# Patient Record
Sex: FEMALE | Race: WHITE | Employment: OTHER | URBAN - METROPOLITAN AREA
[De-identification: names, ages, dates, MRNs, and addresses within clinical notes are randomized per-mention and may not be internally consistent; named-entity substitution may affect disease eponyms.]

---

## 2017-01-09 ENCOUNTER — OFFICE VISIT (OUTPATIENT)
Dept: SLEEP MEDICINE | Age: 82
End: 2017-01-09

## 2017-01-09 VITALS
DIASTOLIC BLOOD PRESSURE: 86 MMHG | HEART RATE: 103 BPM | BODY MASS INDEX: 28.49 KG/M2 | SYSTOLIC BLOOD PRESSURE: 132 MMHG | WEIGHT: 171 LBS | OXYGEN SATURATION: 98 % | HEIGHT: 65 IN

## 2017-01-09 DIAGNOSIS — I25.10 CORONARY ARTERY DISEASE INVOLVING NATIVE CORONARY ARTERY OF NATIVE HEART WITHOUT ANGINA PECTORIS: Chronic | ICD-10-CM

## 2017-01-09 DIAGNOSIS — I10 ESSENTIAL HYPERTENSION: Chronic | ICD-10-CM

## 2017-01-09 DIAGNOSIS — G47.33 OBSTRUCTIVE SLEEP APNEA (ADULT) (PEDIATRIC): Primary | Chronic | ICD-10-CM

## 2017-01-09 DIAGNOSIS — E03.9 ACQUIRED HYPOTHYROIDISM: Chronic | ICD-10-CM

## 2017-01-09 PROCEDURE — 99214 OFFICE O/P EST MOD 30 MIN: CPT | Performed by: INTERNAL MEDICINE

## 2017-01-09 ASSESSMENT — SLEEP AND FATIGUE QUESTIONNAIRES
HOW LIKELY ARE YOU TO NOD OFF OR FALL ASLEEP WHILE SITTING AND READING: 2
ESS TOTAL SCORE: 4
HOW LIKELY ARE YOU TO NOD OFF OR FALL ASLEEP WHILE LYING DOWN TO REST IN THE AFTERNOON WHEN CIRCUMSTANCES PERMIT: 2
HOW LIKELY ARE YOU TO NOD OFF OR FALL ASLEEP WHEN YOU ARE A PASSENGER IN A CAR FOR AN HOUR WITHOUT A BREAK: 0
HOW LIKELY ARE YOU TO NOD OFF OR FALL ASLEEP WHILE SITTING QUIETLY AFTER LUNCH WITHOUT ALCOHOL: 0
HOW LIKELY ARE YOU TO NOD OFF OR FALL ASLEEP WHILE SITTING INACTIVE IN A PUBLIC PLACE: 0
HOW LIKELY ARE YOU TO NOD OFF OR FALL ASLEEP IN A CAR, WHILE STOPPED FOR A FEW MINUTES IN TRAFFIC: 0
HOW LIKELY ARE YOU TO NOD OFF OR FALL ASLEEP WHILE SITTING AND TALKING TO SOMEONE: 0
HOW LIKELY ARE YOU TO NOD OFF OR FALL ASLEEP WHILE WATCHING TV: 0

## 2017-01-09 ASSESSMENT — ENCOUNTER SYMPTOMS
APNEA: 1
SHORTNESS OF BREATH: 0
RHINORRHEA: 0
COUGH: 0
CHOKING: 0

## 2017-03-21 ENCOUNTER — OFFICE VISIT (OUTPATIENT)
Dept: SLEEP MEDICINE | Age: 82
End: 2017-03-21

## 2017-03-21 VITALS
WEIGHT: 171 LBS | DIASTOLIC BLOOD PRESSURE: 70 MMHG | SYSTOLIC BLOOD PRESSURE: 116 MMHG | HEART RATE: 77 BPM | OXYGEN SATURATION: 93 % | HEIGHT: 64 IN | BODY MASS INDEX: 29.19 KG/M2

## 2017-03-21 DIAGNOSIS — E03.9 ACQUIRED HYPOTHYROIDISM: Chronic | ICD-10-CM

## 2017-03-21 DIAGNOSIS — I10 ESSENTIAL HYPERTENSION: Chronic | ICD-10-CM

## 2017-03-21 DIAGNOSIS — I25.10 CORONARY ARTERY DISEASE INVOLVING NATIVE CORONARY ARTERY OF NATIVE HEART WITHOUT ANGINA PECTORIS: Chronic | ICD-10-CM

## 2017-03-21 DIAGNOSIS — G47.33 OBSTRUCTIVE SLEEP APNEA (ADULT) (PEDIATRIC): Primary | Chronic | ICD-10-CM

## 2017-03-21 PROCEDURE — 99214 OFFICE O/P EST MOD 30 MIN: CPT | Performed by: NURSE PRACTITIONER

## 2017-03-21 ASSESSMENT — ENCOUNTER SYMPTOMS
ABDOMINAL DISTENTION: 0
COUGH: 0
SINUS PRESSURE: 0
APNEA: 0
SHORTNESS OF BREATH: 0
RHINORRHEA: 0
ABDOMINAL PAIN: 0

## 2017-03-21 ASSESSMENT — SLEEP AND FATIGUE QUESTIONNAIRES
HOW LIKELY ARE YOU TO NOD OFF OR FALL ASLEEP WHILE SITTING AND TALKING TO SOMEONE: 0
HOW LIKELY ARE YOU TO NOD OFF OR FALL ASLEEP WHILE SITTING QUIETLY AFTER LUNCH WITHOUT ALCOHOL: 1
HOW LIKELY ARE YOU TO NOD OFF OR FALL ASLEEP WHILE SITTING INACTIVE IN A PUBLIC PLACE: 0
HOW LIKELY ARE YOU TO NOD OFF OR FALL ASLEEP WHEN YOU ARE A PASSENGER IN A CAR FOR AN HOUR WITHOUT A BREAK: 0
HOW LIKELY ARE YOU TO NOD OFF OR FALL ASLEEP WHILE SITTING AND READING: 0
ESS TOTAL SCORE: 3
HOW LIKELY ARE YOU TO NOD OFF OR FALL ASLEEP WHILE LYING DOWN TO REST IN THE AFTERNOON WHEN CIRCUMSTANCES PERMIT: 1
HOW LIKELY ARE YOU TO NOD OFF OR FALL ASLEEP IN A CAR, WHILE STOPPED FOR A FEW MINUTES IN TRAFFIC: 0
HOW LIKELY ARE YOU TO NOD OFF OR FALL ASLEEP WHILE WATCHING TV: 1

## 2018-01-24 ENCOUNTER — HOSPITAL ENCOUNTER (OUTPATIENT)
Dept: OTHER | Age: 83
Discharge: HOME | End: 2018-01-24
Attending: NEUROLOGICAL SURGERY | Admitting: NEUROLOGICAL SURGERY

## 2018-01-24 DIAGNOSIS — R52 PAIN: ICD-10-CM

## 2018-01-30 ENCOUNTER — HOSPITAL ENCOUNTER (OUTPATIENT)
Dept: MRI IMAGING | Age: 83
Discharge: HOME | End: 2018-01-30
Attending: NEUROLOGICAL SURGERY | Admitting: NEUROLOGICAL SURGERY

## 2018-01-30 DIAGNOSIS — M47.27 LUMBOSACRAL RADICULOPATHY DUE TO DEGENERATIVE JOINT DISEASE OF SPINE: ICD-10-CM

## 2018-01-30 DIAGNOSIS — M47.12 OTHER SPONDYLOSIS WITH MYELOPATHY, CERVICAL REGION: ICD-10-CM

## 2018-10-19 ENCOUNTER — ANESTHESIA (OUTPATIENT)
Dept: ENDOSCOPY | Age: 83
End: 2018-10-19
Payer: MEDICARE

## 2018-10-19 ENCOUNTER — HOSPITAL ENCOUNTER (OUTPATIENT)
Age: 83
Setting detail: OBSERVATION
Discharge: HOME | End: 2018-10-19
Attending: EMERGENCY MEDICINE
Payer: MEDICARE

## 2018-10-19 ENCOUNTER — APPOINTMENT (OUTPATIENT)
Dept: CT IMAGING | Age: 83
End: 2018-10-19
Payer: MEDICARE

## 2018-10-19 ENCOUNTER — ANESTHESIA EVENT (OUTPATIENT)
Dept: ENDOSCOPY | Age: 83
End: 2018-10-19
Payer: MEDICARE

## 2018-10-19 VITALS — SYSTOLIC BLOOD PRESSURE: 103 MMHG | DIASTOLIC BLOOD PRESSURE: 59 MMHG | OXYGEN SATURATION: 97 %

## 2018-10-19 VITALS
BODY MASS INDEX: 24.99 KG/M2 | HEIGHT: 65 IN | SYSTOLIC BLOOD PRESSURE: 147 MMHG | WEIGHT: 150 LBS | OXYGEN SATURATION: 98 % | DIASTOLIC BLOOD PRESSURE: 75 MMHG | RESPIRATION RATE: 16 BRPM | TEMPERATURE: 98.3 F | HEART RATE: 75 BPM

## 2018-10-19 DIAGNOSIS — K62.89 PROCTITIS: ICD-10-CM

## 2018-10-19 DIAGNOSIS — K62.5 RECTAL BLEEDING: Primary | ICD-10-CM

## 2018-10-19 PROBLEM — K92.2 GI BLEEDING: Status: ACTIVE | Noted: 2018-10-19

## 2018-10-19 LAB
A/G RATIO: 1.7 (ref 1.1–2.2)
ABO/RH: NORMAL
ALBUMIN SERPL-MCNC: 4.8 G/DL (ref 3.4–5)
ALP BLD-CCNC: 48 U/L (ref 40–129)
ALT SERPL-CCNC: 12 U/L (ref 10–40)
ANION GAP SERPL CALCULATED.3IONS-SCNC: 12 MMOL/L (ref 3–16)
ANTIBODY SCREEN: NORMAL
AST SERPL-CCNC: 17 U/L (ref 15–37)
BASOPHILS ABSOLUTE: 0 K/UL (ref 0–0.2)
BASOPHILS RELATIVE PERCENT: 0.7 %
BILIRUB SERPL-MCNC: 0.9 MG/DL (ref 0–1)
BILIRUBIN URINE: NEGATIVE
BLOOD, URINE: NEGATIVE
BUN BLDV-MCNC: 13 MG/DL (ref 7–20)
CALCIUM SERPL-MCNC: 9.4 MG/DL (ref 8.3–10.6)
CHLORIDE BLD-SCNC: 102 MMOL/L (ref 99–110)
CLARITY: CLEAR
CO2: 26 MMOL/L (ref 21–32)
COLOR: YELLOW
CREAT SERPL-MCNC: 0.8 MG/DL (ref 0.6–1.2)
EOSINOPHILS ABSOLUTE: 0 K/UL (ref 0–0.6)
EOSINOPHILS RELATIVE PERCENT: 0.4 %
EPITHELIAL CELLS, UA: 0 /HPF (ref 0–5)
GFR AFRICAN AMERICAN: >60
GFR NON-AFRICAN AMERICAN: >60
GLOBULIN: 2.8 G/DL
GLUCOSE BLD-MCNC: 110 MG/DL (ref 70–99)
GLUCOSE URINE: NEGATIVE MG/DL
HCT VFR BLD CALC: 41.5 % (ref 36–48)
HEMOGLOBIN: 13.9 G/DL (ref 12–16)
HYALINE CASTS: 1 /LPF (ref 0–8)
INR BLD: 1.15 (ref 0.86–1.14)
KETONES, URINE: 15 MG/DL
LEUKOCYTE ESTERASE, URINE: ABNORMAL
LIPASE: 25 U/L (ref 13–60)
LYMPHOCYTES ABSOLUTE: 0.8 K/UL (ref 1–5.1)
LYMPHOCYTES RELATIVE PERCENT: 13.7 %
MCH RBC QN AUTO: 28.7 PG (ref 26–34)
MCHC RBC AUTO-ENTMCNC: 33.5 G/DL (ref 31–36)
MCV RBC AUTO: 85.8 FL (ref 80–100)
MICROSCOPIC EXAMINATION: YES
MONOCYTES ABSOLUTE: 0.4 K/UL (ref 0–1.3)
MONOCYTES RELATIVE PERCENT: 6.2 %
NEUTROPHILS ABSOLUTE: 4.7 K/UL (ref 1.7–7.7)
NEUTROPHILS RELATIVE PERCENT: 79 %
NITRITE, URINE: NEGATIVE
OCCULT BLOOD DIAGNOSTIC: NORMAL
PDW BLD-RTO: 12.9 % (ref 12.4–15.4)
PH UA: 7.5
PLATELET # BLD: 182 K/UL (ref 135–450)
PMV BLD AUTO: 7.8 FL (ref 5–10.5)
POTASSIUM SERPL-SCNC: 3.8 MMOL/L (ref 3.5–5.1)
PROTEIN UA: NEGATIVE MG/DL
PROTHROMBIN TIME: 13.1 SEC (ref 9.8–13)
RBC # BLD: 4.83 M/UL (ref 4–5.2)
RBC UA: 0 /HPF (ref 0–4)
SODIUM BLD-SCNC: 140 MMOL/L (ref 136–145)
SPECIFIC GRAVITY UA: 1.01
TOTAL PROTEIN: 7.6 G/DL (ref 6.4–8.2)
URINE REFLEX TO CULTURE: YES
URINE TYPE: ABNORMAL
UROBILINOGEN, URINE: 0.2 E.U./DL
WBC # BLD: 6 K/UL (ref 4–11)
WBC UA: 2 /HPF (ref 0–5)

## 2018-10-19 PROCEDURE — 96361 HYDRATE IV INFUSION ADD-ON: CPT

## 2018-10-19 PROCEDURE — 80053 COMPREHEN METABOLIC PANEL: CPT

## 2018-10-19 PROCEDURE — 2500000003 HC RX 250 WO HCPCS: Performed by: NURSE ANESTHETIST, CERTIFIED REGISTERED

## 2018-10-19 PROCEDURE — G0378 HOSPITAL OBSERVATION PER HR: HCPCS

## 2018-10-19 PROCEDURE — 86900 BLOOD TYPING SEROLOGIC ABO: CPT

## 2018-10-19 PROCEDURE — 3609008400 HC SIGMOIDOSCOPY DIAGNOSTIC: Performed by: INTERNAL MEDICINE

## 2018-10-19 PROCEDURE — 87086 URINE CULTURE/COLONY COUNT: CPT

## 2018-10-19 PROCEDURE — 7100000010 HC PHASE II RECOVERY - FIRST 15 MIN: Performed by: INTERNAL MEDICINE

## 2018-10-19 PROCEDURE — 86901 BLOOD TYPING SEROLOGIC RH(D): CPT

## 2018-10-19 PROCEDURE — 85025 COMPLETE CBC W/AUTO DIFF WBC: CPT

## 2018-10-19 PROCEDURE — 83690 ASSAY OF LIPASE: CPT

## 2018-10-19 PROCEDURE — 86850 RBC ANTIBODY SCREEN: CPT

## 2018-10-19 PROCEDURE — 6360000002 HC RX W HCPCS: Performed by: INTERNAL MEDICINE

## 2018-10-19 PROCEDURE — 81001 URINALYSIS AUTO W/SCOPE: CPT

## 2018-10-19 PROCEDURE — 2580000003 HC RX 258: Performed by: NURSE ANESTHETIST, CERTIFIED REGISTERED

## 2018-10-19 PROCEDURE — 85610 PROTHROMBIN TIME: CPT

## 2018-10-19 PROCEDURE — 74177 CT ABD & PELVIS W/CONTRAST: CPT

## 2018-10-19 PROCEDURE — 99285 EMERGENCY DEPT VISIT HI MDM: CPT

## 2018-10-19 PROCEDURE — 7100000011 HC PHASE II RECOVERY - ADDTL 15 MIN: Performed by: INTERNAL MEDICINE

## 2018-10-19 PROCEDURE — 2500000003 HC RX 250 WO HCPCS: Performed by: INTERNAL MEDICINE

## 2018-10-19 PROCEDURE — G0328 FECAL BLOOD SCRN IMMUNOASSAY: HCPCS

## 2018-10-19 PROCEDURE — 96375 TX/PRO/DX INJ NEW DRUG ADDON: CPT

## 2018-10-19 PROCEDURE — 6360000004 HC RX CONTRAST MEDICATION: Performed by: EMERGENCY MEDICINE

## 2018-10-19 PROCEDURE — 96374 THER/PROPH/DIAG INJ IV PUSH: CPT

## 2018-10-19 PROCEDURE — S0028 INJECTION, FAMOTIDINE, 20 MG: HCPCS | Performed by: INTERNAL MEDICINE

## 2018-10-19 PROCEDURE — 3700000000 HC ANESTHESIA ATTENDED CARE: Performed by: INTERNAL MEDICINE

## 2018-10-19 PROCEDURE — 3700000001 HC ADD 15 MINUTES (ANESTHESIA): Performed by: INTERNAL MEDICINE

## 2018-10-19 PROCEDURE — 7100000000 HC PACU RECOVERY - FIRST 15 MIN: Performed by: INTERNAL MEDICINE

## 2018-10-19 PROCEDURE — 2709999900 HC NON-CHARGEABLE SUPPLY: Performed by: INTERNAL MEDICINE

## 2018-10-19 PROCEDURE — 2580000003 HC RX 258: Performed by: NURSE PRACTITIONER

## 2018-10-19 PROCEDURE — 7100000001 HC PACU RECOVERY - ADDTL 15 MIN: Performed by: INTERNAL MEDICINE

## 2018-10-19 RX ORDER — PROPOFOL 10 MG/ML
INJECTION, EMULSION INTRAVENOUS CONTINUOUS PRN
Status: DISCONTINUED | OUTPATIENT
Start: 2018-10-19 | End: 2018-10-19 | Stop reason: SDUPTHER

## 2018-10-19 RX ORDER — BUSPIRONE HYDROCHLORIDE 5 MG/1
15 TABLET ORAL 3 TIMES DAILY
Status: CANCELLED | OUTPATIENT
Start: 2018-10-19

## 2018-10-19 RX ORDER — LIDOCAINE HYDROCHLORIDE 20 MG/ML
INJECTION, SOLUTION INFILTRATION; PERINEURAL PRN
Status: DISCONTINUED | OUTPATIENT
Start: 2018-10-19 | End: 2018-10-19 | Stop reason: SDUPTHER

## 2018-10-19 RX ORDER — ISOSORBIDE MONONITRATE 30 MG/1
30 TABLET, EXTENDED RELEASE ORAL DAILY
Status: CANCELLED | OUTPATIENT
Start: 2018-10-19

## 2018-10-19 RX ORDER — ATORVASTATIN CALCIUM 80 MG/1
80 TABLET, FILM COATED ORAL NIGHTLY
Status: CANCELLED | OUTPATIENT
Start: 2018-10-19

## 2018-10-19 RX ORDER — LEVOTHYROXINE SODIUM 0.07 MG/1
1 TABLET ORAL DAILY
Status: CANCELLED | OUTPATIENT
Start: 2018-10-19

## 2018-10-19 RX ORDER — SERTRALINE HYDROCHLORIDE 100 MG/1
200 TABLET, FILM COATED ORAL DAILY
Status: ON HOLD | COMMUNITY
End: 2021-10-18 | Stop reason: ALTCHOICE

## 2018-10-19 RX ORDER — SODIUM CHLORIDE 0.9 % (FLUSH) 0.9 %
10 SYRINGE (ML) INJECTION PRN
Status: CANCELLED | OUTPATIENT
Start: 2018-10-19

## 2018-10-19 RX ORDER — AMLODIPINE BESYLATE 2.5 MG/1
1 TABLET ORAL DAILY
Status: CANCELLED | OUTPATIENT
Start: 2018-10-19

## 2018-10-19 RX ORDER — SODIUM CHLORIDE 9 MG/ML
INJECTION, SOLUTION INTRAVENOUS CONTINUOUS PRN
Status: DISCONTINUED | OUTPATIENT
Start: 2018-10-19 | End: 2018-10-19 | Stop reason: SDUPTHER

## 2018-10-19 RX ORDER — ATORVASTATIN CALCIUM 80 MG/1
80 TABLET, FILM COATED ORAL NIGHTLY
Status: ON HOLD | COMMUNITY
End: 2021-10-22 | Stop reason: SDUPTHER

## 2018-10-19 RX ORDER — ONDANSETRON 2 MG/ML
4 INJECTION INTRAMUSCULAR; INTRAVENOUS EVERY 6 HOURS PRN
Status: CANCELLED | OUTPATIENT
Start: 2018-10-19

## 2018-10-19 RX ORDER — SODIUM CHLORIDE 0.9 % (FLUSH) 0.9 %
10 SYRINGE (ML) INJECTION EVERY 12 HOURS SCHEDULED
Status: CANCELLED | OUTPATIENT
Start: 2018-10-19

## 2018-10-19 RX ORDER — 0.9 % SODIUM CHLORIDE 0.9 %
1000 INTRAVENOUS SOLUTION INTRAVENOUS ONCE
Status: COMPLETED | OUTPATIENT
Start: 2018-10-19 | End: 2018-10-19

## 2018-10-19 RX ORDER — METOPROLOL SUCCINATE 100 MG/1
1 TABLET, EXTENDED RELEASE ORAL DAILY
Status: CANCELLED | OUTPATIENT
Start: 2018-10-19

## 2018-10-19 RX ORDER — ONDANSETRON 2 MG/ML
4 INJECTION INTRAMUSCULAR; INTRAVENOUS EVERY 6 HOURS PRN
Status: DISCONTINUED | OUTPATIENT
Start: 2018-10-19 | End: 2018-11-13 | Stop reason: HOSPADM

## 2018-10-19 RX ADMIN — SODIUM CHLORIDE 1000 ML: 9 INJECTION, SOLUTION INTRAVENOUS at 07:57

## 2018-10-19 RX ADMIN — SODIUM CHLORIDE: 9 INJECTION, SOLUTION INTRAVENOUS at 14:00

## 2018-10-19 RX ADMIN — FAMOTIDINE 20 MG: 10 INJECTION, SOLUTION INTRAVENOUS at 11:55

## 2018-10-19 RX ADMIN — IOPAMIDOL 75 ML: 755 INJECTION, SOLUTION INTRAVENOUS at 08:45

## 2018-10-19 RX ADMIN — ONDANSETRON 4 MG: 2 INJECTION INTRAMUSCULAR; INTRAVENOUS at 11:55

## 2018-10-19 RX ADMIN — PROPOFOL 50 MCG/KG/MIN: 10 INJECTION, EMULSION INTRAVENOUS at 14:06

## 2018-10-19 RX ADMIN — LIDOCAINE HYDROCHLORIDE 50 MG: 20 INJECTION, SOLUTION INFILTRATION; PERINEURAL at 14:05

## 2018-10-19 ASSESSMENT — PULMONARY FUNCTION TESTS
PIF_VALUE: 1

## 2018-10-19 ASSESSMENT — ENCOUNTER SYMPTOMS
BLOOD IN STOOL: 1
SHORTNESS OF BREATH: 0
DIARRHEA: 0
VOMITING: 0
ABDOMINAL PAIN: 0
CHEST TIGHTNESS: 0
NAUSEA: 1

## 2018-10-19 ASSESSMENT — PAIN - FUNCTIONAL ASSESSMENT: PAIN_FUNCTIONAL_ASSESSMENT: 0-10

## 2018-10-19 NOTE — ED NOTES
Pt resting quietly, awaiting transfer to the floor.        Denise Gill, RN  10/19/18 Rajitmackenzie 130, RN  10/19/18 130

## 2018-10-19 NOTE — ED NOTES
Pt c/o of rectal bleeding that began a few weeks. Pt came to ED today because of coffee ground bleeding found on sheets through the night. Pt reports straining while using the restroom. Pt states pain is a 3/10. VSS. No distress noted. Iv placed and fluids are running. PT C/O of headache, physician made aware. Pt and spouse are updated on plan of care. Call light within reach.       Brittani Aquino RN  10/19/18 0655

## 2018-10-19 NOTE — CONSULTS
Gastroenterology Consult Note      Patient: Esequiel Valentine  : 1935  Acct#:      Date:  10/19/2018    Subjective:       History of Present Illness  Patient is a 82 y.o.  female admitted with GI bleeding [K92.2]  GI bleeding [K92.2] who is seen in consult for rectal bleeding. Pt reports a 2 year h/o intermittent BRPBR but much worse over the last 2 days precipitating presentation. Mild lower crampy discomfort. No anorectal pain. No constipation or straining at stool. Has about 2 BM a day that tend to be loose with associated urgency. Reports a 9# unintentional wt loss over the past year or so. Thinks last colonoscopy was 8-10 years ago - unclear if ever had polyps. Past Medical History:   Diagnosis Date    CAD (coronary artery disease)     Depression     Hyperlipidemia     Hypertension     Hypothyroid     Obstructive sleep apnea (adult) (pediatric)       Past Surgical History:   Procedure Laterality Date    ANGIOPLASTY      CORONARY ARTERY BYPASS GRAFT      EYE SURGERY      TUBAL LIGATION          Admission Meds  No current facility-administered medications on file prior to encounter. Current Outpatient Prescriptions on File Prior to Encounter   Medication Sig Dispense Refill    clopidogrel (PLAVIX) 75 MG tablet Take 75 mg by mouth daily.  busPIRone (BUSPAR) 15 MG tablet Take 15 mg by mouth 3 times daily       isosorbide mononitrate (IMDUR) 30 MG CR tablet Take 30 mg by mouth daily.  metoprolol (TOPROL-XL) 100 MG XL tablet TAKE 1 TABLET BY MOUTH EVERY DAY 30 tablet 0    levothyroxine (SYNTHROID) 75 MCG tablet TAKE ONE TABLET BY MOUTH DAILY 90 tablet 1    amlodipine (NORVASC) 2.5 MG tablet TAKE 1 TABLET BY MOUTH DAILY 90 tablet 3    zoster vaccine live, PF, (ZOSTAVAX) 28128 UNT/0.65ML injection Inject 0.65 mLs into the skin once. Patient denies ASA, NSAID use.     Allergies  Allergies   Allergen Reactions    Percocet

## 2018-10-19 NOTE — PROGRESS NOTES
Adm to pacu from endo per cart awakening. Respirations easy & symmetrical. Abdomen soft. Denies pain.

## 2018-10-19 NOTE — PROGRESS NOTES
preopTeaching / education initiated regarding perioperative experience, expectations, and pain management during stay. Patient verbalized understanding.

## 2018-10-19 NOTE — ED PROVIDER NOTES
in stool and nausea. Negative for abdominal pain, diarrhea and vomiting. Genitourinary: Negative for dysuria. Psychiatric/Behavioral: The patient is nervous/anxious. All other systems reviewed and are negative. Positives and Pertinent negatives as per HPI. Except as noted abovein the ROS, all other systems were reviewed and negative. PAST MEDICAL HISTORY     Past Medical History:   Diagnosis Date    CAD (coronary artery disease)     Depression     Hyperlipidemia     Hypertension     Hypothyroid     Obstructive sleep apnea (adult) (pediatric)          SURGICAL HISTORY     Past Surgical History:   Procedure Laterality Date    ANGIOPLASTY      CORONARY ARTERY BYPASS GRAFT  1997    EYE SURGERY      TUBAL LIGATION           CURRENTMEDICATIONS       Previous Medications    AMLODIPINE (NORVASC) 2.5 MG TABLET    TAKE 1 TABLET BY MOUTH DAILY    BUSPIRONE (BUSPAR) 15 MG TABLET    Take 15 mg by mouth 2 times daily. 1/2 tab in AM & 1 tab in PM     CLOPIDOGREL (PLAVIX) 75 MG TABLET    Take 75 mg by mouth daily. ISOSORBIDE MONONITRATE (IMDUR) 30 MG CR TABLET    Take 30 mg by mouth daily. LEVOTHYROXINE (SYNTHROID) 75 MCG TABLET    TAKE ONE TABLET BY MOUTH DAILY    METOPROLOL (TOPROL-XL) 100 MG XL TABLET    TAKE 1 TABLET BY MOUTH EVERY DAY    NIACIN-SIMVASTATIN (SIMCOR) 500-20 MG TB24 TABLET    Take 2 tablets by mouth daily. SERTRALINE (ZOLOFT) 100 MG TABLET    TAKE ONE TABLET BY MOUTH EVERY DAY    ZOSTER VACCINE LIVE, PF, (ZOSTAVAX) 21880 UNT/0.65ML INJECTION    Inject 0.65 mLs into the skin once.            ALLERGIES     Percocet [oxycodone-acetaminophen]    FAMILYHISTORY       Family History   Problem Relation Age of Onset    Heart Disease Mother     Heart Disease Father     Asthma Neg Hx     Cancer Neg Hx     Diabetes Neg Hx     Emphysema Neg Hx     Hypertension Neg Hx     Heart Failure Neg Hx           SOCIAL HISTORY       Social History     Social History    Marital status: examination by myself. This was Hemoccult negative and a yellow stool. She reports she is awakening in the morning with a large splat of blood staining the bed sheets, does admit that she is incontinent of stool and urine. This is not new. CBC is unremarkable with a normal hemoglobin of 13.9. CMP unremarkable. Lipase 25.0. Urinalysis reveals slight ketonuria, the patient is given a liter of IV fluid. INR 1.15, she does take Plavix and aspirin daily. CT abdomen pelvis with IV contrast:  Impression:    Questionable focal wall thickening of the rectum.  This could be further  evaluated with endoscopy if clinically warranted.  There is no evidence of an  obstruction. Small volume pelvic free fluid. Lower lobe nodules measure less than 6 mm. Attending physician did speak with gastroenterology, Dr. Niraj Paulino, he does recommend admission via observation status to hospitalist services and his service will see the patient to sort out rectal bleeding. Patient has not had any bleeding from the rectum during her ER stay. The patient tolerated their visit well. They were seen and evaluated by the attending physician who agreed with the assessment and plan. The patient and / or thefamily were informed of the results of any tests, a time was given to answer questions, a plan was proposed and they agreed with plan. FINAL IMPRESSION      1. Rectal bleeding          DISPOSITION/PLAN   DISPOSITION Decision To Admit 10/19/2018 10:43:29 AM      PATIENT REFERREDTO:  No follow-up provider specified. DISCHARGE MEDICATIONS:  New Prescriptions    No medications on file       DISCONTINUED MEDICATIONS:  Discontinued Medications    ASPIRIN 81 MG EC TABLET    Take 81 mg by mouth daily. HYDROCODONE-ACETAMINOPHEN (VICODIN) 5-500 MG PER TABLET    Take 1 tablet by mouth every 6 hours as needed. LORAZEPAM (ATIVAN) 0.5 MG TABLET    Take 0.5 mg by mouth every 6 hours as needed.       POTASSIUM CHLORIDE

## 2018-10-19 NOTE — PROGRESS NOTES
ALL DISCHARGE INFORMATION EXPLAINED TO PT AND RESPONSIBLE PARTY TO INCLUDE PAIN MANAGEMENT, DIET, ACTIVITY, WOUND CARE ET UNDERSTANDING VOICED. PT HAS CLEAR UNDERSTANDING OF THEIR HOME MEDS. EDUCATIONAL PT HAS BEEN ASSESSED TO BE AT POTENTIAL RISK FOR FALLS RELATED TO RECEIVING ANESTHESIA/MEDICATIONS IN SURGERY. PT AND FAMILY GIVEN INFORMATION ON ASSISTED AMBULATION POST OP.  PAPERS SIGNED AND COPIES GIVEN

## 2018-10-20 LAB — URINE CULTURE, ROUTINE: NORMAL

## 2018-10-20 PROCEDURE — G0378 HOSPITAL OBSERVATION PER HR: HCPCS

## 2018-10-21 PROCEDURE — G0378 HOSPITAL OBSERVATION PER HR: HCPCS

## 2018-10-22 PROCEDURE — G0378 HOSPITAL OBSERVATION PER HR: HCPCS

## 2018-10-23 PROCEDURE — G0378 HOSPITAL OBSERVATION PER HR: HCPCS

## 2018-10-24 PROCEDURE — G0378 HOSPITAL OBSERVATION PER HR: HCPCS

## 2018-10-25 PROCEDURE — G0378 HOSPITAL OBSERVATION PER HR: HCPCS

## 2018-10-26 PROCEDURE — G0378 HOSPITAL OBSERVATION PER HR: HCPCS

## 2018-10-27 PROCEDURE — G0378 HOSPITAL OBSERVATION PER HR: HCPCS

## 2018-10-28 PROCEDURE — G0378 HOSPITAL OBSERVATION PER HR: HCPCS

## 2018-10-29 PROCEDURE — G0378 HOSPITAL OBSERVATION PER HR: HCPCS

## 2018-10-30 PROCEDURE — G0378 HOSPITAL OBSERVATION PER HR: HCPCS

## 2018-10-30 RX ORDER — ARIPIPRAZOLE 2 MG/1
2 TABLET ORAL DAILY
Status: ON HOLD | COMMUNITY
End: 2021-10-18 | Stop reason: ALTCHOICE

## 2018-10-31 PROCEDURE — G0378 HOSPITAL OBSERVATION PER HR: HCPCS

## 2018-11-01 PROCEDURE — G0378 HOSPITAL OBSERVATION PER HR: HCPCS

## 2018-11-02 PROCEDURE — G0378 HOSPITAL OBSERVATION PER HR: HCPCS

## 2018-11-03 PROCEDURE — G0378 HOSPITAL OBSERVATION PER HR: HCPCS

## 2018-11-04 PROCEDURE — G0378 HOSPITAL OBSERVATION PER HR: HCPCS

## 2018-11-05 PROCEDURE — G0378 HOSPITAL OBSERVATION PER HR: HCPCS

## 2018-11-06 PROCEDURE — G0378 HOSPITAL OBSERVATION PER HR: HCPCS

## 2018-11-07 PROCEDURE — G0378 HOSPITAL OBSERVATION PER HR: HCPCS

## 2018-11-08 PROCEDURE — G0378 HOSPITAL OBSERVATION PER HR: HCPCS

## 2018-11-09 PROCEDURE — G0378 HOSPITAL OBSERVATION PER HR: HCPCS

## 2018-11-10 PROCEDURE — G0378 HOSPITAL OBSERVATION PER HR: HCPCS

## 2018-11-11 PROCEDURE — G0378 HOSPITAL OBSERVATION PER HR: HCPCS

## 2018-11-12 PROCEDURE — G0378 HOSPITAL OBSERVATION PER HR: HCPCS

## 2018-11-16 ENCOUNTER — HOSPITAL ENCOUNTER (OUTPATIENT)
Age: 83
Setting detail: OUTPATIENT SURGERY
Discharge: HOME | End: 2018-11-16
Attending: INTERNAL MEDICINE | Admitting: INTERNAL MEDICINE
Payer: MEDICARE

## 2018-11-16 ENCOUNTER — ANESTHESIA (OUTPATIENT)
Dept: ENDOSCOPY | Age: 83
End: 2018-11-16
Payer: MEDICARE

## 2018-11-16 ENCOUNTER — ANESTHESIA EVENT (OUTPATIENT)
Dept: ENDOSCOPY | Age: 83
End: 2018-11-16
Payer: MEDICARE

## 2018-11-16 VITALS
BODY MASS INDEX: 25.1 KG/M2 | SYSTOLIC BLOOD PRESSURE: 112 MMHG | HEIGHT: 64 IN | HEART RATE: 65 BPM | WEIGHT: 147 LBS | RESPIRATION RATE: 16 BRPM | OXYGEN SATURATION: 100 % | TEMPERATURE: 97.4 F | DIASTOLIC BLOOD PRESSURE: 87 MMHG

## 2018-11-16 VITALS — DIASTOLIC BLOOD PRESSURE: 48 MMHG | OXYGEN SATURATION: 98 % | SYSTOLIC BLOOD PRESSURE: 107 MMHG

## 2018-11-16 PROCEDURE — 2709999900 HC NON-CHARGEABLE SUPPLY: Performed by: INTERNAL MEDICINE

## 2018-11-16 PROCEDURE — 2580000003 HC RX 258: Performed by: ANESTHESIOLOGY

## 2018-11-16 PROCEDURE — C1773 RET DEV, INSERTABLE: HCPCS | Performed by: INTERNAL MEDICINE

## 2018-11-16 PROCEDURE — 6360000002 HC RX W HCPCS: Performed by: NURSE ANESTHETIST, CERTIFIED REGISTERED

## 2018-11-16 PROCEDURE — 2580000003 HC RX 258: Performed by: NURSE ANESTHETIST, CERTIFIED REGISTERED

## 2018-11-16 PROCEDURE — 7100000011 HC PHASE II RECOVERY - ADDTL 15 MIN: Performed by: INTERNAL MEDICINE

## 2018-11-16 PROCEDURE — 3609010600 HC COLONOSCOPY POLYPECTOMY SNARE/COLD BIOPSY: Performed by: INTERNAL MEDICINE

## 2018-11-16 PROCEDURE — 3700000001 HC ADD 15 MINUTES (ANESTHESIA): Performed by: INTERNAL MEDICINE

## 2018-11-16 PROCEDURE — 7100000010 HC PHASE II RECOVERY - FIRST 15 MIN: Performed by: INTERNAL MEDICINE

## 2018-11-16 PROCEDURE — 88305 TISSUE EXAM BY PATHOLOGIST: CPT

## 2018-11-16 PROCEDURE — 3700000000 HC ANESTHESIA ATTENDED CARE: Performed by: INTERNAL MEDICINE

## 2018-11-16 RX ORDER — BRIMONIDINE TARTRATE, TIMOLOL MALEATE 2; 5 MG/ML; MG/ML
1 SOLUTION/ DROPS OPHTHALMIC EVERY 12 HOURS
Status: ON HOLD | COMMUNITY
End: 2021-11-01

## 2018-11-16 RX ORDER — VENLAFAXINE 100 MG/1
100 TABLET ORAL NIGHTLY
Status: ON HOLD | COMMUNITY
End: 2021-10-22 | Stop reason: HOSPADM

## 2018-11-16 RX ORDER — QUETIAPINE FUMARATE 50 MG/1
50 TABLET, FILM COATED ORAL NIGHTLY
COMMUNITY

## 2018-11-16 RX ORDER — PROPOFOL 10 MG/ML
INJECTION, EMULSION INTRAVENOUS PRN
Status: DISCONTINUED | OUTPATIENT
Start: 2018-11-16 | End: 2018-11-16 | Stop reason: SDUPTHER

## 2018-11-16 RX ORDER — PREDNISOLONE SODIUM PHOSPHATE 10 MG/ML
1 SOLUTION/ DROPS OPHTHALMIC DAILY
COMMUNITY

## 2018-11-16 RX ORDER — SODIUM CHLORIDE 9 MG/ML
INJECTION, SOLUTION INTRAVENOUS CONTINUOUS PRN
Status: DISCONTINUED | OUTPATIENT
Start: 2018-11-16 | End: 2018-11-16 | Stop reason: SDUPTHER

## 2018-11-16 RX ORDER — SODIUM CHLORIDE 9 MG/ML
INJECTION, SOLUTION INTRAVENOUS CONTINUOUS
Status: DISCONTINUED | OUTPATIENT
Start: 2018-11-16 | End: 2018-11-19 | Stop reason: HOSPADM

## 2018-11-16 RX ADMIN — PROPOFOL 10 MG: 10 INJECTION, EMULSION INTRAVENOUS at 12:10

## 2018-11-16 RX ADMIN — PROPOFOL 10 MG: 10 INJECTION, EMULSION INTRAVENOUS at 12:08

## 2018-11-16 RX ADMIN — SODIUM CHLORIDE: 9 INJECTION, SOLUTION INTRAVENOUS at 11:30

## 2018-11-16 RX ADMIN — PROPOFOL 20 MG: 10 INJECTION, EMULSION INTRAVENOUS at 12:00

## 2018-11-16 RX ADMIN — PROPOFOL 10 MG: 10 INJECTION, EMULSION INTRAVENOUS at 12:14

## 2018-11-16 RX ADMIN — PROPOFOL 10 MG: 10 INJECTION, EMULSION INTRAVENOUS at 12:17

## 2018-11-16 RX ADMIN — PROPOFOL 10 MG: 10 INJECTION, EMULSION INTRAVENOUS at 12:06

## 2018-11-16 RX ADMIN — PROPOFOL 10 MG: 10 INJECTION, EMULSION INTRAVENOUS at 12:04

## 2018-11-16 RX ADMIN — PROPOFOL 80 MG: 10 INJECTION, EMULSION INTRAVENOUS at 11:57

## 2018-11-16 RX ADMIN — SODIUM CHLORIDE: 9 INJECTION, SOLUTION INTRAVENOUS at 11:50

## 2018-11-16 RX ADMIN — PROPOFOL 10 MG: 10 INJECTION, EMULSION INTRAVENOUS at 12:02

## 2018-11-16 ASSESSMENT — PAIN SCALES - GENERAL
PAINLEVEL_OUTOF10: 0

## 2018-11-16 ASSESSMENT — PAIN - FUNCTIONAL ASSESSMENT: PAIN_FUNCTIONAL_ASSESSMENT: 0-10

## 2018-11-16 NOTE — PROGRESS NOTES
To endo recovery from procedure room. VSS, awakens to voice, respirations easy and unlabored.
or piercings on day of surgery. All body piercing jewelry must be removed. 11. If you have ___dentures, they will be removed before going to the OR; we will provide you a container. If you wear ___contact lenses or ___glasses, they will be removed; please bring a case for them. 12. Please see your family doctor/pediatrician for a history & physical and/or concerning medications. Bring any test results/reports from your physician's office. PCP__________________Phone___________H&P Appt. Date________             13 If you  have a Living Will and Durable Power of  for Healthcare, please bring in a copy. X           14. Notify your Surgeon if you develop any illness between now and surgery  time, cough, cold, fever, sore throat, nausea, vomiting, etc.  Please notify your surgeon if you experience dizziness, shortness of breath or blurred vision between now & the time of your surgery             15. DO NOT shave your operative site 96 hours prior to surgery. For face & neck surgery, men may use an electric razor 48 hours prior to surgery. 16. Shower the night before surgery with ___Antibacterial soap ___Hibiclens             17. To provide excellent care visitors will be limited to one in the room at any given time. X           18. Please bring picture ID and insurance card. 19.  Visit our web site for additional information:  [email or web address]              20.During flu season no children under the age of 15 are permitted in the hospital for the safety of all patients. 21. If you take a long acting insulin in the evening only  take half of your usual  dose the night  before your procedure              22. If you use a c-pap please bring DOS if staying overnight,             23.For your convenience [facility] has a pharmacy on site to fill your prescriptions.              24. If you use oxygen and have a portable tank please

## 2018-11-16 NOTE — ANESTHESIA PRE PROCEDURE
disease)     Depression     Hyperlipidemia     Hypertension     Hypothyroid     Obstructive sleep apnea (adult) (pediatric)        Past Surgical History:        Procedure Laterality Date    ANGIOPLASTY      CORONARY ARTERY BYPASS GRAFT  1997    EYE SURGERY Right     CATARACT EXTRACTION W/ IOL    EYE SURGERY Left     CATARCT EXTRACTION W/ IOL    [place] SIGMOIDOSCOPY FLX DX W/COLLJ SPEC BR/WA IF PFRMD N/A 10/19/2018    SIGMOIDOSCOPY DIAGNOSTIC FLEXIBLE performed by Jocelin Palencia MD at [address]  10/19/2018    flexible    TONSILLECTOMY AND ADENOIDECTOMY      TUBAL LIGATION         Social History:    Social History   Substance Use Topics    Smoking status: Never Smoker    Smokeless tobacco: Never Used    Alcohol use Yes      Comment: occas. Counseling given: Not Answered      Vital Signs (Current): There were no vitals filed for this visit.                                            BP Readings from Last 3 Encounters:   10/19/18 (!) 147/75   10/19/18 (!) 103/59   03/21/17 116/70       NPO Status:                                                                                 BMI:   Wt Readings from Last 3 Encounters:   10/30/18 149 lb (67.6 kg)   10/19/18 150 lb (68 kg)   03/21/17 171 lb (77.6 kg)     There is no height or weight on file to calculate BMI.    CBC:   Lab Results   Component Value Date    WBC 6.0 10/19/2018    RBC 4.83 10/19/2018    HGB 13.9 10/19/2018    HCT 41.5 10/19/2018    MCV 85.8 10/19/2018    RDW 12.9 10/19/2018     10/19/2018       CMP:   Lab Results   Component Value Date     10/19/2018    K 3.8 10/19/2018     10/19/2018    CO2 26 10/19/2018    BUN 13 10/19/2018    CREATININE 0.8 10/19/2018    GFRAA >60 10/19/2018    GFRAA 57 06/03/2010    AGRATIO 1.7 10/19/2018    LABGLOM >60 10/19/2018    GLUCOSE 110 10/19/2018    PROT 7.6 10/19/2018    PROT 7.6 06/03/2010    CALCIUM 9.4 10/19/2018    BILITOT 0.9 10/19/2018 ALKPHOS 48 10/19/2018    AST 17 10/19/2018    ALT 12 10/19/2018       POC Tests: No results for input(s): POCGLU, POCNA, POCK, POCCL, POCBUN, POCHEMO, POCHCT in the last 72 hours. Coags:   Lab Results   Component Value Date    PROTIME 13.1 10/19/2018    INR 1.15 10/19/2018       HCG (If Applicable): No results found for: PREGTESTUR, PREGSERUM, HCG, HCGQUANT     ABGs: No results found for: PHART, PO2ART, JDV8YLC, XHN6MPG, BEART, G7TEHDFO     Type & Screen (If Applicable):  No results found for: LABABO, [address]    Anesthesia Evaluation  Patient summary reviewed and Nursing notes reviewed no history of anesthetic complications:   Airway: Mallampati: II  TM distance: >3 FB   Neck ROM: full  Mouth opening: > = 3 FB Dental:          Pulmonary:normal exam    (+) sleep apnea:                             Cardiovascular:  Exercise tolerance: poor (<4 METS),   (+) hypertension: moderate, CAD:, CABG/stent (s/p CABG 2009 stents later  good exercise tolerance): no interval change, [place]:, hyperlipidemia    (-)  angina, orthopnea and PND      Rhythm: regular  Rate: normal  Echocardiogram reviewed         Beta Blocker:  Dose within 24 Hrs      ROS comment: 2017:  Equivocal ECG for ischemia with graded exercise test.   2. Duke Treadmill Score is 4 which indicates intermediate risk. 3. Hypertensive BP response. Neuro/Psych:   (+) depression/anxiety    (-) psychiatric history            ROS comment: 16 eye surgery  Legally blind  Severe glaucoma GI/Hepatic/Renal:             Endo/Other:    (+) hypothyroidism, blood dyscrasia: anemia:., .                 Abdominal:           Vascular:                                        Anesthesia Plan      MAC     ASA 3       Induction: intravenous. MIPS: Prophylactic antiemetics administered. Anesthetic plan and risks discussed with patient. Plan discussed with CRNA.     Attending anesthesiologist reviewed and agrees with Renato Reece MD

## 2018-12-19 NOTE — DISCHARGE SUMMARY
The patient is an 29-year-old female who presented to  emergency room this morning with a report of bright red blood per  rectum. The bright red blood per rectum had been intermittent for two  years, but increased over the last couple of days and was associated  with increased stooling and some urgency. Pt was brought to the endo dept for a sigmoidoscopy. Normal sigmoidoscopy to the mid descending colon with the exception of mild left-sided diverticulosis and small  internal hemorrhoids. I suspect the bleeding is hemorrhoidal in  nature. There is no evidence of any blood throughout the colon. Plan is for a complete colon after plavix is held for 1 week.

## 2021-10-18 ENCOUNTER — APPOINTMENT (OUTPATIENT)
Dept: GENERAL RADIOLOGY | Age: 86
DRG: 377 | End: 2021-10-18
Payer: COMMERCIAL

## 2021-10-18 ENCOUNTER — HOSPITAL ENCOUNTER (INPATIENT)
Age: 86
LOS: 4 days | Discharge: HOME HEALTH | DRG: 377 | End: 2021-10-22
Attending: EMERGENCY MEDICINE | Admitting: INTERNAL MEDICINE
Payer: COMMERCIAL

## 2021-10-18 DIAGNOSIS — J81.0 ACUTE PULMONARY EDEMA (HCC): Primary | ICD-10-CM

## 2021-10-18 DIAGNOSIS — K92.2 GASTROINTESTINAL HEMORRHAGE, UNSPECIFIED GASTROINTESTINAL HEMORRHAGE TYPE: ICD-10-CM

## 2021-10-18 DIAGNOSIS — I48.91 ATRIAL FIBRILLATION, UNSPECIFIED TYPE (HCC): ICD-10-CM

## 2021-10-18 DIAGNOSIS — J96.01 ACUTE RESPIRATORY FAILURE WITH HYPOXEMIA (HCC): ICD-10-CM

## 2021-10-18 PROBLEM — D62 ACUTE BLOOD LOSS ANEMIA: Status: ACTIVE | Noted: 2021-10-18

## 2021-10-18 LAB
A/G RATIO: 0.7 (ref 1.1–2.2)
ABO/RH: NORMAL
ALBUMIN SERPL-MCNC: 3 G/DL (ref 3.4–5)
ALP BLD-CCNC: 75 U/L (ref 40–129)
ALT SERPL-CCNC: 21 U/L (ref 10–40)
ANION GAP SERPL CALCULATED.3IONS-SCNC: 16 MMOL/L (ref 3–16)
ANTIBODY SCREEN: NORMAL
AST SERPL-CCNC: 23 U/L (ref 15–37)
BASOPHILS ABSOLUTE: 0.1 K/UL (ref 0–0.2)
BASOPHILS RELATIVE PERCENT: 0.6 %
BILIRUB SERPL-MCNC: 0.4 MG/DL (ref 0–1)
BUN BLDV-MCNC: 27 MG/DL (ref 7–20)
CALCIUM SERPL-MCNC: 9.2 MG/DL (ref 8.3–10.6)
CHLORIDE BLD-SCNC: 101 MMOL/L (ref 99–110)
CO2: 19 MMOL/L (ref 21–32)
CREAT SERPL-MCNC: 1.4 MG/DL (ref 0.6–1.2)
EOSINOPHILS ABSOLUTE: 0 K/UL (ref 0–0.6)
EOSINOPHILS RELATIVE PERCENT: 0.1 %
GFR AFRICAN AMERICAN: 43
GFR NON-AFRICAN AMERICAN: 36
GLOBULIN: 4.4 G/DL
GLUCOSE BLD-MCNC: 146 MG/DL (ref 70–99)
HCT VFR BLD CALC: 23.5 % (ref 36–48)
HEMOGLOBIN: 7.4 G/DL (ref 12–16)
INR BLD: 1.68 (ref 0.88–1.12)
LYMPHOCYTES ABSOLUTE: 1.1 K/UL (ref 1–5.1)
LYMPHOCYTES RELATIVE PERCENT: 11.8 %
MCH RBC QN AUTO: 27.2 PG (ref 26–34)
MCHC RBC AUTO-ENTMCNC: 31.7 G/DL (ref 31–36)
MCV RBC AUTO: 85.8 FL (ref 80–100)
MONOCYTES ABSOLUTE: 0.5 K/UL (ref 0–1.3)
MONOCYTES RELATIVE PERCENT: 4.9 %
NEUTROPHILS ABSOLUTE: 7.6 K/UL (ref 1.7–7.7)
NEUTROPHILS RELATIVE PERCENT: 82.6 %
OCCULT BLOOD DIAGNOSTIC: ABNORMAL
PDW BLD-RTO: 16.5 % (ref 12.4–15.4)
PLATELET # BLD: 391 K/UL (ref 135–450)
PMV BLD AUTO: 8.1 FL (ref 5–10.5)
POTASSIUM SERPL-SCNC: 4.8 MMOL/L (ref 3.5–5.1)
PRO-BNP: ABNORMAL PG/ML (ref 0–449)
PROCALCITONIN: 0.17 NG/ML (ref 0–0.15)
PROTHROMBIN TIME: 19.4 SEC (ref 9.9–12.7)
RBC # BLD: 2.73 M/UL (ref 4–5.2)
SODIUM BLD-SCNC: 136 MMOL/L (ref 136–145)
TOTAL PROTEIN: 7.4 G/DL (ref 6.4–8.2)
TROPONIN: 0.05 NG/ML
WBC # BLD: 9.2 K/UL (ref 4–11)

## 2021-10-18 PROCEDURE — 99285 EMERGENCY DEPT VISIT HI MDM: CPT

## 2021-10-18 PROCEDURE — 1200000000 HC SEMI PRIVATE

## 2021-10-18 PROCEDURE — 96375 TX/PRO/DX INJ NEW DRUG ADDON: CPT

## 2021-10-18 PROCEDURE — 2580000003 HC RX 258: Performed by: NURSE PRACTITIONER

## 2021-10-18 PROCEDURE — 93005 ELECTROCARDIOGRAM TRACING: CPT | Performed by: EMERGENCY MEDICINE

## 2021-10-18 PROCEDURE — 84484 ASSAY OF TROPONIN QUANT: CPT

## 2021-10-18 PROCEDURE — 36415 COLL VENOUS BLD VENIPUNCTURE: CPT

## 2021-10-18 PROCEDURE — 86923 COMPATIBILITY TEST ELECTRIC: CPT

## 2021-10-18 PROCEDURE — 71045 X-RAY EXAM CHEST 1 VIEW: CPT

## 2021-10-18 PROCEDURE — 86900 BLOOD TYPING SEROLOGIC ABO: CPT

## 2021-10-18 PROCEDURE — 96365 THER/PROPH/DIAG IV INF INIT: CPT

## 2021-10-18 PROCEDURE — 6370000000 HC RX 637 (ALT 250 FOR IP): Performed by: NURSE PRACTITIONER

## 2021-10-18 PROCEDURE — 83880 ASSAY OF NATRIURETIC PEPTIDE: CPT

## 2021-10-18 PROCEDURE — 80053 COMPREHEN METABOLIC PANEL: CPT

## 2021-10-18 PROCEDURE — 85610 PROTHROMBIN TIME: CPT

## 2021-10-18 PROCEDURE — 86901 BLOOD TYPING SEROLOGIC RH(D): CPT

## 2021-10-18 PROCEDURE — 85025 COMPLETE CBC W/AUTO DIFF WBC: CPT

## 2021-10-18 PROCEDURE — 84145 PROCALCITONIN (PCT): CPT

## 2021-10-18 PROCEDURE — 82270 OCCULT BLOOD FECES: CPT

## 2021-10-18 PROCEDURE — P9016 RBC LEUKOCYTES REDUCED: HCPCS

## 2021-10-18 PROCEDURE — C9113 INJ PANTOPRAZOLE SODIUM, VIA: HCPCS | Performed by: NURSE PRACTITIONER

## 2021-10-18 PROCEDURE — 6360000002 HC RX W HCPCS: Performed by: NURSE PRACTITIONER

## 2021-10-18 PROCEDURE — 86850 RBC ANTIBODY SCREEN: CPT

## 2021-10-18 RX ORDER — SODIUM CHLORIDE 9 MG/ML
25 INJECTION, SOLUTION INTRAVENOUS PRN
Status: DISCONTINUED | OUTPATIENT
Start: 2021-10-18 | End: 2021-10-22 | Stop reason: HOSPADM

## 2021-10-18 RX ORDER — METOPROLOL SUCCINATE 50 MG/1
100 TABLET, EXTENDED RELEASE ORAL DAILY
Status: DISCONTINUED | OUTPATIENT
Start: 2021-10-19 | End: 2021-10-19

## 2021-10-18 RX ORDER — ISOSORBIDE MONONITRATE 30 MG/1
30 TABLET, EXTENDED RELEASE ORAL DAILY
Status: DISCONTINUED | OUTPATIENT
Start: 2021-10-19 | End: 2021-10-18

## 2021-10-18 RX ORDER — ARIPIPRAZOLE 2 MG/1
2 TABLET ORAL DAILY
Status: DISCONTINUED | OUTPATIENT
Start: 2021-10-19 | End: 2021-10-18

## 2021-10-18 RX ORDER — PREDNISOLONE ACETATE 10 MG/ML
1 SUSPENSION/ DROPS OPHTHALMIC DAILY
Status: DISCONTINUED | OUTPATIENT
Start: 2021-10-19 | End: 2021-10-22 | Stop reason: HOSPADM

## 2021-10-18 RX ORDER — LEVOTHYROXINE SODIUM 0.07 MG/1
1 TABLET ORAL DAILY
Status: DISCONTINUED | OUTPATIENT
Start: 2021-10-19 | End: 2021-10-18

## 2021-10-18 RX ORDER — VENLAFAXINE 25 MG/1
75 TABLET ORAL DAILY
Status: DISCONTINUED | OUTPATIENT
Start: 2021-10-19 | End: 2021-10-22 | Stop reason: HOSPADM

## 2021-10-18 RX ORDER — FUROSEMIDE 10 MG/ML
20 INJECTION INTRAMUSCULAR; INTRAVENOUS ONCE
Status: COMPLETED | OUTPATIENT
Start: 2021-10-18 | End: 2021-10-18

## 2021-10-18 RX ORDER — ONDANSETRON 4 MG/1
4 TABLET, ORALLY DISINTEGRATING ORAL EVERY 8 HOURS PRN
Status: DISCONTINUED | OUTPATIENT
Start: 2021-10-18 | End: 2021-10-22 | Stop reason: HOSPADM

## 2021-10-18 RX ORDER — MULTIVIT-MIN/IRON/FOLIC ACID/K 18-600-40
1 CAPSULE ORAL DAILY
COMMUNITY

## 2021-10-18 RX ORDER — ATORVASTATIN CALCIUM 80 MG/1
80 TABLET, FILM COATED ORAL NIGHTLY
Status: DISCONTINUED | OUTPATIENT
Start: 2021-10-18 | End: 2021-10-22 | Stop reason: HOSPADM

## 2021-10-18 RX ORDER — SODIUM CHLORIDE 0.9 % (FLUSH) 0.9 %
5-40 SYRINGE (ML) INJECTION EVERY 12 HOURS SCHEDULED
Status: DISCONTINUED | OUTPATIENT
Start: 2021-10-18 | End: 2021-10-22 | Stop reason: HOSPADM

## 2021-10-18 RX ORDER — PREDNISOLONE SODIUM PHOSPHATE 10 MG/ML
1 SOLUTION/ DROPS OPHTHALMIC 4 TIMES DAILY
Status: DISCONTINUED | OUTPATIENT
Start: 2021-10-18 | End: 2021-10-18 | Stop reason: SDUPTHER

## 2021-10-18 RX ORDER — PANTOPRAZOLE SODIUM 40 MG/10ML
40 INJECTION, POWDER, LYOPHILIZED, FOR SOLUTION INTRAVENOUS DAILY
Status: DISCONTINUED | OUTPATIENT
Start: 2021-10-19 | End: 2021-10-19

## 2021-10-18 RX ORDER — LEVOTHYROXINE SODIUM 112 UG/1
112 TABLET ORAL
Status: DISCONTINUED | OUTPATIENT
Start: 2021-10-19 | End: 2021-10-22 | Stop reason: HOSPADM

## 2021-10-18 RX ORDER — SODIUM CHLORIDE 0.9 % (FLUSH) 0.9 %
5-40 SYRINGE (ML) INJECTION PRN
Status: DISCONTINUED | OUTPATIENT
Start: 2021-10-18 | End: 2021-10-22 | Stop reason: HOSPADM

## 2021-10-18 RX ORDER — QUETIAPINE FUMARATE 25 MG/1
25 TABLET, FILM COATED ORAL 2 TIMES DAILY
Status: DISCONTINUED | OUTPATIENT
Start: 2021-10-18 | End: 2021-10-22 | Stop reason: HOSPADM

## 2021-10-18 RX ORDER — AMLODIPINE BESYLATE 2.5 MG/1
2.5 TABLET ORAL DAILY
Status: ON HOLD | COMMUNITY
End: 2021-10-22 | Stop reason: HOSPADM

## 2021-10-18 RX ORDER — BRIMONIDINE TARTRATE, TIMOLOL MALEATE 2; 5 MG/ML; MG/ML
1 SOLUTION/ DROPS OPHTHALMIC EVERY 12 HOURS
Status: DISCONTINUED | OUTPATIENT
Start: 2021-10-18 | End: 2021-10-18

## 2021-10-18 RX ORDER — LORAZEPAM 1 MG/1
1 TABLET ORAL ONCE
Status: COMPLETED | OUTPATIENT
Start: 2021-10-18 | End: 2021-10-18

## 2021-10-18 RX ORDER — ACETAMINOPHEN 650 MG/1
650 SUPPOSITORY RECTAL EVERY 6 HOURS PRN
Status: DISCONTINUED | OUTPATIENT
Start: 2021-10-18 | End: 2021-10-22 | Stop reason: HOSPADM

## 2021-10-18 RX ORDER — ACETAMINOPHEN 325 MG/1
650 TABLET ORAL EVERY 6 HOURS PRN
Status: DISCONTINUED | OUTPATIENT
Start: 2021-10-18 | End: 2021-10-22 | Stop reason: HOSPADM

## 2021-10-18 RX ORDER — ONDANSETRON 2 MG/ML
4 INJECTION INTRAMUSCULAR; INTRAVENOUS EVERY 6 HOURS PRN
Status: DISCONTINUED | OUTPATIENT
Start: 2021-10-18 | End: 2021-10-22 | Stop reason: HOSPADM

## 2021-10-18 RX ORDER — FUROSEMIDE 10 MG/ML
20 INJECTION INTRAMUSCULAR; INTRAVENOUS 2 TIMES DAILY
Status: DISCONTINUED | OUTPATIENT
Start: 2021-10-18 | End: 2021-10-21

## 2021-10-18 RX ORDER — AMLODIPINE BESYLATE 2.5 MG/1
1 TABLET ORAL DAILY
Status: DISCONTINUED | OUTPATIENT
Start: 2021-10-19 | End: 2021-10-18

## 2021-10-18 RX ORDER — M-VIT,TX,IRON,MINS/CALC/FOLIC 27MG-0.4MG
1 TABLET ORAL DAILY
COMMUNITY

## 2021-10-18 RX ADMIN — SODIUM CHLORIDE 80 MG: 9 INJECTION, SOLUTION INTRAVENOUS at 20:07

## 2021-10-18 RX ADMIN — FUROSEMIDE 20 MG: 10 INJECTION, SOLUTION INTRAMUSCULAR; INTRAVENOUS at 19:46

## 2021-10-18 RX ADMIN — SODIUM CHLORIDE 8 MG/HR: 9 INJECTION, SOLUTION INTRAVENOUS at 20:44

## 2021-10-18 RX ADMIN — LORAZEPAM 1 MG: 1 TABLET ORAL at 20:44

## 2021-10-18 ASSESSMENT — ENCOUNTER SYMPTOMS
VOMITING: 0
ROS SKIN COMMENTS: PALE
CHEST TIGHTNESS: 0
DIARRHEA: 0
ABDOMINAL PAIN: 0
NAUSEA: 0
SHORTNESS OF BREATH: 1

## 2021-10-18 ASSESSMENT — PAIN SCALES - GENERAL: PAINLEVEL_OUTOF10: 0

## 2021-10-18 NOTE — ED PROVIDER NOTES
Respiratory: Positive for shortness of breath. Negative for chest tightness. Cardiovascular: Negative for chest pain. Gastrointestinal: Negative for abdominal pain, diarrhea, nausea and vomiting. Genitourinary: Negative for dysuria. Skin:        pale   All other systems reviewed and are negative. Positives and Pertinent negatives as per HPI. Except as noted above in the ROS, all other systems were reviewed and negative. PAST MEDICAL HISTORY     Past Medical History:   Diagnosis Date    CAD (coronary artery disease)     Depression     Hyperlipidemia     Hypertension     Hypothyroid     Legal blindness     Obstructive sleep apnea (adult) (pediatric)          SURGICAL HISTORY     Past Surgical History:   Procedure Laterality Date    ANGIOPLASTY      COLONOSCOPY  11/16/2018    COLONOSCOPY POLYPECTOMY SNARE/COLD BIOPSY performed by Lyndsay Horn MD at [address] Right     CATARACT EXTRACTION W/ IOL    EYE SURGERY Left     CATARCT EXTRACTION W/ IOL    [place] SIGMOIDOSCOPY FLX DX W/COLLJ SPEC BR/WA IF PFRMD N/A 10/19/2018    SIGMOIDOSCOPY DIAGNOSTIC FLEXIBLE performed by Lyndsay Horn MD at [facility]  10/19/2018    flexible    TONSILLECTOMY AND ADENOIDECTOMY      TUBAL LIGATION           CURRENTMEDICATIONS       Previous Medications    AMLODIPINE (NORVASC) 2.5 MG TABLET    TAKE 1 TABLET BY MOUTH DAILY    ARIPIPRAZOLE (ABILIFY) 2 MG TABLET    Take 2 mg by mouth daily    ATORVASTATIN (LIPITOR) 80 MG TABLET    Take 80 mg by mouth nightly    BRIMONIDINE-TIMOLOL (COMBIGAN) 0.2-0.5 % OPHTHALMIC SOLUTION    Place 1 drop into both eyes every 12 hours    CLOPIDOGREL (PLAVIX) 75 MG TABLET    Take 75 mg by mouth daily. ISOSORBIDE MONONITRATE (IMDUR) 30 MG CR TABLET    Take 30 mg by mouth daily.       LEVOTHYROXINE (SYNTHROID) 75 MCG TABLET    TAKE ONE TABLET BY MOUTH DAILY    METOPROLOL (TOPROL-XL) 100 MG XL TABLET    TAKE 1 TABLET BY MOUTH EVERY DAY    PREDNISOLONE SODIUM PHOSPHATE (INFLAMASE FORTE) 1 % OPHTHALMIC SOLUTION    Place 1 drop into both eyes 4 times daily    QUETIAPINE (SEROQUEL) 50 MG TABLET    Take 75 mg by mouth 2 times daily    SERTRALINE (ZOLOFT) 100 MG TABLET    Take 200 mg by mouth daily    TRAZODONE & DIET MANAGE PROD (TRAZAMINE) 50 MG MISC    Take 100 mg by mouth    VENLAFAXINE (EFFEXOR) 100 MG TABLET    Take 100 mg by mouth 3 times daily    ZOSTER VACCINE LIVE, PF, (ZOSTAVAX) 98008 UNT/0.65ML INJECTION    Inject 0.65 mLs into the skin once. ALLERGIES     Percocet [oxycodone-acetaminophen]    FAMILYHISTORY       Family History   Problem Relation Age of Onset    Heart Disease Mother     Heart Disease Father     Asthma Neg Hx     Cancer Neg Hx     Diabetes Neg Hx     Emphysema Neg Hx     Hypertension Neg Hx     Heart Failure Neg Hx           SOCIAL HISTORY       Social History     Tobacco Use    Smoking status: Never Smoker    Smokeless tobacco: Never Used   Vaping Use    Vaping Use: Never used   Substance Use Topics    Alcohol use: Yes     Alcohol/week: 2.0 standard drinks     Types: 2 Glasses of wine per week     Comment: occas.  Drug use: No       SCREENINGS    [place] Coma Scale  Eye Opening: Spontaneous  Best Verbal Response: Oriented  Best Motor Response: Obeys commands  [place] Coma Scale Score: 15        PHYSICAL EXAM    (up to 7 for level 4, 8 or more for level 5)     ED Triage Vitals   BP Temp Temp Source Pulse Resp SpO2 Height Weight   10/18/21 1815 10/18/21 1816 10/18/21 1816 10/18/21 1815 10/18/21 1815 10/18/21 1816 10/18/21 1811 10/18/21 1811   (!) 154/101 97.7 °F (36.5 °C) Oral 128 30 100 % 5' 4\" (1.626 m) 144 lb (65.3 kg)       Physical Exam  Vitals and nursing note reviewed. Constitutional:       Appearance: She is well-developed. She is not diaphoretic. HENT:      Head: Normocephalic and atraumatic.       Right Ear: External ear normal.      Left Ear: External ear normal.   Eyes:      General:         Right eye: No discharge. Left eye: No discharge. Neck:      Vascular: No JVD. Cardiovascular:      Rate and Rhythm: Tachycardia present. Rhythm irregular. Pulses: Normal pulses. Pulmonary:      Effort: Pulmonary effort is normal. No respiratory distress. Abdominal:      Palpations: Abdomen is soft. Tenderness: There is no abdominal tenderness. Genitourinary:     Rectum: Guaiac result positive. Musculoskeletal:         General: Normal range of motion. Cervical back: Normal range of motion and neck supple. Skin:     General: Skin is warm and dry. Coloration: Skin is pale. Neurological:      Mental Status: She is alert and oriented to person, place, and time.    Psychiatric:         Behavior: Behavior normal.         DIAGNOSTIC RESULTS   LABS:    Labs Reviewed   CBC WITH AUTO DIFFERENTIAL - Abnormal; Notable for the following components:       Result Value    RBC 2.73 (*)     Hemoglobin 7.4 (*)     Hematocrit 23.5 (*)     RDW 16.5 (*)     All other components within normal limits    Narrative:     Performed at:  [facility] [address], [place], [address]   Phone [phone]   COMPREHENSIVE METABOLIC PANEL - Abnormal; Notable for the following components:    CO2 19 (*)     Glucose 146 (*)     BUN 27 (*)     CREATININE 1.4 (*)     GFR Non- 36 (*)     GFR  43 (*)     Albumin 3.0 (*)     Albumin/Globulin Ratio 0.7 (*)     All other components within normal limits    Narrative:     Performed at:  [facility] [address], [place], [address]   Phone [phone]   PROTIME-INR - Abnormal; Notable for the following components:    Protime 19.4 (*)     INR 1.68 (*)     All other components within normal limits    Narrative:     Performed at:  [facility] [address], [place], [facility] [address] Phone [phone]   BLOOD OCCULT STOOL DIAGNOSTIC - Abnormal; Notable for the following components:    Occult Blood Diagnostic   (*)     Value: Result: POSITIVE  Normal range: Negative      All other components within normal limits    Narrative:     ORDER#: [id number]                          ORDERED BY: Bill Isabel  SOURCE: Stool                              COLLECTED:  10/18/21 18:51  ANTIBIOTICS AT REGINA.:                      RECEIVED :  10/18/21 19:00  Performed at:  [facility]  [facility]   Phone [phone]   TROPONIN - Abnormal; Notable for the following components:    Troponin 0.05 (*)     All other components within normal limits    Narrative:     Performed at:  [facility]  [facility]   Phone [phone]   BRAIN NATRIURETIC PEPTIDE - Abnormal; Notable for the following components:    Pro-BNP 12,375 (*)     All other components within normal limits    Narrative:     Performed at:  [facility]  [facility]   Phone [phone]   PROCALCITONIN - Abnormal; Notable for the following components:    Procalcitonin 0.17 (*)     All other components within normal limits    Narrative:     Performed at:  [facility]  [facility]   Phone [phone]   TYPE AND SCREEN    Narrative:     Performed at:  [facility] [address]   Phone [phone]       When ordered only abnormal lab results are displayed. All other labs were within normal range or not returned as of this dictation. EKG: When ordered, EKG's are interpreted by the Emergency Department Physician in the absence of a cardiologist.  Please see their note for interpretation of EKG.     RADIOLOGY:   Non-plain film images such as CT, Ultrasound and MRI

## 2021-10-19 LAB
ANION GAP SERPL CALCULATED.3IONS-SCNC: 12 MMOL/L (ref 3–16)
BLOOD BANK DISPENSE STATUS: NORMAL
BLOOD BANK PRODUCT CODE: NORMAL
BPU ID: NORMAL
BUN BLDV-MCNC: 29 MG/DL (ref 7–20)
CALCIUM SERPL-MCNC: 9.1 MG/DL (ref 8.3–10.6)
CHLORIDE BLD-SCNC: 102 MMOL/L (ref 99–110)
CO2: 17 MMOL/L (ref 21–32)
CREAT SERPL-MCNC: 1.4 MG/DL (ref 0.6–1.2)
DESCRIPTION BLOOD BANK: NORMAL
EKG ATRIAL RATE: 119 BPM
EKG DIAGNOSIS: NORMAL
EKG Q-T INTERVAL: 334 MS
EKG QRS DURATION: 98 MS
EKG QTC CALCULATION (BAZETT): 469 MS
EKG R AXIS: 19 DEGREES
EKG T AXIS: 110 DEGREES
EKG VENTRICULAR RATE: 119 BPM
GFR AFRICAN AMERICAN: 43
GFR NON-AFRICAN AMERICAN: 36
GLUCOSE BLD-MCNC: 103 MG/DL (ref 70–99)
HCT VFR BLD CALC: 21.5 % (ref 36–48)
HCT VFR BLD CALC: 24.1 % (ref 36–48)
HEMOGLOBIN: 7 G/DL (ref 12–16)
HEMOGLOBIN: 7.7 G/DL (ref 12–16)
POTASSIUM REFLEX MAGNESIUM: 4.4 MMOL/L (ref 3.5–5.1)
SARS-COV-2, NAAT: NOT DETECTED
SODIUM BLD-SCNC: 131 MMOL/L (ref 136–145)

## 2021-10-19 PROCEDURE — 6370000000 HC RX 637 (ALT 250 FOR IP): Performed by: PHYSICIAN ASSISTANT

## 2021-10-19 PROCEDURE — 85018 HEMOGLOBIN: CPT

## 2021-10-19 PROCEDURE — 36415 COLL VENOUS BLD VENIPUNCTURE: CPT

## 2021-10-19 PROCEDURE — C9113 INJ PANTOPRAZOLE SODIUM, VIA: HCPCS | Performed by: INTERNAL MEDICINE

## 2021-10-19 PROCEDURE — C1751 CATH, INF, PER/CENT/MIDLINE: HCPCS

## 2021-10-19 PROCEDURE — 85014 HEMATOCRIT: CPT

## 2021-10-19 PROCEDURE — 6370000000 HC RX 637 (ALT 250 FOR IP): Performed by: INTERNAL MEDICINE

## 2021-10-19 PROCEDURE — 6360000002 HC RX W HCPCS: Performed by: NURSE PRACTITIONER

## 2021-10-19 PROCEDURE — 1200000000 HC SEMI PRIVATE

## 2021-10-19 PROCEDURE — C9113 INJ PANTOPRAZOLE SODIUM, VIA: HCPCS | Performed by: NURSE PRACTITIONER

## 2021-10-19 PROCEDURE — 6370000000 HC RX 637 (ALT 250 FOR IP): Performed by: NURSE PRACTITIONER

## 2021-10-19 PROCEDURE — 99222 1ST HOSP IP/OBS MODERATE 55: CPT | Performed by: INTERNAL MEDICINE

## 2021-10-19 PROCEDURE — 2580000003 HC RX 258: Performed by: INTERNAL MEDICINE

## 2021-10-19 PROCEDURE — 2580000003 HC RX 258: Performed by: NURSE PRACTITIONER

## 2021-10-19 PROCEDURE — 87635 SARS-COV-2 COVID-19 AMP PRB: CPT

## 2021-10-19 PROCEDURE — 6360000002 HC RX W HCPCS: Performed by: INTERNAL MEDICINE

## 2021-10-19 PROCEDURE — 36573 INSJ PICC RS&I 5 YR+: CPT

## 2021-10-19 PROCEDURE — 36430 TRANSFUSION BLD/BLD COMPNT: CPT

## 2021-10-19 PROCEDURE — 93010 ELECTROCARDIOGRAM REPORT: CPT | Performed by: INTERNAL MEDICINE

## 2021-10-19 PROCEDURE — 80048 BASIC METABOLIC PNL TOTAL CA: CPT

## 2021-10-19 PROCEDURE — 2580000003 HC RX 258

## 2021-10-19 RX ORDER — PEG-3350, SODIUM SULFATE, SODIUM CHLORIDE, POTASSIUM CHLORIDE, SODIUM ASCORBATE AND ASCORBIC ACID 7.5-2.691G
100 KIT ORAL ONCE
Status: COMPLETED | OUTPATIENT
Start: 2021-10-19 | End: 2021-10-19

## 2021-10-19 RX ORDER — AMIODARONE HYDROCHLORIDE 200 MG/1
400 TABLET ORAL 2 TIMES DAILY
Status: DISCONTINUED | OUTPATIENT
Start: 2021-10-19 | End: 2021-10-22 | Stop reason: HOSPADM

## 2021-10-19 RX ORDER — LIDOCAINE HYDROCHLORIDE 10 MG/ML
5 INJECTION, SOLUTION EPIDURAL; INFILTRATION; INTRACAUDAL; PERINEURAL ONCE
Status: DISCONTINUED | OUTPATIENT
Start: 2021-10-19 | End: 2021-10-22 | Stop reason: HOSPADM

## 2021-10-19 RX ORDER — SODIUM CHLORIDE 0.9 % (FLUSH) 0.9 %
5-40 SYRINGE (ML) INJECTION EVERY 12 HOURS SCHEDULED
Status: DISCONTINUED | OUTPATIENT
Start: 2021-10-19 | End: 2021-10-22 | Stop reason: HOSPADM

## 2021-10-19 RX ORDER — PEG-3350, SODIUM SULFATE, SODIUM CHLORIDE, POTASSIUM CHLORIDE, SODIUM ASCORBATE AND ASCORBIC ACID 7.5-2.691G
100 KIT ORAL ONCE
Status: DISCONTINUED | OUTPATIENT
Start: 2021-10-19 | End: 2021-10-19

## 2021-10-19 RX ORDER — SODIUM CHLORIDE 9 MG/ML
INJECTION, SOLUTION INTRAVENOUS PRN
Status: DISCONTINUED | OUTPATIENT
Start: 2021-10-19 | End: 2021-10-22 | Stop reason: HOSPADM

## 2021-10-19 RX ORDER — SODIUM CHLORIDE 9 MG/ML
INJECTION, SOLUTION INTRAVENOUS
Status: COMPLETED
Start: 2021-10-19 | End: 2021-10-19

## 2021-10-19 RX ORDER — PEG-3350, SODIUM SULFATE, SODIUM CHLORIDE, POTASSIUM CHLORIDE, SODIUM ASCORBATE AND ASCORBIC ACID 7.5-2.691G
100 KIT ORAL ONCE
Status: COMPLETED | OUTPATIENT
Start: 2021-10-20 | End: 2021-10-19

## 2021-10-19 RX ORDER — SODIUM CHLORIDE 0.9 % (FLUSH) 0.9 %
5-40 SYRINGE (ML) INJECTION PRN
Status: DISCONTINUED | OUTPATIENT
Start: 2021-10-19 | End: 2021-10-22 | Stop reason: HOSPADM

## 2021-10-19 RX ORDER — SODIUM CHLORIDE 9 MG/ML
25 INJECTION, SOLUTION INTRAVENOUS PRN
Status: DISCONTINUED | OUTPATIENT
Start: 2021-10-19 | End: 2021-10-22 | Stop reason: HOSPADM

## 2021-10-19 RX ORDER — METOPROLOL SUCCINATE 50 MG/1
100 TABLET, EXTENDED RELEASE ORAL DAILY
Status: DISCONTINUED | OUTPATIENT
Start: 2021-10-19 | End: 2021-10-20

## 2021-10-19 RX ORDER — HYDROXYZINE HYDROCHLORIDE 10 MG/1
10 TABLET, FILM COATED ORAL 3 TIMES DAILY PRN
Status: DISCONTINUED | OUTPATIENT
Start: 2021-10-19 | End: 2021-10-21

## 2021-10-19 RX ADMIN — HYDROXYZINE HYDROCHLORIDE 10 MG: 10 TABLET ORAL at 11:11

## 2021-10-19 RX ADMIN — ATORVASTATIN CALCIUM 80 MG: 80 TABLET, FILM COATED ORAL at 20:33

## 2021-10-19 RX ADMIN — SODIUM CHLORIDE 25 ML: 9 INJECTION, SOLUTION INTRAVENOUS at 14:18

## 2021-10-19 RX ADMIN — LEVOTHYROXINE SODIUM 112 MCG: 0.11 TABLET ORAL at 06:02

## 2021-10-19 RX ADMIN — Medication 10 ML: at 20:33

## 2021-10-19 RX ADMIN — METOPROLOL SUCCINATE 100 MG: 50 TABLET, EXTENDED RELEASE ORAL at 06:27

## 2021-10-19 RX ADMIN — PREDNISOLONE ACETATE 1 DROP: 10 SUSPENSION/ DROPS OPHTHALMIC at 09:30

## 2021-10-19 RX ADMIN — ATORVASTATIN CALCIUM 80 MG: 80 TABLET, FILM COATED ORAL at 00:40

## 2021-10-19 RX ADMIN — SODIUM CHLORIDE 8 MG/HR: 9 INJECTION, SOLUTION INTRAVENOUS at 06:29

## 2021-10-19 RX ADMIN — QUETIAPINE FUMARATE 25 MG: 25 TABLET ORAL at 09:24

## 2021-10-19 RX ADMIN — FUROSEMIDE 20 MG: 10 INJECTION, SOLUTION INTRAMUSCULAR; INTRAVENOUS at 09:24

## 2021-10-19 RX ADMIN — POLYETHYLENE GLYCOL 3350, SODIUM SULFATE, SODIUM CHLORIDE, POTASSIUM CHLORIDE, ASCORBIC ACID, SODIUM ASCORBATE 100 G: KIT at 19:33

## 2021-10-19 RX ADMIN — POLYETHYLENE GLYCOL 3350, SODIUM SULFATE, SODIUM CHLORIDE, POTASSIUM CHLORIDE, ASCORBIC ACID, SODIUM ASCORBATE 100 G: KIT at 23:13

## 2021-10-19 RX ADMIN — QUETIAPINE FUMARATE 25 MG: 25 TABLET ORAL at 00:40

## 2021-10-19 RX ADMIN — Medication 10 ML: at 00:40

## 2021-10-19 RX ADMIN — PANTOPRAZOLE SODIUM 40 MG: 40 INJECTION, POWDER, FOR SOLUTION INTRAVENOUS at 09:37

## 2021-10-19 RX ADMIN — Medication 10 ML: at 09:30

## 2021-10-19 RX ADMIN — FUROSEMIDE 20 MG: 10 INJECTION, SOLUTION INTRAMUSCULAR; INTRAVENOUS at 18:57

## 2021-10-19 RX ADMIN — AMIODARONE HYDROCHLORIDE 400 MG: 200 TABLET ORAL at 20:33

## 2021-10-19 RX ADMIN — VENLAFAXINE 75 MG: 25 TABLET ORAL at 09:24

## 2021-10-19 RX ADMIN — QUETIAPINE FUMARATE 25 MG: 25 TABLET ORAL at 20:33

## 2021-10-19 ASSESSMENT — PAIN DESCRIPTION - PAIN TYPE
TYPE: ACUTE PAIN
TYPE: ACUTE PAIN

## 2021-10-19 ASSESSMENT — PAIN DESCRIPTION - ONSET
ONSET: GRADUAL
ONSET: GRADUAL

## 2021-10-19 ASSESSMENT — PAIN SCALES - GENERAL
PAINLEVEL_OUTOF10: 0
PAINLEVEL_OUTOF10: 4
PAINLEVEL_OUTOF10: 0
PAINLEVEL_OUTOF10: 3

## 2021-10-19 ASSESSMENT — PAIN DESCRIPTION - DESCRIPTORS
DESCRIPTORS: HEADACHE
DESCRIPTORS: HEADACHE

## 2021-10-19 ASSESSMENT — PAIN DESCRIPTION - FREQUENCY
FREQUENCY: INTERMITTENT
FREQUENCY: INTERMITTENT

## 2021-10-19 ASSESSMENT — PAIN DESCRIPTION - LOCATION
LOCATION: HEAD
LOCATION: HEAD

## 2021-10-19 ASSESSMENT — PAIN DESCRIPTION - PROGRESSION
CLINICAL_PROGRESSION: NOT CHANGED
CLINICAL_PROGRESSION: NOT CHANGED

## 2021-10-19 NOTE — ED NOTES
Charlotte Sheppard in place     [facility], [address] [place] [address]  10/18/21 2010

## 2021-10-19 NOTE — PROGRESS NOTES
[facility] PROGRESS NOTE    10/19/2021 8:22 AM        Name: Alexandria Fitzgerald . Admitted: 10/18/2021  Primary Care Provider: Jennifer Mederos (Tel: [phone])      Chief Complaint   Patient presents with    Fatigue     Patient newly diagnosed with Afib in June, was recently seen at [facility] for weakness and a kidney infection. Patient is having increasing weakness since she was last seen at [facility], on 4 L % O2 sat      Brief History: Patient is an 81 yo female with hx PAF, CAD/CABG, HLD, HTN, hypothyroidism, ROBYN. She was hospitalized 9/15-9/25 at [facility] with atrial fibrillation and decompensated CHF and was started on Eliquis. She was discharged to rehab unit 9/25-10/7. Patient presented to ER with progressive fatigue, weakness, shortness of breath since return home. Labs showed anemia with Hgb 7.4. Stool for occult blood positive. Lives with ex-. She was active with [facility] prior to admit. Subjective:  Presently resting in bed. Noted to be tachycardic on exam (120s). Patient denies chest pain, shortness of breath, palpitations, abdominal pain, nausea, constipation, diarrhea.      Reviewed interval ancillary notes    Current Medications  metoprolol succinate (TOPROL XL) extended release tablet 100 mg, Daily  pantoprazole (PROTONIX) 80 mg in sodium chloride 0.9 % 100 mL infusion, Continuous  atorvastatin (LIPITOR) tablet 80 mg, Nightly  QUEtiapine (SEROQUEL) tablet 25 mg, BID  venlafaxine (EFFEXOR) tablet 75 mg, Daily  sodium chloride flush 0.9 % injection 5-40 mL, 2 times per day  sodium chloride flush 0.9 % injection 5-40 mL, PRN  0.9 % sodium chloride infusion, PRN  ondansetron (ZOFRAN-ODT) disintegrating tablet 4 mg, Q8H PRN   Or  ondansetron (ZOFRAN) injection 4 mg, Q6H PRN  acetaminophen (TYLENOL) tablet 650 mg, Q6H PRN   Or  acetaminophen (TYLENOL) suppository 650 mg, Q6H PRN  pantoprazole (PROTONIX) injection 40 mg, Daily  furosemide (LASIX) injection 20 mg, BID  influenza quadrivalent split vaccine (FLUZONE;FLUARIX;FLULAVAL;AFLURIA) injection 0.5 mL, Prior to discharge  levothyroxine (SYNTHROID) tablet 112 mcg, QAM AC  prednisoLONE acetate (PRED FORTE) 1 % ophthalmic suspension 1 drop, Daily        Objective:  /79   Pulse 125   Temp 97.8 °F (36.6 °C) (Tympanic)   Resp 16   Ht 5' 4\" (1.626 m)   Wt 154 lb 1.6 oz (69.9 kg)   SpO2 93%   BMI 26.45 kg/m²     Intake/Output Summary (Last 24 hours) at 10/19/2021 0822  Last data filed at 10/19/2021 0630  Gross per 24 hour   Intake --   Output 100 ml   Net -100 ml      Wt Readings from Last 3 Encounters:   10/19/21 154 lb 1.6 oz (69.9 kg)   11/16/18 147 lb (66.7 kg)   10/19/18 150 lb (68 kg)       General appearance:  Appears comfortable  Eyes: Sclera clear. Pupils equal.  ENT: Moist oral mucosa. Trachea midline, no adenopathy. Cardiovascular: Tachycardic, normal S1, S2. No murmur. No edema in lower extremities  Respiratory: Not using accessory muscles. Good inspiratory effort. Clear to auscultation bilaterally, no wheeze or crackles. GI: Abdomen soft, no tenderness, not distended, normal bowel sounds  Musculoskeletal: No cyanosis in digits, neck supple  Neurology: CN 2-12 grossly intact. No speech or motor deficits  Psych: Normal affect. Alert and oriented in time, place and person  Skin: Warm, dry, normal turgor, color pale    Labs and Tests:  CBC:   Recent Labs     10/18/21  1859 10/19/21  0509   WBC 9.2  --    HGB 7.4* 7.0*     --      BMP:    Recent Labs     10/18/21  1859 10/19/21  0509    131*   K 4.8 4.4    102   CO2 19* 17*   BUN 27* 29*   CREATININE 1.4* 1.4*   GLUCOSE 146* 103*     Hepatic:   Recent Labs     10/18/21  1859   AST 23   ALT 21   BILITOT 0.4   ALKPHOS 75       CXR 10/18/2021:   Interval development of bibasilar opacities greater on the right with some   blunting of the right costophrenic angle and focal density in the fissure   suggestive of fluid.  These findings are new compared to prior study.  The   findings could represent CHF however the possibility of a infectious   pneumonic process cannot be excluded       Problem List  Active Problems:    Acute blood loss anemia  Resolved Problems:    * No resolved hospital problems. *       Assessment & Plan:   1. Acute blood loss anemia. Hgb 7.4 on presentation, stool for occult blood positive. Started on IV Protonix, GI consult pending. Eliquis on hold. Hgb 7.0 this am, will give 1 unit PRBCs given hx CAD, acute CHF and elevated troponin. Check iron studies. 2. Acute on chronic diastolic CHF. CXR suggestive pulmonary edema, proBNP 85600. Started on IV Lasix. Consult cardiology. 3. PAF. Noted to be in atrial fib with RVR on presentation. Hospitalized last month at [facility] for same. Continue beta blocker. Significant anemia likely contributing factor to HR. Continue beta blocker. Eliquis on hold for GI bleed. 4. CAD/elevated troponin. Denies chest pain. Troponin 0.05. No acute changes on EKG. Likely supply demand mismatch. Denies chest pain. To receive 1 unit PRBCs. Cardiology consulted. 5. HTN. Controlled. Continue to follow. I have discussed with the patient the rationale for blood component transfusion; its benefits in treating or preventing fatigue, organ damage, or death; and its risk which includes mild transfusion reactions, rare risk of blood borne infection, or more serious but rare reactions. I have discussed the alternatives to transfusion, including the risk and consequences of not receiving transfusion. The patient had an opportunity to ask questions and had agreed to proceed with transfusion of blood components. Disposition. Patient lives with ex-. She was active with [facility] prior to admit.        Diet: Diet NPO  Code:Full Code  DVT PPX: scds      Jaelyn Barakat, APRN - CNP   10/19/2021 8:22 AM

## 2021-10-19 NOTE — ED NOTES
PT report given to WISAM Ponce at this time. She states no further questions or concerns. Telemetry and transport requested.      Siria Parks RN  10/18/21 1225

## 2021-10-19 NOTE — CONSULTS
[address] [place] [address]  [phone]      Chief Complaint   Patient presents with    Fatigue     Patient newly diagnosed with Afib in June, was recently seen at [facility] for weakness and a kidney infection. Patient is having increasing weakness since she was last seen at [facility], on 4 L % O2 sat             History of Present Illness:  Allan Turpin is a 82 y.o. patient who presented to the hospital with complaints of fatigue and weakness. She came to the ED and was found to have anemia and dark stools. Recently started on eliquis for Afib with RVR at OSH. Hgb was 7. GI wants to perform EGD. Troponin is slightly elevated. I have been asked to provide consultation regarding further management and testing. Past Medical History:   has a past medical history of Atrial fibrillation ([address] CAD (coronary artery disease), Depression, Hyperlipidemia, Hypertension, Hypothyroid, Legal blindness, and Obstructive sleep apnea (adult) (pediatric). Surgical History:   has a past surgical history that includes Coronary artery bypass graft (1997); Tubal ligation; angioplasty; sigmoidoscopy (10/19/2018); pr sigmoidoscopy flx dx w/collj spec br/wa if pfrmd (N/A, 10/19/2018); eye surgery (Right); eye surgery (Left); Tonsillectomy and adenoidectomy; and Colonoscopy (11/16/2018). Social History:   reports that she has never smoked. She has never used smokeless tobacco. She reports current alcohol use of about 2.0 standard drinks of alcohol per week. She reports that she does not use drugs. Family History:  family history includes Heart Disease in her father and mother. Home Medications:  Were reviewed and are listed in nursing record. and/or listed below  Prior to Admission medications    Medication Sig Start Date End Date Taking?  Authorizing Provider   Cholecalciferol (VITAMIN D) 50 MCG (2000 UT) CAPS capsule Take 1 capsule by mouth daily   Yes Historical Provider, MD   Multiple  PEG-KCl-NaCl-NaSulf-Na Asc-C (MOVIPREP) solution 100 g  998 g Oral Once General Vik PA-C        lidocaine PF 1 % injection 5 mL  5 mL IntraDERmal Once Harish Dash, APRN - CNP        sodium chloride flush 0.9 % injection 5-40 mL  5-40 mL IntraVENous 2 times per day Harish Dash, APRN - CNP        sodium chloride flush 0.9 % injection 5-40 mL  5-40 mL IntraVENous PRN Harish Dash, APRN - CNP        0.9 % sodium chloride infusion  25 mL IntraVENous PRN Harish Dash, APRN -  mL/hr at 10/19/21 1418 25 mL at 10/19/21 1418    pantoprazole (PROTONIX) 80 mg in sodium chloride 0.9 % 100 mL infusion  8 mg/hr IntraVENous Continuous Donovan Byrd, APRN - CNP 10 mL/hr at 10/19/21 1843 8 mg/hr at 10/19/21 1843    atorvastatin (LIPITOR) tablet 80 mg  80 mg Oral Nightly Chrissy Dumont MD   80 mg at 10/19/21 0040    QUEtiapine (SEROQUEL) tablet 25 mg  25 mg Oral BID Chrissy Dumont MD   25 mg at 10/19/21 0924    venlafaxine (EFFEXOR) tablet 75 mg  75 mg Oral Daily Chrissy Dumont MD   75 mg at 10/19/21 3672    sodium chloride flush 0.9 % injection 5-40 mL  5-40 mL IntraVENous 2 times per day Chrissy Dumont MD   10 mL at 10/19/21 0930    sodium chloride flush 0.9 % injection 5-40 mL  5-40 mL IntraVENous PRN Chrissy Duomnt MD        0.9 % sodium chloride infusion  25 mL IntraVENous PRN Chrissy Dumont MD        ondansetron (ZOFRAN-ODT) disintegrating tablet 4 mg  4 mg Oral Q8H PRN Chrissy Dumont MD        Or    ondansetron [facility]) injection 4 mg  4 mg IntraVENous Q6H PRN Chrissy Dumont MD        acetaminophen (TYLENOL) tablet 650 mg  650 mg Oral Q6H PRN Chrissy Dumont MD        Or    acetaminophen (TYLENOL) suppository 650 mg  650 mg Rectal Q6H PRN Chrissy Dumont MD        furosemide (LASIX) injection 20 mg  20 mg IntraVENous BID Chrissy Dumont MD   20 mg at 10/19/21 2009    influenza quadrivalent split vaccine (FLUZONE;FLUARIX;FLULAVAL;AFLURIA) injection 0.5 mL  0.5 mL IntraMUSCular Prior to discharge Mirza Morse MD        levothyroxine (SYNTHROID) tablet 112 mcg  112 mcg Oral QAM AC Mirza Morse MD   112 mcg at 10/19/21 0602    prednisoLONE acetate (PRED FORTE) 1 % ophthalmic suspension 1 drop  1 drop Right Eye Daily Mirza Morse MD   1 drop at 10/19/21 0930        Allergies:  Percocet [oxycodone-acetaminophen]     Review of Systems:     · Constitutional: there has been no unanticipated weight loss. There's been no change in energy level, sleep pattern, or activity level. · Eyes: No visual changes or diplopia. No scleral icterus. · ENT: No Headaches, hearing loss or vertigo. No mouth sores or sore throat. · Cardiovascular: Reviewed in HPI  · Respiratory: No cough or wheezing, no sputum production. No hematemesis. · Gastrointestinal: No abdominal pain, appetite loss, blood in stools. No change in bowel or bladder habits. · Genitourinary: No dysuria, trouble voiding, or hematuria. · Musculoskeletal:  No gait disturbance, weakness or joint complaints. · Integumentary: No rash or pruritis. · Neurological: No headache, diplopia, change in muscle strength, numbness or tingling. No change in gait, balance, coordination, mood, affect, memory, mentation, behavior. · Psychiatric: No anxiety, no depression. · Endocrine: No malaise, fatigue or temperature intolerance. No excessive thirst, fluid intake, or urination. No tremor. · Hematologic/Lymphatic: No abnormal bruising or bleeding, blood clots or swollen lymph nodes. · Allergic/Immunologic: No nasal congestion or hives.   ·     Physical Examination:    Vitals:    10/19/21 1820   BP: (!) 142/80   Pulse: 124   Resp: 16   Temp: 97.6 °F (36.4 °C)   SpO2: 100%    Weight: 154 lb 1.6 oz (69.9 kg)         General Appearance:  Alert, cooperative, no distress, appears stated age   Head:  Normocephalic, without obvious abnormality, atraumatic   Eyes: PERRL, conjunctiva/corneas clear       Nose: Nares normal, no drainage or sinus tenderness   Throat: Lips, mucosa, and tongue normal   Neck: Supple, symmetrical, trachea midline, no adenopathy, thyroid: not enlarged, symmetric, no tenderness/mass/nodules, no carotid bruit or JVD       Lungs:   Clear to auscultation bilaterally, respirations unlabored   Chest Wall:  No tenderness or deformity   Heart:  Irregular rate and rhythm, S1, S2 normal, no murmur, rub or gallop   Abdomen:   Soft, non-tender, bowel sounds active all four quadrants,  no masses, no organomegaly           Extremities: Extremities normal, atraumatic, no cyanosis or edema   Pulses: 2+ and symmetric   Skin: Skin color, texture, turgor normal, no rashes or lesions   Pysch: Normal mood and affect   Neurologic: Normal gross motor and sensory exam.         Labs  CBC:   Lab Results   Component Value Date    WBC 9.2 10/18/2021    RBC 2.73 10/18/2021    HGB 7.0 10/19/2021    HCT 21.5 10/19/2021    MCV 85.8 10/18/2021    RDW 16.5 10/18/2021     10/18/2021     CMP:    Lab Results   Component Value Date     10/19/2021    K 4.4 10/19/2021     10/19/2021    CO2 17 10/19/2021    BUN 29 10/19/2021    CREATININE 1.4 10/19/2021    GFRAA 43 10/19/2021    GFRAA 57 06/03/2010    AGRATIO 0.7 10/18/2021    LABGLOM 36 10/19/2021    GLUCOSE 103 10/19/2021    PROT 7.4 10/18/2021    PROT 7.6 06/03/2010    CALCIUM 9.1 10/19/2021    BILITOT 0.4 10/18/2021    ALKPHOS 75 10/18/2021    AST 23 10/18/2021    ALT 21 10/18/2021     PT/INR:  No results found for: PTINR  Lab Results   Component Value Date    TROPONINI 0.05 (H) 10/18/2021       EKG:  I have reviewed EKG with the following interpretation:  Impression:  Atrial fibrillation with rapid ventricular responsePossible Inferior infarct , age undeterminedConfirmed     Pt has CAD with following history:  CABG: (date/details),   Valve replacement (date/details)   GXT/Myoview/Lexiscan: (date)  (results) Stress/echo: (date) (result)   CATH/PCI:  (date)- (results)  - (# and type of stents) -   ECHO: (date) results EF    %. RICK (date) (results)  EP procedures/studies/ablation:  (specific data). Device information:  Event monitor: (date/results)    All testing and labs listed below were personally reviewed. Assessment  Patient Active Problem List   Diagnosis    CAD (coronary artery disease)    HTN (hypertension)    Obstructive sleep apnea    Hypothyroid    Hyperlipidemia    Depression    GI bleeding    Acute blood loss anemia         Plan:    I had the opportunity to review the clinical symptoms and presentation of Venessa Yu. Assessment/Plan:  Active Problems:    Acute blood loss anemia  Plan: iron def anemia probably from GI losses. Hold eliquis until ok per GI to resume. May benefit from [employer] in the future. Afib: af with RVR. Recently started on eliquis and amiodarone after a cardioversion. Now back in afib. Treat medically for now with toprol xl and amiodarone. CAD: CABG 2002. PCI 2014. Troponin elevated due to afib and anemia (demand ischemia). No ACS. No heparin or aspirin due to active bleed. I will address the patient's cardiac risk factors and adjusted pharmacologic treatment as needed. In addition, I have reinforced the need for patient directed risk factor modification. Tobacco use was discussed with the patient and educated on the negative effects. I have asked the patient to not utilize these agents. Thank you for allowing to us to participate in the care or Jory Linton. Further evaluation will be based upon the patient's clinical course and testing results. All questions and concerns were addressed to the patient/family. Alternatives to my treatment were discussed. The note was completed using EMR. Every effort was made to ensure accuracy; however, inadvertent computerized transcription errors may be present.     Juan Carlos Todd MD, MD 10/19/2021 7:03 PM

## 2021-10-19 NOTE — ED PROVIDER NOTES
This patient was seen by the Mid-Level Provider. I have seen and examined the patient, agree with the workup, evaluation, management and diagnosis. Care plan has been discussed. My assessment reveals an 44-year-old female who presents with weakness. This is an 44-year-old female who presents with increasing weakness and shortness of breath. Patient has a history of a recent kidney infection as well. The patient also has been recently diagnosed with atrial fibrillation and is on anticoagulation. Radiology results:    XR CHEST PORTABLE   Final Result   Interval development of bibasilar opacities greater on the right with some   blunting of the right costophrenic angle and focal density in the fissure   suggestive of fluid. These findings are new compared to prior study.   The   findings could represent CHF however the possibility of a infectious   pneumonic process cannot be excluded               LABS:    Labs Reviewed   CBC WITH AUTO DIFFERENTIAL - Abnormal; Notable for the following components:       Result Value    RBC 2.73 (*)     Hemoglobin 7.4 (*)     Hematocrit 23.5 (*)     RDW 16.5 (*)     All other components within normal limits    Narrative:     Performed at:  [facility]  [address] [facility], [address]   Phone [phone]   COMPREHENSIVE METABOLIC PANEL - Abnormal; Notable for the following components:    CO2 19 (*)     Glucose 146 (*)     BUN 27 (*)     CREATININE 1.4 (*)     GFR Non- 36 (*)     GFR  43 (*)     Albumin 3.0 (*)     Albumin/Globulin Ratio 0.7 (*)     All other components within normal limits    Narrative:     Performed at:  [facility]  [address] [facility], [address]   Phone [phone]   PROTIME-INR - Abnormal; Notable for the following components:    Protime 19.4 (*)     INR 1.68 (*)     All other components within normal limits    Narrative:     Performed at:  [facility] [address], [place]   Phone [phone]   BLOOD OCCULT STOOL DIAGNOSTIC - Abnormal; Notable for the following components:    Occult Blood Diagnostic   (*)     Value: Result: POSITIVE  Normal range: Negative      All other components within normal limits    Narrative:     ORDER#: [id number]                          ORDERED BY: Leela Barba  SOURCE: Stool                              COLLECTED:  10/18/21 18:51  ANTIBIOTICS AT REGINA.:                      RECEIVED :  10/18/21 19:00  Performed at:  [facility] [address], [address]   Phone [phone]   TROPONIN - Abnormal; Notable for the following components:    Troponin 0.05 (*)     All other components within normal limits    Narrative:     Performed at:  [facility] [address], [place]   Phone [phone]  - Abnormal; Notable for the following components:    Pro-BNP 12,375 (*)     All other components within normal limits    Narrative:     Performed at:  [facility] [address], [place]   Phone [phone]   PROCALCITONIN   TYPE AND SCREEN    Narrative:     Performed at:  [facility] [address], [place]   Phone [phone]           EKG:    Atrial fibrillation at a rate of 119 beats a minute with a rapid ventricular response. No acute ST elevations or depressions or pathologic Q waves. Exam:    Rectal exam was guaiac positive per the midlevel. She was neurologic intact with no focal motor or sensory deficits throughout. Medical decision makin-year-old female presents with fatigue and generalized weakness. The patient's work-up is consistent with an acute lower GI bleed secondary to anticoagulation.   In addition, the patient also has some pulmonary edema as well as an JULIO and anemia. She will be admitted for further care, nephrology consultation and blood. She is in stable condition. FINAL IMPRESSION:    1. Acute pulmonary edema (HCC)    2. Acute respiratory failure with hypoxemia (HCC)    3. Atrial fibrillation, unspecified type ([facility] [address]    4.  Gastrointestinal hemorrhage, unspecified gastrointestinal hemorrhage type           Melissa Bowles MD  10/18/21 2040

## 2021-10-19 NOTE — ED NOTES
This RN attempted to call for PT report at this time, told RN would call back.      Malinda Bernal RN  10/18/21 2397

## 2021-10-19 NOTE — PLAN OF CARE
Problem: Falls - Risk of:  Goal: Will remain free from falls  Description: Will remain free from falls  Outcome: Ongoing     Problem:  Bowel Function - Altered:  Goal: Bowel elimination is within specified parameters  Description: Bowel elimination is within specified parameters  Outcome: Ongoing     Problem: Fluid Volume - Imbalance:  Goal: Will show no signs and symptoms of excessive bleeding  Description: Will show no signs and symptoms of excessive bleeding  Outcome: Ongoing  Goal: Absence of imbalanced fluid volume signs and symptoms  Description: Absence of imbalanced fluid volume signs and symptoms  Outcome: Ongoing

## 2021-10-19 NOTE — ED NOTES
PT transported to floor in stable condition at this time via bed. PT transported with protonix infusing, telemetry monitor in place, by Jennifer Jacobs, ED tech.      Swathi Chopra RN  10/18/21 3965

## 2021-10-19 NOTE — CONSULTS
Gastroenterology Consult Note        Patient: Mainor Dumont  : 1935  Acct#:      Date:  10/19/2021    Subjective:       History of Present Illness  Patient is a 82 y.o.    female admitted with Acute blood loss anemia [D62]  Acute pulmonary edema (HCC) [J81.0]  Gastrointestinal hemorrhage, unspecified gastrointestinal hemorrhage type [K92.2]  Acute respiratory failure with hypoxemia (HCC) [J96.01]  Atrial fibrillation, unspecified type ([address] [I48.91] who is seen in consult for anemia. History of CAD, CABG. She was admitted to [address] 9/15/2021 for acute on chronic CHF and A. fib with RVR. She was started on Eliquis. Was noted to be anemic earlier this month. Has had fatigue and presented to the ER for this. Had elevated BNP and is on Lasix here. Also was in A. fib with RVR. Noted to have elevated troponin at 0.05. Cardiology to see. Noted to have black stool on TRINO in the ER. Patient states her family doctor started her on iron pills 2 days ago. She is legally blind and is unsure what color her stool was at home prior to starting iron pills. Denies abdominal pain. Has had some nausea for a week but no vomiting. Does take ibuprofen at least weekly. Constipation.     Past Medical History:   Diagnosis Date    Atrial fibrillation ([address]     CAD (coronary artery disease)     Depression     Hyperlipidemia     Hypertension     Hypothyroid     Legal blindness     Obstructive sleep apnea (adult) (pediatric)       Past Surgical History:   Procedure Laterality Date    ANGIOPLASTY      COLONOSCOPY  2018    COLONOSCOPY POLYPECTOMY SNARE/COLD BIOPSY performed by Naveed Singh MD at [facility] Right     CATARACT EXTRACTION W/ IOL    EYE SURGERY Left     CATARCT EXTRACTION W/ IOL    ND SIGMOIDOSCOPY FLX DX W/COLLJ SPEC BR/WA IF PFRMD N/A 10/19/2018    SIGMOIDOSCOPY DIAGNOSTIC FLEXIBLE performed by Naveed Singh MD at [address] [facility] ENDOSCOPY    SIGMOIDOSCOPY  10/19/2018    flexible    TONSILLECTOMY AND ADENOIDECTOMY      TUBAL LIGATION        Past Endoscopic History    Colonoscopy with Dr. Jw Dickson 11/20/2018 for intermittent bright red blood per rectum x2 years. 6 polyps were removed, mild diverticulosis scattered throughout the colon, one 6 mm AVMs seen in the cecum which was not treated, small internal hemorrhoids likely source of bleeding. Flex sig 10/20/21 with Dr Solis Mancini:  Sigmoidoscopy.     INDICATION:  The patient is an 59-year-old female who presented to  emergency room this morning with a report of bright red blood per  rectum. The bright red blood per rectum had been intermittent for two  years, but increased over the last couple of days and was associated  with increased stooling and some urgency. She has some lower crampy  abdominal discomfort, which has been present for some time duration  which is not clear. She believes her last colonoscopy was roughly 10  years ago. CT was performed in the emergency room which suggested  thickening of the rectal mucosa representing either a proctitis or  possible neoplasm. The patient's hemoglobin however is normal and her  stool is guaiac negative. Her bleeding sounds more hemorrhoidal in  nature, but due to the CT finding, it was decided to perform a  sigmoidoscopy after enema. Informed and written consent obtained from  the patient after discussing the procedure. ASSESSMENT AND PLAN:  Normal sigmoidoscopy to the mid descending colon  with the exception of mild left-sided diverticulosis and small  internal hemorrhoids. I suspect the bleeding is hemorrhoidal in  nature. There is no evidence of any blood throughout the colon. Given the patient's more longer standing symptoms however I think it  would be reasonable to perform a colonoscopy which she is in agreement  with.   She is on Plavix however and this one need to be held prior to  the exam.  She will be discharged to home and we will arrange for an  outpatient colonoscopy after we confirm it is okay to hold her Plavix. Admission Meds  No current facility-administered medications on file prior to encounter. Current Outpatient Medications on File Prior to Encounter   Medication Sig Dispense Refill    Cholecalciferol (VITAMIN D) 50 MCG (2000 UT) CAPS capsule Take 1 capsule by mouth daily      Multiple Vitamins-Minerals (THERAPEUTIC MULTIVITAMIN-MINERALS) tablet Take 1 tablet by mouth daily      amLODIPine (NORVASC) 2.5 MG tablet Take 2.5 mg by mouth daily      TraZODone & Diet Manage Prod (TRAZAMINE) 50 MG MISC Take 100 mg by mouth nightly       venlafaxine (EFFEXOR) 100 MG tablet Take 100 mg by mouth nightly       QUEtiapine (SEROQUEL) 50 MG tablet Take 50 mg by mouth nightly       prednisoLONE sodium phosphate (INFLAMASE FORTE) 1 % ophthalmic solution Place 1 drop into the right eye daily       atorvastatin (LIPITOR) 80 MG tablet Take 80 mg by mouth nightly      metoprolol (TOPROL-XL) 100 MG XL tablet TAKE 1 TABLET BY MOUTH EVERY DAY (Patient taking differently: Take 100 mg by mouth daily ) 30 tablet 0    levothyroxine (SYNTHROID) 75 MCG tablet TAKE ONE TABLET BY MOUTH DAILY (Patient taking differently: Take 125 mcg by mouth Daily ) 90 tablet 1    brimonidine-timolol (COMBIGAN) 0.2-0.5 % ophthalmic solution Place 1 drop into both eyes every 12 hours      zoster vaccine live, PF, (ZOSTAVAX) 16773 UNT/0.65ML injection Inject 0.65 mLs into the skin once. Allergies  Allergies   Allergen Reactions    Percocet [Oxycodone-Acetaminophen] Other (See Comments)     Pt states it makes her crazy. Social   Social History     Tobacco Use    Smoking status: Never Smoker    Smokeless tobacco: Never Used   Substance Use Topics    Alcohol use: Yes     Alcohol/week: 2.0 standard drinks     Types: 2 Glasses of wine per week     Comment: occas.         Family History   Problem Relation Age of Onset    Heart Disease Mother     Heart Disease Father     Asthma Neg Hx     Cancer Neg Hx     Diabetes Neg Hx     Emphysema Neg Hx     Hypertension Neg Hx     Heart Failure Neg Hx            Review of Systems  Constitutional: negative for fevers, chills, sweats  + fatigue  Ears, nose, mouth, throat, and face: negative for nasal congestion and sore throat   Respiratory: negative for cough and shortness of breath   Cardiovascular: negative for chest pain and dyspnea   Gastrointestinal: see hpi   Genitourinary:negative for dysuria and frequency   Integument/breast: negative for pruritus and rash   Hematologic/lymphatic: negative for lymphadenopathy and easy bruising   Musculoskeletal:negative for arthralgias and myalgias   Neurological: negative for dizziness and weakness           Physical Exam  Blood pressure 132/79, pulse 125, temperature 97.8 °F (36.6 °C), temperature source Tympanic, resp. rate 16, height 5' 4\" (1.626 m), weight 154 lb 1.6 oz (69.9 kg), SpO2 93 %. General appearance: pale, appears weak, alert, cooperative, no distress, appears stated age  Eyes: Anicteric  Head: Normocephalic, without obvious abnormality  Lungs: clear to auscultation bilaterally, Normal Effort  Heart: irregularly irregular, no murmurs or rubs  Abdomen: soft, non-tender. Bowel sounds normal. No masses,  no organomegaly. Extremities: atraumatic, no cyanosis or edema  Skin: warm and dry, no jaundice  Neuro: Grossly intact, A&OX3  Musculoskeletal: 5/5  strength BUE      Data Review:    Recent Labs     10/18/21  1859 10/19/21  0509   WBC 9.2  --    HGB 7.4* 7.0*   HCT 23.5* 21.5*   MCV 85.8  --      --      Recent Labs     10/18/21  1859 10/19/21  0509    131*   K 4.8 4.4    102   CO2 19* 17*   BUN 27* 29*   CREATININE 1.4* 1.4*     Recent Labs     10/18/21  1859   AST 23   ALT 21   BILITOT 0.4   ALKPHOS 75     No results for input(s): LIPASE, AMYLASE in the last 72 hours.   Recent Labs 10/18/21  1859   PROTIME 19.4*   INR 1.68*     No results for input(s): PTT in the last 72 hours. No results for input(s): OCCULTBLD in the last 72 hours. Imaging Studies:                                         Assessment:     Active Problems:    Acute blood loss anemia  Resolved Problems:    * No resolved hospital problems. *    Anemia -hemoglobin had been normal at 12 earlier this year and was down to 8.1 on 10/4/21. .  Labs in care everywhere with normal ferritin at 65, normal iron, iron sat, TIBC. Patient not aware of any black or bloody stools at home but did have black stool on rectal exam here after starting on iron 2 days before. Stool was guaiac positive. Consider ulcer disease, AVM (h/o cecal AVM in 2018), etc.   nausea - without vomiting. X1 week. Has been taking ibuprofen so consider peptic ulcer disease. afib - Eliquis held here. Pt last took this yesterday afternoon. H/o CAD - elevated troponin. Cardiology to see. CHF    Recommendations:   - PPI IV  - to get 1 u prbc today  - clear liquids  - bowel prep tonight  - NPO midnight  - EGD and colonoscopy tomorrow    Discussed with Dr. Makayla Cortez, PAATA  [facility]    I have personally performed a face to face diagnostic evaluation on this patient. I have interviewed and examined the patient and I agree with the findings and recommended plan of care. In summary, my findings and plan are the followin-year-old  female admitted with severe weakness for a few weeks. Patient has extensive cardiac history including CHF, A. fib with RVR. She was recently started on Eliquis. She was noted to have black stools in the ER and was started recently on iron pills by her primary care doctor. Last colonoscopy in 2018 revealed 6 polyps (endoscopically removed), mild diverticulosis throughout the colon, cecal AVM that was not treated, and small internal hemorrhoids. Of note, patient uses NSAIDs at home.   Vital signs are stable abdomen soft nontender no rebound or guarding. Hemoglobin on admission was 7.4, was 12 earlier this year, around 8 in early October  Impression and plan: Acute on chronic anemia with melena. IV PPI, blood transfusion to keep hemoglobin above 8, check iron panel, ferritin, vitamin B12 and folate, EGD and colonoscopy tomorrow (once cleared by cardiology).     Partha Robertson MD, MSc  [facility]  10/19/21

## 2021-10-19 NOTE — PLAN OF CARE
Problem: Falls - Risk of:  Goal: Will remain free from falls  Description: Will remain free from falls  10/19/2021 1254 by Karen Jones RN  Outcome: Ongoing  Note: High fall risk per Khoa Mac scale. Fall precautions in place, bed alarm engaged. Non-skid footwear on patient, belongings within reach, bed in lowest position. Problem: Fluid Volume - Imbalance:  Goal: Will show no signs and symptoms of excessive bleeding  Description: Will show no signs and symptoms of excessive bleeding  10/19/2021 1254 by Karen Jones RN  Note: Pt is pale and reports feeling tired. Positive for occult stool. Has had no bowel movements this shift. Vitals signs stable.

## 2021-10-20 ENCOUNTER — ANESTHESIA EVENT (OUTPATIENT)
Dept: ENDOSCOPY | Age: 86
DRG: 377 | End: 2021-10-20
Payer: COMMERCIAL

## 2021-10-20 ENCOUNTER — ANESTHESIA (OUTPATIENT)
Dept: ENDOSCOPY | Age: 86
DRG: 377 | End: 2021-10-20
Payer: COMMERCIAL

## 2021-10-20 VITALS
SYSTOLIC BLOOD PRESSURE: 112 MMHG | TEMPERATURE: 97.7 F | DIASTOLIC BLOOD PRESSURE: 65 MMHG | RESPIRATION RATE: 1 BRPM | OXYGEN SATURATION: 100 %

## 2021-10-20 LAB
ANION GAP SERPL CALCULATED.3IONS-SCNC: 14 MMOL/L (ref 3–16)
BLOOD BANK DISPENSE STATUS: NORMAL
BLOOD BANK PRODUCT CODE: NORMAL
BPU ID: NORMAL
BUN BLDV-MCNC: 29 MG/DL (ref 7–20)
CALCIUM SERPL-MCNC: 8.3 MG/DL (ref 8.3–10.6)
CHLORIDE BLD-SCNC: 105 MMOL/L (ref 99–110)
CO2: 22 MMOL/L (ref 21–32)
CREAT SERPL-MCNC: 1.5 MG/DL (ref 0.6–1.2)
DESCRIPTION BLOOD BANK: NORMAL
GFR AFRICAN AMERICAN: 40
GFR NON-AFRICAN AMERICAN: 33
GLUCOSE BLD-MCNC: 118 MG/DL (ref 70–99)
HCT VFR BLD CALC: 23.1 % (ref 36–48)
HCT VFR BLD CALC: 24.9 % (ref 36–48)
HEMOGLOBIN: 7.5 G/DL (ref 12–16)
HEMOGLOBIN: 8.3 G/DL (ref 12–16)
MCH RBC QN AUTO: 27.5 PG (ref 26–34)
MCHC RBC AUTO-ENTMCNC: 32.5 G/DL (ref 31–36)
MCV RBC AUTO: 84.7 FL (ref 80–100)
PDW BLD-RTO: 17.2 % (ref 12.4–15.4)
PLATELET # BLD: 295 K/UL (ref 135–450)
PMV BLD AUTO: 7.8 FL (ref 5–10.5)
POTASSIUM SERPL-SCNC: 3.8 MMOL/L (ref 3.5–5.1)
PRO-BNP: 9701 PG/ML (ref 0–449)
RBC # BLD: 2.73 M/UL (ref 4–5.2)
SODIUM BLD-SCNC: 141 MMOL/L (ref 136–145)
WBC # BLD: 7.2 K/UL (ref 4–11)

## 2021-10-20 PROCEDURE — 82728 ASSAY OF FERRITIN: CPT

## 2021-10-20 PROCEDURE — 2580000003 HC RX 258: Performed by: REGISTERED NURSE

## 2021-10-20 PROCEDURE — 1200000000 HC SEMI PRIVATE

## 2021-10-20 PROCEDURE — 83550 IRON BINDING TEST: CPT

## 2021-10-20 PROCEDURE — 2580000003 HC RX 258: Performed by: INTERNAL MEDICINE

## 2021-10-20 PROCEDURE — 6370000000 HC RX 637 (ALT 250 FOR IP): Performed by: INTERNAL MEDICINE

## 2021-10-20 PROCEDURE — G0008 ADMIN INFLUENZA VIRUS VAC: HCPCS | Performed by: INTERNAL MEDICINE

## 2021-10-20 PROCEDURE — 6360000002 HC RX W HCPCS: Performed by: NURSE PRACTITIONER

## 2021-10-20 PROCEDURE — 83540 ASSAY OF IRON: CPT

## 2021-10-20 PROCEDURE — 36415 COLL VENOUS BLD VENIPUNCTURE: CPT

## 2021-10-20 PROCEDURE — 3700000001 HC ADD 15 MINUTES (ANESTHESIA): Performed by: INTERNAL MEDICINE

## 2021-10-20 PROCEDURE — 6360000002 HC RX W HCPCS: Performed by: REGISTERED NURSE

## 2021-10-20 PROCEDURE — 6370000000 HC RX 637 (ALT 250 FOR IP): Performed by: NURSE PRACTITIONER

## 2021-10-20 PROCEDURE — 82746 ASSAY OF FOLIC ACID SERUM: CPT

## 2021-10-20 PROCEDURE — 80048 BASIC METABOLIC PNL TOTAL CA: CPT

## 2021-10-20 PROCEDURE — 6360000002 HC RX W HCPCS: Performed by: INTERNAL MEDICINE

## 2021-10-20 PROCEDURE — 88305 TISSUE EXAM BY PATHOLOGIST: CPT

## 2021-10-20 PROCEDURE — C9113 INJ PANTOPRAZOLE SODIUM, VIA: HCPCS | Performed by: NURSE PRACTITIONER

## 2021-10-20 PROCEDURE — 2720000010 HC SURG SUPPLY STERILE: Performed by: INTERNAL MEDICINE

## 2021-10-20 PROCEDURE — 3609010200 HC COLONOSCOPY ABLATION TUMOR POLYP/OTHER LES: Performed by: INTERNAL MEDICINE

## 2021-10-20 PROCEDURE — 83880 ASSAY OF NATRIURETIC PEPTIDE: CPT

## 2021-10-20 PROCEDURE — 2580000003 HC RX 258: Performed by: NURSE PRACTITIONER

## 2021-10-20 PROCEDURE — 2500000003 HC RX 250 WO HCPCS: Performed by: NURSE ANESTHETIST, CERTIFIED REGISTERED

## 2021-10-20 PROCEDURE — 3609012400 HC EGD TRANSORAL BIOPSY SINGLE/MULTIPLE: Performed by: INTERNAL MEDICINE

## 2021-10-20 PROCEDURE — 85018 HEMOGLOBIN: CPT

## 2021-10-20 PROCEDURE — 0DB68ZX EXCISION OF STOMACH, VIA NATURAL OR ARTIFICIAL OPENING ENDOSCOPIC, DIAGNOSTIC: ICD-10-PCS | Performed by: INTERNAL MEDICINE

## 2021-10-20 PROCEDURE — 99233 SBSQ HOSP IP/OBS HIGH 50: CPT | Performed by: NURSE PRACTITIONER

## 2021-10-20 PROCEDURE — 85027 COMPLETE CBC AUTOMATED: CPT

## 2021-10-20 PROCEDURE — 7100000000 HC PACU RECOVERY - FIRST 15 MIN: Performed by: INTERNAL MEDICINE

## 2021-10-20 PROCEDURE — 0W3P8ZZ CONTROL BLEEDING IN GASTROINTESTINAL TRACT, VIA NATURAL OR ARTIFICIAL OPENING ENDOSCOPIC: ICD-10-PCS | Performed by: INTERNAL MEDICINE

## 2021-10-20 PROCEDURE — 2709999900 HC NON-CHARGEABLE SUPPLY: Performed by: INTERNAL MEDICINE

## 2021-10-20 PROCEDURE — 82607 VITAMIN B-12: CPT

## 2021-10-20 PROCEDURE — 3700000000 HC ANESTHESIA ATTENDED CARE: Performed by: INTERNAL MEDICINE

## 2021-10-20 PROCEDURE — 85014 HEMATOCRIT: CPT

## 2021-10-20 PROCEDURE — 0DB98ZX EXCISION OF DUODENUM, VIA NATURAL OR ARTIFICIAL OPENING ENDOSCOPIC, DIAGNOSTIC: ICD-10-PCS | Performed by: INTERNAL MEDICINE

## 2021-10-20 PROCEDURE — 90686 IIV4 VACC NO PRSV 0.5 ML IM: CPT | Performed by: INTERNAL MEDICINE

## 2021-10-20 PROCEDURE — 2500000003 HC RX 250 WO HCPCS: Performed by: REGISTERED NURSE

## 2021-10-20 PROCEDURE — 7100000001 HC PACU RECOVERY - ADDTL 15 MIN: Performed by: INTERNAL MEDICINE

## 2021-10-20 RX ORDER — METOPROLOL SUCCINATE 50 MG/1
50 TABLET, EXTENDED RELEASE ORAL 3 TIMES DAILY
Status: DISCONTINUED | OUTPATIENT
Start: 2021-10-20 | End: 2021-10-22 | Stop reason: HOSPADM

## 2021-10-20 RX ORDER — SODIUM CHLORIDE 9 MG/ML
INJECTION, SOLUTION INTRAVENOUS CONTINUOUS PRN
Status: DISCONTINUED | OUTPATIENT
Start: 2021-10-20 | End: 2021-10-20 | Stop reason: SDUPTHER

## 2021-10-20 RX ORDER — PROPOFOL 10 MG/ML
INJECTION, EMULSION INTRAVENOUS CONTINUOUS PRN
Status: DISCONTINUED | OUTPATIENT
Start: 2021-10-20 | End: 2021-10-20 | Stop reason: SDUPTHER

## 2021-10-20 RX ORDER — ALPRAZOLAM 0.5 MG/1
0.25 TABLET ORAL ONCE
Status: COMPLETED | OUTPATIENT
Start: 2021-10-20 | End: 2021-10-20

## 2021-10-20 RX ORDER — PROPOFOL 10 MG/ML
INJECTION, EMULSION INTRAVENOUS PRN
Status: DISCONTINUED | OUTPATIENT
Start: 2021-10-20 | End: 2021-10-20 | Stop reason: SDUPTHER

## 2021-10-20 RX ORDER — CALCIUM CHLORIDE 100 MG/ML
INJECTION INTRAVENOUS; INTRAVENTRICULAR PRN
Status: DISCONTINUED | OUTPATIENT
Start: 2021-10-20 | End: 2021-10-20 | Stop reason: SDUPTHER

## 2021-10-20 RX ORDER — SODIUM CHLORIDE 9 MG/ML
INJECTION, SOLUTION INTRAVENOUS PRN
Status: DISCONTINUED | OUTPATIENT
Start: 2021-10-20 | End: 2021-10-22 | Stop reason: HOSPADM

## 2021-10-20 RX ORDER — LIDOCAINE HYDROCHLORIDE 20 MG/ML
INJECTION, SOLUTION EPIDURAL; INFILTRATION; INTRACAUDAL; PERINEURAL PRN
Status: DISCONTINUED | OUTPATIENT
Start: 2021-10-20 | End: 2021-10-20 | Stop reason: SDUPTHER

## 2021-10-20 RX ADMIN — ATORVASTATIN CALCIUM 80 MG: 80 TABLET, FILM COATED ORAL at 21:29

## 2021-10-20 RX ADMIN — HYDROXYZINE HYDROCHLORIDE 10 MG: 10 TABLET ORAL at 21:30

## 2021-10-20 RX ADMIN — PHENYLEPHRINE HYDROCHLORIDE 100 MCG: 10 INJECTION INTRAVENOUS at 14:12

## 2021-10-20 RX ADMIN — INFLUENZA A VIRUS A/VICTORIA/2570/2019 IVR-215 (H1N1) ANTIGEN (PROPIOLACTONE INACTIVATED), INFLUENZA A VIRUS A/CAMBODIA/E0826360/2020 IVR-224 (H3N2) ANTIGEN (PROPIOLACTONE INACTIVATED), INFLUENZA B VIRUS B/VICTORIA/705/2018 BVR-11 ANTIGEN (PROPIOLACTONE INACTIVATED), INFLUENZA B VIRUS B/PHUKET/3073/2013 BVR-1B ANTIGEN (PROPIOLACTONE INACTIVATED) 0.5 ML: 15; 15; 15; 15 INJECTION, SUSPENSION INTRAMUSCULAR at 16:54

## 2021-10-20 RX ADMIN — ACETAMINOPHEN 650 MG: 325 TABLET ORAL at 10:04

## 2021-10-20 RX ADMIN — IRON SUCROSE 200 MG: 20 INJECTION, SOLUTION INTRAVENOUS at 18:21

## 2021-10-20 RX ADMIN — ALPRAZOLAM 0.25 MG: 0.5 TABLET ORAL at 10:04

## 2021-10-20 RX ADMIN — CALCIUM CHLORIDE 0.25 G: 100 INJECTION INTRAVENOUS; INTRAVENTRICULAR at 14:22

## 2021-10-20 RX ADMIN — CALCIUM CHLORIDE 0.25 G: 100 INJECTION INTRAVENOUS; INTRAVENTRICULAR at 14:18

## 2021-10-20 RX ADMIN — LIDOCAINE HYDROCHLORIDE 50 MG: 20 INJECTION, SOLUTION EPIDURAL; INFILTRATION; INTRACAUDAL; PERINEURAL at 14:09

## 2021-10-20 RX ADMIN — QUETIAPINE FUMARATE 25 MG: 25 TABLET ORAL at 09:51

## 2021-10-20 RX ADMIN — PHENYLEPHRINE HYDROCHLORIDE 100 MCG: 10 INJECTION INTRAVENOUS at 13:55

## 2021-10-20 RX ADMIN — HYDROXYZINE HYDROCHLORIDE 10 MG: 10 TABLET ORAL at 05:32

## 2021-10-20 RX ADMIN — LIDOCAINE HYDROCHLORIDE 50 MG: 20 INJECTION, SOLUTION EPIDURAL; INFILTRATION; INTRACAUDAL; PERINEURAL at 13:54

## 2021-10-20 RX ADMIN — METOPROLOL SUCCINATE 100 MG: 50 TABLET, EXTENDED RELEASE ORAL at 09:51

## 2021-10-20 RX ADMIN — VENLAFAXINE 75 MG: 25 TABLET ORAL at 09:51

## 2021-10-20 RX ADMIN — PHENYLEPHRINE HYDROCHLORIDE 200 MCG: 10 INJECTION INTRAVENOUS at 14:09

## 2021-10-20 RX ADMIN — PROPOFOL 100 MCG/KG/MIN: 10 INJECTION, EMULSION INTRAVENOUS at 13:54

## 2021-10-20 RX ADMIN — LEVOTHYROXINE SODIUM 112 MCG: 0.11 TABLET ORAL at 05:32

## 2021-10-20 RX ADMIN — SODIUM CHLORIDE: 9 INJECTION, SOLUTION INTRAVENOUS at 13:47

## 2021-10-20 RX ADMIN — SODIUM CHLORIDE, PRESERVATIVE FREE 10 ML: 5 INJECTION INTRAVENOUS at 09:11

## 2021-10-20 RX ADMIN — AMIODARONE HYDROCHLORIDE 400 MG: 200 TABLET ORAL at 09:51

## 2021-10-20 RX ADMIN — SODIUM CHLORIDE 8 MG/HR: 9 INJECTION, SOLUTION INTRAVENOUS at 11:53

## 2021-10-20 RX ADMIN — METOPROLOL SUCCINATE 50 MG: 50 TABLET, EXTENDED RELEASE ORAL at 21:29

## 2021-10-20 RX ADMIN — AMIODARONE HYDROCHLORIDE 400 MG: 200 TABLET ORAL at 21:30

## 2021-10-20 RX ADMIN — SODIUM CHLORIDE 8 MG/HR: 9 INJECTION, SOLUTION INTRAVENOUS at 02:23

## 2021-10-20 RX ADMIN — PROPOFOL 50 MG: 10 INJECTION, EMULSION INTRAVENOUS at 13:54

## 2021-10-20 RX ADMIN — QUETIAPINE FUMARATE 25 MG: 25 TABLET ORAL at 21:30

## 2021-10-20 RX ADMIN — PHENYLEPHRINE HYDROCHLORIDE 100 MCG: 10 INJECTION INTRAVENOUS at 14:14

## 2021-10-20 RX ADMIN — PREDNISOLONE ACETATE 1 DROP: 10 SUSPENSION/ DROPS OPHTHALMIC at 09:52

## 2021-10-20 RX ADMIN — PHENYLEPHRINE HYDROCHLORIDE 200 MCG: 10 INJECTION INTRAVENOUS at 14:07

## 2021-10-20 ASSESSMENT — PULMONARY FUNCTION TESTS
PIF_VALUE: 0
PIF_VALUE: 1
PIF_VALUE: 0
PIF_VALUE: 0
PIF_VALUE: 1
PIF_VALUE: 0
PIF_VALUE: 0
PIF_VALUE: 1
PIF_VALUE: 0
PIF_VALUE: 0
PIF_VALUE: 1
PIF_VALUE: 0
PIF_VALUE: 1
PIF_VALUE: 1
PIF_VALUE: 0
PIF_VALUE: 0
PIF_VALUE: 1
PIF_VALUE: 0
PIF_VALUE: 0
PIF_VALUE: 1
PIF_VALUE: 0
PIF_VALUE: 1
PIF_VALUE: 0
PIF_VALUE: 1
PIF_VALUE: 1
PIF_VALUE: 0
PIF_VALUE: 1
PIF_VALUE: 0
PIF_VALUE: 1
PIF_VALUE: 0
PIF_VALUE: 1

## 2021-10-20 ASSESSMENT — PAIN SCALES - GENERAL
PAINLEVEL_OUTOF10: 0
PAINLEVEL_OUTOF10: 0
PAINLEVEL_OUTOF10: 2
PAINLEVEL_OUTOF10: 0

## 2021-10-20 NOTE — PROGRESS NOTES
Azucena Gonzalez MD Perfect served  10/20/21 4:59 PM   [phone] Hospital or Facility: [facility] From: Janna Comment RE: Daryl Alvarado 1935 RM: [id number] Could she be off NPO? Need Callback: NO CALLBACK REQ 3A TELEMETRY  Unread     Orders received in chart for regular diet.    Dietary called they will get an order

## 2021-10-20 NOTE — PROGRESS NOTES
HEMOCLIP x2 placed at cecal AVM site. A HEMOCLIP METALLIC CLIPPING DEVICE HAS BEEN PLACED DURING YOUR PROCEDURE.     THIS DEVICE WILL NATURALLY PASS IN 7-14 DAYS WHEN MAKING A BOWEL MOVEMENT

## 2021-10-20 NOTE — PROGRESS NOTES
[address]   Cardiology Progress Note     Date: 10/20/2021  Admit Date: 10/18/2021     Reason for consultation:     Chief Complaint:   Chief Complaint   Patient presents with    Fatigue     Patient newly diagnosed with Afib in June, was recently seen at [facility] for weakness and a kidney infection. Patient is having increasing weakness since she was last seen at [facility], on 4 L % O2 sat        History of Present Illness: History obtained from patient and medical record. Kiran Schwartz is a 82 y.o. female presented to the hospital with complaints of fatigue and weakness. She came to the ED and was found to have anemia and dark stools. Recently started on eliquis for Afib with RVR at OSH. Hgb was 7. GI wants to perform EGD. Troponin is slightly elevated. Interval Hx: Today, she is a fib rate avg 120s up to 140s. Denies cp or sob. On 2L of oxygen. Planning for EGD and colonoscopy today. Patient seen and examined. Clinical notes reviewed. Telemetry reviewed. No new complaints today. No major events overnight. Denies having chest pain, palpitations, shortness of breath, orthopnea/PND, cough, or dizziness at the time of this visit. Past Medical History:  Past Medical History:   Diagnosis Date    Atrial fibrillation ([address]     CAD (coronary artery disease)     Depression     Hyperlipidemia     Hypertension     Hypothyroid     Legal blindness     Obstructive sleep apnea (adult) (pediatric)         Past Surgical History:    has a past surgical history that includes Coronary artery bypass graft (1997); Tubal ligation; angioplasty; sigmoidoscopy (10/19/2018); pr sigmoidoscopy flx dx w/collj spec br/wa if pfrmd (N/A, 10/19/2018); eye surgery (Right); eye surgery (Left); Tonsillectomy and adenoidectomy; and Colonoscopy (11/16/2018). Social History:  Reviewed. reports that she has never smoked.  She has never used smokeless tobacco. She reports current alcohol use of about 2.0 standard drinks of alcohol per week. She reports that she does not use drugs. Allergies: Allergies   Allergen Reactions    Percocet [Oxycodone-Acetaminophen] Other (See Comments)     Pt states it makes her crazy. Family History:  Reviewed. family history includes Heart Disease in her father and mother. Denies family history of sudden cardiac death, arrhythmia, premature CAD    Home Meds:  Prior to Visit Medications    Medication Sig Taking? Authorizing Provider   Cholecalciferol (VITAMIN D) 50 MCG (2000 UT) CAPS capsule Take 1 capsule by mouth daily Yes Historical Provider, MD   Multiple Vitamins-Minerals (THERAPEUTIC MULTIVITAMIN-MINERALS) tablet Take 1 tablet by mouth daily Yes Historical Provider, MD   amLODIPine (NORVASC) 2.5 MG tablet Take 2.5 mg by mouth daily Yes Historical Provider, MD   TraZODone & Diet Manage Prod (TRAZAMINE) 50 MG MISC Take 100 mg by mouth nightly  Yes Historical Provider, MD   venlafaxine (EFFEXOR) 100 MG tablet Take 100 mg by mouth nightly  Yes Historical Provider, MD   QUEtiapine (SEROQUEL) 50 MG tablet Take 50 mg by mouth nightly  Yes Historical Provider, MD   prednisoLONE sodium phosphate (INFLAMASE FORTE) 1 % ophthalmic solution Place 1 drop into the right eye daily  Yes Historical Provider, MD   atorvastatin (LIPITOR) 80 MG tablet Take 80 mg by mouth nightly Yes Historical Provider, MD   metoprolol (TOPROL-XL) 100 MG XL tablet TAKE 1 TABLET BY MOUTH EVERY DAY  Patient taking differently: Take 100 mg by mouth daily  Yes Rehan Gee MD   levothyroxine (SYNTHROID) 75 MCG tablet TAKE ONE TABLET BY MOUTH DAILY  Patient taking differently: Take 125 mcg by mouth Daily  Yes Rehan Gee MD   brimonidine-timolol (COMBIGAN) 0.2-0.5 % ophthalmic solution Place 1 drop into both eyes every 12 hours  Historical Provider, MD   zoster vaccine live, PF, (ZOSTAVAX) 45591 UNT/0.65ML injection Inject 0.65 mLs into the skin once.     Historical Provider, MD Scheduled Meds:   metoprolol succinate  100 mg Oral Daily    lidocaine 1 % injection  5 mL IntraDERmal Once    sodium chloride flush  5-40 mL IntraVENous 2 times per day    amiodarone  400 mg Oral BID    atorvastatin  80 mg Oral Nightly    QUEtiapine  25 mg Oral BID    venlafaxine  75 mg Oral Daily    sodium chloride flush  5-40 mL IntraVENous 2 times per day    [Held by provider] furosemide  20 mg IntraVENous BID    influenza virus vaccine  0.5 mL IntraMUSCular Prior to discharge    levothyroxine  112 mcg Oral QAM AC    prednisoLONE acetate  1 drop Right Eye Daily     Continuous Infusions:   sodium chloride      sodium chloride 25 mL (10/19/21 1418)    pantoprozole (PROTONIX) infusion 8 mg/hr (10/20/21 0223)    sodium chloride       PRN Meds:sodium chloride, hydrOXYzine, sodium chloride flush, sodium chloride, sodium chloride flush, sodium chloride, ondansetron **OR** ondansetron, acetaminophen **OR** acetaminophen     Review of Systems:  · Constitutional: Negative for fever, night sweats, chills,  · Skin: Negative for rash, pruritus, bleeding, or bruising    · HEENT: Negative for vision changes, ringing in the ears, dysphagia  · Respiratory: Reviewed in HPI  · Cardiovascular: Reviewed in HPI  · Gastrointestinal: Negative for abdominal pain, N/V/D, constipation, or black/tarry stools  · Genito-Urinary: Negative for dysuria, incontinence, or hematuria  · Musculoskeletal: Negative for joint swelling, muscle pain, or injuries  · Neurological/Psych: Negative for confusion, seizures, headaches, or TIA-like symptoms. No anxiety or depression.      Physical Examination:  Vitals:    10/20/21 0856   BP: 124/88   Pulse: 140   Resp: 28   Temp: 97.6 °F (36.4 °C)   SpO2: 98%      In: 784.2 [P.O.:480; Blood:304.2]  Out: 100    Wt Readings from Last 3 Encounters:   10/20/21 156 lb 8.4 oz (71 kg)   11/16/18 147 lb (66.7 kg)   10/19/18 150 lb (68 kg)       Intake/Output Summary (Last 24 hours) at 10/20/2021 [address] [place] [address] filed at 10/20/2021 0010  Gross per 24 hour   Intake 1003.45 ml   Output 100 ml   Net 903.45 ml       Telemetry: Personally Reviewed  - a fib rvr   · Constitutional: Cooperative and in no apparent distress, and appears well nourished  · Skin: Warm and pink; no pallor, cyanosis, clubbing, or bruising   · HEENT: Symmetric and normocephalic. PERRL. Conjunctiva pink with clear sclera. Mucus membranes moist.   · Cardiovascular: IRRegular rate and rhythm. S1/S2 present without murmurs, no rubs or gallops. Peripheral pulses 2+, capillary refill < 3 seconds. No elevation of JVP. No peripheral edema  · Respiratory: Respirations symmetric and unlabored. Lungs clear to auscultation bilaterally, no wheezing, crackles, or rhonchi  · Gastrointestinal: Abdomen soft and round. Bowel sounds active without tenderness. · Musculoskeletal: Bilateral upper and lower extremity strength with generalized weakness   · Neurologic/Psych: Awake and orientated to person, place and time. Calm affect, appropriate mood    Pertinent labs, diagnostic, device, and imaging results reviewed as a part of this visit    Labs:    BMP:   Recent Labs     10/18/21  1859 10/19/21  0509 10/20/21  0618    131* 141   K 4.8 4.4 3.8    102 105   CO2 19* 17* 22   BUN 27* 29* 29*   CREATININE 1.4* 1.4* 1.5*     Estimated Creatinine Clearance: 27 mL/min (A) (based on SCr of 1.5 mg/dL (H)). CBC:   Recent Labs     10/18/21  1859 10/18/21  1859 10/19/21  0509 10/19/21  1949 10/20/21  0618   WBC 9.2  --   --   --  7.2   HGB 7.4*   < > 7.0* 7.7* 7.5*   HCT 23.5*   < > 21.5* 24.1* 23.1*   MCV 85.8  --   --   --  84.7     --   --   --  295    < > = values in this interval not displayed.      Thyroid:   Lab Results   Component Value Date    TSH 2.54 06/03/2010     Lipids:   Lab Results   Component Value Date    TRIG 200 12/03/2009     LFTS:   Lab Results   Component Value Date    ALT 21 10/18/2021    AST 23 10/18/2021    ALKPHOS 75 10/18/2021    PROT 7.4 10/18/2021    PROT 7.6 06/03/2010    AGRATIO 0.7 10/18/2021    BILITOT 0.4 10/18/2021     Cardiac Enzymes:   Lab Results   Component Value Date    TROPONINI 0.05 10/18/2021    TROPONINI <0.01 10/16/2016     Coags:   Lab Results   Component Value Date    PROTIME 19.4 10/18/2021    INR 1.68 10/18/2021       ECG: 10/18/21  Atrial fibrillation with rapid ventricular response  Possible Inferior infarct , age undetermined    ECHO: 6/17/21  Summary:   Atrial fibrillation with a ventricular rate of 100 beats per minute. Overall left ventricular ejection fraction is estimated to be 55-60%. The left ventricular wall motion is normal.   Left ventricular systolic function is normal. (LPHM>/=45%)   The diastolic function is impaired and classified as Grade 3 (restrictive). Mild aortic sclerosis present without evidence of stenosis. .     Reason for Study: Further evaluation of a-fib. RICK:  6/21/21  Left Ventricle: Overall left ventricular ejection fraction is estimated to be   40-45%. There is normal left ventricular wall thickness. Right Ventricle: The right ventricle is normal in size and function. The right   ventricular systolic function is normal.     Left Atrium: The left atrial size is normal. No left atrial clot detected. Right Atrium: Right atrial size is normal.     Mitral Valve: The mitral valve leaflets appear normal. There is no evidence of   mitral valve prolapse. There is mild to moderate mitral regurgitation. Aortic Valve: The aortic valve is normal in structure and function. No aortic   regurgitation is present. Aortic Root: The aortic root is normal size. Tricuspid Valve: The tricuspid valve is normal in structure and function. Trace tricuspid regurgitation is present. Pulmonary valve: The pulmonic valve is normal in structure. There is trace   pulmonic valvular regurgitation. Pericardium: There is no pericardial effusion.      Cath: 6/2021 ([facility])  Findings:     Dominance: right dominant     LM:  Distal: 40%        LAD:  Proximal: 100%        LCX:  Proximal: 100%, left-to-left and right-to-left collaterals        RCA:  Mid: patent stent          LV: EF: 40%      Wall motion: Mild global hypokinesis      LVEDP: 15 mmHg (elevated)    Aortic valve pullback gradient: no     Graft angiography: yes    LIMA to LAD patent    SVG to DG/OM patent to DG    SVG to RCA not injected, know to be occluded       Problem List:   Patient Active Problem List    Diagnosis Date Noted    Acute blood loss anemia 10/18/2021    GI bleeding 10/19/2018    Depression 05/07/2014    Obstructive sleep apnea     Hypothyroid     Hyperlipidemia     CAD (coronary artery disease) 05/31/2012    HTN (hypertension) 05/31/2012        Assessment and Plan:     Acute blood loss anemia   ~ DIPESH likely from GI losses   ~ holding Eliquis until ok to resume per GI. May benefit from Watchman in the future. ~ plan for EGD and colon today     Atrial fib RVR  ~ asymptomatic   ~ recent DCCV 6/2021 and started on amiodarone and Eliquis.   ~ Now back in a fib. Treat medically for now given acute anemia.   ~ lenient HR control given acute anemia. Goal for HR <120 given  LVEF 40-45% on last RICK. Increase Toprol to 50mg TID. Continue amiodarone 400 BID. Coronary artery disease   ~ hx of CABG in '02 and PCI in '14  ~ type II troponin elevation d/t anemia and a fib. No ACS.  ~ aspirin on hold with acute anemia     Multiple medical conditions with risk of decompensation. All pertinent information and plan of care discussed with the physician. All questions and concerns were addressed to the patient. Alternatives to my treatment were discussed. I have discussed the above stated plan with patient and the nurse. The patient verbalized understanding and agreed with the plan. Thank you for allowing to us to participate in the care of Balaji.     Total visit time > 35 minutes; > 50% spend counseling / coordinating care. I reviewed interval history, physical exam, review of data including labs, imaging, development and implementation of treatment plan and coordination of complex care. Counseled on risk factor modifications. Mireya MOTA-VERENICE  [facility]   Office: [phone]    NOTE:  This report was transcribed using voice recognition software. Every effort was made to ensure accuracy; however, inadvertent computerized transcription errors may be present.

## 2021-10-20 NOTE — PROGRESS NOTES
[facility]    10/20/2021 8:59 AM        Name: Tiana Rodas . Admitted: 10/18/2021  Primary Care Provider: Noemí Bryan (Tel: [phone])      Chief Complaint   Patient presents with    Fatigue     Patient newly diagnosed with Afib in June, was recently seen at [facility] for weakness and a kidney infection. Patient is having increasing weakness since she was last seen at [facility], on 4 L % O2 sat      Brief History: Patient is an 83 yo female with hx PAF, CAD/CABG, HLD, HTN, hypothyroidism, ROBYN. She was hospitalized 9/15-9/25 at [facility] with atrial fibrillation and decompensated CHF and was started on Eliquis. She was discharged to rehab unit 9/25-10/7. Patient presented to ER with progressive fatigue, weakness, shortness of breath since return home. Labs showed anemia with Hgb 7.4. Stool for occult blood positive. Lives with ex-. She was active with [facility] prior to admit. Subjective:  Presently resting in bed, visitor at bedside. Patient reports she is anxious today, nervous about procedure. NPO for EGD/colonoscopy today. Remains tachycardic, Patient denies chest pain, shortness of breath, palpitations, abdominal pain, nausea, constipation, diarrhea.      Reviewed interval ancillary notes    Current Medications  metoprolol succinate (TOPROL XL) extended release tablet 100 mg, Daily  0.9 % sodium chloride infusion, PRN  hydrOXYzine (ATARAX) tablet 10 mg, TID PRN  lidocaine PF 1 % injection 5 mL, Once  sodium chloride flush 0.9 % injection 5-40 mL, 2 times per day  sodium chloride flush 0.9 % injection 5-40 mL, PRN  0.9 % sodium chloride infusion, PRN  amiodarone (CORDARONE) tablet 400 mg, BID  pantoprazole (PROTONIX) 80 mg in sodium chloride 0.9 % 100 mL infusion, Continuous  atorvastatin (LIPITOR) tablet 80 mg, Nightly  QUEtiapine (SEROQUEL) tablet 25 mg, BID  venlafaxine [facility]) tablet 75 mg, Daily  sodium chloride flush 0.9 % injection 5-40 mL, 2 times per day  sodium chloride flush 0.9 % injection 5-40 mL, PRN  0.9 % sodium chloride infusion, PRN  ondansetron (ZOFRAN-ODT) disintegrating tablet 4 mg, Q8H PRN   Or  ondansetron (ZOFRAN) injection 4 mg, Q6H PRN  acetaminophen (TYLENOL) tablet 650 mg, Q6H PRN   Or  acetaminophen (TYLENOL) suppository 650 mg, Q6H PRN  furosemide (LASIX) injection 20 mg, BID  influenza quadrivalent split vaccine (FLUZONE;FLUARIX;FLULAVAL;AFLURIA) injection 0.5 mL, Prior to discharge  levothyroxine (SYNTHROID) tablet 112 mcg, QAM AC  prednisoLONE acetate (PRED FORTE) 1 % ophthalmic suspension 1 drop, Daily        Objective:  /88   Pulse 140   Temp 97.6 °F (36.4 °C) (Oral)   Resp 28   Ht 5' 4\" (1.626 m)   Wt 156 lb 8.4 oz (71 kg)   SpO2 98%   BMI 26.87 kg/m²     Intake/Output Summary (Last 24 hours) at 10/20/2021 0859  Last data filed at 10/20/2021 0010  Gross per 24 hour   Intake 1003.45 ml   Output 100 ml   Net 903.45 ml      Wt Readings from Last 3 Encounters:   10/20/21 156 lb 8.4 oz (71 kg)   11/16/18 147 lb (66.7 kg)   10/19/18 150 lb (68 kg)     General:  Awake, alert, oriented in NAD, anxious  Skin:  Warm and dry, color pale  Neck:  Supple. No JVD appreciated  Chest:  Normal effort. Clear to auscultation, no wheezes/rhonchi/rales  Cardiovascular:  Tachycardic, S1/S2, no murmur/gallop/rub  Abdomen:  Soft, nontender, +bowel sounds  Extremities:  No edema  Neurological: No focal deficits  Psychological: Anxious      Labs and Tests:  CBC:   Recent Labs     10/18/21  1859 10/18/21  1859 10/19/21  0509 10/19/21  1949 10/20/21  0618   WBC 9.2  --   --   --  7.2   HGB 7.4*   < > 7.0* 7.7* 7.5*     --   --   --  295    < > = values in this interval not displayed.      BMP:    Recent Labs     10/18/21  1859 10/19/21  0509 10/20/21  0618    131* 141   K 4.8 4.4 3.8    102 105   CO2 19* 17* 22   BUN 27* 29* 29*   CREATININE Diet: Diet NPO  Code:Full Code  DVT PPX: mian Mays, NAKUL - CNP   10/20/2021 8:59 AM

## 2021-10-20 NOTE — PROGRESS NOTES
Returned from [employer]. Report received from [facility] at bedside. Sleepy but easily arousable. Continues to be afib RVR at 125. Denies any pain. Family at bedside with pt . Protonix drip off.

## 2021-10-20 NOTE — PLAN OF CARE
Problem: Falls - Risk of:  Goal: Will remain free from falls  Description: Will remain free from falls  10/19/2021 2154 by Jorden Tran RN  Outcome: Ongoing  10/19/2021 1254 by Chelle Yarbrough RN  Outcome: Ongoing  Note: High fall risk per [facility] CONTINUING [facility] CARE ROSALIA scale. Fall precautions in place, bed alarm engaged. Non-skid footwear on patient, belongings within reach, bed in lowest position. Problem: Bowel Function - Altered:  Goal: Bowel elimination is within specified parameters  Description: Bowel elimination is within specified parameters  Outcome: Ongoing     Problem: Fluid Volume - Imbalance:  Goal: Will show no signs and symptoms of excessive bleeding  Description: Will show no signs and symptoms of excessive bleeding  10/19/2021 2154 by Jorden Tran RN  Outcome: Ongoing  10/19/2021 1254 by Chelle Yarbrough RN  Note: Pt is pale and reports feeling tired. Positive for occult stool. Has had no bowel movements this shift. Vitals signs stable.   Goal: Absence of imbalanced fluid volume signs and symptoms  Description: Absence of imbalanced fluid volume signs and symptoms  Outcome: Ongoing     Problem: Pain:  Goal: Pain level will decrease  Description: Pain level will decrease  Outcome: Ongoing

## 2021-10-20 NOTE — BRIEF OP NOTE
Brief Postoperative Note      Patient: Lito Allen  YOB: 1935  MRN: [id number]    Date of Procedure: 10/20/2021    Pre-Op Diagnosis: anemia       Procedure(s):  EGD BIOPSY  COLONOSCOPY CONTROL HEMORRHAGE    Surgeon(s):  Ayaz Solitario MD      Anesthesia: Monitor Anesthesia Care    Estimated Blood Loss (mL): Minimal    Complications: None    Specimens:   ID Type Source Tests Collected by Time Destination   A : Duodenum Bx  Tissue Duodenum SURGICAL PATHOLOGY Ayaz Solitario MD 10/20/2021 2213    B : Gastric Bx R/O H. Pylori Tissue Stomach SURGICAL PATHOLOGY Ayaz Solitario MD 10/20/2021 4219      Findings:   EGD: normal EGD. S/p gastric and duodenal bx  Colonoscopy: Poor prep. Bilious effluent in TI. 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 Small HF polyp (left alone), Scattered diverticulosis. Hemorrhoids. Plans  Monitor H/H. IV Venofer 200 mg IV X 5 days.  No recall colonoscopy due to advanced age and co-morbidities    Electronically signed by Ayaz Solitario MD on 10/20/2021 at 2:51 PM

## 2021-10-20 NOTE — PROGRESS NOTES
Received from endo - Drowsy,nasal cannula,left side semi fowlers,abdomen soft non tender,warmer placed,vss.

## 2021-10-20 NOTE — ANESTHESIA PRE PROCEDURE
current facility-administered medications for this visit. No current outpatient medications on file.      Facility-Administered Medications Ordered in Other Visits   Medication Dose Route Frequency Provider Last Rate Last Admin    0.9 % sodium chloride infusion   IntraVENous PRN Randy Alexis MD        metoprolol succinate (TOPROL XL) extended release tablet 50 mg  50 mg Oral TID NAKUL Ulrich - CNP        0.9 % sodium chloride infusion   IntraVENous PRN Vista , APRN - CNP        hydrOXYzine (ATARAX) tablet 10 mg  10 mg Oral TID PRN Vista , APRN - CNP   10 mg at 10/20/21 0532    lidocaine PF 1 % injection 5 mL  5 mL IntraDERmal Once Vista , APRN - CNP        sodium chloride flush 0.9 % injection 5-40 mL  5-40 mL IntraVENous 2 times per day Vista Abimael, APRN - CNP   10 mL at 10/20/21 0911    sodium chloride flush 0.9 % injection 5-40 mL  5-40 mL IntraVENous PRN Vista , APRN - CNP        0.9 % sodium chloride infusion  25 mL IntraVENous PRN Tommyta Abimael, APRN -  mL/hr at 10/19/21 1418 25 mL at 10/19/21 1418    amiodarone (CORDARONE) tablet 400 mg  400 mg Oral BID Mumtaz Bernal MD   400 mg at 10/20/21 0951    pantoprazole (PROTONIX) 80 mg in sodium chloride 0.9 % 100 mL infusion  8 mg/hr IntraVENous Continuous Wayne Donovan, APRN - CNP 10 mL/hr at 10/20/21 1153 8 mg/hr at 10/20/21 1153    atorvastatin (LIPITOR) tablet 80 mg  80 mg Oral Nightly Rayo Sawyer MD   80 mg at 10/19/21 2033    QUEtiapine (SEROQUEL) tablet 25 mg  25 mg Oral BID Rayo Sawyer MD   25 mg at 10/20/21 0951    venlafaxine (EFFEXOR) tablet 75 mg  75 mg Oral Daily Rayo Sawyer MD   75 mg at 10/20/21 1327    sodium chloride flush 0.9 % injection 5-40 mL  5-40 mL IntraVENous 2 times per day Rayo Sawyer MD   10 mL at 10/19/21 2033    sodium chloride flush 0.9 % injection 5-40 mL  5-40 mL IntraVENous PRN Rayo Ripper, MD       Sudha Griffins 0.9 % sodium chloride infusion  25 mL IntraVENous PRN Cecilio Aschoff, MD        ondansetron (ZOFRAN-ODT) disintegrating tablet 4 mg  4 mg Oral Q8H PRN Cecilio Aschoff, MD        Or    [facility]) injection 4 mg  4 mg IntraVENous Q6H PRN Cecilio Aschoff, MD        acetaminophen (TYLENOL) tablet 650 mg  650 mg Oral Q6H PRN Cecilio Aschoff, MD   650 mg at 10/20/21 1004    Or    acetaminophen (TYLENOL) suppository 650 mg  650 mg Rectal Q6H PRN Cecilio Aschoff, MD        [Held by provider] furosemide (LASIX) injection 20 mg  20 mg IntraVENous BID Cecilio Aschoff, MD   20 mg at 10/19/21 1857    influenza quadrivalent split vaccine (FLUZONE;FLUARIX;FLULAVAL;AFLURIA) injection 0.5 mL  0.5 mL IntraMUSCular Prior to discharge Cecilio Aschoff, MD        levothyroxine (SYNTHROID) tablet 112 mcg  112 mcg Oral QAM AC Cecilio Aschoff, MD   112 mcg at 10/20/21 0532    prednisoLONE acetate (PRED FORTE) 1 % ophthalmic suspension 1 drop  1 drop Right Eye Daily Cecilio Aschoff, MD   1 drop at 10/20/21 6021       Allergies: Allergies   Allergen Reactions    Percocet [Oxycodone-Acetaminophen] Other (See Comments)     Pt states it makes her crazy. Problem List:    Patient Active Problem List   Diagnosis Code    CAD (coronary artery disease) I25.10    HTN (hypertension) I10    Obstructive sleep apnea G47.33    Hypothyroid E03.9    Hyperlipidemia E78.5    Depression F32. A    GI bleeding K92.2    Acute blood loss anemia D62       Past Medical History:        Diagnosis Date    Atrial fibrillation (HCC)     CAD (coronary artery disease)     Depression     Hyperlipidemia     Hypertension     Hypothyroid     Legal blindness     Obstructive sleep apnea (adult) (pediatric)        Past Surgical History:        Procedure Laterality Date    ANGIOPLASTY      COLONOSCOPY  11/16/2018    COLONOSCOPY POLYPECTOMY SNARE/COLD BIOPSY performed by Jasmin Liu MD at [address] [place] [address] input(s): POCGLU, POCNA, POCK, POCCL, POCBUN, POCHEMO, POCHCT in the last 72 hours. Coags:   Lab Results   Component Value Date    PROTIME 19.4 10/18/2021    INR 1.68 10/18/2021       HCG (If Applicable): No results found for: PREGTESTUR, PREGSERUM, HCG, HCGQUANT     ABGs: No results found for: PHART, PO2ART, GIA8LKF, HEH1HBF, BEART, Y8NYLFPQ     Type & Screen (If Applicable):  No results found for: LABABO, [address]    Anesthesia Evaluation  Patient summary reviewed and Nursing notes reviewed no history of anesthetic complications:   Airway: Mallampati: II  TM distance: >3 FB   Neck ROM: full  Mouth opening: > = 3 FB Dental:          Pulmonary:normal exam    (+) sleep apnea:                             Cardiovascular:  Exercise tolerance: poor (<4 METS),   (+) hypertension: moderate, CAD:, CABG/stent (s/p CABG 2009 stents later  good exercise tolerance): no interval change, dysrhythmias: atrial fibrillation, [place]:, hyperlipidemia    (-)  angina, orthopnea and PND      Rhythm: regular  Rate: normal  Echocardiogram reviewed         Beta Blocker:  Dose within 24 Hrs      ROS comment: 2017:  Equivocal ECG for ischemia with graded exercise test.   2. Duke Treadmill Score is 4 which indicates intermediate risk. 3. Hypertensive BP response. Neuro/Psych:   (+) depression/anxiety    (-) psychiatric history            ROS comment: 16 eye surgery  Legally blind  Severe glaucoma GI/Hepatic/Renal:             Endo/Other:    (+) hypothyroidism, blood dyscrasia: anemia:., .                 Abdominal:             Vascular: Other Findings:               Anesthesia Plan      MAC     ASA 3       Induction: intravenous. MIPS: Prophylactic antiemetics administered. Anesthetic plan and risks discussed with patient. Plan discussed with CRNA.     Attending anesthesiologist reviewed and agrees with Baljinder Mills MD   10/20/2021

## 2021-10-20 NOTE — PROGRESS NOTES
MIDLINE PLACEMENT:    Upon arrival to place peripheral IV. Pt did not have anything to venipuncture and has already had a US IV places that failed so midline order received and placed. Check chart for issues related to mid line placement, check for consent, and did time out with RN Macie Mendoza. Pt. Tolerated placement well, no difficulty accessing left basilic vein and verified with blood return and flushed without resistance. Pt did not c/o of any numbness or tingling to extremity. RN educated on midline use and proper maintenance as well as patient. Reported off to [place] ok to use line.

## 2021-10-20 NOTE — PROGRESS NOTES
Drowsy,nasal cannula,denies pain or nausea,consuming po intake,criteria met to transfer to room 3316,vss.3 A notified .

## 2021-10-20 NOTE — PROGRESS NOTES
Pt arrived to endo, HR 130s, 140s Afib on monitor. Orders from Anesthesiologist to give 1 Unit PRBC prior to procedure. Call placed to blood bank, will tube unit of blood to endo.

## 2021-10-21 ENCOUNTER — TELEPHONE (OUTPATIENT)
Dept: CARDIOLOGY | Age: 86
End: 2021-10-21

## 2021-10-21 LAB
ANION GAP SERPL CALCULATED.3IONS-SCNC: 13 MMOL/L (ref 3–16)
BUN BLDV-MCNC: 27 MG/DL (ref 7–20)
CALCIUM SERPL-MCNC: 9.1 MG/DL (ref 8.3–10.6)
CHLORIDE BLD-SCNC: 104 MMOL/L (ref 99–110)
CO2: 22 MMOL/L (ref 21–32)
CREAT SERPL-MCNC: 1.5 MG/DL (ref 0.6–1.2)
FERRITIN: 67 NG/ML (ref 15–150)
FOLATE: 13.31 NG/ML (ref 4.78–24.2)
GFR AFRICAN AMERICAN: 40
GFR NON-AFRICAN AMERICAN: 33
GLUCOSE BLD-MCNC: 127 MG/DL (ref 70–99)
HCT VFR BLD CALC: 26 % (ref 36–48)
HCT VFR BLD CALC: 26 % (ref 36–48)
HCT VFR BLD CALC: 26.8 % (ref 36–48)
HEMOGLOBIN: 8.4 G/DL (ref 12–16)
HEMOGLOBIN: 8.5 G/DL (ref 12–16)
HEMOGLOBIN: 8.9 G/DL (ref 12–16)
IRON SATURATION: 9 % (ref 15–50)
IRON: 22 UG/DL (ref 37–145)
MCH RBC QN AUTO: 28.9 PG (ref 26–34)
MCHC RBC AUTO-ENTMCNC: 33.4 G/DL (ref 31–36)
MCV RBC AUTO: 86.7 FL (ref 80–100)
PDW BLD-RTO: 17.8 % (ref 12.4–15.4)
PLATELET # BLD: 301 K/UL (ref 135–450)
PMV BLD AUTO: 7.7 FL (ref 5–10.5)
POTASSIUM SERPL-SCNC: 3.9 MMOL/L (ref 3.5–5.1)
RBC # BLD: 3.09 M/UL (ref 4–5.2)
REASON FOR REJECTION: NORMAL
REJECTED TEST: NORMAL
SODIUM BLD-SCNC: 139 MMOL/L (ref 136–145)
TOTAL IRON BINDING CAPACITY: 242 UG/DL (ref 260–445)
VITAMIN B-12: 943 PG/ML (ref 211–911)
WBC # BLD: 7.9 K/UL (ref 4–11)

## 2021-10-21 PROCEDURE — 85014 HEMATOCRIT: CPT

## 2021-10-21 PROCEDURE — 2580000003 HC RX 258: Performed by: INTERNAL MEDICINE

## 2021-10-21 PROCEDURE — 6370000000 HC RX 637 (ALT 250 FOR IP): Performed by: INTERNAL MEDICINE

## 2021-10-21 PROCEDURE — 99233 SBSQ HOSP IP/OBS HIGH 50: CPT | Performed by: NURSE PRACTITIONER

## 2021-10-21 PROCEDURE — 80048 BASIC METABOLIC PNL TOTAL CA: CPT

## 2021-10-21 PROCEDURE — 6360000002 HC RX W HCPCS: Performed by: NURSE PRACTITIONER

## 2021-10-21 PROCEDURE — 6360000002 HC RX W HCPCS: Performed by: INTERNAL MEDICINE

## 2021-10-21 PROCEDURE — 1200000000 HC SEMI PRIVATE

## 2021-10-21 PROCEDURE — 97530 THERAPEUTIC ACTIVITIES: CPT

## 2021-10-21 PROCEDURE — 85018 HEMOGLOBIN: CPT

## 2021-10-21 PROCEDURE — 6370000000 HC RX 637 (ALT 250 FOR IP): Performed by: NURSE PRACTITIONER

## 2021-10-21 PROCEDURE — 97162 PT EVAL MOD COMPLEX 30 MIN: CPT

## 2021-10-21 PROCEDURE — 85027 COMPLETE CBC AUTOMATED: CPT

## 2021-10-21 PROCEDURE — 97166 OT EVAL MOD COMPLEX 45 MIN: CPT

## 2021-10-21 PROCEDURE — 6370000000 HC RX 637 (ALT 250 FOR IP): Performed by: PHYSICIAN ASSISTANT

## 2021-10-21 RX ORDER — FUROSEMIDE 10 MG/ML
20 INJECTION INTRAMUSCULAR; INTRAVENOUS 2 TIMES DAILY
Status: COMPLETED | OUTPATIENT
Start: 2021-10-21 | End: 2021-10-21

## 2021-10-21 RX ORDER — ALPRAZOLAM 0.5 MG/1
0.25 TABLET ORAL 3 TIMES DAILY PRN
Status: DISCONTINUED | OUTPATIENT
Start: 2021-10-21 | End: 2021-10-22 | Stop reason: HOSPADM

## 2021-10-21 RX ORDER — LANOLIN ALCOHOL/MO/W.PET/CERES
3 CREAM (GRAM) TOPICAL NIGHTLY PRN
Status: DISCONTINUED | OUTPATIENT
Start: 2021-10-21 | End: 2021-10-21

## 2021-10-21 RX ORDER — LANOLIN ALCOHOL/MO/W.PET/CERES
6 CREAM (GRAM) TOPICAL NIGHTLY
Status: DISCONTINUED | OUTPATIENT
Start: 2021-10-21 | End: 2021-10-22 | Stop reason: HOSPADM

## 2021-10-21 RX ORDER — FUROSEMIDE 10 MG/ML
20 INJECTION INTRAMUSCULAR; INTRAVENOUS 2 TIMES DAILY
Status: DISCONTINUED | OUTPATIENT
Start: 2021-10-21 | End: 2021-10-21

## 2021-10-21 RX ADMIN — QUETIAPINE FUMARATE 25 MG: 25 TABLET ORAL at 21:57

## 2021-10-21 RX ADMIN — ACETAMINOPHEN 650 MG: 325 TABLET ORAL at 13:18

## 2021-10-21 RX ADMIN — METOPROLOL SUCCINATE 50 MG: 50 TABLET, EXTENDED RELEASE ORAL at 07:15

## 2021-10-21 RX ADMIN — IRON SUCROSE 200 MG: 20 INJECTION, SOLUTION INTRAVENOUS at 17:41

## 2021-10-21 RX ADMIN — LEVOTHYROXINE SODIUM 112 MCG: 0.11 TABLET ORAL at 06:03

## 2021-10-21 RX ADMIN — AMIODARONE HYDROCHLORIDE 400 MG: 200 TABLET ORAL at 21:58

## 2021-10-21 RX ADMIN — METOPROLOL SUCCINATE 50 MG: 50 TABLET, EXTENDED RELEASE ORAL at 13:18

## 2021-10-21 RX ADMIN — HYDROXYZINE HYDROCHLORIDE 10 MG: 10 TABLET ORAL at 07:15

## 2021-10-21 RX ADMIN — SODIUM CHLORIDE, PRESERVATIVE FREE 10 ML: 5 INJECTION INTRAVENOUS at 08:30

## 2021-10-21 RX ADMIN — MELATONIN TAB 3 MG 6 MG: 3 TAB at 21:58

## 2021-10-21 RX ADMIN — METOPROLOL SUCCINATE 50 MG: 50 TABLET, EXTENDED RELEASE ORAL at 21:58

## 2021-10-21 RX ADMIN — PREDNISOLONE ACETATE 1 DROP: 10 SUSPENSION/ DROPS OPHTHALMIC at 09:10

## 2021-10-21 RX ADMIN — MELATONIN TAB 3 MG 3 MG: 3 TAB at 01:56

## 2021-10-21 RX ADMIN — Medication 10 ML: at 22:01

## 2021-10-21 RX ADMIN — ATORVASTATIN CALCIUM 80 MG: 80 TABLET, FILM COATED ORAL at 21:58

## 2021-10-21 RX ADMIN — AMIODARONE HYDROCHLORIDE 400 MG: 200 TABLET ORAL at 07:15

## 2021-10-21 RX ADMIN — ACETAMINOPHEN 650 MG: 325 TABLET ORAL at 01:56

## 2021-10-21 RX ADMIN — VENLAFAXINE 75 MG: 25 TABLET ORAL at 07:14

## 2021-10-21 RX ADMIN — FUROSEMIDE 20 MG: 10 INJECTION, SOLUTION INTRAMUSCULAR; INTRAVENOUS at 17:37

## 2021-10-21 RX ADMIN — ALPRAZOLAM 0.25 MG: 0.5 TABLET ORAL at 21:57

## 2021-10-21 RX ADMIN — SODIUM CHLORIDE, PRESERVATIVE FREE 10 ML: 5 INJECTION INTRAVENOUS at 17:37

## 2021-10-21 RX ADMIN — SODIUM CHLORIDE, PRESERVATIVE FREE 10 ML: 5 INJECTION INTRAVENOUS at 22:00

## 2021-10-21 RX ADMIN — ONDANSETRON 4 MG: 4 TABLET, ORALLY DISINTEGRATING ORAL at 09:10

## 2021-10-21 RX ADMIN — QUETIAPINE FUMARATE 25 MG: 25 TABLET ORAL at 07:15

## 2021-10-21 RX ADMIN — FUROSEMIDE 20 MG: 10 INJECTION, SOLUTION INTRAMUSCULAR; INTRAVENOUS at 13:18

## 2021-10-21 ASSESSMENT — PAIN SCALES - GENERAL
PAINLEVEL_OUTOF10: 1
PAINLEVEL_OUTOF10: 1
PAINLEVEL_OUTOF10: 3
PAINLEVEL_OUTOF10: 0
PAINLEVEL_OUTOF10: 0
PAINLEVEL_OUTOF10: 3
PAINLEVEL_OUTOF10: 0

## 2021-10-21 ASSESSMENT — PAIN DESCRIPTION - LOCATION
LOCATION: GENERALIZED
LOCATION: BACK

## 2021-10-21 ASSESSMENT — PAIN DESCRIPTION - ONSET: ONSET: GRADUAL

## 2021-10-21 ASSESSMENT — PAIN DESCRIPTION - FREQUENCY: FREQUENCY: INTERMITTENT

## 2021-10-21 ASSESSMENT — PAIN DESCRIPTION - PAIN TYPE
TYPE: ACUTE PAIN
TYPE: ACUTE PAIN

## 2021-10-21 ASSESSMENT — PAIN DESCRIPTION - DESCRIPTORS: DESCRIPTORS: ACHING

## 2021-10-21 ASSESSMENT — PAIN DESCRIPTION - PROGRESSION: CLINICAL_PROGRESSION: NOT CHANGED

## 2021-10-21 NOTE — PROGRESS NOTES
[address]   Cardiology Progress Note     Date: 10/21/2021  Admit Date: 10/18/2021     Reason for consultation:     Chief Complaint:   Chief Complaint   Patient presents with    Fatigue     Patient newly diagnosed with Afib in June, was recently seen at [facility] for weakness and a kidney infection. Patient is having increasing weakness since she was last seen at [facility], on 4 L % O2 sat        History of Present Illness: History obtained from patient and medical record. Mariah Andujar is a 82 y.o. female presented to the hospital with complaints of fatigue and weakness. She came to the ED and was found to have anemia and dark stools. Recently started on eliquis for Afib with RVR at OSH. Hgb was 7. GI wants to perform EGD. Troponin is slightly elevated. Interval Hx: Today, she is a fib rate avg 120s up to 140s. Denies cp or sob. On 2L of oxygen. Planning for EGD and colonoscopy today. Patient seen and examined. Clinical notes reviewed. Telemetry reviewed. No new complaints today. No major events overnight. Denies having chest pain, palpitations, shortness of breath, orthopnea/PND, cough, or dizziness at the time of this visit. Past Medical History:  Past Medical History:   Diagnosis Date    Atrial fibrillation ([address]     CAD (coronary artery disease)     Depression     Hyperlipidemia     Hypertension     Hypothyroid     Legal blindness     Obstructive sleep apnea (adult) (pediatric)         Past Surgical History:    has a past surgical history that includes Coronary artery bypass graft (1997); Tubal ligation; angioplasty; sigmoidoscopy (10/19/2018); pr sigmoidoscopy flx dx w/collj spec br/wa if pfrmd (N/A, 10/19/2018); eye surgery (Right); eye surgery (Left); Tonsillectomy and adenoidectomy; Colonoscopy (11/16/2018); Upper gastrointestinal endoscopy (N/A, 10/20/2021); and Colonoscopy (N/A, 10/20/2021). Social History:  Reviewed.   reports that she has never smoked. She has never used smokeless tobacco. She reports current alcohol use of about 2.0 standard drinks of alcohol per week. She reports that she does not use drugs. Allergies: Allergies   Allergen Reactions    Percocet [Oxycodone-Acetaminophen] Other (See Comments)     Pt states it makes her crazy. Family History:  Reviewed. family history includes Heart Disease in her father and mother. Denies family history of sudden cardiac death, arrhythmia, premature CAD    Home Meds:  Prior to Visit Medications    Medication Sig Taking?  Authorizing Provider   Cholecalciferol (VITAMIN D) 50 MCG (2000 UT) CAPS capsule Take 1 capsule by mouth daily Yes Historical Provider, MD   Multiple Vitamins-Minerals (THERAPEUTIC MULTIVITAMIN-MINERALS) tablet Take 1 tablet by mouth daily Yes Historical Provider, MD   amLODIPine (NORVASC) 2.5 MG tablet Take 2.5 mg by mouth daily Yes Historical Provider, MD   TraZODone & Diet Manage Prod (TRAZAMINE) 50 MG MISC Take 100 mg by mouth nightly  Yes Historical Provider, MD   venlafaxine (EFFEXOR) 100 MG tablet Take 100 mg by mouth nightly  Yes Historical Provider, MD   QUEtiapine (SEROQUEL) 50 MG tablet Take 50 mg by mouth nightly  Yes Historical Provider, MD   prednisoLONE sodium phosphate (INFLAMASE FORTE) 1 % ophthalmic solution Place 1 drop into the right eye daily  Yes Historical Provider, MD   atorvastatin (LIPITOR) 80 MG tablet Take 80 mg by mouth nightly Yes Historical Provider, MD   metoprolol (TOPROL-XL) 100 MG XL tablet TAKE 1 TABLET BY MOUTH EVERY DAY  Patient taking differently: Take 100 mg by mouth daily  Yes Los Olguin MD   levothyroxine (SYNTHROID) 75 MCG tablet TAKE ONE TABLET BY MOUTH DAILY  Patient taking differently: Take 125 mcg by mouth Daily  Yes Los Olguin MD   brimonidine-timolol (COMBIGAN) 0.2-0.5 % ophthalmic solution Place 1 drop into both eyes every 12 hours  Historical Provider, MD   zoster vaccine live, PF, ([facility]) [address]228 UNT/0.65ML injection Inject 0.65 mLs into the skin once. Historical Provider, MD        Scheduled Meds:   metoprolol succinate  50 mg Oral TID    iron sucrose (VENOFER) iv piggyback 100 mL (Admin over 60 minutes)  200 mg IntraVENous Q24H    lidocaine 1 % injection  5 mL IntraDERmal Once    sodium chloride flush  5-40 mL IntraVENous 2 times per day    amiodarone  400 mg Oral BID    atorvastatin  80 mg Oral Nightly    QUEtiapine  25 mg Oral BID    venlafaxine  75 mg Oral Daily    sodium chloride flush  5-40 mL IntraVENous 2 times per day    [Held by provider] furosemide  20 mg IntraVENous BID    levothyroxine  112 mcg Oral QAM AC    prednisoLONE acetate  1 drop Right Eye Daily     Continuous Infusions:   sodium chloride      sodium chloride      sodium chloride 25 mL (10/19/21 1418)    sodium chloride       PRN Meds:melatonin, sodium chloride, sodium chloride, hydrOXYzine, sodium chloride flush, sodium chloride, sodium chloride flush, sodium chloride, ondansetron **OR** ondansetron, acetaminophen **OR** acetaminophen     Review of Systems:  · Constitutional: Negative for fever, night sweats, chills,  · Skin: Negative for rash, pruritus, bleeding, or bruising    · HEENT: Negative for vision changes, ringing in the ears, dysphagia  · Respiratory: Reviewed in HPI  · Cardiovascular: Reviewed in HPI  · Gastrointestinal: Negative for abdominal pain, N/V/D, constipation, or black/tarry stools  · Genito-Urinary: Negative for dysuria, incontinence, or hematuria  · Musculoskeletal: Negative for joint swelling, muscle pain, or injuries  · Neurological/Psych: Negative for confusion, seizures, headaches, or TIA-like symptoms. No anxiety or depression.      Physical Examination:  Vitals:    10/21/21 0832   BP: 136/86   Pulse: 112   Resp: 17   Temp: 97.5 °F (36.4 °C)   SpO2: 91%      In: 370 [P.O.:360; I.V.:10]  Out: 200    Wt Readings from Last 3 Encounters:   10/20/21 156 lb 8.4 oz (71 kg)   11/16/18 147 lb (66.7 Component Value Date    ALT 21 10/18/2021    AST 23 10/18/2021    ALKPHOS 75 10/18/2021    PROT 7.4 10/18/2021    PROT 7.6 06/03/2010    AGRATIO 0.7 10/18/2021    BILITOT 0.4 10/18/2021     Cardiac Enzymes:   Lab Results   Component Value Date    TROPONINI 0.05 10/18/2021    TROPONINI <0.01 10/16/2016     Coags:   Lab Results   Component Value Date    PROTIME 19.4 10/18/2021    INR 1.68 10/18/2021       ECG: 10/18/21  Atrial fibrillation with rapid ventricular response  Possible Inferior infarct , age undetermined    ECHO: 6/17/21  Summary:   Atrial fibrillation with a ventricular rate of 100 beats per minute. Overall left ventricular ejection fraction is estimated to be 55-60%. The left ventricular wall motion is normal.   Left ventricular systolic function is normal. (YQMW>/=34%)   The diastolic function is impaired and classified as Grade 3 (restrictive). Mild aortic sclerosis present without evidence of stenosis. .     Reason for Study: Further evaluation of a-fib. RICK:  6/21/21  Left Ventricle: Overall left ventricular ejection fraction is estimated to be   40-45%. There is normal left ventricular wall thickness. Right Ventricle: The right ventricle is normal in size and function. The right   ventricular systolic function is normal.     Left Atrium: The left atrial size is normal. No left atrial clot detected. Right Atrium: Right atrial size is normal.     Mitral Valve: The mitral valve leaflets appear normal. There is no evidence of   mitral valve prolapse. There is mild to moderate mitral regurgitation. Aortic Valve: The aortic valve is normal in structure and function. No aortic   regurgitation is present. Aortic Root: The aortic root is normal size. Tricuspid Valve: The tricuspid valve is normal in structure and function. Trace tricuspid regurgitation is present. Pulmonary valve: The pulmonic valve is normal in structure. There is trace   pulmonic valvular regurgitation. Pericardium: There is no pericardial effusion. Cath: 6/2021 ([facility])  Findings:     Dominance: right dominant     LM:  Distal: 40%        LAD:  Proximal: 100%        LCX:  Proximal: 100%, left-to-left and right-to-left collaterals        RCA:  Mid: patent stent          LV: EF: 40%      Wall motion: Mild global hypokinesis      LVEDP: 15 mmHg (elevated)    Aortic valve pullback gradient: no     Graft angiography: yes    LIMA to LAD patent    SVG to DG/OM patent to DG    SVG to RCA not injected, know to be occluded       Problem List:   Patient Active Problem List    Diagnosis Date Noted    Acute blood loss anemia 10/18/2021    GI bleeding 10/19/2018    Depression 05/07/2014    Obstructive sleep apnea     Hypothyroid     Hyperlipidemia     CAD (coronary artery disease) 05/31/2012    HTN (hypertension) 05/31/2012        Assessment and Plan:     Acute blood loss anemia   ~ DIPESH likely from GI losses   ~ holding Eliquis until ok to resume per GI. May benefit from Watchman in the future.   ~ EGD normal. Colonoscopy with poor prep, bilious effluent in TI, nonbleeding cecal AVM treated with cautery and clip x2, small hepatic flexure polyp (left alone), scattered diverticulosis and hemorrhoids. If anemia persistent as OP planning on capsule endoscopy. ~ hgb stable today at 8.9    Atrial fib RVR  ~ asymptomatic   ~ recent DCCV 6/2021 and started on amiodarone and Eliquis.   ~ Treat medically for now given acute anemia.   ~ lenient HR control given acute anemia. Goal for HR <120 given  LVEF 40-45% on last RICK. Increased Toprol to 50mg TID. Continue amiodarone 400 BID. HR has improved some today. Needs better control prior to d/c.  ~ ok to resume Eliquis tomorrow per GI. Coronary artery disease   ~ hx of CABG in '02 and PCI in '14  ~ type II troponin elevation d/t anemia and a fib. No ACS.  ~ aspirin on hold with acute anemia, resume when ok with GI.      Acute systolic CHF   ~ RICK 3/0055: EF 40-45% (echo couple days prior EF 55-60%)  ~ presenting cxr with pulmonary edema and elevated proBNP. Wt 156lbs today was around 152lbs per chart review. ~ with diurese with IV lasix today only. Monitor kidney function closely. Pt has f/u with primary cardiologist, Dr. Luciaan Wilks with [facility] on 11/10. Will need 1 wk f/u with PCP. Multiple medical conditions with risk of decompensation. All pertinent information and plan of care discussed with the physician. All questions and concerns were addressed to the patient. Alternatives to my treatment were discussed. I have discussed the above stated plan with patient and the nurse. The patient verbalized understanding and agreed with the plan. Thank you for allowing to us to participate in the care of Balaji. Total visit time > 35 minutes; > 50% spend counseling / coordinating care. I reviewed interval history, physical exam, review of data including labs, imaging, development and implementation of treatment plan and coordination of complex care. Counseled on risk factor modifications. Cecilia MOTA-CNP  [facility] [place]   Office: [phone]    NOTE:  This report was transcribed using voice recognition software. Every effort was made to ensure accuracy; however, inadvertent computerized transcription errors may be present.

## 2021-10-21 NOTE — PROGRESS NOTES
Gastroenterology Progress Note    Estela Aggarwal is a 82 y.o. female patient. Active Problems:    Acute blood loss anemia  Resolved Problems:    * No resolved hospital problems. *      SUBJECTIVE:  No GI complaints. No N/V, abdominal pain. ROS:  No fever, chills  No chest pain, palpitations  No SOB, cough  Gastrointestinal ROS: see above    Physical    VITALS:  /83   Pulse 86   Temp 96.9 °F (36.1 °C) (Temporal)   Resp 16   Ht 5' 4\" (1.626 m)   Wt 156 lb 8.4 oz (71 kg)   SpO2 92%   BMI 26.87 kg/m²   TEMPERATURE:  Current - Temp: 96.9 °F (36.1 °C); Max - Temp  Av.3 °F (36.3 °C)  Min: 96.9 °F (36.1 °C)  Max: 98 °F (36.7 °C)    NAD  Irregularly irregular  Abdomen soft, ND, NT,  Bowel sounds normal.  anicteric    Data    Data Review:    Recent Labs     10/18/21  1859 10/19/21  0509 10/20/21  0618 10/20/21  1651 10/21/21  0600   WBC 9.2  --  7.2  --  7.9   HGB 7.4*   < > 7.5* 8.3* 8.9*   HCT 23.5*   < > 23.1* 24.9* 26.8*   MCV 85.8  --  84.7  --  86.7     --  295  --  301    < > = values in this interval not displayed. Recent Labs     10/19/21  0509 10/20/21  0618 10/21/21  0600   * 141 139   K 4.4 3.8 3.9    105 104   CO2 17* 22 22   BUN 29* 29* 27*   CREATININE 1.4* 1.5* 1.5*     Recent Labs     10/18/21  1859   AST 23   ALT 21   BILITOT 0.4   ALKPHOS 75     No results for input(s): LIPASE, AMYLASE in the last 72 hours. Recent Labs     10/18/21  1859   PROTIME 19.4*   INR 1.68*     No results for input(s): PTT in the last 72 hours. ASSESSMENT :    Anemia - hemoglobin had been normal at 12 earlier this year and was down to 8.1 on 10/4/21. .  Labs in care everywhere with normal ferritin at 65, normal iron, iron sat, TIBC. Patient not aware of any black or bloody stools at home (she is legally blind) but did have black stool on rectal exam here after starting on iron 2 days before. Stool was guaiac positive.    EGD 10/10 normal. Colonoscopy with poor prep, bilious effluent in TI, nonbleeding cecal AVM treated with cautery and clip x2, small hepatic flexure polyp (left alone), scattered diverticulosis and hemorrhoids. hgb from 7 up to 8's with 2 u prbc.   afib - Eliquis held here. PLAN :  - f/u biopsies  - monitor hgb  - ok to resume eliquis tomorrow  - if hgb drops further as outpatient or she has bleeding, then would plan capsule endoscopy. Discussed with Dr. Sunshine You PA-C  [facility]        I have personally performed a face to face diagnostic evaluation on this patient. I have interviewed and examined the patient and I agree with the findings and recommended plan of care. In summary, my findings and plan are the following:  Pt with AVM in cecum ablated. No gross bleeding overnight.  hgb ok at 8.9. Ab nt. Plan restart eliquis tomorrow. F/u with dr. Hernandez Due as outpt. [id number] for [facility] home.        Harsh Griffiths MD  [address]

## 2021-10-21 NOTE — PROGRESS NOTES
[facility]    10/21/2021 9:52 AM        Name: Carlos Smith . Admitted: 10/18/2021  Primary Care Provider: Severiano Porras (Tel: [phone])      Chief Complaint   Patient presents with    Fatigue     Patient newly diagnosed with Afib in June, was recently seen at [facility] for weakness and a kidney infection. Patient is having increasing weakness since she was last seen at [facility], on 4 L % O2 sat      Brief History: Patient is an 83 yo female with hx PAF, CAD/CABG, HLD, HTN, hypothyroidism, ROBYN. She was hospitalized 9/15-9/25 at [facility] with atrial fibrillation and decompensated CHF and was started on Eliquis. She was discharged to rehab unit 9/25-10/7. Patient presented to ER with progressive fatigue, weakness, shortness of breath since return home. Labs showed anemia with Hgb 7.4. Stool for occult blood positive. Lives with ex-. She was active with [facility] prior to admit. Subjective:  Presently resting in bed, sister visiting. No significant source of bleeding found on EGD/colonoscopy yesterday. Patient offers c/o nausea this am. Denies abdominal pain, vomiting, diarrhea. Main complaint is anxiety and inability to sleep in hospital. No improvement with atarax.    \    Reviewed interval ancillary notes    Current Medications  melatonin tablet 3 mg, Nightly PRN  0.9 % sodium chloride infusion, PRN  metoprolol succinate (TOPROL XL) extended release tablet 50 mg, TID  iron sucrose (VENOFER) 200 mg in sodium chloride 0.9 % 100 mL IVPB, Q24H  0.9 % sodium chloride infusion, PRN  hydrOXYzine (ATARAX) tablet 10 mg, TID PRN  lidocaine PF 1 % injection 5 mL, Once  sodium chloride flush 0.9 % injection 5-40 mL, 2 times per day  sodium chloride flush 0.9 % injection 5-40 mL, PRN  0.9 % sodium chloride infusion, PRN  amiodarone (CORDARONE) tablet 400 mg, BID  atorvastatin (LIPITOR) tablet 80 mg, Nightly  QUEtiapine (SEROQUEL) tablet 25 mg, BID  venlafaxine (EFFEXOR) tablet 75 mg, Daily  sodium chloride flush 0.9 % injection 5-40 mL, 2 times per day  sodium chloride flush 0.9 % injection 5-40 mL, PRN  0.9 % sodium chloride infusion, PRN  ondansetron (ZOFRAN-ODT) disintegrating tablet 4 mg, Q8H PRN   Or  ondansetron (ZOFRAN) injection 4 mg, Q6H PRN  acetaminophen (TYLENOL) tablet 650 mg, Q6H PRN   Or  acetaminophen (TYLENOL) suppository 650 mg, Q6H PRN  [Held by provider] furosemide (LASIX) injection 20 mg, BID  levothyroxine (SYNTHROID) tablet 112 mcg, QAM AC  prednisoLONE acetate (PRED FORTE) 1 % ophthalmic suspension 1 drop, Daily        Objective:  /86   Pulse 112   Temp 97.5 °F (36.4 °C) (Oral)   Resp 17   Ht 5' 4\" (1.626 m)   Wt 156 lb 8.4 oz (71 kg)   SpO2 91%   BMI 26.87 kg/m²     Intake/Output Summary (Last 24 hours) at 10/21/2021 0952  Last data filed at 10/21/2021 0916  Gross per 24 hour   Intake 1260 ml   Output 200 ml   Net 1060 ml      Wt Readings from Last 3 Encounters:   10/20/21 156 lb 8.4 oz (71 kg)   11/16/18 147 lb (66.7 kg)   10/19/18 150 lb (68 kg)     General:  Awake, alert, oriented in NAD  Skin:  Warm and dry. Neck:  Supple. No JVD appreciated  Chest:  Normal effort. Clear to auscultation, no wheezes/rhonchi/rales  Cardiovascular:  RRR, normal S1/S2, no murmur/gallop/rub  Abdomen:  Soft, nontender, +bowel sounds  Extremities:  No edema  Neurological: No focal deficits  Psychological: Normal mood and affect    Labs and Tests:  CBC:   Recent Labs     10/18/21  1859 10/19/21  0509 10/20/21  0618 10/20/21  1651 10/21/21  0600   WBC 9.2  --  7.2  --  7.9   HGB 7.4*   < > 7.5* 8.3* 8.9*     --  295  --  301    < > = values in this interval not displayed.      BMP:    Recent Labs     10/19/21  0509 10/20/21  0618 10/21/21  0600   * 141 139   K 4.4 3.8 3.9    105 104   CO2 17* 22 22   BUN 29* 29* 27*   CREATININE 1.4* 1.5* 1.5*   GLUCOSE 103* 118* 127*     Hepatic:   Recent Labs     10/18/21  1859   AST 23   ALT 21   BILITOT 0.4   ALKPHOS 75       CXR 10/18/2021: Interval development of bibasilar opacities greater on the right with some   blunting of the right costophrenic angle and focal density in the fissure   suggestive of fluid.  These findings are new compared to prior study.  The   findings could represent CHF however the possibility of a infectious   pneumonic process cannot be excluded     EGD/Colonoscopy 10/20/2021:  Findings:   EGD: normal EGD. S/p gastric and duodenal bx  Colonoscopy: Poor prep. Bilious effluent in TI. 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 Small HF polyp (left alone), Scattered diverticulosis. Hemorrhoids. Problem List  Active Problems:    Acute blood loss anemia  Resolved Problems:    * No resolved hospital problems. *       Assessment & Plan:   1. Acute blood loss anemia. Hgb 7.4 on presentation, stool for occult blood positive. Started on IV Protonix. Eliquis on hold. Received 2 units PRBCs (10/19 , 10/20) for Hgb < 8.0, with hx CAD, acute CHF and elevated troponin. Iron studies, vitamin B12 and folate pending, spoke with [facility] in lab. No significant source of bleeding found on EGD/colonoscopy yesterday. If Hgb drops, will need capsule endoscopy. Receiving IV venofer per GI. Hgb 8.9 today. 2. Acute on chronic diastolic CHF. CXR suggestive pulmonary edema. Placed on IV Lasix, proBNP 42848->[id number]. Lasix held 10/20 for NPO status and bowel prep. Resumed IV Lasix today. Cardio on board. 3. PAF. Noted to be in atrial fib with RVR on presentation. Hospitalized last month at [facility] for same. Remains in atrial fib, rates improved with po amiodarone and increase metoprolol. Eliquis on hold for anemia. GI recommends resuming tomorrow. 4. CAD/elevated troponin. Denies chest pain. Troponin 0.05. No acute changes on EKG. Likely supply demand mismatch. Denies chest pain.  Cardiology evaluated, no indication for further testing. 5. HTN. Controlled. Continue to follow. 6. Anxiety. Patient expressing anxiety from being in hospital. No improvement with Atarax. Will DC and give low dose Xanax prn. Continue Seroquel and Effexor. PT/OT consults pending. Disposition. Patient lives with ex-. She was active with [facility] prior to admit. Diet: ADULT DIET;  Regular; Low Fat/Low Chol/High Fiber/FABI  Code:Full Code  DVT PPX: scds      NAKUL Pandey CNP   10/21/2021 9:52 AM

## 2021-10-21 NOTE — PROGRESS NOTES
Occupational Therapy   Occupational Therapy Initial Assessment  Date: 10/21/2021   Patient Name: Georgina Olvera  MRN: [id number]     : 1935    Date of Service: 10/21/2021    Discharge Recommendations:  Georgina Olvera scored a 15/24 on the AM-PAC ADL Inpatient form. Current research shows that an AM-PAC score of 17 or less is typically not associated with a discharge to the patient's home setting. Based on the patient's AM-PAC score and their current ADL deficits, it is recommended that the patient have 3-5 sessions per week of Occupational Therapy at d/c to increase the patient's independence. Please see assessment section for further patient specific details. If patient discharges prior to next session this note will serve as a discharge summary. Please see below for the latest assessment towards goals. Pt reporting she will refuse above stated recommendation: if refused:  HOME HEALTH CARE: LEVEL 3 SAFETY     - Initial home health evaluation to occur within 24-48 hours, in patient home   - Therapy evaluations in home within 24-48 hours of discharge; including DME and home safety   - Frontload therapy 5 days, then 3x a week   - Therapy to evaluate if patient has [address] needs for personal care   -  evaluation within 24-48 hours, includes evaluation of resources and insurance to determine AL, [place], LTC, and Medicaid options        OT Equipment Recommendations  Equipment Needed: Yes  Mobility Devices: ADL Assistive Devices  ADL Assistive Devices: Hand-held Shower;Transfer Tub Bench    Assessment   Performance deficits / Impairments: Decreased functional mobility ; Decreased ADL status; Decreased high-level IADLs;Decreased strength;Decreased endurance  Assessment: pt not at baseline level of functioning and would benefit from ongoing skilled OT services in order to return to [facility].   Treatment Diagnosis: anemia  Prognosis: Good  Decision Making: Medium Complexity  History: pt legally blind, lives with ex-, just got out of PT/OT at rehab center  Assistance / Modification: assist of 1  Patient Education: eval, POC, discharge, DME-pt independent with education  Barriers to Learning: visual  REQUIRES OT FOLLOW UP: Yes  Activity Tolerance  Activity Tolerance: Patient Tolerated treatment well  Safety Devices  Safety Devices in place: Yes  Type of devices: All fall risk precautions in place;Nurse notified;Call light within reach; Patient at risk for falls; Left in bed;Bed alarm in place           Patient Diagnosis(es): The primary encounter diagnosis was Acute pulmonary edema ([address] Diagnoses of Acute respiratory failure with hypoxemia ([address] Atrial fibrillation, unspecified type ([address] and Gastrointestinal hemorrhage, unspecified gastrointestinal hemorrhage type were also pertinent to this visit. has a past medical history of Atrial fibrillation ([address] CAD (coronary artery disease), Depression, Hyperlipidemia, Hypertension, Hypothyroid, Legal blindness, and Obstructive sleep apnea (adult) (pediatric). has a past surgical history that includes Coronary artery bypass graft (1997); Tubal ligation; angioplasty; sigmoidoscopy (10/19/2018); pr sigmoidoscopy flx dx w/collj spec br/wa if pfrmd (N/A, 10/19/2018); eye surgery (Right); eye surgery (Left); Tonsillectomy and adenoidectomy; Colonoscopy (11/16/2018); Upper gastrointestinal endoscopy (N/A, 10/20/2021); and Colonoscopy (N/A, 10/20/2021). Treatment Diagnosis: anemia      Restrictions  Restrictions/Precautions  Restrictions/Precautions: Fall Risk (high fall risk)  Required Braces or Orthoses?: No  Position Activity Restriction  Other position/activity restrictions: Patient is an 83 yo female with hx PAF, CAD/CABG, HLD, HTN, hypothyroidism, ROBYN. She was hospitalized 9/15-9/25 at [facility] with atrial fibrillation and decompensated CHF and was started on Eliquis. She was discharged to rehab unit 9/25-10/7.  Patient presented to ER with progressive fatigue, weakness, shortness of breath since return home. Labs showed anemia with Hgb 7.4. Stool for occult blood positive.     Subjective   General  Chart Reviewed: Yes  Patient assessed for rehabilitation services?: Yes  Additional Pertinent Hx: pt legally blind  Family / Caregiver Present: Yes (ex- present)  Diagnosis: anemia  Subjective  Subjective: pt supine upon arrival and agreeable to OT eval, denies pain  Patient Currently in Pain: Denies  Vital Signs  Level of Consciousness: Alert (0)  Patient Currently in Pain: Denies  Social/Functional History  Social/Functional History  Lives With: Other (comment) (ex-)  Type of Home: House  Home Layout: One level  Home Access: Stairs to enter without rails  Entrance Stairs - Number of Steps: 1 REBEKA  Bathroom Shower/Tub: Tub/Shower unit  Bathroom Toilet: Standard  Bathroom Equipment: Shower chair  Bathroom Accessibility: Accessible  Home Equipment: Cane, Rolling walker  Receives Help From: Home health (2 hours/5 days a week)  ADL Assistance: [address] [place] [address] Avenue: Needs assistance (ex- does cooking, grocery shopping, laundry)  Ambulation Assistance: Independent (pt reports \"I depend on sarah\" referring to community)  Transfer Assistance: Independent  Active : No  Leisure & Hobbies: going to [employer]  Additional Comments: pt denies falls       Objective   Vision: Impaired  Vision Exceptions: Legally blind  Hearing: Within functional limits    Orientation  Overall Orientation Status: Within Functional Limits  Observation/Palpation  Posture: Fair  Balance  Sitting Balance: Stand by assistance  Standing Balance: Minimal assistance  Standing Balance  Time: ~5-6 minutes  Activity: mobility in room ~20 ft with Alvarado CONNELL  Comment: VCs secondary to visual deficit  Functional Mobility  Functional - Mobility Device: No device  Activity: Other  Assist Level: Minimal assistance  ADL  Grooming: Minimal assistance (brushing

## 2021-10-21 NOTE — PROGRESS NOTES
Pt c/o nausea this AM. States it is due to \"the food smell in front of her\". PRN Zofran given and food removed from the room per pt request. Will continue to monitor.

## 2021-10-21 NOTE — PLAN OF CARE
Problem: Fluid Volume - Imbalance:  Goal: Will show no signs and symptoms of excessive bleeding  Description: Will show no signs and symptoms of excessive bleeding  10/21/2021 0749 by Kerline Hernandes RN  Note: H&H lab drawn Q8hr. Hbg this AM was 8.9.  VS stable. Problem: Falls - Risk of:  Goal: Will remain free from falls  Description: Will remain free from falls  10/21/2021 0749 by Kerline Hernandes RN  Outcome: Ongoing  Note: High fall risk per Hood Rummer scale. Fall precautions in place, bed alarm engaged. Non-skid footwear on patient, belongings within reach, bed in lowest position.

## 2021-10-21 NOTE — PLAN OF CARE
Problem: Falls - Risk of:  Goal: Will remain free from falls  Description: Will remain free from falls  Outcome: Met This Shift  Note: Pt is wearing the fall bracelet and yellow nonskid socks. Bed is in lowest position, locked, side rails up 2/4, and call light is within reach. Pt informed of fall risks, verbalizes understanding, and agrees to ask for help to ambulate. Will monitor.     Goal: Absence of physical injury  Description: Absence of physical injury  Outcome: Met This Shift     Problem: Fluid Volume - Imbalance:  Goal: Will show no signs and symptoms of excessive bleeding  Description: Will show no signs and symptoms of excessive bleeding  Outcome: Met This Shift  Goal: Absence of imbalanced fluid volume signs and symptoms  Description: Absence of imbalanced fluid volume signs and symptoms  Outcome: Met This Shift     Problem: Pain:  Goal: Pain level will decrease  Description: Pain level will decrease  Outcome: Met This Shift

## 2021-10-21 NOTE — PROGRESS NOTES
Physical Therapy    Facility/Department: [address] NURSING  Initial Assessment    NAME: Michelle Garcia  : 1935  MRN: [id number]    Date of Service: 10/21/2021    Discharge Recommendations:  Michelle Garcia scored a 17/24 on the AM-PAC short mobility form. Current research shows that an AM-PAC score of 17 or less is typically not associated with a discharge to the patient's home setting. Based on the patient's AM-PAC score and their current functional mobility deficits, it is recommended that the patient have 3-5 sessions per week of Physical Therapy at d/c to increase the patient's independence. Please see assessment section for further patient specific details. Patient currently refusing, if continues to refuse, recommend home with 24 hour supervision and S2 level home care  [address] [place] [address]: LEVEL 2 SOCIAL     - Initial home health evaluation to occur within 24-48 hours, in patient home   - Therapy to evaluate with goal of regaining prior level of functioning   - Therapy to evaluate if patient has [address] needs for personal care   -  evaluation within 24-48 hours, includes evaluation of resources and insurance to determine AL/IL/LTC/Medicaid options   - [place] on A[facility] Referral   - [address] to discuss home support and care needs post discharge within two weeks of discharge. If patient discharges prior to next session this note will serve as a discharge summary. Please see below for the latest assessment towards goals. 3-5 sessions per week   PT Equipment Recommendations  Equipment Needed: No    Assessment   Body structures, Functions, Activity limitations: Decreased functional mobility ; Decreased safe awareness;Decreased balance;Decreased ROM; Decreased strength;Decreased endurance  Assessment: Patient not at baseline function and would benefit from skilled PT to address above deficits and facilitate return to baseline function.  Patient currently refusing non-home discharge  Treatment Diagnosis: decreased functional mobility, impaired gait, decreased endurance  Prognosis: Good  Decision Making: Medium Complexity  Clinical Presentation: evolving  PT Education: PT Role;Plan of Care;Goals  Patient Education: d/c recommendations - verbalized understanding  Barriers to Learning: visual  REQUIRES PT FOLLOW UP: Yes  Activity Tolerance  Activity Tolerance: Patient limited by fatigue  Activity Tolerance: unable to tolerate further ambulation due to report of fatigue       Patient Diagnosis(es): The primary encounter diagnosis was Acute pulmonary edema ([address] Diagnoses of Acute respiratory failure with hypoxemia ([address] Atrial fibrillation, unspecified type ([address] and Gastrointestinal hemorrhage, unspecified gastrointestinal hemorrhage type were also pertinent to this visit. has a past medical history of Atrial fibrillation ([address] CAD (coronary artery disease), Depression, Hyperlipidemia, Hypertension, Hypothyroid, Legal blindness, and Obstructive sleep apnea (adult) (pediatric). has a past surgical history that includes Coronary artery bypass graft (1997); Tubal ligation; angioplasty; sigmoidoscopy (10/19/2018); pr sigmoidoscopy flx dx w/collj spec br/wa if pfrmd (N/A, 10/19/2018); eye surgery (Right); eye surgery (Left); Tonsillectomy and adenoidectomy; Colonoscopy (11/16/2018); Upper gastrointestinal endoscopy (N/A, 10/20/2021); and Colonoscopy (N/A, 10/20/2021). Restrictions  Restrictions/Precautions  Restrictions/Precautions: Fall Risk (high fall risk)  Required Braces or Orthoses?: No  Position Activity Restriction  Other position/activity restrictions: Patient is an 83 yo female with hx PAF, CAD/CABG, HLD, HTN, hypothyroidism, ROBYN. She was hospitalized 9/15-9/25 at [facility] with atrial fibrillation and decompensated CHF and was started on Eliquis. She was discharged to rehab unit 9/25-10/7.  Patient presented to ER with progressive fatigue, weakness, shortness of breath since return home. Labs showed anemia with Hgb 7.4. Stool for occult blood positive. Vision/Hearing  Vision: Impaired  Vision Exceptions: Legally blind (states she can see shadows)  Hearing: Within functional limits     Subjective  General  Chart Reviewed: Yes  Patient assessed for rehabilitation services?: Yes  Family / Caregiver Present: No  Diagnosis: acute blood loss anemia  Follows Commands: Within Functional Limits  General Comment  Comments: supine in bed upon arrival with alarm on  Subjective  Subjective: Denied pain at rest. Agreeable to therapy, but reports fatigue as she barely slept last night. Pain Screening  Patient Currently in Pain: Denies          Orientation  Orientation  Overall Orientation Status: Within Functional Limits  Social/Functional History  Social/Functional History  Lives With: Other (comment) (ex-)  Type of Home: House  Home Layout: One level  Home Access: Stairs to enter without rails  Entrance Stairs - Number of Steps: 1 REBEKA  Bathroom Shower/Tub: Tub/Shower unit  Bathroom Toilet: Standard  Bathroom Equipment: Shower chair  Bathroom Accessibility: Accessible  Home Equipment: Cane, Rolling walker  Receives Help From: Home health (2 hours/5 days a week)  ADL Assistance: [facility]: Needs assistance (ex- does cooking, grocery shopping, laundry)  Ambulation Assistance: Independent (pt reports \"I depend on sarah\" referring to community)  Transfer Assistance: Independent  Active : No  Leisure & Hobbies: going to [employer]  Additional Comments: pt denies falls    Objective          AROM RLE (degrees)  RLE AROM: WFL  AROM LLE (degrees)  LLE AROM : WFL  Strength RLE  Strength RLE: WFL  Strength LLE  Strength LLE: WFL        Bed mobility  Supine to Sit: Stand by assistance  Sit to Supine:  Moderate assistance  Transfers  Sit to Stand: Contact guard assistance  Stand to sit: Contact guard assistance  Ambulation  Ambulation?: Yes  Ambulation 1  Surface: level tile  Device: No Device;Hand-Held Assist (B HHA)  Assistance: Minimal assistance  Quality of Gait: mildly unsteady, decreased malini, guidance due to visual deficits  Distance: 20'  Comments: declined further ambulation attempt with RW due to fatigue     Balance  Sitting - Static: Good  Sitting - Dynamic: Good  Standing - Static: Fair (stood at sink 2-3' with CGA)  Standing - Dynamic: [address]  Times per week: 3-5  Times per day: Daily  Current Treatment Recommendations: Strengthening, Transfer Training, Endurance Training, Balance Training, Gait Training, Functional Mobility Training, Stair training, Safety Education & Training, Home Exercise Program  Safety Devices  Type of devices: All fall risk precautions in place, Call light within reach, Bed alarm in place, Left in bed, Nurse notified, Gait belt, Patient at risk for falls  Restraints  Initially in place: No      AM-PAC Score  AM-PAC Inpatient Mobility Raw Score : 17 (10/21/21 1559)  AM-PAC Inpatient T-Scale Score : 42.13 (10/21/21 1559)  Mobility Inpatient CMS 0-100% Score: 50.57 (10/21/21 1559)  Mobility Inpatient CMS G-Code Modifier : CK (10/21/21 1559)          Goals  Short term goals  Time Frame for Short term goals: To be met prior to discharge  Short term goal 1: Bed mobility with supervision  Short term goal 2: Sit to/from stand with supervision  Short term goal 3: Ambulate 48' with AAD and supervision  Short term goal 4: Navigate up/down 1 steps with AAD and min A  Patient Goals   Patient goals :  To go home       Therapy Time   Individual Concurrent Group Co-treatment   Time In 1440         Time Out 1508         Minutes 28         Timed Code Treatment Minutes: Myah Cardenas 284       Merry Aviles, PT     Thanks, Merry Aviles, PT, DPT [id number]

## 2021-10-21 NOTE — TELEPHONE ENCOUNTER
Call placed to Dr. Contreras [place] office (pt's primary cardiologist)  with [facility] [phone], pt scheduled for soonest available hospital follow up appt, 11/11 at 315pm at [facility] location- w/ Dr Fern Boeck.

## 2021-10-22 VITALS
DIASTOLIC BLOOD PRESSURE: 76 MMHG | HEART RATE: 117 BPM | WEIGHT: 156 LBS | BODY MASS INDEX: 26.63 KG/M2 | RESPIRATION RATE: 16 BRPM | SYSTOLIC BLOOD PRESSURE: 126 MMHG | TEMPERATURE: 97.3 F | OXYGEN SATURATION: 90 % | HEIGHT: 64 IN

## 2021-10-22 LAB
ANION GAP SERPL CALCULATED.3IONS-SCNC: 13 MMOL/L (ref 3–16)
BUN BLDV-MCNC: 27 MG/DL (ref 7–20)
CALCIUM SERPL-MCNC: 8.9 MG/DL (ref 8.3–10.6)
CHLORIDE BLD-SCNC: 102 MMOL/L (ref 99–110)
CO2: 21 MMOL/L (ref 21–32)
CREAT SERPL-MCNC: 1.4 MG/DL (ref 0.6–1.2)
GFR AFRICAN AMERICAN: 43
GFR NON-AFRICAN AMERICAN: 36
GLUCOSE BLD-MCNC: 114 MG/DL (ref 70–99)
HCT VFR BLD CALC: 26.7 % (ref 36–48)
HEMOGLOBIN: 8.8 G/DL (ref 12–16)
MCH RBC QN AUTO: 28.8 PG (ref 26–34)
MCHC RBC AUTO-ENTMCNC: 33.1 G/DL (ref 31–36)
MCV RBC AUTO: 87 FL (ref 80–100)
PDW BLD-RTO: 18.1 % (ref 12.4–15.4)
PLATELET # BLD: 257 K/UL (ref 135–450)
PMV BLD AUTO: 7.4 FL (ref 5–10.5)
POTASSIUM SERPL-SCNC: 3.5 MMOL/L (ref 3.5–5.1)
PRO-BNP: ABNORMAL PG/ML (ref 0–449)
RBC # BLD: 3.07 M/UL (ref 4–5.2)
SODIUM BLD-SCNC: 136 MMOL/L (ref 136–145)
WBC # BLD: 8.2 K/UL (ref 4–11)

## 2021-10-22 PROCEDURE — 6370000000 HC RX 637 (ALT 250 FOR IP): Performed by: INTERNAL MEDICINE

## 2021-10-22 PROCEDURE — 85027 COMPLETE CBC AUTOMATED: CPT

## 2021-10-22 PROCEDURE — 36415 COLL VENOUS BLD VENIPUNCTURE: CPT

## 2021-10-22 PROCEDURE — 83880 ASSAY OF NATRIURETIC PEPTIDE: CPT

## 2021-10-22 PROCEDURE — 99233 SBSQ HOSP IP/OBS HIGH 50: CPT | Performed by: NURSE PRACTITIONER

## 2021-10-22 PROCEDURE — 6370000000 HC RX 637 (ALT 250 FOR IP): Performed by: NURSE PRACTITIONER

## 2021-10-22 PROCEDURE — 6360000002 HC RX W HCPCS: Performed by: NURSE PRACTITIONER

## 2021-10-22 PROCEDURE — 2580000003 HC RX 258: Performed by: INTERNAL MEDICINE

## 2021-10-22 PROCEDURE — 80048 BASIC METABOLIC PNL TOTAL CA: CPT

## 2021-10-22 RX ORDER — VENLAFAXINE 75 MG/1
75 TABLET ORAL DAILY
Qty: 90 TABLET | Refills: 1 | Status: ON HOLD | COMMUNITY
Start: 2021-10-23 | End: 2021-11-01

## 2021-10-22 RX ORDER — FUROSEMIDE 40 MG/1
40 TABLET ORAL DAILY
Qty: 30 TABLET | Refills: 0 | Status: ON HOLD | OUTPATIENT
Start: 2021-10-23 | End: 2021-11-08 | Stop reason: HOSPADM

## 2021-10-22 RX ORDER — ATORVASTATIN CALCIUM 40 MG/1
40 TABLET, FILM COATED ORAL NIGHTLY
Qty: 30 TABLET | Refills: 3 | COMMUNITY
Start: 2021-10-22

## 2021-10-22 RX ORDER — DOCUSATE SODIUM 100 MG/1
100 CAPSULE, LIQUID FILLED ORAL DAILY
Qty: 30 CAPSULE | Refills: 1 | Status: SHIPPED | OUTPATIENT
Start: 2021-10-22

## 2021-10-22 RX ORDER — LEVOTHYROXINE SODIUM 112 UG/1
112 TABLET ORAL
Qty: 30 TABLET | Refills: 1 | Status: SHIPPED | OUTPATIENT
Start: 2021-10-23

## 2021-10-22 RX ORDER — ASPIRIN 81 MG/1
81 TABLET ORAL DAILY
Qty: 30 TABLET | Refills: 5 | Status: ON HOLD | COMMUNITY
Start: 2021-10-22 | End: 2021-11-08 | Stop reason: HOSPADM

## 2021-10-22 RX ORDER — FUROSEMIDE 10 MG/ML
40 INJECTION INTRAMUSCULAR; INTRAVENOUS ONCE
Status: COMPLETED | OUTPATIENT
Start: 2021-10-22 | End: 2021-10-22

## 2021-10-22 RX ORDER — FERROUS SULFATE 325(65) MG
325 TABLET ORAL 2 TIMES DAILY WITH MEALS
Qty: 180 TABLET | Refills: 1 | Status: SHIPPED | OUTPATIENT
Start: 2021-10-22

## 2021-10-22 RX ORDER — AMIODARONE HYDROCHLORIDE 400 MG/1
400 TABLET ORAL 2 TIMES DAILY
Qty: 60 TABLET | Refills: 0 | Status: ON HOLD | OUTPATIENT
Start: 2021-10-22 | End: 2021-11-08 | Stop reason: HOSPADM

## 2021-10-22 RX ORDER — METOPROLOL SUCCINATE 50 MG/1
50 TABLET, EXTENDED RELEASE ORAL 3 TIMES DAILY
Qty: 90 TABLET | Refills: 1 | Status: SHIPPED | OUTPATIENT
Start: 2021-10-22

## 2021-10-22 RX ADMIN — METOPROLOL SUCCINATE 50 MG: 50 TABLET, EXTENDED RELEASE ORAL at 08:48

## 2021-10-22 RX ADMIN — LEVOTHYROXINE SODIUM 112 MCG: 0.11 TABLET ORAL at 06:01

## 2021-10-22 RX ADMIN — APIXABAN 2.5 MG: 2.5 TABLET, FILM COATED ORAL at 11:01

## 2021-10-22 RX ADMIN — VENLAFAXINE 75 MG: 25 TABLET ORAL at 08:47

## 2021-10-22 RX ADMIN — SODIUM CHLORIDE, PRESERVATIVE FREE 10 ML: 5 INJECTION INTRAVENOUS at 08:58

## 2021-10-22 RX ADMIN — FUROSEMIDE 40 MG: 10 INJECTION, SOLUTION INTRAMUSCULAR; INTRAVENOUS at 11:01

## 2021-10-22 RX ADMIN — ALPRAZOLAM 0.25 MG: 0.5 TABLET ORAL at 06:07

## 2021-10-22 RX ADMIN — Medication 10 ML: at 09:02

## 2021-10-22 RX ADMIN — QUETIAPINE FUMARATE 25 MG: 25 TABLET ORAL at 08:48

## 2021-10-22 RX ADMIN — AMIODARONE HYDROCHLORIDE 400 MG: 200 TABLET ORAL at 08:47

## 2021-10-22 RX ADMIN — PREDNISOLONE ACETATE 1 DROP: 10 SUSPENSION/ DROPS OPHTHALMIC at 08:45

## 2021-10-22 ASSESSMENT — PAIN SCALES - GENERAL
PAINLEVEL_OUTOF10: 0

## 2021-10-22 NOTE — PLAN OF CARE
Problem: Falls - Risk of:  Goal: Will remain free from falls  Description: Will remain free from falls  Outcome: Met This Shift  Note: Pt is wearing the fall bracelet and yellow nonskid socks. Bed is in lowest position, locked, side rails up 2/4, and call light is within reach. Pt informed of fall risks, verbalizes understanding, and agrees to ask for help to ambulate. Will monitor.     Goal: Absence of physical injury  Description: Absence of physical injury  Outcome: Met This Shift     Problem: Fluid Volume - Imbalance:  Goal: Will show no signs and symptoms of excessive bleeding  Description: Will show no signs and symptoms of excessive bleeding  Outcome: Met This Shift  Goal: Absence of imbalanced fluid volume signs and symptoms  Description: Absence of imbalanced fluid volume signs and symptoms  Outcome: Met This Shift

## 2021-10-22 NOTE — CARE COORDINATION
Discharge Planning Assessment  RN  discharge planner met with patient  and family member- sister to discuss reason for admission, current living situation, and potential needs at the time of discharge    Demographics/Insurance verified Yes- [address]    Current type of dwelling:  [employer], with a basement    Patient from [facility]/ confirmed with:  N/A    Living arrangements: lives with Ex-     Level of function/Support: needs assistance with ADLs. Ex- assists and also has home  aid services  x5 days a week for 2 hrs via [address] [place], [place] [address] agency. Patient was recently  Discharged from [place] rehab [facility] where she had been there  for 10 for therapy after being discharged from [facility]. She stayed home for only 3 days before coming to the hospital.    PCP:  Dr Yamila Horne    Last Visit to PCP:  1 week ago    DME: has a walker, C-pap but she has not used it for the last 4 months because she feels it is dirty. Active with any community resources/agencies/skilled home care:  home  aid services  x5 days a week for 2 hrs via [employer] agency. Patient is also active with [facility] agency  -    Phone ;   [phone]  Fax;  [phone]   for Skilled Nursing, [address] confirmed with [place] with the  agency - [phone]  EX -[phone] who requested orders faxed to the above fax number. Medication compliance issues: Denies issues    Financial issues that could impact healthcare:  No    Tentative discharge plan: home with skilled hhc services  Therapy recommending SNF , patient declines prefer going home with  Skilled hhc services. Discussed and provided facilities of choice if transition to a skilled nursing facility is required at the time of discharge    Discussed with patient and/or family that on the day of discharge home tentative time of discharge will be between 10 AM and noon.     Transportation at the time of discharge: ex- will transport her home Est[place] Manzanares

## 2021-10-22 NOTE — DISCHARGE SUMMARY
[address] [facility] DISCHARGE SUMMARY    Patient Demographics    Steve Cade  Date of Birth. 1935  MRN. [id number]     Primary care provider. Leo Orellana  (Tel: [phone])    Admit date: 10/18/2021    Discharge date (blank if same as Note Date): Note Date: 10/22/2021     Reason for Hospitalization. Chief Complaint   Patient presents with    Fatigue     Patient newly diagnosed with Afib in June, was recently seen at [facility] for weakness and a kidney infection. Patient is having increasing weakness since she was last seen at [facility], on 4 L % O2 sat          Significant Findings. Active Problems:    Acute blood loss anemia  Resolved Problems:    * No resolved hospital problems. *       Problems and results from this hospitalization that need follow up. 1. Acute blood loss anemia. Biweekly CBC starting Monday. Home health to draw. 2. Call Dr Crow Reilly office for f/u gastric and duodenal biopsy results. 3. Persistent atrial. Follow up with primary cardiologist.    Significant test results and incidental findings. CXR 10/18/2021: Interval development of bibasilar opacities greater on the right with some   blunting of the right costophrenic angle and focal density in the fissure   suggestive of fluid.  These findings are new compared to prior study.  The   findings could represent CHF however the possibility of a infectious   pneumonic process cannot be excluded          Invasive procedures and treatments. EGD/Colonoscopy 10/20/2021:  Findings:   EGD: normal EGD. S/p gastric and duodenal bx  Colonoscopy: Poor prep. Bilious effluent in TI. 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 Small HF polyp (left alone), Scattered diverticulosis. Hemorrhoids.     [facility] Course. Patient is an 81 yo female with hx PAF, CAD/CABG, HLD, HTN, hypothyroidism, ROBYN.  She was hospitalized 9/15-9/25 at [facility] with atrial fibrillation and decompensated CHF, she was started on Eliquis. Patient was discharged to rehab unit 9/25-10/7. She presented to ER with progressive fatigue, weakness, and shortness of breath since her return home. Labs showed significant anemia with Hgb 7.4. Stool for occult blood positive. 1. Acute blood loss anemia. Hgb 7.4 on presentation, stool for occult blood positive. Started on IV Protonix. Eliquis was held on admission. Received 2 units PRBCs (10/19 , 10/20) for Hgb < 8.0, with hx CAD, acute CHF and elevated troponin. Iron studies low and she received IV venofer. She was discharged on po iron and stool softner. Vitamin B12 and folate within acceptable limits. Underwent EGD/colonoscopy 10/20 with no significant source of bleeding found. If Hgb drops, will need capsule endoscopy. Okay per GI to resume Eliquis. Hgb 8.9 on DC. Monitor CBC biweekly x 2 weeks, home health to draw. 2. Acute on chronic diastolic CHF. CXR suggestive pulmonary edema. Placed on IV Lasix, proBNP 01089->5422. Discharged on po Lasix. 3. PAF. Noted to be in atrial fib with RVR on presentation. Hospitalized last month at [facility] for same. Remains in atrial fib, rates improved with po amiodarone and increase metoprolol. Eliquis resumed on DC. Patient to follow with primary cardiologist.     4. CAD/elevated troponin. Denies chest pain. Troponin 0.05. No acute changes on EKG. Likely supply demand mismatch. Denies chest pain. Cardiology evaluated, no indication for further testing. 5. HTN. Controlled. 6. Anxiety. Patient expressing anxiety from being in hospital. No improvement with Atarax, received low dose Xanax prn in hospital. Continued Seroquel and Effexor. Patient states she feels better, anxious for DC home, \"I can't stand thought of looking at these walls for another day. \" PT/OT have evaluated, recommend SNF but patient declines.  Lives with former spouse, was active with home care prior to admission. Home care orders placed. Consults. IP CONSULT TO GI  IP CONSULT TO CARDIOLOGY    Physical examination on discharge day. /76   Pulse 117   Temp 97.3 °F (36.3 °C) (Temporal)   Resp 16   Ht 5' 4\" (1.626 m)   Wt 156 lb (70.8 kg)   SpO2 90%   BMI 26.78 kg/m²   General appearance. Alert. Looks comfortable. HEENT. Sclera clear. Moist mucus membranes. Cardiovascular. Irregular rate and rhythm, normal S1, S2. No murmur. Respiratory. Not using accessory muscles. Clear to auscultation bilaterally, no wheeze. Gastrointestinal. Abdomen soft, non-tender, not distended, normal bowel sounds  Neurology. Facial symmetry. No speech deficits. Moving all extremities equally. Extremities. No edema in lower extremities. Skin. Warm, dry, normal turgor    Condition at time of discharge stable    Medication instructions provided to patient at discharge. Medication List      START taking these medications    amiodarone 400 MG tablet  Commonly known as: PACERONE  Take 1 tablet by mouth 2 times daily  Notes to patient: Use: to treat certain types of abnormal heartbeats  Side effects: Constipation, upset stomach, throwing up, change in taste     docusate sodium 100 MG capsule  Commonly known as: COLACE  Take 1 capsule by mouth daily  Notes to patient: Use: softens stools, relieves constipation  Side effects: gas, upset stomach, diarrhea     ferrous sulfate 325 (65 Fe) MG tablet  Commonly known as: IRON 325  Take 1 tablet by mouth 2 times daily (with meals)  Notes to patient: Use: is a dietary supplement used to treat anemia  Side effects: stomach discomfort. indigestion, constipation, and possible darkened stools     furosemide 40 MG tablet  Commonly known as: LASIX  Take 1 tablet by mouth daily Take 1 tab daily until weight back to 152lbs. Then can take as needed thereafter.   Notes to patient: Use: to get rid of extra fluid or to treat high blood pressure  Side effects: frequent urination, dizziness, upset stomach        CHANGE how you take these medications    atorvastatin 80 MG tablet  Commonly known as: LIPITOR  What changed: how much to take  Notes to patient: Use: statin used to treat cholesterol and triglyceride levels, may reduce the risk of stroke or heart attacks  Side effects: muscle weakness, fatigue, cold like symptoms, urinary tract infection, nausea, loss of appetite and indigestion. levothyroxine 112 MCG tablet  Commonly known as: SYNTHROID  Take 1 tablet by mouth every morning (before breakfast)  What changed:   · medication strength  · See the new instructions. Notes to patient: Use: increases thyroid stimulating hormone  Side effects: weight loss, nervousness   For best results: take on an empty stomach, 30 minutes prior to eating     metoprolol succinate 50 MG extended release tablet  Commonly known as: TOPROL XL  Take 1 tablet by mouth 3 times daily  What changed:   · medication strength  · See the new instructions. Notes to patient: Use: to treat high blood pressure and to treat heart failure  Side effects: dizziness, feeling tired, diarrhea, upset stomach     venlafaxine 75 MG tablet  Commonly known as: EFFEXOR  What changed:   · medication strength  · how much to take  · when to take this  Notes to patient: Use: Treatment of generalized anxiety disorder  Side effects:  Insomnia, weight loss, lack of appetite, fatigue, constipation        CONTINUE taking these medications    aspirin EC 81 MG EC tablet  Notes to patient: Thins blood to prevent clotting, ie heart attack or stroke  Side effects: may cause extra bruising or bleeding      Combigan 0.2-0.5 % ophthalmic solution  Generic drug: brimonidine-timolol     Eliquis 2.5 MG Tabs tablet  Generic drug: apixaban  Notes to patient: Use: to lower the chance of heart attack or stroke  Side effects: upset stomach, easy to bruise     prednisoLONE sodium phosphate 1 % ophthalmic solution  Commonly known as: INFLAMASE FORTE     QUEtiapine 50 MG tablet  Commonly known as: SEROQUEL  Notes to patient: Use: to treat bipolar disorder, depression, schizophrenia  Side effects: stuffy nose, weight gain, constipation     therapeutic multivitamin-minerals tablet  Notes to patient: Use: dietary supplement  Side effects: upset stomach, nausea     Trazamine 50 MG Misc  Notes to patient: Use: to treat low mood (depression). It is most often given after other drugs have failed to help  Side effects: headache, constipation, feeling dizzy     vitamin D 50 MCG (2000 UT) Caps capsule  Notes to patient: Use: Dietary supplement, helps absorb calcium better. Side effects: upset stomach, nausea        STOP taking these medications    Abilify 2 MG tablet  Generic drug: ARIPiprazole     amLODIPine 2.5 MG tablet  Commonly known as: NORVASC     clopidogrel 75 MG tablet  Commonly known as: PLAVIX     isosorbide mononitrate 30 MG extended release tablet  Commonly known as: IMDUR     sertraline 100 MG tablet  Commonly known as: ZOLOFT     zoster vaccine live (PF) 19423 UNT/0.65ML injection  Commonly known as: ZOSTAVAX           Where to Get Your Medications      These medications were sent to [id number], [address] - P [phone] - [address], [facility] [address]    Phone: [phone]   · amiodarone 400 MG tablet  · docusate sodium 100 MG capsule  · ferrous sulfate 325 (65 Fe) MG tablet  · furosemide 40 MG tablet  · levothyroxine 112 MCG tablet  · metoprolol succinate 50 MG extended release tablet         Discharge recommendations given to patient. Follow Up. PCP in 1 week   Disposition. home  Activity. activity as tolerated  Diet: ADULT DIET; Regular; Low Fat/Low Chol/High Fiber/FABI      Spent 40 minutes in discharge process.     Signed:  NAKUL Velez CNP     10/22/2021 8:38 AM

## 2021-10-22 NOTE — CARE COORDINATION
Patient discharged to home with skilled hhc services via   [facility]  -    Phone ;   [phone]  Fax;  [phone]  The face sheet ,  AVS/CRISTO and hhc order faxed to   [phone] as per Veronika  with the agency    All discharge needs met per case management

## 2021-10-22 NOTE — PROGRESS NOTES
Occupational Therapy Karol Burrell    Patient declined OT in AM stating she has not been sleeping and therefore has been having trouble functioning during the day. Requests PM therapy if time allows.

## 2021-10-22 NOTE — CONSULTS
does not use extra added salt but does not follow a specific sodium limitation. She does not think she drinks over 64 oz. She is compliant with medications and follow up visits. Patient provided with both written and verbal education on CHF signs/ symptoms, causes, discharge medications, daily weights, low sodium diet, activity, and follow-up. Pt to call if gains 3 pounds in one day or 5 pounds in one week. Mutually agreed upon goals were discussed such as calling the MD as soon as they recognize symptoms and weight gain, maintaining proper diet, taking medications as prescribed, joining cardiac rehab when able. Also reviewed importance of risk factor reduction. Patient provided with CHF Zone Management tool and CHF symptoms magnet. Discussed importance of lifestyle changes: encouraged calling early with symptoms    PATIENT/CAREGIVER TEACHING:    Level of patient/caregiver understanding able to:   [x ] Verbalize understanding [ ] Demonstrate understanding [ ] Teach back   [ ] Needs reinforcement [ ] Other:       Time spent teachin mins    1. WEIGHT: Admit Weight: 144 lb (65.3 kg)      Today  Weight: 156 lb (70.8 kg)   2. I/O     Intake/Output Summary (Last 24 hours) at 10/22/2021 1155  Last data filed at 10/22/2021 0903  Gross per 24 hour   Intake 480 ml   Output 525 ml   Net -45 ml       Recommendations:   1. Patient already has follow up with Cardiologist  2. Will place CHF pathway on chiki for discharge with home health   3. Continue to educate on S/S.   4. Emphasize daily weights, diet, and knowing when and who to call  5. Provided patient with CHF Resource Line for questions and concerns.        BEATRIZ BURGOS RN 10/22/2021 11:55 AM

## 2021-10-22 NOTE — PROGRESS NOTES
[facility]   Cardiology Progress Note     Date: 10/22/2021  Admit Date: 10/18/2021     Reason for consultation:     Chief Complaint:   Chief Complaint   Patient presents with    Fatigue     Patient newly diagnosed with Afib in June, was recently seen at [facility] for weakness and a kidney infection. Patient is having increasing weakness since she was last seen at [facility], on 4 L % O2 sat        History of Present Illness: History obtained from patient and medical record. Radha Adams is a 82 y.o. female presented to the hospital with complaints of fatigue and weakness. She came to the ED and was found to have anemia and dark stools. Recently started on eliquis for Afib with RVR at OSH. Hgb was 7. GI wants to perform EGD. Troponin is slightly elevated. Interval Hx: Today, she reports feeling ok. She feels eager to discharge home. No specific complaints. HR improved on tele. Patient seen and examined. Clinical notes reviewed. Telemetry reviewed. No new complaints today. No major events overnight. Denies having chest pain, palpitations, shortness of breath, orthopnea/PND, cough, or dizziness at the time of this visit. Past Medical History:  Past Medical History:   Diagnosis Date    Atrial fibrillation ([facility] [address]     CAD (coronary artery disease)     Depression     Hyperlipidemia     Hypertension     Hypothyroid     Legal blindness     Obstructive sleep apnea (adult) (pediatric)         Past Surgical History:    has a past surgical history that includes Coronary artery bypass graft (1997); Tubal ligation; angioplasty; sigmoidoscopy (10/19/2018); pr sigmoidoscopy flx dx w/collj spec br/wa if pfrmd (N/A, 10/19/2018); eye surgery (Right); eye surgery (Left); Tonsillectomy and adenoidectomy; Colonoscopy (11/16/2018); Upper gastrointestinal endoscopy (N/A, 10/20/2021); and Colonoscopy (N/A, 10/20/2021). Social History:  Reviewed. reports that she has never smoked.  She has never used smokeless tobacco. She reports current alcohol use of about 2.0 standard drinks of alcohol per week. She reports that she does not use drugs. Allergies: Allergies   Allergen Reactions    Percocet [Oxycodone-Acetaminophen] Other (See Comments)     Pt states it makes her crazy. Family History:  Reviewed. family history includes Heart Disease in her father and mother. Denies family history of sudden cardiac death, arrhythmia, premature CAD    Home Meds:  Prior to Visit Medications    Medication Sig Taking?  Authorizing Provider   ferrous sulfate (IRON 325) 325 (65 Fe) MG tablet Take 1 tablet by mouth 2 times daily (with meals) Yes NAKUL Majano CNP   docusate sodium (COLACE) 100 MG capsule Take 1 capsule by mouth daily Yes NAKUL Majano CNP   levothyroxine (SYNTHROID) 112 MCG tablet Take 1 tablet by mouth every morning (before breakfast) Yes NAKUL Majano CNP   apixaban (ELIQUIS) 2.5 MG TABS tablet Take 1 tablet by mouth 2 times daily Yes NAKUL Majano CNP   aspirin EC 81 MG EC tablet Take 1 tablet by mouth daily Yes NAKUL Majano CNP   atorvastatin (LIPITOR) 80 MG tablet Take 0.5 tablets by mouth nightly Yes NAKUL Majano CNP   venlafaxine (EFFEXOR) 75 MG tablet Take 1 tablet by mouth daily Yes NAKUL Majano CNP   Cholecalciferol (VITAMIN D) 50 MCG (2000 UT) CAPS capsule Take 1 capsule by mouth daily Yes Historical Provider, MD   Multiple Vitamins-Minerals (THERAPEUTIC MULTIVITAMIN-MINERALS) tablet Take 1 tablet by mouth daily Yes Historical Provider, MD   TraZODone & Diet Manage Prod (TRAZAMINE) 50 MG MISC Take 100 mg by mouth nightly  Yes Historical Provider, MD   QUEtiapine (SEROQUEL) 50 MG tablet Take 50 mg by mouth nightly  Yes Historical Provider, MD   prednisoLONE sodium phosphate (INFLAMASE FORTE) 1 % ophthalmic solution Place 1 drop into the right eye daily  Yes Historical Provider, MD   metoprolol (TOPROL-XL) 100 MG XL tablet TAKE 1 TABLET BY MOUTH EVERY DAY  Patient taking differently: Take 100 mg by mouth daily  Yes Nick Hilliard MD   brimonidine-timolol (COMBIGAN) 0.2-0.5 % ophthalmic solution Place 1 drop into both eyes every 12 hours  Historical Provider, MD        Scheduled Meds:   apixaban  2.5 mg Oral BID    melatonin  6 mg Oral Nightly    metoprolol succinate  50 mg Oral TID    iron sucrose (VENOFER) iv piggyback 100 mL (Admin over 60 minutes)  200 mg IntraVENous Q24H    lidocaine 1 % injection  5 mL IntraDERmal Once    sodium chloride flush  5-40 mL IntraVENous 2 times per day    amiodarone  400 mg Oral BID    atorvastatin  80 mg Oral Nightly    QUEtiapine  25 mg Oral BID    venlafaxine  75 mg Oral Daily    sodium chloride flush  5-40 mL IntraVENous 2 times per day    levothyroxine  112 mcg Oral QAM AC    prednisoLONE acetate  1 drop Right Eye Daily     Continuous Infusions:   sodium chloride      sodium chloride      sodium chloride 25 mL (10/19/21 1418)    sodium chloride       PRN Meds:ALPRAZolam, sodium chloride, sodium chloride, sodium chloride flush, sodium chloride, sodium chloride flush, sodium chloride, ondansetron **OR** ondansetron, acetaminophen **OR** acetaminophen     Review of Systems:  · Constitutional: Negative for fever, night sweats, chills,  · Skin: Negative for rash, pruritus, bleeding, or bruising    · HEENT: Negative for vision changes, ringing in the ears, dysphagia  · Respiratory: Reviewed in HPI  · Cardiovascular: Reviewed in HPI  · Gastrointestinal: Negative for abdominal pain, N/V/D, constipation, or black/tarry stools  · Genito-Urinary: Negative for dysuria, incontinence, or hematuria  · Musculoskeletal: Negative for joint swelling, muscle pain, or injuries  · Neurological/Psych: Negative for confusion, seizures, headaches, or TIA-like symptoms. No anxiety or depression.      Physical Examination:  Vitals:    10/22/21 0815   BP:    Pulse: 120 Resp:    Temp:    SpO2:       In: 480 [P.O.:480]  Out: 525    Wt Readings from Last 3 Encounters:   10/20/21 156 lb 8.4 oz (71 kg)   11/16/18 147 lb (66.7 kg)   10/19/18 150 lb (68 kg)       Intake/Output Summary (Last 24 hours) at 10/22/2021 9447  Last data filed at 10/22/2021 4621  Gross per 24 hour   Intake 480 ml   Output 625 ml   Net -145 ml       Telemetry: Personally Reviewed  - a fib   · Constitutional: Cooperative and in no apparent distress, and appears well nourished  · Skin: Warm and pink; no pallor, cyanosis, clubbing, or bruising   · HEENT: Symmetric and normocephalic. PERRL. Conjunctiva pink with clear sclera. Mucus membranes moist.   · Cardiovascular: IRRegular rate and rhythm. S1/S2 present without murmurs, no rubs or gallops. Peripheral pulses 2+, capillary refill < 3 seconds. No elevation of JVP. No peripheral edema  · Respiratory: Respirations symmetric and unlabored. Lungs with expiratory wheeznig  · Gastrointestinal: Abdomen soft and round. Bowel sounds active without tenderness. · Musculoskeletal: Bilateral upper and lower extremity strength with generalized weakness   · Neurologic/Psych: Awake and orientated to person, place and time. Calm affect, appropriate mood    Pertinent labs, diagnostic, device, and imaging results reviewed as a part of this visit    Labs:    BMP:   Recent Labs     10/20/21  0618 10/21/21  0600 10/22/21  0430    139 136   K 3.8 3.9 3.5    104 102   CO2 22 22 21   BUN 29* 27* 27*   CREATININE 1.5* 1.5* 1.4*     Estimated Creatinine Clearance: 29 mL/min (A) (based on SCr of 1.4 mg/dL (H)).    CBC:   Recent Labs     10/20/21  0618 10/20/21  1651 10/21/21  0600 10/21/21  0600 10/21/21  1448 10/21/21  2245 10/22/21  0430   WBC 7.2  --  7.9  --   --   --  8.2   HGB 7.5*   < > 8.9*   < > 8.5* 8.4* 8.8*   HCT 23.1*   < > 26.8*   < > 26.0* 26.0* 26.7*   MCV 84.7  --  86.7  --   --   --  87.0     --  301  --   --   --  257    < > = values in this interval not displayed. Thyroid:   Lab Results   Component Value Date    TSH 2.54 06/03/2010     Lipids:   Lab Results   Component Value Date    TRIG 200 12/03/2009     LFTS:   Lab Results   Component Value Date    ALT 21 10/18/2021    AST 23 10/18/2021    ALKPHOS 75 10/18/2021    PROT 7.4 10/18/2021    PROT 7.6 06/03/2010    AGRATIO 0.7 10/18/2021    BILITOT 0.4 10/18/2021     Cardiac Enzymes:   Lab Results   Component Value Date    TROPONINI 0.05 10/18/2021    TROPONINI <0.01 10/16/2016     Coags:   Lab Results   Component Value Date    PROTIME 19.4 10/18/2021    INR 1.68 10/18/2021       ECG: 10/18/21  Atrial fibrillation with rapid ventricular response  Possible Inferior infarct , age undetermined    ECHO: 6/17/21  Summary:   Atrial fibrillation with a ventricular rate of 100 beats per minute. Overall left ventricular ejection fraction is estimated to be 55-60%. The left ventricular wall motion is normal.   Left ventricular systolic function is normal. (RPHQ>/=23%)   The diastolic function is impaired and classified as Grade 3 (restrictive). Mild aortic sclerosis present without evidence of stenosis. .     Reason for Study: Further evaluation of a-fib. RICK:  6/21/21  Left Ventricle: Overall left ventricular ejection fraction is estimated to be   40-45%. There is normal left ventricular wall thickness. Right Ventricle: The right ventricle is normal in size and function. The right   ventricular systolic function is normal.     Left Atrium: The left atrial size is normal. No left atrial clot detected. Right Atrium: Right atrial size is normal.     Mitral Valve: The mitral valve leaflets appear normal. There is no evidence of   mitral valve prolapse. There is mild to moderate mitral regurgitation. Aortic Valve: The aortic valve is normal in structure and function. No aortic   regurgitation is present. Aortic Root: The aortic root is normal size. Tricuspid Valve:  The tricuspid valve is normal in structure and function. Trace tricuspid regurgitation is present. Pulmonary valve: The pulmonic valve is normal in structure. There is trace   pulmonic valvular regurgitation. Pericardium: There is no pericardial effusion. Cath: 6/2021 ([facility])  Findings:     Dominance: right dominant     LM:  Distal: 40%        LAD:  Proximal: 100%        LCX:  Proximal: 100%, left-to-left and right-to-left collaterals        RCA:  Mid: patent stent          LV: EF: 40%      Wall motion: Mild global hypokinesis      LVEDP: 15 mmHg (elevated)    Aortic valve pullback gradient: no     Graft angiography: yes    LIMA to LAD patent    SVG to DG/OM patent to DG    SVG to RCA not injected, know to be occluded       Problem List:   Patient Active Problem List    Diagnosis Date Noted    Acute blood loss anemia 10/18/2021    GI bleeding 10/19/2018    Depression 05/07/2014    Obstructive sleep apnea     Hypothyroid     Hyperlipidemia     CAD (coronary artery disease) 05/31/2012    HTN (hypertension) 05/31/2012        Assessment and Plan:     Acute blood loss anemia   ~ DIPESH likely from GI losses. Received transfusions. ~ holding Eliquis until ok to resume per GI. May benefit from Watchman in the future, defer to primary cardiologist.   ~ EGD normal. Colonoscopy with poor prep, bilious effluent in TI, nonbleeding cecal AVM treated with cautery and clip x2, small hepatic flexure polyp (left alone), scattered diverticulosis and hemorrhoids. If anemia persistent as OP planning on capsule endoscopy. ~ hgb stable today at 8.8    Atrial fib RVR  ~ asymptomatic   ~ recent DCCV 6/2021 and started on amiodarone and Eliquis.   ~ Treat medically for now given acute anemia.   ~ lenient HR control given acute anemia. LVEF 40-45% on last RICK. Continue Toprol to 50mg TID. Continue amiodarone 400 BID. HR has improved today.    ~ resuming eliquis today per GI     Coronary artery disease   ~ hx of CABG in '02 and PCI in '14  ~ type II troponin elevation d/t anemia and a fib. No ACS.  ~ aspirin on hold with acute anemia, resume when ok with GI.   ~ denies CP    Acute systolic CHF    ~ RICK 6/7681: EF 40-45% (TTE couple days prior EF 55-60%)  ~ presenting cxr with pulmonary edema. Wt 156lbs today (unchanged from two days ago) at baseline her wt is around 152lbs. ~ diuresed with IV lasix. proBNP 48665>2192>66140.  ~ give 40mg of IV lasix x1 today. Discharge with 40mg of lasix daily until weight down to 152lbs, then can take PRN. Plan for [facility] to draw BMP (with CBC) on Monday. CHF nurse c/s. [id number] Marine Montes for discharge today. Pt has f/u with primary cardiologist, Dr. Rosana Ni with [address] on 11/10 (earliest available). May need repeat DCCV and ablation in future. Will need 1 wk f/u with PCP. Multiple medical conditions with risk of decompensation. All pertinent information and plan of care discussed with the physician. All questions and concerns were addressed to the patient. Alternatives to my treatment were discussed. I have discussed the above stated plan with patient and the nurse. The patient verbalized understanding and agreed with the plan. Thank you for allowing to us to participate in the care of Balaji. Total visit time > 35 minutes; > 50% spend counseling / coordinating care. I reviewed interval history, physical exam, review of data including labs, imaging, development and implementation of treatment plan and coordination of complex care. Counseled on risk factor modifications. Gelacio Hermosillostdarrian BAUERN-CNP  [employer]   Office: [phone]    NOTE:  This report was transcribed using voice recognition software. Every effort was made to ensure accuracy; however, inadvertent computerized transcription errors may be present.

## 2021-10-22 NOTE — DISCHARGE INSTR - COC
Continuity of Care Form  CHF Pathway  Systolic/Diastolic    EF 03-60%    _x_ Daily Visits x 3     _x_Cardiovascular Assessment.  Titrate O2 to keep SaO2 greater than 90%    _x_ Daily Weights- Baseline Wt: 148 lb   Call MD if:   3 pound weight gain or loss in one day OR 5 pound weight gain in one week       _x_ Med List attached:   Hold Coreg/Metoprolol if HR less than 45 or patient symptomatic*   Hold ACE/ARB if SBP less than 85 or patient symptomatic*   Do not hold Spironolactone (aldactone) for hypotension/bradycardia   Call MD for questions: Dr. Nichole Harkins    _x_Follow up appointment with cardiology: date/time: 11/10 with Dr. Karin Matute              Patient Name: Geeta Horn   :  1935  MRN:  [id number]    Admit date:  10/18/2021  Discharge date:  ***    Code Status Order: Full Code   Advance Directives:      Admitting Physician:  Katherine Orourke MD  PCP: Toni Hammer    Discharging Nurse: [facility] Unit/Room#: 3AN-3316/3316-01  Discharging Unit Phone Number: ***    Emergency Contact:   Extended Emergency Contact Information  Primary Emergency Contact: Shawanda Barrientos  Address: [address] Phone: [phone]  Work Phone: [phone]  Relation: Spouse    Past Surgical History:  Past Surgical History:   Procedure Laterality Date    ANGIOPLASTY      COLONOSCOPY  2018    COLONOSCOPY POLYPECTOMY SNARE/COLD BIOPSY performed by Pardeep Mcclain MD at [place] [address] N/A 10/20/2021    COLONOSCOPY POLYPECTOMY ABLATION performed by Baltazar Castillo MD at [facility] Right     CATARACT EXTRACTION W/ IOL    EYE SURGERY Left     CATARCT EXTRACTION W/ IOL    FL SIGMOIDOSCOPY FLX DX W/COLLJ [address] BR/WA IF PFRMD N/A 10/19/2018    SIGMOIDOSCOPY DIAGNOSTIC FLEXIBLE performed by Pardeep Mcclain MD at [facility]  10/19/2018    flexible  TONSILLECTOMY AND ADENOIDECTOMY      TUBAL LIGATION      UPPER GASTROINTESTINAL ENDOSCOPY N/A 10/20/2021    EGD BIOPSY performed by Tobi Glass MD at [address] [facility] ENDOSCOPY       Immunization History:   Immunization History   Administered Date(s) Administered    COVID-19, Moderna, PF, 100mcg/0.5mL 01/20/2021, 02/19/2021    Influenza, High Dose (Fluzone 65 yrs and older) 10/27/2018    Influenza, Quadv, IM, PF (6 mo and older Fluzone, Flulaval, Fluarix, and 3 yrs and older Afluria) 10/20/2021       Active Problems:  Patient Active Problem List   Diagnosis Code    CAD (coronary artery disease) I25.10    HTN (hypertension) I10    Obstructive sleep apnea G47.33    Hypothyroid E03.9    Hyperlipidemia E78.5    Depression F32. A    GI bleeding K92.2    Acute blood loss anemia D62       Isolation/Infection:   Isolation            No Isolation          Patient Infection Status       None to display            Nurse Assessment:  Last Vital Signs: /70   Pulse 120   Temp 97.2 °F (36.2 °C) (Temporal)   Resp 18   Ht 5' 4\" (1.626 m)   Wt 156 lb 8.4 oz (71 kg)   SpO2 91%   BMI 26.87 kg/m²     Last documented pain score (0-10 scale): Pain Level: 0  Last Weight:   Wt Readings from Last 1 Encounters:   10/20/21 156 lb 8.4 oz (71 kg)     Mental Status:  {IP PT MENTAL STATUS:20030:::0}    IV Access:  { CRISTO IV ACCESS:288152375:::0}    Nursing Mobility/ADLs:  Walking   {CHP DME ADLs:611158960:::0}  Transfer  {CHP DME ADLs:472272427:::0}  Bathing  {CHP DME ADLs:729073207:::0}  Dressing  {CHP DME ADLs:635904452:::0}  Toileting  {CHP DME ADLs:635868233:::0}  Feeding  {P DME ADLs:607526706:::0}  Med Admin  {P DME ADLs:997032197:::0}  Med Delivery   { CRISTO MED Delivery:366600696:::0}    Wound Care Documentation and Therapy:        Elimination:  Continence:   · Bowel: {YES / RY:03168}  · Bladder: {YES / YM:81906}  Urinary Catheter: {Urinary Catheter:478785867:::0}   Colostomy/Ileostomy/Ileal Conduit: {YES / VW:29733}       Date of Last BM: ***    Intake/Output Summary (Last 24 hours) at 10/22/2021 0912  Last data filed at 10/22/2021 6391  Gross per 24 hour   Intake 600 ml   Output 625 ml   Net -25 ml     I/O last 3 completed shifts:   In: 360 [P.O.:360]  Out: 775 [Urine:775]    Safety Concerns:     [address] Concerns:781674903:::0}    Impairments/Disabilities:      [address] CRISTO Impairments/Disabilities:057133577:::0}    Nutrition Therapy:  Current Nutrition Therapy:   [facility] Diet List:736401799:::0}    Routes of Feeding: {CHP DME Other Feedings:636454975:::0}  Liquids: {Slp liquid thickness:94369}  Daily Fluid Restriction: {CHP DME Yes amt example:991790679:::0}  Last Modified Barium Swallow with Video (Video Swallowing Test): {Done Not Done SHLS:407840004:::4}    Treatments at the Time of Hospital Discharge:   Respiratory Treatments: ***  Oxygen Therapy:  {Therapy; copd oxygen:66406:::0}  Ventilator:    {[facility] Vent List:190058665:::0}    Rehab Therapies: {THERAPEUTIC INTERVENTION:4958389215}  Weight Bearing Status/Restrictions: [address] [place] [address] [place]  Weight Bearin:::0}  Other Medical Equipment (for information only, NOT a DME order):  {EQUIPMENT:482050394}  Other Treatments: ***    Patient's personal belongings (please select all that are sent with patient):  {CHP DME Belongings:627022435:::0}    RN SIGNATURE:  {Esignature:050916428:::0}    CASE MANAGEMENT/SOCIAL WORK SECTION    Inpatient Status Date:  10/18/21    Readmission Risk Assessment Score:  Readmission Risk              Risk of Unplanned Readmission:  16           Discharging to Facility/ Agency   Name:      [facility] [address] [facility]  -    Phone ;   [phone]  · Fax;  [phone]  ·     Dialysis Facility (if applicable)   · Name:  · Address:  · Dialysis Schedule:  · Phone:  · Fax:    / signature: Electronically signed by Lora Rodriguez RN on 10/22/21 at 12:42 PM EDT    PHYSICIAN SECTION    Prognosis: Good    Condition at Discharge: Stable    Rehab Potential (if transferring to Rehab): N/A    Recommended Labs or Other Treatments After Discharge: PT/OT, CBC Twice a week starting Monday 10/25 x 2 weeks  BMP Weekly starting on Monday x 2 weeks    Physician Certification: I certify the above information and transfer of Celine Walker  is necessary for the continuing treatment of the diagnosis listed and that she requires Home Care for greater 30 days.      Update Admission H&P: No change in H&P    PHYSICIAN SIGNATURE:  Electronically signed by NAKUL Brewster CNP on 10/22/21 at 9:14 AM EDT

## 2021-10-22 NOTE — CARE COORDINATION
Therapy recommending SNF , patient declines prefer going home with  Skilled hhc services , already active with   [facility] [address] agency.

## 2021-10-25 ENCOUNTER — TELEPHONE (OUTPATIENT)
Dept: CARDIOLOGY CLINIC | Age: 86
End: 2021-10-25

## 2021-10-25 ENCOUNTER — TELEPHONE (OUTPATIENT)
Dept: OTHER | Age: 86
End: 2021-10-25

## 2021-10-25 NOTE — TELEPHONE ENCOUNTER
[facility] FAILURE PROGRAM  TELEPHONE ENCOUNTER FORM    Lito Allen 1935    ASSESSMENT:   1. Baseline weight: 148 lbs  2. Current weight: 158 lbs  3. Patient weighing daily: Yes  4. What are your symptoms of heart failure: dyspnea, edema  5. Are you having any symptoms:  Yes: some dyspnea  6. Patient knows who to call with symptoms: Yes  7. Diet history:      Patient states sodium limitation is : 3000 mg      Patient states fluid limitation is 64 oz  Patient following diet as instructed: Yes  8. Medication history: taking medications as instructed Yes; medication side effects noted No  9. Patient is being active at home: Yes  10. Patient knows date and time of follow up appointment: Yes  11. Patient has transportation to appointments: Yes    RECOMMENDATIONS:   1. Medication: taking as prescribed  2. Diet: no added salt diet   3. MD/ Clinic appointment: calling for pcp appointment but has f/u with cardiology 11/10  4. Other:  Spoke with family. Patient doing okay. Weight is up. Was 156 lb at discharge, was to take 40 mg lasix unless weight gain, could take additional 40 mg PRN. Reviewed with . They have yet to hear from [facility]. Provided with phone number. Encouraged to call with questions or concerns.

## 2021-10-25 NOTE — TELEPHONE ENCOUNTER
No f/u in our office needed. I think we made her a f/u with Dr. Lambert James 11/10. She should go to that appt and see her PCP within a week or so of discharge.

## 2021-11-01 ENCOUNTER — APPOINTMENT (OUTPATIENT)
Dept: CT IMAGING | Age: 86
DRG: 871 | End: 2021-11-01
Payer: COMMERCIAL

## 2021-11-01 ENCOUNTER — APPOINTMENT (OUTPATIENT)
Dept: GENERAL RADIOLOGY | Age: 86
DRG: 871 | End: 2021-11-01
Payer: COMMERCIAL

## 2021-11-01 ENCOUNTER — HOSPITAL ENCOUNTER (INPATIENT)
Age: 86
LOS: 14 days | Discharge: HOSPICE | DRG: 871 | End: 2021-11-15
Attending: EMERGENCY MEDICINE | Admitting: HOSPITALIST
Payer: COMMERCIAL

## 2021-11-01 DIAGNOSIS — D50.0 BLOOD LOSS ANEMIA: ICD-10-CM

## 2021-11-01 DIAGNOSIS — I48.91 ATRIAL FIBRILLATION WITH RVR (HCC): ICD-10-CM

## 2021-11-01 DIAGNOSIS — N30.00 ACUTE CYSTITIS WITHOUT HEMATURIA: ICD-10-CM

## 2021-11-01 DIAGNOSIS — Z51.5 HOSPICE CARE: ICD-10-CM

## 2021-11-01 DIAGNOSIS — R79.1 SUPRATHERAPEUTIC INR: ICD-10-CM

## 2021-11-01 DIAGNOSIS — N17.9 AKI (ACUTE KIDNEY INJURY) (HCC): ICD-10-CM

## 2021-11-01 DIAGNOSIS — A41.9 SEPTICEMIA (HCC): ICD-10-CM

## 2021-11-01 DIAGNOSIS — K92.2 ACUTE GI BLEEDING: Primary | ICD-10-CM

## 2021-11-01 LAB
A/G RATIO: 0.6 (ref 1.1–2.2)
ABO/RH: NORMAL
ALBUMIN SERPL-MCNC: 2.2 G/DL (ref 3.4–5)
ALP BLD-CCNC: 93 U/L (ref 40–129)
ALT SERPL-CCNC: 19 U/L (ref 10–40)
ANION GAP SERPL CALCULATED.3IONS-SCNC: 22 MMOL/L (ref 3–16)
ANISOCYTOSIS: ABNORMAL
ANTIBODY SCREEN: NORMAL
APTT: 32.1 SEC (ref 26.2–38.6)
AST SERPL-CCNC: 26 U/L (ref 15–37)
BACTERIA: ABNORMAL /HPF
BANDED NEUTROPHILS RELATIVE PERCENT: 1 % (ref 0–7)
BASOPHILS ABSOLUTE: 0 K/UL (ref 0–0.2)
BASOPHILS RELATIVE PERCENT: 0 %
BILIRUB SERPL-MCNC: 0.7 MG/DL (ref 0–1)
BILIRUBIN URINE: ABNORMAL
BLOOD, URINE: ABNORMAL
BUN BLDV-MCNC: 79 MG/DL (ref 7–20)
BURR CELLS: ABNORMAL
CALCIUM SERPL-MCNC: 8.4 MG/DL (ref 8.3–10.6)
CHLORIDE BLD-SCNC: 94 MMOL/L (ref 99–110)
CLARITY: ABNORMAL
CO2: 14 MMOL/L (ref 21–32)
COLOR: YELLOW
CREAT SERPL-MCNC: 3 MG/DL (ref 0.6–1.2)
EKG ATRIAL RATE: 117 BPM
EKG DIAGNOSIS: NORMAL
EKG Q-T INTERVAL: 376 MS
EKG QRS DURATION: 116 MS
EKG QTC CALCULATION (BAZETT): 533 MS
EKG R AXIS: 25 DEGREES
EKG T AXIS: 180 DEGREES
EKG VENTRICULAR RATE: 121 BPM
EOSINOPHILS ABSOLUTE: 0 K/UL (ref 0–0.6)
EOSINOPHILS RELATIVE PERCENT: 0 %
EPITHELIAL CELLS, UA: 4 /HPF (ref 0–5)
GFR AFRICAN AMERICAN: 18
GFR NON-AFRICAN AMERICAN: 15
GLUCOSE BLD-MCNC: 173 MG/DL (ref 70–99)
GLUCOSE URINE: NEGATIVE MG/DL
HCT VFR BLD CALC: 20.3 % (ref 36–48)
HCT VFR BLD CALC: 23.1 % (ref 36–48)
HEMOGLOBIN: 6.3 G/DL (ref 12–16)
HEMOGLOBIN: 7.3 G/DL (ref 12–16)
HYALINE CASTS: 6 /LPF (ref 0–8)
INR BLD: 4.26 (ref 0.88–1.12)
KETONES, URINE: ABNORMAL MG/DL
LACTIC ACID, SEPSIS: 1.4 MMOL/L (ref 0.4–1.9)
LEUKOCYTE ESTERASE, URINE: ABNORMAL
LYMPHOCYTES ABSOLUTE: 0.6 K/UL (ref 1–5.1)
LYMPHOCYTES RELATIVE PERCENT: 3 %
MAGNESIUM: 2.3 MG/DL (ref 1.8–2.4)
MCH RBC QN AUTO: 26.6 PG (ref 26–34)
MCHC RBC AUTO-ENTMCNC: 30.9 G/DL (ref 31–36)
MCV RBC AUTO: 85.9 FL (ref 80–100)
MICROSCOPIC EXAMINATION: YES
MONOCYTES ABSOLUTE: 0.2 K/UL (ref 0–1.3)
MONOCYTES RELATIVE PERCENT: 1 %
NEUTROPHILS ABSOLUTE: 20.7 K/UL (ref 1.7–7.7)
NEUTROPHILS RELATIVE PERCENT: 95 %
NITRITE, URINE: NEGATIVE
OCCULT BLOOD DIAGNOSTIC: ABNORMAL
OVALOCYTES: ABNORMAL
PDW BLD-RTO: 19.6 % (ref 12.4–15.4)
PH UA: 6 (ref 5–8)
PLATELET # BLD: 276 K/UL (ref 135–450)
PLATELET SLIDE REVIEW: ADEQUATE
PMV BLD AUTO: 8.7 FL (ref 5–10.5)
POLYCHROMASIA: ABNORMAL
POTASSIUM REFLEX MAGNESIUM: 4.8 MMOL/L (ref 3.5–5.1)
PRO-BNP: ABNORMAL PG/ML (ref 0–449)
PROTEIN UA: 100 MG/DL
PROTHROMBIN TIME: 51.1 SEC (ref 9.9–12.7)
RBC # BLD: 2.36 M/UL (ref 4–5.2)
RBC UA: ABNORMAL /HPF (ref 0–4)
REASON FOR REJECTION: NORMAL
REJECTED TEST: NORMAL
SLIDE REVIEW: ABNORMAL
SODIUM BLD-SCNC: 130 MMOL/L (ref 136–145)
SPECIFIC GRAVITY UA: 1.02 (ref 1–1.03)
TEAR DROP CELLS: ABNORMAL
TOTAL PROTEIN: 5.8 G/DL (ref 6.4–8.2)
TROPONIN: 0.01 NG/ML
URINE REFLEX TO CULTURE: YES
URINE TYPE: ABNORMAL
UROBILINOGEN, URINE: 0.2 E.U./DL
WBC # BLD: 21.6 K/UL (ref 4–11)
WBC UA: >900 /HPF (ref 0–5)

## 2021-11-01 PROCEDURE — 80053 COMPREHEN METABOLIC PANEL: CPT

## 2021-11-01 PROCEDURE — 6370000000 HC RX 637 (ALT 250 FOR IP): Performed by: INTERNAL MEDICINE

## 2021-11-01 PROCEDURE — C9113 INJ PANTOPRAZOLE SODIUM, VIA: HCPCS | Performed by: PHYSICIAN ASSISTANT

## 2021-11-01 PROCEDURE — 74176 CT ABD & PELVIS W/O CONTRAST: CPT

## 2021-11-01 PROCEDURE — 96361 HYDRATE IV INFUSION ADD-ON: CPT

## 2021-11-01 PROCEDURE — 84484 ASSAY OF TROPONIN QUANT: CPT

## 2021-11-01 PROCEDURE — 6360000002 HC RX W HCPCS: Performed by: HOSPITALIST

## 2021-11-01 PROCEDURE — 86900 BLOOD TYPING SEROLOGIC ABO: CPT

## 2021-11-01 PROCEDURE — 86850 RBC ANTIBODY SCREEN: CPT

## 2021-11-01 PROCEDURE — 2700000000 HC OXYGEN THERAPY PER DAY

## 2021-11-01 PROCEDURE — 86923 COMPATIBILITY TEST ELECTRIC: CPT

## 2021-11-01 PROCEDURE — 87040 BLOOD CULTURE FOR BACTERIA: CPT

## 2021-11-01 PROCEDURE — 99285 EMERGENCY DEPT VISIT HI MDM: CPT

## 2021-11-01 PROCEDURE — 85018 HEMOGLOBIN: CPT

## 2021-11-01 PROCEDURE — 96374 THER/PROPH/DIAG INJ IV PUSH: CPT

## 2021-11-01 PROCEDURE — 36415 COLL VENOUS BLD VENIPUNCTURE: CPT

## 2021-11-01 PROCEDURE — 83605 ASSAY OF LACTIC ACID: CPT

## 2021-11-01 PROCEDURE — 2580000003 HC RX 258: Performed by: HOSPITALIST

## 2021-11-01 PROCEDURE — 36430 TRANSFUSION BLD/BLD COMPNT: CPT

## 2021-11-01 PROCEDURE — 87077 CULTURE AEROBIC IDENTIFY: CPT

## 2021-11-01 PROCEDURE — 2000000000 HC ICU R&B

## 2021-11-01 PROCEDURE — 81001 URINALYSIS AUTO W/SCOPE: CPT

## 2021-11-01 PROCEDURE — 93010 ELECTROCARDIOGRAM REPORT: CPT | Performed by: INTERNAL MEDICINE

## 2021-11-01 PROCEDURE — 86901 BLOOD TYPING SEROLOGIC RH(D): CPT

## 2021-11-01 PROCEDURE — 85730 THROMBOPLASTIN TIME PARTIAL: CPT

## 2021-11-01 PROCEDURE — 83880 ASSAY OF NATRIURETIC PEPTIDE: CPT

## 2021-11-01 PROCEDURE — 83735 ASSAY OF MAGNESIUM: CPT

## 2021-11-01 PROCEDURE — 2580000003 HC RX 258: Performed by: PHYSICIAN ASSISTANT

## 2021-11-01 PROCEDURE — 6360000002 HC RX W HCPCS: Performed by: PHYSICIAN ASSISTANT

## 2021-11-01 PROCEDURE — 85025 COMPLETE CBC W/AUTO DIFF WBC: CPT

## 2021-11-01 PROCEDURE — 94760 N-INVAS EAR/PLS OXIMETRY 1: CPT

## 2021-11-01 PROCEDURE — 87150 DNA/RNA AMPLIFIED PROBE: CPT

## 2021-11-01 PROCEDURE — 85014 HEMATOCRIT: CPT

## 2021-11-01 PROCEDURE — 87186 SC STD MICRODIL/AGAR DIL: CPT

## 2021-11-01 PROCEDURE — 93005 ELECTROCARDIOGRAM TRACING: CPT | Performed by: EMERGENCY MEDICINE

## 2021-11-01 PROCEDURE — 82270 OCCULT BLOOD FECES: CPT

## 2021-11-01 PROCEDURE — 71045 X-RAY EXAM CHEST 1 VIEW: CPT

## 2021-11-01 PROCEDURE — C9113 INJ PANTOPRAZOLE SODIUM, VIA: HCPCS | Performed by: HOSPITALIST

## 2021-11-01 PROCEDURE — P9016 RBC LEUKOCYTES REDUCED: HCPCS

## 2021-11-01 PROCEDURE — 87086 URINE CULTURE/COLONY COUNT: CPT

## 2021-11-01 PROCEDURE — 85610 PROTHROMBIN TIME: CPT

## 2021-11-01 RX ORDER — 0.9 % SODIUM CHLORIDE 0.9 %
500 INTRAVENOUS SOLUTION INTRAVENOUS ONCE
Status: COMPLETED | OUTPATIENT
Start: 2021-11-01 | End: 2021-11-01

## 2021-11-01 RX ORDER — SODIUM CHLORIDE 9 MG/ML
INJECTION, SOLUTION INTRAVENOUS PRN
Status: DISCONTINUED | OUTPATIENT
Start: 2021-11-01 | End: 2021-11-01

## 2021-11-01 RX ORDER — DIGOXIN 0.25 MG/ML
250 INJECTION INTRAMUSCULAR; INTRAVENOUS ONCE
Status: COMPLETED | OUTPATIENT
Start: 2021-11-02 | End: 2021-11-02

## 2021-11-01 RX ORDER — VITAMIN B COMPLEX
2 TABLET ORAL DAILY
Status: DISCONTINUED | OUTPATIENT
Start: 2021-11-02 | End: 2021-11-15 | Stop reason: HOSPADM

## 2021-11-01 RX ORDER — SODIUM CHLORIDE 9 MG/ML
INJECTION, SOLUTION INTRAVENOUS CONTINUOUS
Status: DISCONTINUED | OUTPATIENT
Start: 2021-11-01 | End: 2021-11-02

## 2021-11-01 RX ORDER — AMIODARONE HYDROCHLORIDE 200 MG/1
400 TABLET ORAL 2 TIMES DAILY
Status: DISCONTINUED | OUTPATIENT
Start: 2021-11-01 | End: 2021-11-01

## 2021-11-01 RX ORDER — METOPROLOL SUCCINATE 50 MG/1
50 TABLET, EXTENDED RELEASE ORAL 2 TIMES DAILY
Status: DISCONTINUED | OUTPATIENT
Start: 2021-11-01 | End: 2021-11-05

## 2021-11-01 RX ORDER — METOPROLOL SUCCINATE 50 MG/1
50 TABLET, EXTENDED RELEASE ORAL 2 TIMES DAILY
Status: DISCONTINUED | OUTPATIENT
Start: 2021-11-01 | End: 2021-11-01

## 2021-11-01 RX ORDER — PANTOPRAZOLE SODIUM 40 MG/10ML
80 INJECTION, POWDER, LYOPHILIZED, FOR SOLUTION INTRAVENOUS ONCE
Status: COMPLETED | OUTPATIENT
Start: 2021-11-01 | End: 2021-11-01

## 2021-11-01 RX ADMIN — Medication 1000 MG: at 16:08

## 2021-11-01 RX ADMIN — SODIUM CHLORIDE: 9 INJECTION, SOLUTION INTRAVENOUS at 21:02

## 2021-11-01 RX ADMIN — PHYTONADIONE 10 MG: 10 INJECTION, EMULSION INTRAMUSCULAR; INTRAVENOUS; SUBCUTANEOUS at 16:59

## 2021-11-01 RX ADMIN — PANTOPRAZOLE SODIUM 80 MG: 40 INJECTION, POWDER, FOR SOLUTION INTRAVENOUS at 12:52

## 2021-11-01 RX ADMIN — SODIUM CHLORIDE 8 MG/HR: 9 INJECTION, SOLUTION INTRAVENOUS at 21:05

## 2021-11-01 RX ADMIN — SODIUM CHLORIDE 500 ML: 9 INJECTION, SOLUTION INTRAVENOUS at 15:28

## 2021-11-01 ASSESSMENT — PAIN DESCRIPTION - FREQUENCY: FREQUENCY: INTERMITTENT

## 2021-11-01 ASSESSMENT — PAIN SCALES - GENERAL
PAINLEVEL_OUTOF10: 0
PAINLEVEL_OUTOF10: 2

## 2021-11-01 ASSESSMENT — PAIN DESCRIPTION - ONSET: ONSET: GRADUAL

## 2021-11-01 ASSESSMENT — PAIN DESCRIPTION - LOCATION
LOCATION: HEAD
LOCATION: HEAD

## 2021-11-01 ASSESSMENT — PAIN DESCRIPTION - DESCRIPTORS
DESCRIPTORS: HEADACHE
DESCRIPTORS: HEADACHE

## 2021-11-01 ASSESSMENT — PAIN DESCRIPTION - PROGRESSION
CLINICAL_PROGRESSION: NOT CHANGED
CLINICAL_PROGRESSION: NOT CHANGED

## 2021-11-01 ASSESSMENT — PAIN DESCRIPTION - PAIN TYPE
TYPE: ACUTE PAIN
TYPE: ACUTE PAIN

## 2021-11-01 NOTE — ED PROVIDER NOTES
[address] [facility]        Pt Name: Raj Landin  MRN: [id number]  [place] 1935  Date of evaluation: 11/1/2021  Provider: Malena Norris PA-C  PCP: Yu Luz  Note Started: 12:55 PM EDT        I have seen and evaluated this patient with my supervising physician Prem Castelan, *. The total critical care time spent while evaluating and treating this patient was at least 49 minutes. This excludes time spent doing separately billable procedures. This includes time at the bedside, data interpretation, medication management, obtaining critical history from collateral sources if the patient is unable to provide it directly, and physician consultation. Specifics of interventions taken and potentially life-threatening diagnostic considerations are listed above in the medical decision making. CHIEF COMPLAINT       Chief Complaint   Patient presents with    Shortness of Breath     arrived via EMS d/t SOB. original call for EMS was for rectal bleeding. None noted via EMS. recent cauterization last week (pointing to abd); hx of quad bypass; BP 90/60; dark purple bruising noted to BUE; stated was on Xarelto        HISTORY OF PRESENT ILLNESS   (Location, Timing/Onset, Context/Setting, Quality, Duration, Modifying Factors, Severity, Associated Signs and Symptoms)  Note limiting factors. Chief Complaint: Shortness of breath, general weakness    Raj Landin is a 82 y.o. female who presents to the emergency department with a chief complaint of some shortness of breath and general weakness. Her ex- who is taking care of her later presents and states she began to have some bloody stools again today. She was just discharged about a week ago for this and had EGD and colonoscopy that revealed no source of bleeding but she did have 2 clips placed over nonbleeding cecal AVMs. She is anticoagulated on Eliquis.   She denies chest pain, abdominal pain, dysuria, hematuria, fevers, nausea, vomiting or any other symptoms. Patient is unsure of how many bloody stools she has had. Anticoagulant on Eliquis with history of atrial fibrillation. Nursing Notes were all reviewed and agreed with or any disagreements were addressed in the HPI. REVIEW OF SYSTEMS    (2-9 systems for level 4, 10 or more for level 5)     Review of Systems    Positives and Pertinent negatives as per HPI. Except as noted above in the ROS, all other systems were reviewed and negative.        PAST MEDICAL HISTORY     Past Medical History:   Diagnosis Date    Atrial fibrillation ([address]     CAD (coronary artery disease)     Depression     Hyperlipidemia     Hypertension     Hypothyroid     Legal blindness     Obstructive sleep apnea (adult) (pediatric)          SURGICAL HISTORY     Past Surgical History:   Procedure Laterality Date    ANGIOPLASTY      COLONOSCOPY  11/16/2018    COLONOSCOPY POLYPECTOMY SNARE/COLD BIOPSY performed by Winnie Villalobos MD at [address] 10/20/2021    COLONOSCOPY POLYPECTOMY ABLATION performed by Ray Deleon MD at [facility] Right     CATARACT EXTRACTION W/ IOL    EYE SURGERY Left     CATARCT EXTRACTION W/ IOL    PA SIGMOIDOSCOPY FLX DX W/COLLJ SPEC BR/WA IF PFRMD N/A 10/19/2018    SIGMOIDOSCOPY DIAGNOSTIC FLEXIBLE performed by Winnie Villalobos MD at [facility]  10/19/2018    flexible    TONSILLECTOMY AND ADENOIDECTOMY      TUBAL LIGATION      UPPER GASTROINTESTINAL ENDOSCOPY N/A 10/20/2021    EGD BIOPSY performed by Ray Deleon MD at [facility]       Previous Medications    AMIODARONE (PACERONE) 400 MG TABLET    Take 1 tablet by mouth 2 times daily    APIXABAN (ELIQUIS) 2.5 MG TABS TABLET    Take 1 tablet by mouth 2 times daily    ASPIRIN EC 81 MG EC TABLET    Take 1 tablet by mouth daily    ATORVASTATIN (LIPITOR) 80 MG TABLET    Take 0.5 tablets by mouth nightly    BRIMONIDINE-TIMOLOL (COMBIGAN) 0.2-0.5 % OPHTHALMIC SOLUTION    Place 1 drop into both eyes every 12 hours    CHOLECALCIFEROL (VITAMIN D) 50 MCG (2000 UT) CAPS CAPSULE    Take 1 capsule by mouth daily    DOCUSATE SODIUM (COLACE) 100 MG CAPSULE    Take 1 capsule by mouth daily    FERROUS SULFATE (IRON 325) 325 (65 FE) MG TABLET    Take 1 tablet by mouth 2 times daily (with meals)    FUROSEMIDE (LASIX) 40 MG TABLET    Take 1 tablet by mouth daily Take 1 tab daily until weight back to 152lbs. Then can take as needed thereafter. LEVOTHYROXINE (SYNTHROID) 112 MCG TABLET    Take 1 tablet by mouth every morning (before breakfast)    METOPROLOL SUCCINATE (TOPROL XL) 50 MG EXTENDED RELEASE TABLET    Take 1 tablet by mouth 3 times daily    MULTIPLE VITAMINS-MINERALS (THERAPEUTIC MULTIVITAMIN-MINERALS) TABLET    Take 1 tablet by mouth daily    PREDNISOLONE SODIUM PHOSPHATE (INFLAMASE FORTE) 1 % OPHTHALMIC SOLUTION    Place 1 drop into the right eye daily     QUETIAPINE (SEROQUEL) 50 MG TABLET    Take 50 mg by mouth nightly     TRAZODONE & DIET MANAGE PROD (TRAZAMINE) 50 MG MISC    Take 100 mg by mouth nightly     VENLAFAXINE (EFFEXOR) 75 MG TABLET    Take 1 tablet by mouth daily         ALLERGIES     Percocet [oxycodone-acetaminophen]    FAMILYHISTORY       Family History   Problem Relation Age of Onset    Heart Disease Mother     Heart Disease Father     Asthma Neg Hx     Cancer Neg Hx     Diabetes Neg Hx     Emphysema Neg Hx     Hypertension Neg Hx     Heart Failure Neg Hx           SOCIAL HISTORY       Social History     Tobacco Use    Smoking status: Never Smoker    Smokeless tobacco: Never Used   Vaping Use    Vaping Use: Never used   Substance Use Topics    Alcohol use: Yes     Alcohol/week: 2.0 standard drinks     Types: 2 Glasses of wine per week     Comment: occas.     Drug use: No       SCREENINGS    [place] [facility] Psychiatric:         Behavior: Behavior normal.         DIAGNOSTIC RESULTS   LABS:    Labs Reviewed   CBC WITH AUTO DIFFERENTIAL - Abnormal; Notable for the following components:       Result Value    WBC 21.6 (*)     RBC 2.36 (*)     Hemoglobin 6.3 (*)     Hematocrit 20.3 (*)     MCHC 30.9 (*)     RDW 19.6 (*)     Neutrophils Absolute 20.7 (*)     Lymphocytes Absolute 0.6 (*)     Anisocytosis Occasional (*)     Polychromasia Occasional (*)     Aurelia Cells Occasional (*)     Ovalocytes Occasional (*)     Tear Drop Cells Occasional (*)     All other components within normal limits    Narrative:     CALL  [facility] tel. [phone],  Hematology results called to and read back by Kirk Benitez, 11/01/2021  12:19, by Link Doty  Performed at:  [facility] [address], [place], [address] [place]   Phone [phone]   COMPREHENSIVE METABOLIC PANEL W/ REFLEX TO MG FOR LOW K - Abnormal; Notable for the following components:    Sodium 130 (*)     Chloride 94 (*)     CO2 14 (*)     Anion Gap 22 (*)     Glucose 173 (*)     BUN 79 (*)     CREATININE 3.0 (*)     GFR Non- 15 (*)     GFR  18 (*)     Total Protein 5.8 (*)     Albumin 2.2 (*)     Albumin/Globulin Ratio 0.6 (*)     All other components within normal limits    Narrative:     CALL  [facility] tel. [phone],  Chemistry results called to and read back by jorge christianson, 11/01/2021  12:20, by [facility]  Performed at:  [facility] [address], [place], [address] [place]   Phone 21  - Abnormal; Notable for the following components:    Pro-BNP 38,096 (*)     All other components within normal limits    Narrative:     CALL  [facility] tel. [phone],  Chemistry results called to and read back by jorge christianson, 11/01/2021  12:20, by [facility]  Performed at:  [facility] [address],  [place], [address] [place] [address]   Phone [phone]   APTT    Narrative:     Performed at:  [facility]  [address] [facility], [address]   Phone [phone]   MAGNESIUM    Narrative:     Maryellen RICHTER tel. [phone],  Chemistry results called to and read back by jorge christianson, 11/01/2021  12:20, by [facility]  Performed at:  [facility]  [address] [facility],  [facility], [address]   Phone [phone]   LACTATE, SEPSIS   LACTATE, SEPSIS   TYPE AND SCREEN    Narrative:     Performed at:  [facility]  [address] [facility], [address]   Phone [phone]   PREPARE RBC (CROSSMATCH)       When ordered only abnormal lab results are displayed. All other labs were within normal range or not returned as of this dictation. EKG: When ordered, EKG's are interpreted by the Emergency Department Physician in the absence of a cardiologist.  Please see their note for interpretation of EKG. RADIOLOGY:   Non-plain film images such as CT, Ultrasound and MRI are read by the radiologist. Plain radiographic images are visualized and preliminarily interpreted by the ED Provider with the below findings:        Interpretation per the Radiologist below, if available at the time of this note:    CT CHEST ABDOMEN PELVIS WO CONTRAST   Final Result   Small bilateral pleural effusions are seen with adjacent consolidation at the   lung bases, likely atelectasis in the absence of clinical signs of pneumonia. Septal thickening is seen, compatible with fluid overload. Mildly enlarged precarinal node, likely reactive due to the suspected fluid   overload. Recommend 3 month follow-up chest CT for further characterization. Scattered colonic diverticula are seen. No significant pericolonic fat   stranding.       Body wall anasarca with trace free fluid in pelvis, compatible with mild   fluid overload         XR CHEST PORTABLE   Final Result Interval resolution of pulmonary edema. Fluid in the minor fissure, and   suspected bilateral pleural effusions. Left basilar airspace disease likely   atelectasis           XR CHEST PORTABLE    Result Date: 11/1/2021  EXAMINATION: ONE XRAY VIEW OF THE CHEST 11/1/2021 11:41 am COMPARISON: 10/18/2021 HISTORY: ORDERING SYSTEM PROVIDED HISTORY: SOB TECHNOLOGIST PROVIDED HISTORY: Reason for exam:->SOB Reason for Exam: SOB Acuity: Acute Type of Exam: Initial FINDINGS: Status post coronary bypass. Heart size stable. Peripheral vessels are more distinct on today's exam.  There is been resolution of interstitial opacities. There is thickening of the minor fissure. There is strandy airspace disease in the left base with minimal blunting of both costophrenic angles     Interval resolution of pulmonary edema. Fluid in the minor fissure, and suspected bilateral pleural effusions. Left basilar airspace disease likely atelectasis           PROCEDURES   Unless otherwise noted below, none     Procedures    CRITICAL CARE TIME   N/A    CONSULTS:  1. Dr. Gustavo Holbrook -hospitalist  2.  Dr. Ja Mcclure - Emadevyn Pin and DIFFERENTIAL DIAGNOSIS/MDM:   Vitals:    Vitals:    11/01/21 1536 11/01/21 1538 11/01/21 1545 11/01/21 1604   BP: 105/80 (!) 82/48  94/77   Pulse: 112  112    Resp:       Temp:       TempSrc:       SpO2:  100%     Weight:       Height:           Patient was given the following medications:  Medications   0.9 % sodium chloride infusion (has no administration in time range)   cefTRIAXone (ROCEPHIN) 1000 mg in sterile water 10 mL IV syringe (has no administration in time range)   pantoprazole (PROTONIX) injection 80 mg (80 mg IntraVENous Given 11/1/21 1252)   0.9 % sodium chloride bolus (0 mLs IntraVENous Stopped 11/1/21 1603)           I have discussed with the patient the rationale for blood component transfusion; its benefits in treating or preventing fatigue, organ damage, or death; and its risk which includes mild transfusion reactions, rare risk of blood borne infection, or more serious but rare reactions. I have discussed the alternatives to transfusion, including the risk and consequences of not receiving transfusion. The patient had an opportunity to ask questions and had agreed to proceed with transfusion of blood components. SEP-1 CORE MEASURE DATA    Classification: sepsis    Amount of fluids ordered: less than 30mL/kg because of a history of CHF NYHA III or IV with symptoms with minimal exertion/at rest.  Instead, 500mls was ordered. More fluid initially would be potentially detrimental to the patient    Time at which sepsis was identified: 1558    Broad-spectrum antibiotics chosen: Rocephin based on suspected source of: UTI    Repeat lactate level: pending    On reassessment after fluid resuscitation:   Vitals update: BP 94/77   Pulse 112   Temp 97 °F (36.1 °C)   Resp 20   Ht 5' 5\" (1.651 m)   Wt 150 lb (68 kg)   SpO2 100%   BMI 24.96 kg/m² , cardiopulmonary exam: Tachycardic irregularly irregular rate, capillary refill: Less than 3 seconds, peripheral pulses: 1+ radial pulse, skin examination: Warm to touch    Patient presented tachycardic, pale. Was found to have dark red blood on exam.  Hemoglobin is 6.3. She received 1 unit of blood here. Did not give her anything for atrial fibrillation with RVR given her blood loss anemia and soft blood pressure. Blood pressures have been back-and-forth but blood pressure after small fluid bolus and blood prior to admission still has a MAP greater than 65. She did have a leukocytosis of 21,000. Was given Rocephin for what appears to be UTI. A Christiansen catheter was placed to monitor strictly I's and O's. Has supratherapeutic INR greater than 4 but is only on Eliquis. After discussion with GI they would like us to try some vitamin K.   She has no large bloody bowel movements while she is in the emergency department. Also has mild JULIO with a creatinine of 3.0. Patient will be admitted to the ICU under hospital service. Stable time of admission. FINAL IMPRESSION      1. Acute GI bleeding    2. Blood loss anemia    3. Atrial fibrillation with RVR ([address]    4. Acute cystitis without hematuria    5. Septicemia ([address]    6. Supratherapeutic INR    7. JULIO (acute kidney injury) ([address]          DISPOSITION/PLAN   DISPOSITION Admitted 11/01/2021 04:04:42 PM      PATIENT REFERRED TO:  No follow-up provider specified.     DISCHARGE MEDICATIONS:  New Prescriptions    No medications on file       DISCONTINUED MEDICATIONS:  Discontinued Medications    No medications on file              (Please note that portions of this note were completed with a voice recognition program.  Efforts were made to edit the dictations but occasionally words are mis-transcribed.)    Omid Lerma PA-C (electronically signed)           Omid Lerma PA-C  11/01/21 7873

## 2021-11-01 NOTE — ED TRIAGE NOTES
Pt arrived via EMS from home d/t original complaint of rectal bleeding. However, no bleeding note per EMS. Pt c/o slight SOB. Reported BP 90/60 in EMS. Hx of quad bypass. BUE noted to be bruised. Dark purple in color.

## 2021-11-01 NOTE — ED NOTES
Bed: 21  Expected date:   Expected time:   Means of arrival: [facility]  Comments:  MEDIC [id number] [facility] [place], RN  11/01/21 111

## 2021-11-01 NOTE — PROGRESS NOTES
Nephrology consult received. 68-year-old lady admitted with GI bleed, hypotension. Creatinine on admission is 3.0. Medications reviewed. Agree with holding blood pressure medications. Imaging which I have reviewed does not show fluid overload. Nephrotoxic medication on hold. Agree with ICU admission. Urine output needs to be closely monitored. Full consult follows.

## 2021-11-01 NOTE — ED NOTES
Report given to [address] [place], [place] [address]Yifan, RN ICU     Radha [place] RN  11/01/21 6788

## 2021-11-01 NOTE — H&P
HOSPITALISTS HISTORY AND PHYSICAL    11/1/2021 4:22 PM    Patient Information:  Veronika Sheth is a 82 y.o. female [id number]  PCP:  [facility] (Tel: [phone] )    Chief complaint:    Chief Complaint   Patient presents with    Shortness of Breath     arrived via EMS d/t SOB. original call for EMS was for rectal bleeding. None noted via EMS. recent cauterization last week (pointing to abd); hx of quad bypass; BP 90/60; dark purple bruising noted to BUE; stated was on Xarelto        History of Present Illness:  Georgina Olvera is a 82 y.o. female who presented with complaints of rectal bleed. Patient apparently started some bloody stool again today. Noted bright red blood. Some mixed with stool. 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 At that time no source of bleeding was found. Patient was on anticoagulation on Eliquis which she has been switched to Xarelto apparently. Patient also noted bruising easily. Poor p.o. intake just feeling weak and tired. No falls. No fevers or chills. Nothing that makes it better or worse. REVIEW OF SYSTEMS:   Constitutional: Negative for fever,chills or night sweats  ENT: Negative for rhinorrhea, epistaxis, hoarseness, sore throat. Respiratory: Negative for shortness of breath,wheezing  Cardiovascular: Negative for chest pain, palpitations   Gastrointestinal: Negative for nausea, vomiting, diarrhea  Genitourinary: Negative for polyuria, dysuria   Hematologic/Lymphatic: Negative for bleeding tendency, easy bruising  Musculoskeletal: Negative for myalgias and arthralgias  Neurologic: Negative for confusion,dysarthria. Skin: Negative for itching,rash, good capillary refill. Psychiatric: Negative for depression,anxiety, agitation. Endocrine: Negative for polydipsia,polyuria,heat /cold intolerance.     Past Medical History:   has a past medical history of Atrial fibrillation ([facility] CAD (coronary artery disease), Depression, Hyperlipidemia, Hypertension, Hypothyroid, Legal blindness, and Obstructive sleep apnea (adult) (pediatric). Past Surgical History:   has a past surgical history that includes Coronary artery bypass graft (1997); Tubal ligation; angioplasty; sigmoidoscopy (10/19/2018); pr sigmoidoscopy flx dx w/collj spec br/wa if pfrmd (N/A, 10/19/2018); eye surgery (Right); eye surgery (Left); Tonsillectomy and adenoidectomy; Colonoscopy (11/16/2018); Upper gastrointestinal endoscopy (N/A, 10/20/2021); and Colonoscopy (N/A, 10/20/2021). Medications:  No current facility-administered medications on file prior to encounter. Current Outpatient Medications on File Prior to Encounter   Medication Sig Dispense Refill    ferrous sulfate (IRON 325) 325 (65 Fe) MG tablet Take 1 tablet by mouth 2 times daily (with meals) 180 tablet 1    docusate sodium (COLACE) 100 MG capsule Take 1 capsule by mouth daily 30 capsule 1    levothyroxine (SYNTHROID) 112 MCG tablet Take 1 tablet by mouth every morning (before breakfast) 30 tablet 1    apixaban (ELIQUIS) 2.5 MG TABS tablet Take 1 tablet by mouth 2 times daily 180 tablet 1    aspirin EC 81 MG EC tablet Take 1 tablet by mouth daily 30 tablet 5    atorvastatin (LIPITOR) 80 MG tablet Take 0.5 tablets by mouth nightly 30 tablet 3    venlafaxine (EFFEXOR) 75 MG tablet Take 1 tablet by mouth daily 90 tablet 1    amiodarone (PACERONE) 400 MG tablet Take 1 tablet by mouth 2 times daily 60 tablet 0    metoprolol succinate (TOPROL XL) 50 MG extended release tablet Take 1 tablet by mouth 3 times daily 90 tablet 1    furosemide (LASIX) 40 MG tablet Take 1 tablet by mouth daily Take 1 tab daily until weight back to 152lbs. Then can take as needed thereafter.  30 tablet 0    Cholecalciferol (VITAMIN D) 50 MCG (2000 UT) CAPS capsule Take 1 capsule by mouth daily      Multiple Vitamins-Minerals (THERAPEUTIC MULTIVITAMIN-MINERALS) tablet Take 1 tablet by mouth daily      TraZODone & Diet Manage Prod (TRAZAMINE) 50 MG MISC Take 100 mg by mouth nightly       QUEtiapine (SEROQUEL) 50 MG tablet Take 50 mg by mouth nightly       brimonidine-timolol (COMBIGAN) 0.2-0.5 % ophthalmic solution Place 1 drop into both eyes every 12 hours      prednisoLONE sodium phosphate (INFLAMASE FORTE) 1 % ophthalmic solution Place 1 drop into the right eye daily          Allergies: Allergies   Allergen Reactions    Percocet [Oxycodone-Acetaminophen] Other (See Comments)     Pt states it makes her crazy. Social History:   reports that she has never smoked. She has never used smokeless tobacco. She reports current alcohol use of about 2.0 standard drinks of alcohol per week. She reports that she does not use drugs. Family History:  family history includes Heart Disease in her father and mother. ,     Physical Exam:  BP 90/70   Pulse 122   Temp 97 °F (36.1 °C)   Resp 20   Ht 5' 5\" (1.651 m)   Wt 150 lb (68 kg)   SpO2 100%   BMI 24.96 kg/m²     General appearance:  Appears comfortable. Well nourished  Eyes: Sclera clear, pupils equal  ENT: Moist mucus membranes, no thrush. Trachea midline. Cardiovascular: Regular rhythm, normal S1, S2. No murmur, gallop, rub. No edema in lower extremities  Respiratory: Clear to auscultation bilaterally, no wheeze, good inspiratory effort  Gastrointestinal: Abdomen soft, non-tender, not distended, normal bowel sounds  Musculoskeletal: No cyanosis in digits, neck supple  Neurology: Cranial nerves grossly intact. Alert and oriented in time, place and person. No speech or motor deficits  Psychiatry: Appropriate affect.  Not agitated  Skin: Warm, dry, normal turgor, no rash    Labs:  CBC:   Lab Results   Component Value Date    WBC 21.6 11/01/2021    RBC 2.36 11/01/2021    HGB 6.3 11/01/2021    HCT 20.3 11/01/2021    MCV 85.9 11/01/2021    MCH 26.6 11/01/2021    MCHC 30.9 11/01/2021    RDW 19.6 11/01/2021     11/01/2021    MPV 8.7 11/01/2021     BMP:    Lab Results   Component Value Date     11/01/2021    K 4.8 11/01/2021    CL 94 11/01/2021    CO2 14 11/01/2021    BUN 79 11/01/2021    CREATININE 3.0 11/01/2021    CALCIUM 8.4 11/01/2021    GFRAA 18 11/01/2021    GFRAA 57 06/03/2010    LABGLOM 15 11/01/2021    GLUCOSE 173 11/01/2021       Chest Xray:   EKG:    I visualized CXR images and EKG strips        Problem List  Active Problems:    GI bleed  Resolved Problems:    * No resolved hospital problems. *        Assessment/Plan:   Acute blood loss anemia  -Secondary to GI  -Positive Hemoccult  -Hemoglobin is 6.3  - patient will be transfused 1 unit of blood  -Blood pressures are soft but still stable currently  -Admit to ICU for close monitoring  -Patient is on anticoagulation Xarelto. INR is 4  -Consulted GI.  -We will give vitamin K x1. Repeat INR  -Further recs per GI continue Protonix drip for now    Acute renal failure  -Likely prerenal creatinine is up to 3 baselines around 1.4 since also low on bicarb  -Due to CHF and limited use of fluid. Consulted nephrology  -Hold all nephrotoxic drug for now    Uncontrolled A. Fib  -Heart rates in the 110  -Probably likely secondary to ongoing GI bleed and renal issues  -Treat underlying cause continue beta-blocker hold anticoagulation  -This is second episode of bleeding will consult cardiology on recommendation anticoagulation    UTI  -Elevated leukocytosis abnormal UA  -Likely explain patient's symptoms  -Treated with Rocephin  -Follow-up on culture. Chronic CHF  -Monitor closely holding Lasix while BP soft  -May need IV fluids we will closely monitor.     Recent GI bleed  -With history of AVM bleeding              Jackquelyn Holstein, MD    11/1/2021 4:22 PM

## 2021-11-01 NOTE — CONSULTS
MD Estephanie Kingston MD Viva Rasp, MD                                  Office: [phone]                 Fax: [phone]          [employer]                     NEPHROLOGY CONSULTATION NOTE:     PATIENT NAME: Michelle Garcia  : 1935  MRN: [id number]      Name:  Michelle Garcia Date/Time of Admission: 2021 11:12 AM    CSN: [id number] Attending Provider: Abiola Voss   Room/Bed: ED- : 1935 Age: 82 y. o. Reason for Nephrology consult :  Evaluation of patient with worsening renal failure. And GI bleed            History of Presenting complaint:       Michelle Garcia 82 y.o. And is presented to the emergency room with multiple complaints of generalized weakness and rectal bleed. Patient has a history of recent cauterization AVM. 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 Patient is on anticoagulationXarelto. At the time of admission patient is found to have hypotension. No history of fever. Nephrology consult for critically elevated renal labs. BUN is 79 and a creatinine of 3.0. Sodium is 130 and a CO2 of 14. Found to have elevated WBC count 21,600. Hemoglobin is 6.3 on admission. Hypotensive. Decreased urine output. No previous history of chronic kidney disease. Following admission she is given blood transfusion. Also started on IV antibiotics for possible UTI. She is very weak and tired. Unable to give any detailed history    Medications reviewed. Medical records reviewed. Past Medical History :         Past Medical History:   Diagnosis Date    Atrial fibrillation ([facility] [address]     CAD (coronary artery disease)     Depression     Hyperlipidemia     Hypertension     Hypothyroid     Legal blindness     Obstructive sleep apnea (adult) (pediatric)        Medications  Which i have  Reviewed, also have reviewed home medications  Prior to Admission medications    Medication Sig Start Date End Date Taking?  Authorizing drop into the right eye daily     Historical Provider, MD     Current Facility-Administered Medications: 0.9 % sodium chloride infusion, , IntraVENous, PRN  phytonadione (ADULT) (VITAMIN K) 10 mg in dextrose 5 % 100 mL IVPB, 10 mg, IntraVENous, Once   sodium chloride           Allergies. Allergies   Allergen Reactions    Percocet [Oxycodone-Acetaminophen] Other (See Comments)     Pt states it makes her crazy. Social History. Social History     Socioeconomic History    Marital status: Legally      Spouse name: Not on file    Number of children: Not on file    Years of education: Not on file    Highest education level: Not on file   Occupational History    Not on file   Tobacco Use    Smoking status: Never Smoker    Smokeless tobacco: Never Used   Vaping Use    Vaping Use: Never used   Substance and Sexual Activity    Alcohol use: Yes     Alcohol/week: 2.0 standard drinks     Types: 2 Glasses of wine per week     Comment: occas.  Drug use: No    Sexual activity: Yes     Partners: Male   Other Topics Concern    Not on file   Social History Narrative    Not on file     Social Determinants of Health     Financial Resource Strain:     Difficulty of Paying Living Expenses:    Food Insecurity:     Worried About Running Out of Food in the Last Year:     [address] in the Last Year:    Transportation Needs:     Lack of Transportation (Medical):      Lack of Transportation (Non-Medical):    Physical Activity:     Days of Exercise per Week:     Minutes of Exercise per Session:    Stress:     Feeling of Stress :    Social Connections:     Frequency of Communication with Friends and Family:     Frequency of Social Gatherings with Friends and Family:     Attends Protestant Services:     Active Member of Clubs or Organizations:     Attends Club or Organization Meetings:     Marital Status:    Intimate Partner Violence:     Fear of Current or Ex-Partner:     Emotionally Abused:  Physically Abused:     Sexually Abused:        Family History    Family History   Problem Relation Age of Onset    Heart Disease Mother     Heart Disease Father     Asthma Neg Hx     Cancer Neg Hx     Diabetes Neg Hx     Emphysema Neg Hx     Hypertension Neg Hx     Heart Failure Neg Hx        Review of Systems. I have reviewed in detail the 10 system review with admitting physician and other consultants on the case     PHYSICAL EXAMINATION. BP 90/70   Pulse 122   Temp 97 °F (36.1 °C) (Tympanic)   Resp 20   Ht 5' 5\" (1.651 m)   Wt 150 lb (68 kg)   SpO2 100%   BMI 24.96 kg/m²     Intake/Output Summary (Last 24 hours) at 11/1/2021 1704  Last data filed at 11/1/2021 1636  Gross per 24 hour   Intake 327.5 ml   Output 75 ml   Net 252.5 ml       INTAKE/OUTPUT:  No intake/output data recorded. I/O this shift:  In: 327.5 [Blood:327.5]  Out: 75 [Urine:75]       Ill Appearing. mild respiratory distress. lethargic. Hypotensive. External exam of the ears and nose are normal  HENT: exam is normal  Eyes: Pupils are equal, round, and reactive to light. Lymph Nodes. No axillary or cervical lymph nodes are palpable. Neck. JVD not visible. No lymph nodes palpable. CVS.  Heart sounds are normal.Palpation of the heart is normal. No murmurs. No pericardial rub.  RS.dullness on percussion of the lower chest wall. Bilateral Basal rales. PA soft , bowel sounds are normal no distension and no tenderness to palpation. Skin No rash , No palpable nodules  Musculoskeletal: Normal range of motion. 1+ edema and no tenderness. CNS  No focal    Edema Worse. Lethargic.    All pulses are well felt      Labs reviewed by me       CBC:   Recent Labs     11/01/21  1140   WBC 21.6*   HGB 6.3*   HCT 20.3*   MCV 85.9        BMP:   Recent Labs     11/01/21  1140   *   K 4.8   CL 94*   CO2 14*   BUN 79*   CREATININE 3.0*     Magnesium:   Lab Results   Component Value Date    MG 2.30 11/01/2021 ASSESSMENT     Acute renal failuresevereworseningwith decreased urine output. Hypotension. Hypovolemia. Anemia of acute blood loss. Severe. Worsening metabolic acidosis. Critical low albumin of 2.2. Critical anemia 7.3 on admission. E. coli sepsis WBC count 21,000               PLAN           Acute renal failuresevere on presentationwith slow improvement of urine output. Marilee Murphy is 80 and a creatinine of 3.0 on admission. Labs are pending today. Etiology most likely related to ATNpatient had severe hypotension and hypovolemia on admission. Other risk factor for ATN include E. coli sepsis  Patient needs stat labs today.     Continue to monitor urine output. May benefit from low-dose Lasix 20 mg IV twice daily.     Hypovolemiahas improved. Appears to have mild fluid overload.     Electrolytes needs close monitoring. May require bicarbonate infusion. Reevaluate after labs today.     Medications reviewed. All nephrotoxic medications have been discontinued. Hold Ace inhibitors till renal recovery is complete.     Antibiotic dose reviewed and appropriate for level of renal function. Work up for acute renal failure has been ordered which include:  Renal Panel study  CBC  Urine Analysis   Urine Electrolytes   U Protein : creatinine ratio  Urine for eosinophil smear exam.    Renal Ultrasound Scan     Daily weight   Strict I and O   Renal Diet   Low Na diet     Electrolytes reviewed. Electrolytes needs close monitoring due to severity of renal failure   Magnesium levels  are stable. medications reviewed. Nephrotoxic medications discontinued. Discontinue ACE  Inhibitors. Antibiotic doses reviewed and changes made/ renal dosing. .  discontinue diuretics. continue IV fluids at current rate. No immediate indications for dialysis. Fluid Overload. Edema is worse increase diuretics. Monitor urine output and report if less than 50ml/hr.   Renal functions and electrolytes need close monitoring due toWorsening Kidney function. Repeat chest xray in the morning. Treatment Plan discussed withpatient, treating team and RN. Treatment plan discussed with patient., High risk. and critically ill -time spent 35 mins. Thanks for the consult             Electronically Signed:  Roro Deutsch MD 11/1/2021          Arterial Blood Gasses  No results for input(s): PH, PCO2, PO2 in the last 72 hours. Invalid input(s): [id number], INSPIREDO2    UA:  Recent Labs     11/01/21  1220   COLORU YELLOW   PHUR 6.0   WBCUA >900*   RBCUA 5-10*   BACTERIA 4+*   CLARITYU TURBID*   SPECGRAV 1.016   LEUKOCYTESUR LARGE*   UROBILINOGEN 0.2   BILIRUBINUR SMALL*   BLOODU MODERATE*   GLUCOSEU Negative       LIVER PROFILE:   Recent Labs     11/01/21  1140   AST 26   ALT 19   BILITOT 0.7   ALKPHOS 93     PT/INR:    Lab Results   Component Value Date    PROTIME 51.1 11/01/2021    PROTIME 19.4 10/18/2021    PROTIME 13.1 10/19/2018    INR 4.26 11/01/2021    INR 1.68 10/18/2021    INR 1.15 10/19/2018     PTT:    Lab Results   Component Value Date    APTT 32.1 11/01/2021     CANDE:  No results found for: ANATITER, CANDE        RADIOLOGY:    XR CHEST PORTABLE    Result Date: 11/1/2021  EXAMINATION: ONE XRAY VIEW OF THE CHEST 11/1/2021 11:41 am COMPARISON: 10/18/2021 HISTORY: ORDERING SYSTEM PROVIDED HISTORY: SOB TECHNOLOGIST PROVIDED HISTORY: Reason for exam:->SOB Reason for Exam: SOB Acuity: Acute Type of Exam: Initial FINDINGS: Status post coronary bypass. Heart size stable. Peripheral vessels are more distinct on today's exam.  There is been resolution of interstitial opacities. There is thickening of the minor fissure. There is strandy airspace disease in the left base with minimal blunting of both costophrenic angles     Interval resolution of pulmonary edema. Fluid in the minor fissure, and suspected bilateral pleural effusions.   Left basilar airspace disease likely atelectasis     XR CHEST PORTABLE    Result Date: 10/18/2021  EXAMINATION: ONE XRAY VIEW OF THE CHEST 10/18/2021 6:50 pm COMPARISON: October 16, 2015 HISTORY: ORDERING SYSTEM PROVIDED HISTORY: SOB TECHNOLOGIST PROVIDED HISTORY: Reason for exam:->SOB FINDINGS: Interval development of patchy opacities in the bat lung bases bilaterally with blunting of the right costophrenic angle small amount of fluid in the fissure on the right. There is mild prominence of the cardiac silhouette. The findings are nonspecific. Findings could represent pulmonary edema however other etiology such is a infectious process cannot be excluded     Interval development of bibasilar opacities greater on the right with some blunting of the right costophrenic angle and focal density in the fissure suggestive of fluid. These findings are new compared to prior study. The findings could represent CHF however the possibility of a infectious pneumonic process cannot be excluded     CT CHEST ABDOMEN PELVIS WO CONTRAST    Result Date: 11/1/2021  EXAMINATION: CT OF THE CHEST, ABDOMEN, AND PELVIS WITHOUT CONTRAST 11/1/2021 1:10 pm TECHNIQUE: CT of the chest, abdomen and pelvis was performed without the administration of intravenous contrast. Multiplanar reformatted images are provided for review. Dose modulation, iterative reconstruction, and/or weight based adjustment of the mA/kV was utilized to reduce the radiation dose to as low as reasonably achievable. COMPARISON: None HISTORY: ORDERING SYSTEM PROVIDED HISTORY: Short of breath, GI bleed TECHNOLOGIST PROVIDED HISTORY: Reason for exam:->Short of breath, GI bleed Additional Contrast?->None Decision Support Exception - unselect if not a suspected or confirmed emergency medical condition->Emergency Medical Condition (MA) Reason for Exam: Shortness of Breath (arrived via EMS d/t SOB. original call for EMS was for rectal bleeding. None noted via EMS.  recent cauterization last week (pointing to abd); hx of quad bypass; BP 90/60; dark purple bruising noted to BUE; stated was on Xarelto ) Acuity: Acute Type of Exam: Initial FINDINGS: Chest: Mediastinum: Thyroid gland is unremarkable. Small mediastinal and hilar nodes are noted. Precarinal node measures 1.9 cm x 1.3 cm. Heart is enlarged. There is hyperdensity of the interventricular septum, suggesting anemia. Lungs/pleura: Small left-sided pleural effusion is seen. There is adjacent left basilar consolidation. Hazy ground-glass opacity is seen throughout the left upper and left lower lobe. Small right-sided pleural effusion is seen. There is adjacent right basilar consolidation. Fluid is seen in the major fissure on the right. Hazy ground-glass opacity is seen in the right upper lobe, right middle lobe and right lower lobe. A few areas of septal thickening are seen Soft Tissues/Bones: Spurring is seen in the spine. Spurring is seen in the shoulder joints. Clips are seen in the supraclavicular region on the right. Postfusion changes are seen in the cervical spine. There is mild body wall anasarca Abdomen/Pelvis: Organs: There is borderline splenomegaly. No perisplenic fluid There is mild thickening of the adrenal glands No hydronephrosis on the right. No hydronephrosis on the left Punctate calcifications seen in left kidney, likely vascular Gallbladder is distended. There is excretion of contrast seen in the gallbladder. No peripancreatic fluid. No peripancreatic inflammatory change GI/Bowel: No significant small bowel distention noted. A few colonic diverticula are seen. Clips are seen in the region of the cecum. Pelvis: Christiansen catheter seen within the bladder. Bladder is contracted. Trace free fluid is seen in the pelvis Peritoneum/Retroperitoneum: Atherosclerotic change seen in aorta. Tiny retroperitoneal nodes are seen. Bones/Soft Tissues: Spurring is seen in the spine. Spurring is seen in the hips. .  Tiny periumbilical hernia containing fat is seen     Small bilateral pleural effusions are seen with adjacent consolidation at the lung bases, likely atelectasis in the absence of clinical signs of pneumonia. Septal thickening is seen, compatible with fluid overload. Mildly enlarged precarinal node, likely reactive due to the suspected fluid overload. Recommend 3 month follow-up chest CT for further characterization. Scattered colonic diverticula are seen. No significant pericolonic fat stranding. Body wall anasarca with trace free fluid in pelvis, compatible with mild fluid overload       Imaging Results.   Chest X Ray reviwed by me          Electronically Signed:  Devyn Bertrand MD 11/1/2021

## 2021-11-01 NOTE — ED PROVIDER NOTES
[facility] Emergency Department      Pt Name: Ivon Guy  MRN: [id number]  [place] 1935  Date of evaluation: 11/1/2021  Provider: Ashely Burch MD  I independently performed a history and physical on Ivon Guy. All diagnostic, treatment, and disposition decisions were made by myself in conjunction with the advanced practice provider. HPI: Ivon Guy presented with   Chief Complaint   Patient presents with    Shortness of Breath     arrived via EMS d/t SOB. original call for EMS was for rectal bleeding. None noted via EMS. recent cauterization last week (pointing to abd); hx of quad bypass; BP 90/60; dark purple bruising noted to BUE; stated was on [address] has a past medical history of Atrial fibrillation ([facility] [address] CAD (coronary artery disease), Depression, Hyperlipidemia, Hypertension, Hypothyroid, Legal blindness, and Obstructive sleep apnea (adult) (pediatric). She has a past surgical history that includes Coronary artery bypass graft (1997); Tubal ligation; angioplasty; sigmoidoscopy (10/19/2018); pr sigmoidoscopy flx dx w/collj spec br/wa if pfrmd (N/A, 10/19/2018); eye surgery (Right); eye surgery (Left); Tonsillectomy and adenoidectomy; Colonoscopy (11/16/2018); Upper gastrointestinal endoscopy (N/A, 10/20/2021); and Colonoscopy (N/A, 10/20/2021). No current facility-administered medications on file prior to encounter.      Current Outpatient Medications on File Prior to Encounter   Medication Sig Dispense Refill    ferrous sulfate (IRON 325) 325 (65 Fe) MG tablet Take 1 tablet by mouth 2 times daily (with meals) 180 tablet 1    docusate sodium (COLACE) 100 MG capsule Take 1 capsule by mouth daily 30 capsule 1    levothyroxine (SYNTHROID) 112 MCG tablet Take 1 tablet by mouth every morning (before breakfast) 30 tablet 1    apixaban (ELIQUIS) 2.5 MG TABS tablet Take 1 tablet by mouth 2 times daily 180 tablet 1    aspirin EC 81 MG EC tablet Take 1 tablet by mouth daily 30 tablet 5    atorvastatin (LIPITOR) 40 MG tablet Take 40 mg by mouth nightly  30 tablet 3    amiodarone (PACERONE) 400 MG tablet Take 1 tablet by mouth 2 times daily (Patient taking differently: Take 200 mg by mouth daily ) 60 tablet 0    metoprolol succinate (TOPROL XL) 50 MG extended release tablet Take 1 tablet by mouth 3 times daily (Patient taking differently: Take 50 mg by mouth 2 times daily ) 90 tablet 1    furosemide (LASIX) 40 MG tablet Take 1 tablet by mouth daily Take 1 tab daily until weight back to 152lbs. Then can take as needed thereafter. 30 tablet 0    Cholecalciferol (VITAMIN D) 50 MCG (2000 UT) CAPS capsule Take 1 capsule by mouth daily      Multiple Vitamins-Minerals (THERAPEUTIC MULTIVITAMIN-MINERALS) tablet Take 1 tablet by mouth daily      QUEtiapine (SEROQUEL) 50 MG tablet Take 50 mg by mouth nightly       prednisoLONE sodium phosphate (INFLAMASE FORTE) 1 % ophthalmic solution Place 1 drop into the right eye daily        PHYSICAL EXAM  Vitals: BP (!) 126/99   Pulse 125   Temp 96.9 °F (36.1 °C) (Oral)   Resp 20   Ht 5' 5\" (1.651 m)   Wt 150 lb (68 kg)   BMI 24.96 kg/m²   Constitutional:  82 y.o. female alert, cooperative, pale  HENT:  Atraumatic scalp, mucous membranes moist  Eyes:   Conjunctiva clear, no icterus  Neck:  Supple, no visible JVD, no signs of injury  Cardiovascular:  Regular, no rubs  Thorax & Lungs:  No accessory muscle usage, clear  Abdomen:  Soft, non distended, NT  Back:  No deformity  Genitalia:  Deferred  Rectal:  Deferred  Extremities:  No cyanosis, no edema, adequate perfusion  Skin:  Warm, dry  Neurologic:  Alert, no slurred speech  Psychiatric:  Affect appropriate    Medical Decision Making and Plan:  Briefly, this is an 82 y. o.female who presented with weakness, fatigue, sob. Hx of recent GI bleed on Xarelto. Some afib with RVR, BP improved with hydration. Has recurrent GIB with anemia. She agreed to transfusion. GI consulted.   Also has UTI, sepsis, JULIO. Plan is to admit for further care. For further details of Ariel Yu's Emergency Department encounter, please see documentation by advanced practice provider SANFORD Arias.      Labs Reviewed   CBC WITH AUTO DIFFERENTIAL - Abnormal; Notable for the following components:       Result Value    WBC 21.6 (*)     RBC 2.36 (*)     Hemoglobin 6.3 (*)     Hematocrit 20.3 (*)     MCHC 30.9 (*)     RDW 19.6 (*)     All other components within normal limits    Narrative:     Merry Child  [facility] tel. [phone],  Hematology results called to and read back by Maritza De, 11/01/2021  12:19, by Lalo Day  Performed at:  [facility] [address], [place], [facility] [place]   Phone [phone]   COMPREHENSIVE METABOLIC PANEL W/ REFLEX TO MG FOR LOW K - Abnormal; Notable for the following components:    Sodium 130 (*)     Chloride 94 (*)     CO2 14 (*)     Anion Gap 22 (*)     Glucose 173 (*)     BUN 79 (*)     CREATININE 3.0 (*)     GFR Non- 15 (*)     GFR  18 (*)     Total Protein 5.8 (*)     Albumin 2.2 (*)     Albumin/Globulin Ratio 0.6 (*)     All other components within normal limits    Narrative:     ANNAMARIA Madrid  [facility] tel. [phone],  Chemistry results called to and read back by jorge christianson, 11/01/2021  12:20, by [facility]  Performed at:  [facility] [address], [place], [facility] [place]   Phone [phone]  - Abnormal; Notable for the following components:    Pro-BNP 38,096 (*)     All other components within normal limits    Narrative:     Merry Child  [facility] tel. [phone],  Chemistry results called to and read back by jorge christianson, 11/01/2021  12:20, by [facility]  Performed at:  [facility] [address] [facility], [facility] [place]   Phone [phone]   PROTIME-INR - Abnormal; Notable for the following components:    Protime 51.1 (*)     INR 4.26 (*)     All other components within normal limits    Narrative:     Performed at:  [facility] [address] [facility], [address]   Phone [phone]   TROPONIN    Narrative:     Elenita Lopezdo  [facility] tel. [phone],  Chemistry results called to and read back by jorge christianson, 11/01/2021  12:20, by [facility]  Performed at:  [facility] [address] [facility], [address]   Phone [phone]   APTT    Narrative:     Performed at:  [facility] [address], [place], [address]   Phone [phone]   MAGNESIUM    Narrative:     Elenita RICHTER tel. [phone],  Chemistry results called to and read back by jorge christianson, 11/01/2021  12:20, by [facility]  Performed at:  [facility] [address], [place], [facility] [address]   Phone [phone]   URINE RT REFLEX TO CULTURE   BLOOD OCCULT STOOL DIAGNOSTIC   TYPE AND SCREEN   TYPE AND SCREEN   PREPARE RBC (CROSSMATCH)     RADIOLOGY:     Plain x-rays were viewed by me:   CT CHEST ABDOMEN PELVIS WO CONTRAST   Final Result   Small bilateral pleural effusions are seen with adjacent consolidation at the   lung bases, likely atelectasis in the absence of clinical signs of pneumonia. Septal thickening is seen, compatible with fluid overload. Mildly enlarged precarinal node, likely reactive due to the suspected fluid   overload. Recommend 3 month follow-up chest CT for further characterization. Scattered colonic diverticula are seen. No significant pericolonic fat   stranding. Body wall anasarca with trace free fluid in pelvis, compatible with mild   fluid overload         XR CHEST PORTABLE   Final Result   Interval resolution of pulmonary edema. Fluid in the minor fissure, and   suspected bilateral pleural effusions.   Left basilar airspace disease likely   atelectasis           EKG:  Read by me in the absence of a cardiologist shows: Atrial fibrillation, rate 121, nonspecific conduction delay, nonspecific ST-T wave abnormality, axis XX 5 degrees, prior EKG not available    CRITICAL CARE:   Total critical care time of 31 minutes (excludes any time for procedures). This includes but not limited to vital sign monitoring, medication, clinical response to medications, interpretation of diagnostics, review of nursing notes, pertinent record review, discussions about patient condition, consultation time, documentation time. Critical care due to the patient's GIB, sepsis. FINAL IMPRESSION:    1. Acute GI bleeding    2. Blood loss anemia    3. Atrial fibrillation with RVR ([facility] [address]    4. Acute cystitis without hematuria    5. Septicemia ([facility] [address]    6. Supratherapeutic INR    7.  JULIO (acute kidney injury) [facility])       DISPOSITION Admitted 11/01/2021 04:04:42 PM       Pooja Ramsey MD  11/02/21 2033

## 2021-11-02 PROBLEM — I48.91 ATRIAL FIBRILLATION (HCC): Status: ACTIVE | Noted: 2021-11-02

## 2021-11-02 LAB
ANION GAP SERPL CALCULATED.3IONS-SCNC: 14 MMOL/L (ref 3–16)
ANISOCYTOSIS: ABNORMAL
BANDED NEUTROPHILS RELATIVE PERCENT: 3 % (ref 0–7)
BASOPHILS ABSOLUTE: 0 K/UL (ref 0–0.2)
BASOPHILS RELATIVE PERCENT: 0 %
BUN BLDV-MCNC: 84 MG/DL (ref 7–20)
BURR CELLS: ABNORMAL
CALCIUM SERPL-MCNC: 8 MG/DL (ref 8.3–10.6)
CHLORIDE BLD-SCNC: 100 MMOL/L (ref 99–110)
CO2: 15 MMOL/L (ref 21–32)
CREAT SERPL-MCNC: 3 MG/DL (ref 0.6–1.2)
EOSINOPHILS ABSOLUTE: 0 K/UL (ref 0–0.6)
EOSINOPHILS RELATIVE PERCENT: 0 %
GFR AFRICAN AMERICAN: 18
GFR NON-AFRICAN AMERICAN: 15
GLUCOSE BLD-MCNC: 110 MG/DL (ref 70–99)
HCT VFR BLD CALC: 22.8 % (ref 36–48)
HCT VFR BLD CALC: 24.7 % (ref 36–48)
HEMOGLOBIN: 7.2 G/DL (ref 12–16)
HEMOGLOBIN: 7.7 G/DL (ref 12–16)
INR BLD: 2.09 (ref 0.88–1.12)
LYMPHOCYTES ABSOLUTE: 0.4 K/UL (ref 1–5.1)
LYMPHOCYTES RELATIVE PERCENT: 2 %
MCH RBC QN AUTO: 27.7 PG (ref 26–34)
MCHC RBC AUTO-ENTMCNC: 31.7 G/DL (ref 31–36)
MCV RBC AUTO: 87.5 FL (ref 80–100)
MONOCYTES ABSOLUTE: 0.2 K/UL (ref 0–1.3)
MONOCYTES RELATIVE PERCENT: 1 %
NEUTROPHILS ABSOLUTE: 21.1 K/UL (ref 1.7–7.7)
NEUTROPHILS RELATIVE PERCENT: 94 %
OVALOCYTES: ABNORMAL
PDW BLD-RTO: 18.2 % (ref 12.4–15.4)
PLATELET # BLD: 234 K/UL (ref 135–450)
PLATELET SLIDE REVIEW: ADEQUATE
PMV BLD AUTO: 7.9 FL (ref 5–10.5)
POTASSIUM SERPL-SCNC: 5.2 MMOL/L (ref 3.5–5.1)
PROTHROMBIN TIME: 24.4 SEC (ref 9.9–12.7)
RBC # BLD: 2.61 M/UL (ref 4–5.2)
REPORT: NORMAL
SLIDE REVIEW: ABNORMAL
SODIUM BLD-SCNC: 129 MMOL/L (ref 136–145)
TEAR DROP CELLS: ABNORMAL
TOXIC GRANULATION: PRESENT
WBC # BLD: 21.8 K/UL (ref 4–11)

## 2021-11-02 PROCEDURE — 2580000003 HC RX 258: Performed by: INTERNAL MEDICINE

## 2021-11-02 PROCEDURE — 36415 COLL VENOUS BLD VENIPUNCTURE: CPT

## 2021-11-02 PROCEDURE — 80048 BASIC METABOLIC PNL TOTAL CA: CPT

## 2021-11-02 PROCEDURE — 05HC33Z INSERTION OF INFUSION DEVICE INTO LEFT BASILIC VEIN, PERCUTANEOUS APPROACH: ICD-10-PCS | Performed by: INTERNAL MEDICINE

## 2021-11-02 PROCEDURE — 2000000000 HC ICU R&B

## 2021-11-02 PROCEDURE — 85014 HEMATOCRIT: CPT

## 2021-11-02 PROCEDURE — C9113 INJ PANTOPRAZOLE SODIUM, VIA: HCPCS | Performed by: HOSPITALIST

## 2021-11-02 PROCEDURE — 6370000000 HC RX 637 (ALT 250 FOR IP): Performed by: INTERNAL MEDICINE

## 2021-11-02 PROCEDURE — 87040 BLOOD CULTURE FOR BACTERIA: CPT

## 2021-11-02 PROCEDURE — 6360000002 HC RX W HCPCS: Performed by: HOSPITALIST

## 2021-11-02 PROCEDURE — 6360000002 HC RX W HCPCS: Performed by: INTERNAL MEDICINE

## 2021-11-02 PROCEDURE — 6370000000 HC RX 637 (ALT 250 FOR IP): Performed by: HOSPITALIST

## 2021-11-02 PROCEDURE — 36569 INSJ PICC 5 YR+ W/O IMAGING: CPT

## 2021-11-02 PROCEDURE — 85610 PROTHROMBIN TIME: CPT

## 2021-11-02 PROCEDURE — 99223 1ST HOSP IP/OBS HIGH 75: CPT | Performed by: INTERNAL MEDICINE

## 2021-11-02 PROCEDURE — 2500000003 HC RX 250 WO HCPCS: Performed by: INTERNAL MEDICINE

## 2021-11-02 PROCEDURE — 2580000003 HC RX 258: Performed by: HOSPITALIST

## 2021-11-02 PROCEDURE — C1751 CATH, INF, PER/CENT/MIDLINE: HCPCS

## 2021-11-02 PROCEDURE — 99222 1ST HOSP IP/OBS MODERATE 55: CPT | Performed by: INTERNAL MEDICINE

## 2021-11-02 PROCEDURE — 85018 HEMOGLOBIN: CPT

## 2021-11-02 PROCEDURE — 85025 COMPLETE CBC W/AUTO DIFF WBC: CPT

## 2021-11-02 RX ORDER — SODIUM CHLORIDE 0.9 % (FLUSH) 0.9 %
5-40 SYRINGE (ML) INJECTION EVERY 12 HOURS SCHEDULED
Status: DISCONTINUED | OUTPATIENT
Start: 2021-11-02 | End: 2021-11-15 | Stop reason: HOSPADM

## 2021-11-02 RX ORDER — FUROSEMIDE 10 MG/ML
20 INJECTION INTRAMUSCULAR; INTRAVENOUS 2 TIMES DAILY
Status: COMPLETED | OUTPATIENT
Start: 2021-11-02 | End: 2021-11-03

## 2021-11-02 RX ORDER — SODIUM CHLORIDE 0.9 % (FLUSH) 0.9 %
5-40 SYRINGE (ML) INJECTION PRN
Status: DISCONTINUED | OUTPATIENT
Start: 2021-11-02 | End: 2021-11-15 | Stop reason: HOSPADM

## 2021-11-02 RX ORDER — SODIUM CHLORIDE 9 MG/ML
25 INJECTION, SOLUTION INTRAVENOUS PRN
Status: DISCONTINUED | OUTPATIENT
Start: 2021-11-02 | End: 2021-11-15 | Stop reason: HOSPADM

## 2021-11-02 RX ORDER — LIDOCAINE HYDROCHLORIDE 10 MG/ML
5 INJECTION, SOLUTION EPIDURAL; INFILTRATION; INTRACAUDAL; PERINEURAL ONCE
Status: COMPLETED | OUTPATIENT
Start: 2021-11-02 | End: 2021-11-02

## 2021-11-02 RX ADMIN — SODIUM CHLORIDE: 9 INJECTION, SOLUTION INTRAVENOUS at 06:45

## 2021-11-02 RX ADMIN — Medication 10 ML: at 20:57

## 2021-11-02 RX ADMIN — DIGOXIN 250 MCG: 250 INJECTION, SOLUTION INTRAMUSCULAR; INTRAVENOUS; PARENTERAL at 00:48

## 2021-11-02 RX ADMIN — SODIUM CHLORIDE 8 MG/HR: 9 INJECTION, SOLUTION INTRAVENOUS at 06:41

## 2021-11-02 RX ADMIN — CEFTRIAXONE 2000 MG: 2 INJECTION, POWDER, FOR SOLUTION INTRAMUSCULAR; INTRAVENOUS at 12:54

## 2021-11-02 RX ADMIN — SODIUM CHLORIDE 8 MG/HR: 9 INJECTION, SOLUTION INTRAVENOUS at 17:07

## 2021-11-02 RX ADMIN — FUROSEMIDE 20 MG: 10 INJECTION, SOLUTION INTRAMUSCULAR; INTRAVENOUS at 17:14

## 2021-11-02 RX ADMIN — Medication 2000 UNITS: at 08:47

## 2021-11-02 RX ADMIN — SODIUM CHLORIDE: 9 INJECTION, SOLUTION INTRAVENOUS at 17:17

## 2021-11-02 RX ADMIN — PIPERACILLIN AND TAZOBACTAM 3375 MG: 3; .375 INJECTION, POWDER, LYOPHILIZED, FOR SOLUTION INTRAVENOUS at 17:23

## 2021-11-02 RX ADMIN — FUROSEMIDE 20 MG: 10 INJECTION, SOLUTION INTRAMUSCULAR; INTRAVENOUS at 12:42

## 2021-11-02 RX ADMIN — LIDOCAINE HYDROCHLORIDE 5 ML: 10 INJECTION, SOLUTION EPIDURAL; INFILTRATION; INTRACAUDAL; PERINEURAL at 17:14

## 2021-11-02 ASSESSMENT — ENCOUNTER SYMPTOMS
EYE DISCHARGE: 0
CHOKING: 0
STRIDOR: 0
BLOOD IN STOOL: 0
EYE REDNESS: 0
RHINORRHEA: 0
COLOR CHANGE: 0
APNEA: 0
DIARRHEA: 0
COUGH: 0
PHOTOPHOBIA: 0
ABDOMINAL PAIN: 0
SHORTNESS OF BREATH: 0
NAUSEA: 0
FACIAL SWELLING: 0
TROUBLE SWALLOWING: 0
CHEST TIGHTNESS: 0

## 2021-11-02 ASSESSMENT — PAIN DESCRIPTION - PROGRESSION
CLINICAL_PROGRESSION: NOT CHANGED

## 2021-11-02 ASSESSMENT — PAIN DESCRIPTION - LOCATION
LOCATION: HEAD
LOCATION: HEAD

## 2021-11-02 ASSESSMENT — PAIN DESCRIPTION - PAIN TYPE
TYPE: ACUTE PAIN
TYPE: ACUTE PAIN

## 2021-11-02 ASSESSMENT — PAIN SCALES - GENERAL
PAINLEVEL_OUTOF10: 0

## 2021-11-02 NOTE — PROGRESS NOTES
Gastroenterology Progress Note    Petra Nunn is a 82 y.o. female patient. Active Problems:    GI bleed  Resolved Problems:    * No resolved hospital problems. *      SUBJECTIVE: No abdominal pain. No further bloody bowel movements. ROS:  No fever, chills  No chest pain, palpitations  No SOB, cough  Gastrointestinal ROS: see above    Physical    VITALS:  /72   Pulse 109   Temp 96.4 °F (35.8 °C) (Temporal)   Resp 19   Ht 5' 5\" (1.651 m)   Wt 164 lb 3.9 oz (74.5 kg)   SpO2 100%   BMI 27.33 kg/m²   TEMPERATURE:  Current - Temp: 96.4 °F (35.8 °C); Max - Temp  Av °F (36.1 °C)  Min: 96.4 °F (35.8 °C)  Max: 98.2 °F (36.8 °C)    NAD  Irregularly irregular  Lungs CTA Bilaterally  Abdomen soft, ND, NT,  Bowel sounds normal.    Data    Data Review:    Recent Labs     21  1140 21  1749 21  1109   WBC 21.6*  --  21.8*   HGB 6.3* 7.3* 7.2*   HCT 20.3* 23.1* 22.8*   MCV 85.9  --  87.5     --  234     Recent Labs     21  1140 21  1109   * 129*   K 4.8 5.2*   CL 94* 100   CO2 14* 15*   BUN 79* 84*   CREATININE 3.0* 3.0*     Recent Labs     21  1140   AST 26   ALT 19   BILITOT 0.7   ALKPHOS 93     No results for input(s): LIPASE, AMYLASE in the last 72 hours. Recent Labs     21  1140   PROTIME 51.1*   INR 4.26*     No results for input(s): PTT in the last 72 hours. ASSESSMENT :    Lower GI hemorrhage - her INR is elevated likely from being ill in addition to Eliquis. The cecal lesion seen 2 weeks ago may be responsible vs other angioectasias. She is not actively bleeding at present (no BM since admission). She is anemic along with elevated INR requiring resuscitation with PRBC's and Vit K to start. No further bleeding.     Acute Blood Loss Anemia - Hb 6.3 on admission, down from 8.8 on discharge last month.   Hemoglobin incremented appropriately with 1 unit PRBC and is stable today at 7.2.    UTI, bacteremia    JULIO      PLAN :  - monitor hgb  - f/u repeat INR  - She appears very weak. Consider repeat colonoscopy once patient improved and bacteremia treated. Discussed with DAMION Kraus  I have personally performed a face to face diagnostic evaluation on this patient. I have interviewed and examined the patient and I agree with the findings and recommended plan of care. In summary, my findings and plan are the following: As above, no further rectal bleeding and Hb stable at 7. E.coli growing in urine, GNR in blood. She is very ill and weak. Will defer colonoscopy for now until she is improved or unless bleeding recurs. Consider colonoscopy later this week if able to take a prep. Hold anticoag for now.     Brandy Garcia MD  [address]  11/2/2021

## 2021-11-02 NOTE — PROGRESS NOTES
MD Som Andrade MD Shauna Bacon, MD                                  Office: [phone]                 Fax: [phone]          [facility]                     NEPHROLOGY    ICU     IN PATIENT PROGRESS NOTE:     PATIENT NAME: Jory Linton  : 1935  MRN: [id number]            Subjective:     Patient is weak and lethargic. She appears pale. Less hypotension. Christiansen catheter. Slowly improving urine output. Medications reviewed. Infusions reviewed. Assessment and Plan    Assessment:     Acute renal failuresevereworseningwith decreased urine output. Hypotension. Hypovolemia. Anemia of acute blood loss. Severe. Worsening metabolic acidosis. Critical low albumin of 2.2. Critical anemia 7.3 on admission. E. coli sepsis WBC count 21,000        Plan:       Acute renal failuresevere on presentationwith slow improvement of urine output. Deion Bright is 80 and a creatinine of 3.0 on admission. Labs are pending today. Etiology most likely related to ATNpatient had severe hypotension and hypovolemia on admission. Other risk factor for ATN include E. coli sepsis  Patient needs stat labs today. Continue to monitor urine output. May benefit from low-dose Lasix 20 mg IV twice daily. Hypovolemiahas improved. Appears to have mild fluid overload. Electrolytes needs close monitoring. May require bicarbonate infusion. Reevaluate after labs today. Medications reviewed. All nephrotoxic medications have been discontinued. Hold Ace inhibitors till renal recovery is complete. Antibiotic dose reviewed and appropriate for level of renal function. No immediate indications for dialysis at this time. Very high risk for possible requiring hemodialysis treatment in the next 24 to 48 hours. Patient remains critically ill. Discussed with RN. Discussed with treatment team.    Electrolytes reviewed. Continue IV Bicarbonate for 24 Hrs.    Magnesium levels reviewedand are stable. Medications reviewed and appropriate for level of kidney function. All Nephrotoxic medications have been discontinued. Antibiotic doses are appropriate for level of renal function. Hold ACE inhibitors till renal functions improve. Anemia levels have worsened and continue with Iron and Aranesp as per protocol. High risk for worsening renal failure and need for dialysis if no improvement in the next 24-48 hrs. Deconditioned and  Nutritional status needs Improvement. Discussed with patient., Discussed with treating team. and  Discussed with RN   Multiple complex problems. , Patient at high risk-time spent 35 mins. and Patient is critically ill. and  Time spent 35 mins. EXAM  Vitals:    11/02/21 1000   BP: (!) 149/62   Pulse: 104   Resp: 17   Temp:    SpO2:        Intake/Output Summary (Last 24 hours) at 11/2/2021 1110  Last data filed at 11/2/2021 0503  Gross per 24 hour   Intake 3843.5 ml   Output 325 ml   Net 3518.5 ml       ill appearing. mild respiratory distress. lethargic.   no hypotension  External exam of the ears and nose are normal  HENT: exam is normal  Eyes: Pupils are equal, round, and reactive to light. Lymph Nodes. No axillary or cervical lymph nodes are palpable. Neck. JVD not visible. No lymph nodes palpable. CVS.  Heart sounds are normal.Palpation of the heart is normal. No murmurs. No pericardial rub.  RS.dullness on percussion of the lower chest wall. Bilateral Basal rales. PA soft , bowel sounds are normal no distension and no tenderness to palpation. Skin No rash , No palpable nodules  Musculoskeletal: Normal range of motion. 1+ edema and no tenderness. CNS  No focal    Edematrace   Lethargic. All pulses are well felt      Active Problems:    GI bleed  Resolved Problems:    * No resolved hospital problems.  *        Medications Reviewed by me   cefTRIAXone (ROCEPHIN) IV  2,000 mg IntraVENous Q24H    Vitamin D  2 tablet Oral Daily    metoprolol succinate  50 mg Oral BID      sodium chloride 100 mL/hr at 11/02/21 0645    pantoprozole (PROTONIX) infusion 8 mg/hr (11/02/21 1162)       Data Review. Labs reviewed by me       CBC:   Recent Labs     11/01/21  1140 11/01/21  1749   WBC 21.6*  --    HGB 6.3* 7.3*   HCT 20.3* 23.1*   MCV 85.9  --      --      BMP:   Recent Labs     11/01/21  1140   *   K 4.8   CL 94*   CO2 14*   BUN 79*   CREATININE 3.0*     Magnesium:   Lab Results   Component Value Date    MG 2.30 11/01/2021     Lab Results   Component Value Date    CREATININE 3.0 11/01/2021       Arterial Blood Gasses  No results for input(s): PH, PCO2, PO2 in the last 72 hours. Invalid input(s): [id number], INSPIREDO2    UA:  Recent Labs     11/01/21  1220   COLORU YELLOW   PHUR 6.0   WBCUA >900*   RBCUA 5-10*   BACTERIA 4+*   CLARITYU TURBID*   SPECGRAV 1.016   LEUKOCYTESUR LARGE*   UROBILINOGEN 0.2   BILIRUBINUR SMALL*   BLOODU MODERATE*   GLUCOSEU Negative       LIVER PROFILE:   Recent Labs     11/01/21  1140   AST 26   ALT 19   BILITOT 0.7   ALKPHOS 93     PT/INR:    Lab Results   Component Value Date    PROTIME 51.1 11/01/2021    PROTIME 19.4 10/18/2021    PROTIME 13.1 10/19/2018    INR 4.26 11/01/2021    INR 1.68 10/18/2021    INR 1.15 10/19/2018     PTT:    Lab Results   Component Value Date    APTT 32.1 11/01/2021     CANDE:  No results found for: ANATITER, CANDE  CHEMISTRY COMMON GROUP :   Lab Results   Component Value Date    GLUCOSE 173 11/01/2021    TSH 2.54 06/03/2010     Recent Labs     11/01/21  1140   GLUCOSE 173*   CALCIUM 8.4         RADIOLOGY:        Imaging Results. Chest X Ray reviwed by me    Chest Xray Reviewed by me  Renal Ultrasound Reviewed by me    EKG reviewed by me.                 Electronically Signed: Margarita Zapata MD 11/2/2021 11:10 AM

## 2021-11-02 NOTE — CONSULTS
Gastroenterology Consult Note                          Patient: Ranjith Jones  : 1935  CSN#:      Date:  2021    Subjective:       History of Present Illness  Patient is a 82 y.o.  female admitted with Septicemia ([facility] [address] [A41.9]  GI bleed [K92.2]  Acute GI bleeding [K92.2]  JULIO (acute kidney injury) ([facility] [address] [N17.9]  Blood loss anemia [D50.0]  Atrial fibrillation with RVR ([address] [I48.91]  Supratherapeutic INR [R79.1]  Acute cystitis without hematuria [N30.00] who is seen in consult for lower GI hemorrhage. She was seen by our group 10/20/2021 for anemia and bleeding. EGD was normal, but colonoscopy demonstrated a cecal angioectasia that was cauterized with APC and 2 hemoclips were applied. Mild diverticulosis and internal hemorrhoids were also noted. She now presents with multiple red bloody BM's, weakness and fatigue, and brought to ED where she was found to have significant anemia. She is anticoagulated for chronic A.fib with Eliquis and Xarelto. She denies abd pain, CP, dyspnea, fevers or chills.       Past Medical History:   Diagnosis Date    Atrial fibrillation ([address]     CAD (coronary artery disease)     Depression     Hyperlipidemia     Hypertension     Hypothyroid     Legal blindness     Obstructive sleep apnea (adult) (pediatric)       Past Surgical History:   Procedure Laterality Date    ANGIOPLASTY      COLONOSCOPY  2018    COLONOSCOPY POLYPECTOMY SNARE/COLD BIOPSY performed by Dorys Braga MD at [address] 10/20/2021    COLONOSCOPY POLYPECTOMY ABLATION performed by Raul White MD at [facility] Right     CATARACT EXTRACTION W/ IOL    EYE SURGERY Left     CATARCT EXTRACTION W/ IOL    HI SIGMOIDOSCOPY FLX DX W/COLLJ SPEC BR/WA IF PFRMD N/A 10/19/2018    SIGMOIDOSCOPY DIAGNOSTIC FLEXIBLE performed by Dorys Braga MD at [facility]  10/19/2018 flexible    TONSILLECTOMY AND ADENOIDECTOMY      TUBAL LIGATION      UPPER GASTROINTESTINAL ENDOSCOPY N/A 10/20/2021    EGD BIOPSY performed by Partha Robertson MD at [address] [facility]      Past Endoscopic History: See HPI    Admission Meds  No current facility-administered medications on file prior to encounter. Current Outpatient Medications on File Prior to Encounter   Medication Sig Dispense Refill    ferrous sulfate (IRON 325) 325 (65 Fe) MG tablet Take 1 tablet by mouth 2 times daily (with meals) 180 tablet 1    docusate sodium (COLACE) 100 MG capsule Take 1 capsule by mouth daily 30 capsule 1    levothyroxine (SYNTHROID) 112 MCG tablet Take 1 tablet by mouth every morning (before breakfast) 30 tablet 1    apixaban (ELIQUIS) 2.5 MG TABS tablet Take 1 tablet by mouth 2 times daily 180 tablet 1    aspirin EC 81 MG EC tablet Take 1 tablet by mouth daily 30 tablet 5    atorvastatin (LIPITOR) 80 MG tablet Take 0.5 tablets by mouth nightly 30 tablet 3    venlafaxine (EFFEXOR) 75 MG tablet Take 1 tablet by mouth daily 90 tablet 1    amiodarone (PACERONE) 400 MG tablet Take 1 tablet by mouth 2 times daily 60 tablet 0    metoprolol succinate (TOPROL XL) 50 MG extended release tablet Take 1 tablet by mouth 3 times daily 90 tablet 1    furosemide (LASIX) 40 MG tablet Take 1 tablet by mouth daily Take 1 tab daily until weight back to 152lbs. Then can take as needed thereafter.  30 tablet 0    Cholecalciferol (VITAMIN D) 50 MCG (2000 UT) CAPS capsule Take 1 capsule by mouth daily      Multiple Vitamins-Minerals (THERAPEUTIC MULTIVITAMIN-MINERALS) tablet Take 1 tablet by mouth daily      TraZODone & Diet Manage Prod (TRAZAMINE) 50 MG MISC Take 100 mg by mouth nightly       QUEtiapine (SEROQUEL) 50 MG tablet Take 50 mg by mouth nightly       brimonidine-timolol (COMBIGAN) 0.2-0.5 % ophthalmic solution Place 1 drop into both eyes every 12 hours      prednisoLONE sodium phosphate (INFLAMASE FORTE) 1 % ophthalmic solution Place 1 drop into the right eye daily           Patient denies ASA, NSAID use. Allergies  Allergies   Allergen Reactions    Percocet [Oxycodone-Acetaminophen] Other (See Comments)     Pt states it makes her crazy. Social   Social History     Tobacco Use    Smoking status: Never Smoker    Smokeless tobacco: Never Used   Substance Use Topics    Alcohol use: Yes     Alcohol/week: 2.0 standard drinks     Types: 2 Glasses of wine per week     Comment: occas. Family History   Problem Relation Age of Onset    Heart Disease Mother     Heart Disease Father     Asthma Neg Hx     Cancer Neg Hx     Diabetes Neg Hx     Emphysema Neg Hx     Hypertension Neg Hx     Heart Failure Neg Hx       No family history of colon cancer, Crohn's disease, or ulcerative colitis. Review of Systems  Pertinent items are noted in HPI. Physical Exam  Blood pressure 113/77, pulse 133, temperature 97 °F (36.1 °C), temperature source Temporal, resp. rate 26, height 5' 5\" (1.651 m), weight 150 lb (68 kg), SpO2 95 %. General appearance: Pale, ill-appearing, alert, cooperative, no distress, appears stated age  Eyes: Anicteric  Head: Normocephalic, without obvious abnormality  Lungs: clear to auscultation bilaterally, Normal Effort  Heart: regular rate and rhythm, normal S1 and S2, no murmurs or rubs  Abdomen: soft, non-tender. Bowel sounds normal. No masses,  no organomegaly. Extremities: atraumatic, no cyanosis or edema  Skin: Pale, warm and dry, no jaundice  Neuro: Grossly intact, A&OX3      Data Review:    Recent Labs     11/01/21  1140 11/01/21  1749   WBC 21.6*  --    HGB 6.3* 7.3*   HCT 20.3* 23.1*   MCV 85.9  --      --      Recent Labs     11/01/21  1140   *   K 4.8   CL 94*   CO2 14*   BUN 79*   CREATININE 3.0*     Recent Labs     11/01/21  1140   AST 26   ALT 19   BILITOT 0.7   ALKPHOS 93     No results for input(s): LIPASE, AMYLASE in the last 72 hours.   Recent Labs 11/01/21  1140   PROTIME 51.1*   INR 4.26*     No results for input(s): PTT in the last 72 hours. No results for input(s): OCCULTBLD in the last 72 hours. Imaging Studies:                            CT-scan of abdomen and pelvis 11/1/2021:   FINDINGS:       Chest:       Mediastinum: Thyroid gland is unremarkable.  Small mediastinal and hilar   nodes are noted.  Precarinal node measures 1.9 cm x 1.3 cm. Heart is enlarged. There is hyperdensity of the interventricular septum, suggesting anemia.       Lungs/pleura: Small left-sided pleural effusion is seen. [place] is adjacent   left basilar consolidation.  Hazy ground-glass opacity is seen throughout the   left upper and left lower lobe.       Small right-sided pleural effusion is seen. [place] is adjacent right basilar   consolidation.  Fluid is seen in the major fissure on the right.  Hazy   ground-glass opacity is seen in the right upper lobe, right middle lobe and   right lower lobe.     A few areas of septal thickening are seen       Soft Tissues/Bones: Spurring is seen in the spine.  Spurring is seen in the   shoulder joints.       Clips are seen in the supraclavicular region on the right.  Postfusion   changes are seen in the cervical spine.  There is mild body wall anasarca           Abdomen/Pelvis:       Organs: There is borderline splenomegaly.  No perisplenic fluid       There is mild thickening of the adrenal glands       No hydronephrosis on the right.  No hydronephrosis on the left       Punctate calcifications seen in left kidney, likely vascular       Gallbladder is distended.  There is excretion of contrast seen in the   gallbladder.       No peripancreatic fluid.  No peripancreatic inflammatory change       GI/Bowel: No significant small bowel distention noted.  A few colonic   diverticula are seen.  Clips are seen in the region of the cecum.       Pelvis: Christiansen catheter seen within the bladder.  Bladder is contracted.    Trace free fluid is seen in the pelvis       Peritoneum/Retroperitoneum: Atherosclerotic change seen in aorta.  Tiny   retroperitoneal nodes are seen.       Bones/Soft Tissues: Spurring is seen in the spine.  Spurring is seen in the   hips. Michael Share periumbilical hernia containing fat is seen           Impression   Small bilateral pleural effusions are seen with adjacent consolidation at the   lung bases, likely atelectasis in the absence of clinical signs of pneumonia. Septal thickening is seen, compatible with fluid overload.       Mildly enlarged precarinal node, likely reactive due to the suspected fluid   overload.  Recommend 3 month follow-up chest CT for further characterization.       Scattered colonic diverticula are seen.  No significant pericolonic fat   stranding.       Body wall anasarca with trace free fluid in pelvis, compatible with mild   fluid overload                      Assessment:     Active Problems:    GI bleed  Resolved Problems:    * No resolved hospital problems. *    Lower GI hemorrhage - her INR is elevated likely from being ill in addition to over medication with Xarelto/Eliquis. The cecal lesion seen 2 weeks ago may be responsible vs other angioectasias. She is not actively bleeding at present (no BM since entering ED) but rectal exam shows BRB. She is anemic along with elevated INR requiring resuscitation with PRBC's and Vit K to start. Would repeat INR this PM and daily. Would hold of on specific reversal agent (eg. AndexXa) unless active bleeding occurs. Acute Blood Loss Anemia - Hb 6.3 on admission, down from 8.8 on discharge last month. Recommendations:   1) Clear liquid diet  2) Vit K IV today and monitor INR daily  3) PRBC's to get Hb >7.5. 4) Defer endoscopy for now, but consider repeating colonoscopy if bleeding fails to resolve with reversal of anticoagulation. 5) Nephrology and Cardiology to manage diuresis.        Laxmi Bellamy, [address] [place] [address]  11/1/2021

## 2021-11-02 NOTE — PROGRESS NOTES
Coordination of Nutrition Care:  Continue to monitor while inpatient    Goals:  Pt will tolerate diet advancement to solids and consume at least 50% of meals and supplements       Nutrition Monitoring and Evaluation:   Behavioral-Environmental Outcomes:  None Identified   Food/Nutrient Intake Outcomes:  Food and Nutrient Intake, Supplement Intake, Diet Advancement/Tolerance  Physical Signs/Symptoms Outcomes:  GI Status     Discharge Planning:     Too soon to determine     Electronically signed by Asif Lopez RD, LD on 11/2/21 at 10:56 AM EDT    Contact: [phone]

## 2021-11-02 NOTE — CARE COORDINATION
Discharge Planning Assessment  Readmission risk score 23%  RN discharge planner met with patient/ (and family member) to discuss reason for admission, current living situation, and potential needs at the time of discharge    Demographics/Insurance verified Yes Ehsan Ritchie    Current type of dwelling:ranch with a basement    Patient from ECF/SW confirmed with:n/a    Living arrangements:lives with ex     Level of function/Support:requires assistance for ADL, ex  and [employer] aide assistance    PCP: Genie Tsang    Last Visit to PCP:in past month    DME:walker, CPAP    Active with any community resources/agencies/skilled home care: was discharged in the past 2 weeks with [facility], CM left  to verify if agency still active and what services patient currently receiving. She was DC with nursing. PT, OT having declined placement on last admission    Medication compliance issues:not reported    Financial issues that could impact healthcare:Ehsan Rtichie      Tentative discharge plan: at minimum Home health, consider placement if patient will agree    Discussed and provided facilities of choice if transition to a skilled nursing facility is required at the time of discharge      Discussed with patient and/or family that on the day of discharge home tentative time of discharge will be between 10 AM and noon.     Transportation at the time of discharge: family    FAHEEM RayaN, CCM, RN  [facility]  [phone]

## 2021-11-02 NOTE — PROGRESS NOTES
Pt alert and oriented on 3L O2. Protonix drip and fluids started. Pt BP runnnig low with chronic afib in the 120's to 130's. Digoxin given and effective, HR and BP stabilized. Son and daughter called and updated on pt condition. Pt rested through he night without acute incident.

## 2021-11-02 NOTE — CONSULTS
Infectious Diseases   Consult Note        Admission Date: 11/1/2021  Hospital Day: Hospital Day: 2   Attending: Jamee Freedman MD  Date of service: 11/2/21     Reason for admission: Septicemia [facility]) [A41.9]  GI bleed [K92.2]  Acute GI bleeding [K92.2]  JULIO (acute kidney injury) ([facility] [address] [N17.9]  Blood loss anemia [D50.0]  Atrial fibrillation with RVR ([facility] [address] [I48.91]  Supratherapeutic INR [R79.1]  Acute cystitis without hematuria [N30.00]    Chief complaint/ Reason for consult: Severe sepsis    Microbiology:        I have reviewed allavailable micro lab data and cultures    Blood culture (2/2) - collected on 11/1/2021: E. Coli  Urine culture: Collected on 11/1/2021: Negative    Antibiotics and immunizations:       Current antibiotics: All antibiotics and their doses were reviewed by me    Recent Abx Admin                   cefTRIAXone (ROCEPHIN) 2000 mg IVPB in D5W 50ml minibag (mg) 2,000 mg New Bag 11/02/21 1254    cefTRIAXone (ROCEPHIN) 1000 mg in sterile water 10 mL IV syringe (mg) 1,000 mg Given 11/01/21 1608                  Immunization History: All immunization history was reviewed by me today. Immunization History   Administered Date(s) Administered    Influenza, High Dose (Fluzone 65 yrs and older) 10/27/2018    Influenza, Quadv, IM, PF (6 mo and older Fluzone, Flulaval, Fluarix, and 3 yrs and older Afluria) 10/20/2021       Known drug allergies: All allergies were reviewed and updated    Allergies   Allergen Reactions    Percocet [Oxycodone-Acetaminophen] Other (See Comments)     Pt states it makes her crazy. Social history:     Social History:  All social andepidemiologic history was reviewed and updated by me today as needed. · Tobacco use:   reports that she has never smoked. She has never used smokeless tobacco.  · Alcohol use:   reports current alcohol use of about 2.0 standard drinks of alcohol per week. · Currently lives in: [address] [place] [address] Road  ·  reports no history of drug use. COVID VACCINATION AND LAB RESULT RECORDS:     Internal Administration   First Dose      Second Dose           Last COVID Lab SARS-CoV-2, NAAT (no units)   Date Value   10/19/2021 Not Detected            Assessment:     The patient is a 82 y.o. old female who  has a past medical history of Atrial fibrillation ([address] CAD (coronary artery disease), Depression, Hyperlipidemia, Hypertension, Hypothyroid, Legal blindness, and Obstructive sleep apnea (adult) (pediatric). with following problems:    · Severe sepsis on admission with leukocytosis, hypotension, tachycardia, acute kidney injury  · Gram-negative bacteremia with E. Coli  · High anion gap metabolic acidosis  · Elevated proBNP  · Mild hyperkalemia  · Hyponatremia  · Normocytic normochromic anemia  · Bilateral pleural effusions  · Colon Diverticulosis  · Generalized anasarca  · Coronary artery disease  · Essential hypertension  · Hyperlipidemia  · Obstructive sleep apnea  · Overweight due to excess calorie intake : Body mass index is 27.33 kg/m². ·       Discussion:      The patient was admitted with metabolic encephalopathy and leukocytosis and acute kidney injury and severe hypotension. She also had high anion gap of 22 on presentation to the ER and had an elevated proBNP of 38,000    White cell count is 21,800 today      Plan:     Diagnostic Workup:    · Will order 1 set of follow-up blood culture today  · Follow-up on susceptibility of E. coli isolated from the blood culture  · Continue to follow fever curve, WBC count and blood cultures  · Follow up on liverand renal functions closely  · Follow-up on urine culture    Antimicrobials:    · Patient's white cell count is going up despite IV ceftriaxone  · Will switch IV ceftriaxone to IV Zosyn at a renally adjusted dose of 3.375 g every 12 hour for now  Recommend holding off on PICC line until blood cultures clear for at least 48 and preferably 72 hours.   · Midline or regular central venous line okay for now, if needed for IV access. · We will follow up on the culture results and clinical progress and will make further recommendations accordingly. · Continue close vitals monitoring. · Maintain good glycemic control. · Fall precautions. Aspiration precautions. · Continue to watch for new fever or diarrhea. · DVT prophylaxis. · Discussed all above with patient and RN. Drug Monitoring:    · Continue serial monitoring for antibiotic toxicity as follows: CBC, CMP  · Continue to watch for following: new or worsening fever, hypotension, hives, lip swelling and redness or purulence at vascular access sites. I/v access Management:    · Continue to monitor i.v access sites for erythema, induration, discharge or tenderness. · As always, continue efforts to minimizetubes/lines/drains as clinically appropriate to reduce chances of line associated infections. Current isolation precautions: There are no current isolations documented for this patient. Level of complexity of consult: High     Risk of Complications/Morbidity: High     · Illness(es)/ Infection present that pose threat to life/bodily function. · There is potential for severe exacerbation of infection/side effects of treatment. · Therapy requires intensive monitoring for antimicrobial agent toxicity. Thank you for involving me in the care of your patient. I will continue to follow. If you have any additional questions, please do not hesitate to contact me. Subjective:     Presenting complaint in ER:     Chief Complaint   Patient presents with    Shortness of Breath     arrived via EMS d/t SOB. original call for EMS was for rectal bleeding. None noted via EMS.  recent cauterization last week (pointing to abd); hx of quad bypass; BP 90/60; dark purple bruising noted to BUE; stated was on Xarelto         HPI: Ranjith  is a 82 y.o. female patient, who was seen at the request of Dr. Katia Silva MD.    History was obtained from chart review and the patient. The patient was admitted on 11/1/2021. I have been consulted to see the patient for above mentioned reason(s). The patient has multiple medical comorbidities, and presented to the ER for generalized weakness and shortness of breath. The patient had 2 sets of blood cultures done in the ER. She was noted to have elevated white cell count of 21,600. He also had severe metabolic acidosis. He was started empirically on IV ceftriaxone. 2 sets of blood cultures have grown E. coli    I have been asked for my opinion for management for this patient. Past Medical History: All past medical history reviewed today. Past Medical History:   Diagnosis Date    Atrial fibrillation ([facility] [address]     CAD (coronary artery disease)     Depression     Hyperlipidemia     Hypertension     Hypothyroid     Legal blindness     Obstructive sleep apnea (adult) (pediatric)          Past Surgical History: All pastsurgical history was reviewed today. Past Surgical History:   Procedure Laterality Date    ANGIOPLASTY      COLONOSCOPY  11/16/2018    COLONOSCOPY POLYPECTOMY SNARE/COLD BIOPSY performed by Gerardo Haynes MD at [address] 10/20/2021    COLONOSCOPY POLYPECTOMY ABLATION performed by Chiqui Hernandez MD at [facility] Right     CATARACT EXTRACTION W/ IOL    EYE SURGERY Left     CATARCT EXTRACTION W/ IOL    [place] SIGMOIDOSCOPY FLX DX W/COLLJ SPEC BR/WA IF PFRMD N/A 10/19/2018    SIGMOIDOSCOPY DIAGNOSTIC FLEXIBLE performed by Gerardo Haynes MD at [address] [facility]  10/19/2018    flexible    TONSILLECTOMY AND ADENOIDECTOMY      TUBAL LIGATION      UPPER GASTROINTESTINAL ENDOSCOPY N/A 10/20/2021    EGD BIOPSY performed by Chiqui Hernandez MD at [address] [facility] ENDOSCOPY         Family History: All family history was reviewed today.         Problem Relation Age of Onset    Heart Disease Mother     Heart Disease Father     Asthma Neg Hx     Cancer Neg Hx     Diabetes Neg Hx     Emphysema Neg Hx     Hypertension Neg Hx     Heart Failure Neg Hx          Medications: All current and past medications were reviewed. Medications Prior to Admission: ferrous sulfate (IRON 325) 325 (65 Fe) MG tablet, Take 1 tablet by mouth 2 times daily (with meals)  docusate sodium (COLACE) 100 MG capsule, Take 1 capsule by mouth daily  levothyroxine (SYNTHROID) 112 MCG tablet, Take 1 tablet by mouth every morning (before breakfast)  apixaban (ELIQUIS) 2.5 MG TABS tablet, Take 1 tablet by mouth 2 times daily  aspirin EC 81 MG EC tablet, Take 1 tablet by mouth daily  atorvastatin (LIPITOR) 40 MG tablet, Take 40 mg by mouth nightly   amiodarone (PACERONE) 400 MG tablet, Take 1 tablet by mouth 2 times daily (Patient taking differently: Take 200 mg by mouth daily )  metoprolol succinate (TOPROL XL) 50 MG extended release tablet, Take 1 tablet by mouth 3 times daily (Patient taking differently: Take 50 mg by mouth 2 times daily )  furosemide (LASIX) 40 MG tablet, Take 1 tablet by mouth daily Take 1 tab daily until weight back to 152lbs. Then can take as needed thereafter.   [DISCONTINUED] venlafaxine (EFFEXOR) 75 MG tablet, Take 1 tablet by mouth daily  Cholecalciferol (VITAMIN D) 50 MCG (2000 UT) CAPS capsule, Take 1 capsule by mouth daily  Multiple Vitamins-Minerals (THERAPEUTIC MULTIVITAMIN-MINERALS) tablet, Take 1 tablet by mouth daily  QUEtiapine (SEROQUEL) 50 MG tablet, Take 50 mg by mouth nightly   prednisoLONE sodium phosphate (INFLAMASE FORTE) 1 % ophthalmic solution, Place 1 drop into the right eye daily   [DISCONTINUED] TraZODone & Diet Manage Prod (TRAZAMINE) 50 MG MISC, Take 100 mg by mouth nightly   [DISCONTINUED] brimonidine-timolol (COMBIGAN) 0.2-0.5 % ophthalmic solution, Place 1 drop into both eyes every 12 hours     furosemide  20 mg IntraVENous BID    lidocaine 1 % injection  5 mL IntraDERmal Once  sodium chloride flush  5-40 mL IntraVENous 2 times per day    piperacillin-tazobactam  3,375 mg IntraVENous Q12H    Vitamin D  2 tablet Oral Daily    metoprolol succinate  50 mg Oral BID          REVIEW OF SYSTEMS:       Review of Systems   Constitutional: Positive for fatigue. Negative for chills, diaphoresis and unexpected weight change. HENT: Negative for congestion, ear discharge, ear pain, facial swelling, hearing loss, rhinorrhea and trouble swallowing. Eyes: Negative for photophobia, discharge, redness and visual disturbance. Respiratory: Negative for apnea, cough, choking, chest tightness, shortness of breath and stridor. Cardiovascular: Negative for chest pain and palpitations. Gastrointestinal: Negative for abdominal pain, blood in stool, diarrhea and nausea. Endocrine: Negative for polydipsia, polyphagia and polyuria. Genitourinary: Negative for difficulty urinating, dysuria, frequency, hematuria, menstrual problem and vaginal discharge. Musculoskeletal: Negative for arthralgias, joint swelling, myalgias and neck stiffness. Skin: Negative for color change and rash. Allergic/Immunologic: Negative for immunocompromised state. Neurological: Negative for dizziness, seizures, speech difficulty, light-headedness and headaches. Hematological: Negative for adenopathy. Psychiatric/Behavioral: Negative for agitation, hallucinations and suicidal ideas. Objective:       PHYSICAL EXAM:      Vitals:   Vitals:    11/02/21 1100 11/02/21 1105 11/02/21 1158 11/02/21 1200   BP: 119/66  110/73 111/72   Pulse: 109  117 109   Resp: 18  20 19   Temp:   96.4 °F (35.8 °C)    TempSrc:   Temporal    SpO2: 100%  100%    Weight:       Height:  5' 5\" (1.651 m)         Physical Exam  Vitals and nursing note reviewed. Constitutional:       General: She is not in acute distress. Appearance: She is well-developed. She is not diaphoretic.       Comments: She appears quite tired   HENT: Head: Normocephalic. Right Ear: External ear normal.      Left Ear: External ear normal.      Nose: Nose normal.   Eyes:      General: No scleral icterus. Right eye: No discharge. Left eye: No discharge. Conjunctiva/sclera: Conjunctivae normal.      Pupils: Pupils are equal, round, and reactive to light. Cardiovascular:      Rate and Rhythm: Normal rate and regular rhythm. Heart sounds: No murmur heard. No friction rub. Pulmonary:      Effort: Pulmonary effort is normal.      Breath sounds: No stridor. No wheezing or rales. Chest:      Chest wall: No tenderness. Abdominal:      Palpations: Abdomen is soft. There is no mass. Tenderness: There is no abdominal tenderness. There is no guarding or rebound. Musculoskeletal:         General: No tenderness. Cervical back: Normal range of motion and neck supple. Lymphadenopathy:      Cervical: No cervical adenopathy. Skin:     General: Skin is warm and dry. Findings: No erythema or rash. Neurological:      Mental Status: She is alert and oriented to person, place, and time. Motor: No abnormal muscle tone. Psychiatric:         Judgment: Judgment normal.           Lines and drains: All vascular access sites are healthy with no local erythema, discharge or tenderness. Intake and output:     I/O last 3 completed shifts: In: 3843.5 [P.O.:2540; I.V.:876; Blood:327.5; IV Piggyback:100]  Out: 325 [Urine:325]    Lab Data:   All available labs were reviewed by me today.      CBC:   Recent Labs     11/01/21  1140 11/01/21  1749 11/02/21  1109   WBC 21.6*  --  21.8*   RBC 2.36*  --  2.61*   HGB 6.3* 7.3* 7.2*   HCT 20.3* 23.1* 22.8*     --  234   MCV 85.9  --  87.5   MCH 26.6  --  27.7   MCHC 30.9*  --  31.7   RDW 19.6*  --  18.2*   BANDSPCT 1  --  3        BMP:  Recent Labs     11/01/21  1140 11/02/21  1109   * 129*   K 4.8 5.2*   CL 94* 100   CO2 14* 15*   BUN 79* 84*   CREATININE 3.0* 3.0* CALCIUM 8.4 8.0*   GLUCOSE 173* 110*        Hepatic FunctionPanel:   Lab Results   Component Value Date    ALKPHOS 93 11/01/2021    ALT 19 11/01/2021    AST 26 11/01/2021    PROT 5.8 11/01/2021    PROT 7.6 06/03/2010    BILITOT 0.7 11/01/2021    LABALBU 2.2 11/01/2021       CPK: No results found for: CKTOTAL  ESR: No results found for: SEDRATE  CRP: No results found for: CRP      Imaging: All pertinent images and reports for the current visit were reviewed by meduring this visit. CT CHEST ABDOMEN PELVIS WO CONTRAST   Final Result   Small bilateral pleural effusions are seen with adjacent consolidation at the   lung bases, likely atelectasis in the absence of clinical signs of pneumonia. Septal thickening is seen, compatible with fluid overload. Mildly enlarged precarinal node, likely reactive due to the suspected fluid   overload. Recommend 3 month follow-up chest CT for further characterization. Scattered colonic diverticula are seen. No significant pericolonic fat   stranding. Body wall anasarca with trace free fluid in pelvis, compatible with mild   fluid overload         XR CHEST PORTABLE   Final Result   Interval resolution of pulmonary edema. Fluid in the minor fissure, and   suspected bilateral pleural effusions. Left basilar airspace disease likely   atelectasis             Outside records:    Labs, Microbiology, Radiology and pertinent results from Care everywhere, if available, were reviewed as a part ofthe consultation. Problem list:       Patient Active Problem List   Diagnosis Code    CAD (coronary artery disease) I25.10    Essential hypertension I10    ROBYN (obstructive sleep apnea) G47.33    Hypothyroid E03.9    Hyperlipidemia E78.5    Depression F32. A    GI bleeding K92.2    Acute blood loss anemia D62    GI bleed K92.2    Atrial fibrillation (HCC) I48.91    Severe sepsis (HCC) A41.9, R65.20    E coli bacteremia R78.81, B96.20    Supratherapeutic INR R79.1    Hyperkalemia E87.5    Normocytic normochromic anemia D64.9    Hyponatremia E87.1    Bilateral pleural effusion J90    Diverticulosis K57.90    Anasarca R60.1    Overweight (BMI 25.0-29. 9) E66.3    High anion gap metabolic acidosis P30.1    Elevated brain natriuretic peptide (BNP) level R79.89         Please note that this chart was generated using Dragon dictation software. Although every effort was made to ensure the accuracy of this automated transcription, some errors in transcription may have occurred inadvertently. If you may need any clarification, please do not hesitate to contact me through EPIC or at the phone number provided below with my electronic signature. Any pictures or media included in this note were obtained after taking informed verbal consent from the patient and with their approval to include those in the patient's medical record.       Laura Fonseca MD, MPH  11/2/21, 1:41 PM EDT   [facility] Infectious Disease   [address] [place] [address], [address] [place] [address]  Office: [phone]  Fax: [phone]  Clinic days:  Tuesday & Thursday

## 2021-11-02 NOTE — FLOWSHEET NOTE
Vascular Access Note:   A Power Midline has been placed in the left vein using sterile technique. A power midline is an   long peripheral catheter and may receive medications that are allowed through a peripheral vein. Power midline is approved by the FDA for blood draws. As of now it draws blood briskly without a tourniquet. It is approved to be in place for 29 days. It may, in time, be more difficult to draw blood from it and you may place a tourniquet above it to assist with blood draws- remembering it is 14 cm long and a tourniquet needs to be placed well above the tip of the catheter in order not to cut off flow or fracture the line.      Thank you,  Sherlyn Oliva RN, BSN, [facility] [place],  [place], [place] [address]  Vascular Access/PICC Team RN  2

## 2021-11-02 NOTE — PROGRESS NOTES
Per Dr. Rosalia suarez for PICC line placement in either arm with a GFR < 60. Consent obtained from pt's daughter, Juan Ramon Morataya.

## 2021-11-02 NOTE — PROGRESS NOTES
Patients home PAP unit is not here and she refused using our V60, she is 99% on 3 lpm, will monitor.

## 2021-11-02 NOTE — PROGRESS NOTES
Physician Progress Note      Jean Claude Olmedo  CSN #:                  [id number]  :                       1935  ADMIT DATE:       2021 11:12 AM  DISCH DATE:  RESPONDING  PROVIDER #:        Luisa Astorga MD          QUERY TEXT:    Pt admitted with GI bleed. Noted documentation of sepsis by GI, nephrology   and ED. Found to have positive blood cultures growing gram negative rods X2,   UTI-cultures pending, WBC 21.6 heart rate 125(afib), JULIO with ATN, temperature   of 96.9 -rectal.  If possible, please document in progress notes and   discharge summary:      The medical record reflects the following:  Risk Factors: UTI, positive blood cultures, GIB  Clinical Indicators: Noted documentation of sepsis by GI, nephrology and ED. Found to have positive blood cultures growing gram negative rods X2,   UTI-cultures pending, WBC 21.6 heart rate 125(afib), JULIO with ATN, temperature   of 96.9 -rectal.  Treatment: IVF, Rocephin, Zosyn, sodium bicarbonate, ICU  Options provided:  -- Sepsis confirmed present on admission  -- Sepsis ruled out  -- Other - I will add my own diagnosis  -- Disagree - Not applicable / Not valid  -- Disagree - Clinically unable to determine / Unknown  -- Refer to Clinical Documentation Reviewer    PROVIDER RESPONSE TEXT:    The diagnosis of sepsis was confirmed as present on admission.     Query created by: Amanda Schultz on 2021 2:10 PM      Electronically signed by:  Luisa Astorga MD 2021 2:12 PM

## 2021-11-02 NOTE — CONSULTS
[address] [facility]  [phone]      Chief Complaint   Patient presents with    Shortness of Breath     arrived via EMS d/t SOB. original call for EMS was for rectal bleeding. None noted via EMS. recent cauterization last week (pointing to abd); hx of quad bypass; BP 90/60; dark purple bruising noted to BUE; stated was on Xarelto             History of Present Illness:  Cheryl Glass is a 82 y.o. patient who presented to the hospital with complaints of LGIB. She has recurrent GIB on xarelto and eliquis. She has AVMs. She required multiple transfusions and vitamin K in the ED. No active bleeding. I have been asked to provide consultation regarding further management and testing. Past Medical History:   has a past medical history of Atrial fibrillation ([address] CAD (coronary artery disease), Depression, Hyperlipidemia, Hypertension, Hypothyroid, Legal blindness, and Obstructive sleep apnea (adult) (pediatric). Surgical History:   has a past surgical history that includes Coronary artery bypass graft (1997); Tubal ligation; angioplasty; sigmoidoscopy (10/19/2018); pr sigmoidoscopy flx dx w/collj spec br/wa if pfrmd (N/A, 10/19/2018); eye surgery (Right); eye surgery (Left); Tonsillectomy and adenoidectomy; Colonoscopy (11/16/2018); Upper gastrointestinal endoscopy (N/A, 10/20/2021); and Colonoscopy (N/A, 10/20/2021). Social History:   reports that she has never smoked. She has never used smokeless tobacco. She reports current alcohol use of about 2.0 standard drinks of alcohol per week. She reports that she does not use drugs. Family History:  family history includes Heart Disease in her father and mother. Home Medications:  Were reviewed and are listed in nursing record. and/or listed below  Prior to Admission medications    Medication Sig Start Date End Date Taking?  Authorizing Provider   ferrous sulfate (IRON 325) 325 (65 Fe) MG tablet Take 1 tablet by mouth 2 times daily (with meals) 10/22/21   [facility], APRN - CNP   docusate sodium (COLACE) 100 MG capsule Take 1 capsule by mouth daily 10/22/21   [facility] NAKUL Hickey CNP   levothyroxine (SYNTHROID) 112 MCG tablet Take 1 tablet by mouth every morning (before breakfast) 10/23/21   [facility], APRN - CNP   apixaban (ELIQUIS) 2.5 MG TABS tablet Take 1 tablet by mouth 2 times daily 10/22/21   [facility], APRN - CNP   aspirin EC 81 MG EC tablet Take 1 tablet by mouth daily 10/22/21   [facility] C[facility]   atorvastatin (LIPITOR) 40 MG tablet Take 40 mg by mouth nightly  10/22/21   [facility], APRN - CNP   amiodarone (PACERONE) 400 MG tablet Take 1 tablet by mouth 2 times daily  Patient taking differently: Take 200 mg by mouth daily  10/22/21   [facility], APRN - CNP   metoprolol succinate (TOPROL XL) 50 MG extended release tablet Take 1 tablet by mouth 3 times daily  Patient taking differently: Take 50 mg by mouth 2 times daily  10/22/21   [facility], APRN - CNP   furosemide (LASIX) 40 MG tablet Take 1 tablet by mouth daily Take 1 tab daily until weight back to 152lbs. Then can take as needed thereafter.  10/23/21   [facility] C[facility]   Cholecalciferol (VITAMIN D) 50 MCG (2000 UT) CAPS capsule Take 1 capsule by mouth daily    Historical Provider, MD   Multiple Vitamins-Minerals (THERAPEUTIC MULTIVITAMIN-MINERALS) tablet Take 1 tablet by mouth daily    Historical Provider, MD   QUEtiapine (SEROQUEL) 50 MG tablet Take 50 mg by mouth nightly     Historical Provider, MD   prednisoLONE sodium phosphate (INFLAMASE FORTE) 1 % ophthalmic solution Place 1 drop into the right eye daily     Historical Provider, MD        Current Medications:  Current Facility-Administered Medications   Medication Dose Route Frequency Provider Last Rate Last Admin    cefTRIAXone (ROCEPHIN) 2000 mg IVPB in D5W 50ml minibag  2,000 mg IntraVENous Q24H Tess Pringle MD        furosemide (LASIX) injection 20 mg 20 mg IntraVENous BID Rosa Kidd MD        sodium bicarbonate 100 mEq in sodium chloride 0.45 % 1,000 mL infusion   IntraVENous Continuous Rosa Kidd MD        Vitamin D (CHOLECALCIFEROL) tablet 2,000 Units  2 tablet Oral Daily Tess Pringle MD   2,000 Units at 11/02/21 0847    pantoprazole (PROTONIX) 80 mg in sodium chloride 0.9 % 100 mL infusion  8 mg/hr IntraVENous Continuous Tess Pringle MD 10 mL/hr at 11/02/21 0641 8 mg/hr at 11/02/21 0641    metoprolol succinate (TOPROL XL) extended release tablet 50 mg  50 mg Oral BID Natalia Ruff MD   50 mg at 11/02/21 0847        Allergies:  Percocet [oxycodone-acetaminophen]     Review of Systems:     · Constitutional: there has been no unanticipated weight loss. There's been no change in energy level, sleep pattern, or activity level. · Eyes: No visual changes or diplopia. No scleral icterus. · ENT: No Headaches, hearing loss or vertigo. No mouth sores or sore throat. · Cardiovascular: Reviewed in HPI  · Respiratory: No cough or wheezing, no sputum production. No hematemesis. · Gastrointestinal: No abdominal pain, appetite loss, blood in stools. No change in bowel or bladder habits. · Genitourinary: No dysuria, trouble voiding, or hematuria. · Musculoskeletal:  No gait disturbance, weakness or joint complaints. · Integumentary: No rash or pruritis. · Neurological: No headache, diplopia, change in muscle strength, numbness or tingling. No change in gait, balance, coordination, mood, affect, memory, mentation, behavior. · Psychiatric: No anxiety, no depression. · Endocrine: No malaise, fatigue or temperature intolerance. No excessive thirst, fluid intake, or urination. No tremor. · Hematologic/Lymphatic: No abnormal bruising or bleeding, blood clots or swollen lymph nodes. · Allergic/Immunologic: No nasal congestion or hives.   ·     Physical Examination:    Vitals:    11/02/21 1000   BP: (!) 149/62   Pulse: 104   Resp: 17   Temp: SpO2:     Weight: 164 lb 3.9 oz (74.5 kg)         General Appearance:  Alert, cooperative, no distress, appears stated age, pale lethargic   Head:  Normocephalic, without obvious abnormality, atraumatic   Eyes:  PERRL, conjunctiva/corneas clear       Nose: Nares normal, no drainage or sinus tenderness   Throat: Lips, mucosa, and tongue normal   Neck: Supple, symmetrical, trachea midline, no adenopathy, thyroid: not enlarged, symmetric, no tenderness/mass/nodules, no carotid bruit or JVD       Lungs:   Clear to auscultation bilaterally, respirations unlabored   Chest Wall:  No tenderness or deformity   Heart:  Irregular rate and rhythm, S1, S2 normal, no murmur, rub or gallop   Abdomen:   Soft, non-tender, bowel sounds active all four quadrants,  no masses, no organomegaly           Extremities: Extremities normal, atraumatic, no cyanosis or edema   Pulses: 2+ and symmetric   Skin: Skin color, texture, turgor normal, no rashes or lesions   Pysch: Normal mood and affect   Neurologic: Normal gross motor and sensory exam.         Labs  CBC:   Lab Results   Component Value Date    WBC 21.8 11/02/2021    RBC 2.61 11/02/2021    HGB 7.2 11/02/2021    HCT 22.8 11/02/2021    MCV 87.5 11/02/2021    RDW 18.2 11/02/2021     11/02/2021     CMP:    Lab Results   Component Value Date     11/02/2021    K 5.2 11/02/2021    K 4.8 11/01/2021     11/02/2021    CO2 15 11/02/2021    BUN 84 11/02/2021    CREATININE 3.0 11/02/2021    GFRAA 18 11/02/2021    GFRAA 57 06/03/2010    AGRATIO 0.6 11/01/2021    LABGLOM 15 11/02/2021    GLUCOSE 110 11/02/2021    PROT 5.8 11/01/2021    PROT 7.6 06/03/2010    CALCIUM 8.0 11/02/2021    BILITOT 0.7 11/01/2021    ALKPHOS 93 11/01/2021    AST 26 11/01/2021    ALT 19 11/01/2021     PT/INR:  No results found for: PTINR  Lab Results   Component Value Date    TROPONINI 0.01 11/01/2021       EKG:  I have reviewed EKG with the following interpretation:  Impression:  afib with RVR      All testing and labs listed below were personally reviewed. Assessment  Patient Active Problem List   Diagnosis    CAD (coronary artery disease)    HTN (hypertension)    Obstructive sleep apnea    Hypothyroid    Hyperlipidemia    Depression    GI bleeding    Acute blood loss anemia    GI bleed         Plan:    I had the opportunity to review the clinical symptoms and presentation of Venessa Yu. Assessment/Plan:  Active Problems:    GI bleed  Plan: mgmt per GI. Likely due to AVM and oral anticoagulants. Hold eliquis/xarelto for now. Await GI recs      Afib: chronic afib. With rvr. Can not give [address] due to recurrent bleeds. May be watchman candidate. CAD: CABG 2002. PCI 2014. Troponin elevated due to afib and anemia (demand ischemia). No ACS. No heparin or aspirin due to active bleed    I will address the patient's cardiac risk factors and adjusted pharmacologic treatment as needed. In addition, I have reinforced the need for patient directed risk factor modification. Tobacco use was discussed with the patient and educated on the negative effects. I have asked the patient to not utilize these agents. Thank you for allowing to us to participate in the care or Kelley Freedman. Further evaluation will be based upon the patient's clinical course and testing results. All questions and concerns were addressed to the patient/family. Alternatives to my treatment were discussed. The note was completed using EMR. Every effort was made to ensure accuracy; however, inadvertent computerized transcription errors may be present.     Tyrese Collado MD, MD 11/2/2021 11:36 AM

## 2021-11-02 NOTE — PROGRESS NOTES
[facility]    11/2/2021 3:19 PM        Name: Laisha Gilliam . Admitted: 11/1/2021  Primary Care Provider: Natali Das (Tel: [phone])                        Subjective:  . No acute events overnight. Resting well. Pain control. Diet ok. Labs reviewed  Denies any chest pain sob. Reviewed interval ancillary notes    Current Medications  furosemide (LASIX) injection 20 mg, BID  sodium bicarbonate 100 mEq in sodium chloride 0.45 % 1,000 mL infusion, Continuous  lidocaine PF 1 % injection 5 mL, Once  sodium chloride flush 0.9 % injection 5-40 mL, 2 times per day  sodium chloride flush 0.9 % injection 5-40 mL, PRN  0.9 % sodium chloride infusion, PRN  piperacillin-tazobactam (ZOSYN) 3,375 mg in dextrose 5 % 50 mL IVPB extended infusion (mini-bag), Q12H  Vitamin D (CHOLECALCIFEROL) tablet 2,000 Units, Daily  pantoprazole (PROTONIX) 80 mg in sodium chloride 0.9 % 100 mL infusion, Continuous  metoprolol succinate (TOPROL XL) extended release tablet 50 mg, BID        Objective:  BP (!) 82/15   Pulse 97   Temp 96.4 °F (35.8 °C) (Temporal)   Resp 20   Ht 5' 5\" (1.651 m)   Wt 164 lb 3.9 oz (74.5 kg)   SpO2 100%   BMI 27.33 kg/m²     Intake/Output Summary (Last 24 hours) at 11/2/2021 1519  Last data filed at 11/2/2021 0503  Gross per 24 hour   Intake 3516 ml   Output 325 ml   Net 3191 ml      Wt Readings from Last 3 Encounters:   11/02/21 164 lb 3.9 oz (74.5 kg)   10/22/21 156 lb (70.8 kg)   11/16/18 147 lb (66.7 kg)       General appearance:  Appears comfortable  Eyes: Sclera clear. Pupils equal.  ENT: Moist oral mucosa. Trachea midline, no adenopathy. Cardiovascular: Regular rhythm, normal S1, S2. No murmur. No edema in lower extremities  Respiratory: Not using accessory muscles. Good inspiratory effort.  Clear to auscultation bilaterally, no wheeze or crackles. GI: Abdomen soft, no tenderness, not distended, normal bowel sounds  Musculoskeletal: No cyanosis in digits, neck supple  Neurology: CN 2-12 grossly intact. No speech or motor deficits  Psych: Normal affect. Alert and oriented in time, place and person  Skin: Warm, dry, normal turgor    Labs and Tests:  CBC:   Recent Labs     11/01/21  1140 11/01/21  1749 11/02/21  1109   WBC 21.6*  --  21.8*   HGB 6.3* 7.3* 7.2*     --  234     BMP:    Recent Labs     11/01/21  1140 11/02/21  1109   * 129*   K 4.8 5.2*   CL 94* 100   CO2 14* 15*   BUN 79* 84*   CREATININE 3.0* 3.0*   GLUCOSE 173* 110*     Hepatic:   Recent Labs     11/01/21  1140   AST 26   ALT 19   BILITOT 0.7   ALKPHOS 93       Discussed care with family and patient             Spent 30  minutes with patient and family at bedside and on unit reviewing medical records and labs, spent greater than 50% time counseling patient and family on diagnosis and plan   Problem List  Active Problems:    Essential hypertension    ROBYN (obstructive sleep apnea)    GI bleed    Atrial fibrillation (HCC)    Severe sepsis (HCC)    E coli bacteremia    Supratherapeutic INR    Hyperkalemia    Normocytic normochromic anemia    Hyponatremia    Bilateral pleural effusion    Diverticulosis    Anasarca    Overweight (BMI 25.0-29. 9)    High anion gap metabolic acidosis    Elevated brain natriuretic peptide (BNP) level  Resolved Problems:    * No resolved hospital problems. *       Assessment & Plan:   1. Sepsis  -Present on admission with E. coli bacteremia versus UTI  -We will continue Rocephin. -WBC still high. -Multiple risk factors   -ID consulted as well    Lower GI bleed  -Presume this could be from AVM with previous history  -Patient is on anticoagulation which has been held  -INR still elevated.   Patient did receive vitamin K with no further bleeding  -Appreciate GI recommendation  -Once medically optimized    Acute blood loss anemia  -Presenting hemoglobin 6.8 did receive 1 unit of blood patient is stable at 7.2.  = Transfuse if drops below 7    Acute renal failure  -Likely probably secondary to all of the above creatinine still 3 BUN rising up  We will defer IV fluids to nephrology given history of CHF-    A. fib on anticoagulation with recurrent bleed cardiology consulted          Diet: Diet NPO  Code:Prior  DVT PPX lovenox       Cassidy Chin MD   11/2/2021 3:19 PM

## 2021-11-03 ENCOUNTER — APPOINTMENT (OUTPATIENT)
Dept: GENERAL RADIOLOGY | Age: 86
DRG: 871 | End: 2021-11-03
Payer: COMMERCIAL

## 2021-11-03 LAB
ANION GAP SERPL CALCULATED.3IONS-SCNC: 16 MMOL/L (ref 3–16)
BLOOD BANK DISPENSE STATUS: NORMAL
BLOOD BANK PRODUCT CODE: NORMAL
BPU ID: NORMAL
BUN BLDV-MCNC: 78 MG/DL (ref 7–20)
CALCIUM SERPL-MCNC: 7.4 MG/DL (ref 8.3–10.6)
CHLORIDE BLD-SCNC: 97 MMOL/L (ref 99–110)
CO2: 18 MMOL/L (ref 21–32)
CREAT SERPL-MCNC: 3 MG/DL (ref 0.6–1.2)
DESCRIPTION BLOOD BANK: NORMAL
GFR AFRICAN AMERICAN: 18
GFR NON-AFRICAN AMERICAN: 15
GLUCOSE BLD-MCNC: 103 MG/DL (ref 70–99)
HCT VFR BLD CALC: 21.4 % (ref 36–48)
HCT VFR BLD CALC: 23.4 % (ref 36–48)
HCT VFR BLD CALC: 23.5 % (ref 36–48)
HEMOGLOBIN: 6.7 G/DL (ref 12–16)
HEMOGLOBIN: 7.6 G/DL (ref 12–16)
HEMOGLOBIN: 7.6 G/DL (ref 12–16)
INR BLD: 1.65 (ref 0.88–1.12)
MCH RBC QN AUTO: 27.6 PG (ref 26–34)
MCHC RBC AUTO-ENTMCNC: 31.2 G/DL (ref 31–36)
MCV RBC AUTO: 88.5 FL (ref 80–100)
ORGANISM: ABNORMAL
PDW BLD-RTO: 18.9 % (ref 12.4–15.4)
PLATELET # BLD: 239 K/UL (ref 135–450)
PMV BLD AUTO: 8 FL (ref 5–10.5)
POTASSIUM SERPL-SCNC: 4 MMOL/L (ref 3.5–5.1)
PROTHROMBIN TIME: 19 SEC (ref 9.9–12.7)
RBC # BLD: 2.42 M/UL (ref 4–5.2)
SARS-COV-2, NAAT: NOT DETECTED
SODIUM BLD-SCNC: 131 MMOL/L (ref 136–145)
URINE CULTURE, ROUTINE: ABNORMAL
WBC # BLD: 16.9 K/UL (ref 4–11)

## 2021-11-03 PROCEDURE — 6370000000 HC RX 637 (ALT 250 FOR IP): Performed by: HOSPITALIST

## 2021-11-03 PROCEDURE — 6360000002 HC RX W HCPCS: Performed by: INTERNAL MEDICINE

## 2021-11-03 PROCEDURE — 85018 HEMOGLOBIN: CPT

## 2021-11-03 PROCEDURE — 36430 TRANSFUSION BLD/BLD COMPNT: CPT

## 2021-11-03 PROCEDURE — 2700000000 HC OXYGEN THERAPY PER DAY

## 2021-11-03 PROCEDURE — 85014 HEMATOCRIT: CPT

## 2021-11-03 PROCEDURE — 71045 X-RAY EXAM CHEST 1 VIEW: CPT

## 2021-11-03 PROCEDURE — 80048 BASIC METABOLIC PNL TOTAL CA: CPT

## 2021-11-03 PROCEDURE — 2580000003 HC RX 258: Performed by: HOSPITALIST

## 2021-11-03 PROCEDURE — 99233 SBSQ HOSP IP/OBS HIGH 50: CPT | Performed by: INTERNAL MEDICINE

## 2021-11-03 PROCEDURE — 2580000003 HC RX 258: Performed by: INTERNAL MEDICINE

## 2021-11-03 PROCEDURE — 94761 N-INVAS EAR/PLS OXIMETRY MLT: CPT

## 2021-11-03 PROCEDURE — 85610 PROTHROMBIN TIME: CPT

## 2021-11-03 PROCEDURE — 85027 COMPLETE CBC AUTOMATED: CPT

## 2021-11-03 PROCEDURE — 6370000000 HC RX 637 (ALT 250 FOR IP): Performed by: PHYSICIAN ASSISTANT

## 2021-11-03 PROCEDURE — 6360000002 HC RX W HCPCS: Performed by: HOSPITALIST

## 2021-11-03 PROCEDURE — 2500000003 HC RX 250 WO HCPCS: Performed by: INTERNAL MEDICINE

## 2021-11-03 PROCEDURE — 2000000000 HC ICU R&B

## 2021-11-03 PROCEDURE — 36415 COLL VENOUS BLD VENIPUNCTURE: CPT

## 2021-11-03 PROCEDURE — 6370000000 HC RX 637 (ALT 250 FOR IP): Performed by: INTERNAL MEDICINE

## 2021-11-03 PROCEDURE — 87635 SARS-COV-2 COVID-19 AMP PRB: CPT

## 2021-11-03 RX ORDER — PEG-3350, SODIUM SULFATE, SODIUM CHLORIDE, POTASSIUM CHLORIDE, SODIUM ASCORBATE AND ASCORBIC ACID 7.5-2.691G
100 KIT ORAL ONCE
Status: COMPLETED | OUTPATIENT
Start: 2021-11-03 | End: 2021-11-03

## 2021-11-03 RX ORDER — LORAZEPAM 2 MG/ML
0.5 INJECTION INTRAMUSCULAR ONCE
Status: COMPLETED | OUTPATIENT
Start: 2021-11-03 | End: 2021-11-03

## 2021-11-03 RX ORDER — SODIUM CHLORIDE 9 MG/ML
INJECTION, SOLUTION INTRAVENOUS
Status: DISPENSED
Start: 2021-11-03 | End: 2021-11-03

## 2021-11-03 RX ORDER — SODIUM CHLORIDE 9 MG/ML
INJECTION, SOLUTION INTRAVENOUS PRN
Status: COMPLETED | OUTPATIENT
Start: 2021-11-03 | End: 2021-11-03

## 2021-11-03 RX ADMIN — POLYETHYLENE GLYCOL 3350, SODIUM SULFATE, SODIUM CHLORIDE, POTASSIUM CHLORIDE, ASCORBIC ACID, SODIUM ASCORBATE 100 G: KIT at 18:00

## 2021-11-03 RX ADMIN — POLYETHYLENE GLYCOL 3350, SODIUM SULFATE, SODIUM CHLORIDE, POTASSIUM CHLORIDE, ASCORBIC ACID, SODIUM ASCORBATE 100 G: KIT at 20:55

## 2021-11-03 RX ADMIN — PIPERACILLIN AND TAZOBACTAM 3375 MG: 3; .375 INJECTION, POWDER, LYOPHILIZED, FOR SOLUTION INTRAVENOUS at 15:40

## 2021-11-03 RX ADMIN — Medication 10 ML: at 09:49

## 2021-11-03 RX ADMIN — FUROSEMIDE 20 MG: 10 INJECTION, SOLUTION INTRAMUSCULAR; INTRAVENOUS at 17:30

## 2021-11-03 RX ADMIN — LORAZEPAM 0.5 MG: 2 INJECTION INTRAMUSCULAR; INTRAVENOUS at 17:29

## 2021-11-03 RX ADMIN — Medication 2000 UNITS: at 09:48

## 2021-11-03 RX ADMIN — PIPERACILLIN AND TAZOBACTAM 3375 MG: 3; .375 INJECTION, POWDER, LYOPHILIZED, FOR SOLUTION INTRAVENOUS at 01:24

## 2021-11-03 RX ADMIN — SODIUM CHLORIDE: 9 INJECTION, SOLUTION INTRAVENOUS at 11:31

## 2021-11-03 RX ADMIN — FUROSEMIDE 20 MG: 10 INJECTION, SOLUTION INTRAMUSCULAR; INTRAVENOUS at 09:48

## 2021-11-03 RX ADMIN — Medication 10 ML: at 19:46

## 2021-11-03 ASSESSMENT — ENCOUNTER SYMPTOMS
COUGH: 0
ABDOMINAL PAIN: 0
FACIAL SWELLING: 0
NAUSEA: 0
CHOKING: 0
EYE REDNESS: 0
COLOR CHANGE: 0
SHORTNESS OF BREATH: 0
DIARRHEA: 0
RHINORRHEA: 0
BLOOD IN STOOL: 0
CHEST TIGHTNESS: 0
TROUBLE SWALLOWING: 0
APNEA: 0
PHOTOPHOBIA: 0
EYE DISCHARGE: 0
STRIDOR: 0

## 2021-11-03 ASSESSMENT — PAIN SCALES - GENERAL
PAINLEVEL_OUTOF10: 0

## 2021-11-03 NOTE — CONSULTS
[address]   Electrophysiology Consultation   Date: 11/3/2021  Reason for Consultation: Watchman workup   Consult Requesting Physician: Katia Silva MD   Chief Complaint   Patient presents with    Shortness of Breath     arrived via EMS d/t SOB. original call for EMS was for rectal bleeding. None noted via EMS. recent cauterization last week (pointing to abd); hx of quad bypass; BP 90/60; dark purple bruising noted to BUE; stated was on Xarelto        CC: SOB, rectal bleeding  HPI: Ranjith  is a 82 y.o. female past medical history significant for CAD s/p CABG and PCI, dyslipidemia and atrial fibrillation. She was hospitalized at [facility] on June 16, 2021 with atrial fibrillation. Cardiac cath was done and no intervention was performed and showed patent grafts to LAD and circumflex and patent stent to RCA. At the time she was loaded with amiodarone and cardioverted to sinus rhythm. She was also treated with anticoagulation. She has now presented to the hospital with rectal bleeding and lower GI bleed. She has had recurrent GI bleeding on Xarelto on Eliquis. She has AVMs. She has had transfusion for her GI bleeds. EP has been consulted for evaluation for watchman device. Assessment and plan:   - Persistent atrial fibrillation:    Patient has high [id number] score and requires anticoagulation to prevent thromboembolic events. Unfortunately patient has had GI bleeding and is at high risk of bleeding and not a good candidate for long term anticoagulation therapy. Explained SIMRAN closure with patient his family members at bedside. Risks, benefits and alternative of procedure were explained. All questions answered. Patient is following with Dr. Nereyda Nails at [place] and would like to follow up with his cardiologist for watchman evaluation and workup. - CAD s/p CAD and PCI   Recent cath at [facility]    Stable.     Seen by IC for elevated troponin which is likely related to demand ischemia    - Multiple GI bleeding:    Off anticoagulation. Colonoscopy pending. No further recommendation. Please call if there are questions. Will sign off. Discussed with nursing staff. Active Hospital Problems    Diagnosis Date Noted    Atrial fibrillation [facility]) [I48.91] 11/02/2021    Severe sepsis ([address] [A41.9, R65.20]     E coli bacteremia [R78.81, B96.20]     Supratherapeutic INR [R79.1]     Hyperkalemia [E87.5]     Normocytic normochromic anemia [D64.9]     Hyponatremia [E87.1]     Bilateral pleural effusion [J90]     Diverticulosis [K57.90]     Anasarca [R60.1]     Overweight (BMI 25.0-29. 9) [E66.3]     High anion gap metabolic acidosis [D24.9]     Elevated brain natriuretic peptide (BNP) level [R79.89]     GI bleed [K92.2] 11/01/2021    ROBYN (obstructive sleep apnea) [G47.33]     Essential hypertension [I10] 05/31/2012       Diagnostic studies:     RICK 7/22/2021 at [facility]  Left Ventricle: Overall left ventricular ejection fraction is estimated to be   40-45%. There is normal left ventricular wall thickness. Right Ventricle: The right ventricle is normal in size and function. The right   ventricular systolic function is normal.     Left Atrium: The left atrial size is normal. No left atrial clot detected. Right Atrium: Right atrial size is normal.       Mitral Valve: The mitral valve leaflets appear normal. There is no evidence of   mitral valve prolapse. There is mild to moderate mitral regurgitation. Aortic Valve: The aortic valve is normal in structure and function. No aortic   regurgitation is present. Aortic Root: The aortic root is normal size. Tricuspid Valve: The tricuspid valve is normal in structure and function. Trace tricuspid regurgitation is present. Pulmonary valve: The pulmonic valve is normal in structure. There is trace   pulmonic valvular regurgitation. Pericardium: There is no pericardial effusion.       Cardiac catheterization on 2021  Dominance: right dominant     LM:  Distal: 40%        LAD:  Proximal: 100%        LCX:  Proximal: 100%, left-to-left and right-to-left collaterals        RCA:  Mid: patent stent          LV: EF: 40%      Wall motion: Mild global hypokinesis      LVEDP: 15 mmHg (elevated)    Aortic valve pullback gradient: no     Graft angiography: yes    LIMA to LAD patent    SVG to DG/OM patent to DG    SVG to RCA not injected, know to be occluded        I independently reviewed the cardiac diagnostic studies, ECG and relevant imaging studies. No results found for: LVEF, LVEFMODE  Lab Results   Component Value Date    TSH 2.54 2010       Physical Examination:  Vitals:    21 1207   BP: 122/73   Pulse: 113   Resp: 23   Temp: 96.9 °F (36.1 °C)   SpO2: 100%      In: 2419.3 [P.O.:240; I.V.:2026.3]  Out: 1175    Wt Readings from Last 3 Encounters:   21 167 lb 8.8 oz (76 kg)   10/22/21 156 lb (70.8 kg)   18 147 lb (66.7 kg)     Temp  Av.1 °F (36.2 °C)  Min: 96.6 °F (35.9 °C)  Max: 97.5 °F (36.4 °C)  Pulse  Av.4  Min: 93  Max: 125  BP  Min: 82/15  Max: 142/70  SpO2  Av.9 %  Min: 92 %  Max: 100 %    Intake/Output Summary (Last 24 hours) at 11/3/2021 1316  Last data filed at 11/3/2021 1243  Gross per 24 hour   Intake 2419.25 ml   Output 1600 ml   Net 819.25 ml         I independently reviewed all cardiac tracing from cardiac telemetry. · Constitutional: Oriented. Appears ill   · Head: Normocephalic and atraumatic. · Mouth/Throat: Oropharynx is clear and moist.   · Eyes: Conjunctivae normal. EOM are normal.   · Neck: Neck supple. No JVD present. · Cardiovascular: Mild tachycardia Irregular rhythm, S1&S2. · Pulmonary/Chest: Bilateral respiratory sounds. No rhonchi. · Abdominal: Soft. No tenderness. · Musculoskeletal: No tenderness. No edema    · Lymphadenopathy: Has no cervical adenopathy. · Neurological: Alert and oriented.  Follows command, No Gross deficit   · Skin: Skin is warm, ++ bruises   · Psychiatric: Has a normal behavior       Scheduled Meds:   PEG-KCl-NaCl-NaSulf-Na Asc-C  100 g Oral Once    PEG-KCl-NaCl-NaSulf-Na Asc-C  100 g Oral Once    furosemide  20 mg IntraVENous BID    sodium chloride flush  5-40 mL IntraVENous 2 times per day    piperacillin-tazobactam  3,375 mg IntraVENous Q12H    Vitamin D  2 tablet Oral Daily    metoprolol succinate  50 mg Oral BID     Continuous Infusions:   sodium chloride      sodium bicarbonate infusion 75 mL/hr at 11/03/21 0620    sodium chloride 5 mL/hr at 11/02/21 1717     PRN Meds:.sodium chloride flush, sodium chloride     Review of System:  [x] Full ROS obtained and negative except as mentioned in HPI    Prior to Admission medications    Medication Sig Start Date End Date Taking?  Authorizing Provider   ferrous sulfate (IRON 325) 325 (65 Fe) MG tablet Take 1 tablet by mouth 2 times daily (with meals) 10/22/21   NAKUL Francois CNP   docusate sodium (COLACE) 100 MG capsule Take 1 capsule by mouth daily 10/22/21   NAKUL Francois CNP   levothyroxine (SYNTHROID) 112 MCG tablet Take 1 tablet by mouth every morning (before breakfast) 10/23/21   NAKUL Francois CNP   apixaban (ELIQUIS) 2.5 MG TABS tablet Take 1 tablet by mouth 2 times daily 10/22/21   NAKUL Francois CNP   aspirin EC 81 MG EC tablet Take 1 tablet by mouth daily 10/22/21   NAKUL Francois CNP   atorvastatin (LIPITOR) 40 MG tablet Take 40 mg by mouth nightly  10/22/21   NAKUL Francois CNP   amiodarone (PACERONE) 400 MG tablet Take 1 tablet by mouth 2 times daily  Patient taking differently: Take 200 mg by mouth daily  10/22/21   NAKUL Farah CNP   metoprolol succinate (TOPROL XL) 50 MG extended release tablet Take 1 tablet by mouth 3 times daily  Patient taking differently: Take 50 mg by mouth 2 times daily  10/22/21   NAKUL Farah CNP   furosemide (LASIX) 40 MG tablet Take 1 tablet by mouth daily Take 1 tab daily until weight back to 152lbs. Then can take as needed thereafter. 10/23/21   Juli Whitehead APRN - CNP   Cholecalciferol (VITAMIN D) 50 MCG (2000 UT) CAPS capsule Take 1 capsule by mouth daily    Historical Provider, MD   Multiple Vitamins-Minerals (THERAPEUTIC MULTIVITAMIN-MINERALS) tablet Take 1 tablet by mouth daily    Historical Provider, MD   QUEtiapine (SEROQUEL) 50 MG tablet Take 50 mg by mouth nightly     Historical Provider, MD   prednisoLONE sodium phosphate (INFLAMASE FORTE) 1 % ophthalmic solution Place 1 drop into the right eye daily     Historical Provider, MD       Past Medical History:   Diagnosis Date    Atrial fibrillation ([facility] [address]     CAD (coronary artery disease)     Depression     Hyperlipidemia     Hypertension     Hypothyroid     Legal blindness     Obstructive sleep apnea (adult) (pediatric)         Past Surgical History:   Procedure Laterality Date    ANGIOPLASTY      COLONOSCOPY  11/16/2018    COLONOSCOPY POLYPECTOMY SNARE/COLD BIOPSY performed by Luis Ivory MD at [facility] N/A 10/20/2021    COLONOSCOPY POLYPECTOMY ABLATION performed by Tobi Glass MD at [address] [place] [address] Right     CATARACT EXTRACTION W/ IOL    EYE SURGERY Left     CATARCT EXTRACTION W/ IOL    [place] SIGMOIDOSCOPY FLX DX W/COLLJ SPEC BR/WA IF PFRMD N/A 10/19/2018    SIGMOIDOSCOPY DIAGNOSTIC FLEXIBLE performed by Luis Ivory MD at [facility]  10/19/2018    flexible    TONSILLECTOMY AND ADENOIDECTOMY      TUBAL LIGATION      UPPER GASTROINTESTINAL ENDOSCOPY N/A 10/20/2021    EGD BIOPSY performed by Tobi Glass MD at [place] [address] [Oxycodone-Acetaminophen] Other (See Comments)     Pt states it makes her crazy. Social History:  Reviewed. reports that she has never smoked.  She has never used

## 2021-11-03 NOTE — CARE COORDINATION
Patient seen for wound consult. There are no wounds to sacrum or buttocks. Patient does have skin tear to right posterior leg. Patient reports it happened at home. Will place wound care order set. JOAQUIN Higgins, RN  [facility]     11/03/21 1726   Wound 11/01/21 Right;Posterior; Lower skin tear right posterior lower leg   Date First Assessed/Time First Assessed: 11/01/21 1800   Present on Hospital Admission: Yes  Primary Wound Type: Skin Tear  Wound Location Orientation: Right;Posterior; Lower  Wound Description (Comments): skin tear right posterior lower leg   Wound Image    Wound Etiology Skin Tear   Dressing Status New dressing applied   Dressing/Treatment Foam   Dressing Change Due 11/05/21   Wound Length (cm) 4 cm   Wound Width (cm) 0.5 cm   Wound Surface Area (cm^2) 2 cm^2   Wound Assessment Pink/red   Drainage Amount None   Odor None   Scarlett-wound Assessment Ecchymosis

## 2021-11-03 NOTE — PROGRESS NOTES
Gastroenterology Progress Note    Ranjith Jones is a 82 y.o. female patient. Active Problems:    Essential hypertension    ROBYN (obstructive sleep apnea)    GI bleed    Atrial fibrillation (HCC)    Severe sepsis (HCC)    E coli bacteremia    Supratherapeutic INR    Hyperkalemia    Normocytic normochromic anemia    Hyponatremia    Bilateral pleural effusion    Diverticulosis    Anasarca    Overweight (BMI 25.0-29. 9)    High anion gap metabolic acidosis    Elevated brain natriuretic peptide (BNP) level  Resolved Problems:    * No resolved hospital problems. *      SUBJECTIVE: Feels a little better. No abdominal pain. Had a dark/black BM this am per RN. ROS:  No fever, chills  No chest pain, palpitations  No SOB, cough  Gastrointestinal ROS: see above    Physical    VITALS:  BP (!) 129/97   Pulse 112   Temp 96.6 °F (35.9 °C) (Oral)   Resp 18   Ht 5' 5\" (1.651 m)   Wt 167 lb 8.8 oz (76 kg)   SpO2 96%   BMI 27.88 kg/m²   TEMPERATURE:  Current - Temp: 96.6 °F (35.9 °C); Max - Temp  Av.9 °F (36.1 °C)  Min: 96.4 °F (35.8 °C)  Max: 97.5 °F (36.4 °C)    NAD  tachycardia  Lungs CTA Bilaterally  Abdomen soft, ND, NT,  Bowel sounds normal.    Data    Data Review:    Recent Labs     21  1140 21  1749 21  1109 21  2335 21  0748   WBC 21.6*  --  21.8*  --  16.9*   HGB 6.3*   < > 7.2* 7.7* 6.7*   HCT 20.3*   < > 22.8* 24.7* 21.4*   MCV 85.9  --  87.5  --  88.5     --  234  --  239    < > = values in this interval not displayed. Recent Labs     21  1140 21  1109 21  0748   * 129* 131*   K 4.8 5.2* 4.0   CL 94* 100 97*   CO2 14* 15* 18*   BUN 79* 84* 78*   CREATININE 3.0* 3.0* 3.0*     Recent Labs     21  1140   AST 26   ALT 19   BILITOT 0.7   ALKPHOS 93     No results for input(s): LIPASE, AMYLASE in the last 72 hours.   Recent Labs     21  1140 21  1425 21  0747   PROTIME 51.1* 24.4* 19.0*   INR 4.26* 2.09* 1.65* No results for input(s): PTT in the last 72 hours. ASSESSMENT :    Lower GI hemorrhage - pt presented with red blood stools. her INR is elevated likely from being ill in addition to Eliquis. The cecal lesion seen 2 weeks ago may be responsible vs other angioectasias. She is anemic along with elevated INR requiring resuscitation with PRBC's and Vit K to start. Had a dark/black BM today - could be old blood.     Acute Blood Loss Anemia - Hb 6.3 on admission, down from 8.8 on discharge last month. Hemoglobin incremented appropriately with 1 unit PRBC to 7. 2. hgb down to 6.7 today. UTI, E.coli bacteremia    JULIO    Elevated INR - down to 1.65 today. PLAN :  - monitor hgb  - will monitor one more day and can plan bowel prep Thursday for colonoscopy Friday. Or would plan colonoscopy sooner if signs of active GI bleeding. Discussed with Dr. Cas Mejias, [address]  I have personally performed a face to face diagnostic evaluation on this patient. I have interviewed and examined the patient and I agree with the findings and recommended plan of care. In summary, my findings and plan are the following: As above, no further bleeding, but Hb has trended down and now needing additional PRBC's, and having black stool likely passing old blood. Abd is soft and NT. She does feel better. Will plan colonoscopy in the AM if able to drink prep today.     Melani Gerber, [address] [place] [address]  11/3/2021

## 2021-11-03 NOTE — PROGRESS NOTES
Pt is anxious about taking bowel prep. Pt asked for anxiety medication. Wants to try to take it orally or through NG tube if oral is not possible.   Ativan ordered and given (see mar)

## 2021-11-03 NOTE — PROGRESS NOTES
Rawleigh Berlin, MD Shauna Jeans, MD Kenny Cooler, MD                                  Office: [phone]                 Fax: [phone]          [facility]                     NEPHROLOGY    ICU     IN PATIENT PROGRESS NOTE:     PATIENT NAME: Kelley Freedman  : 1935  MRN: [id number]            Subjective:     Patient is weak and lethargic. More awake and alert  Remains hypotensive  Christiansen catheter. Slowly improving urine output. Medications reviewed. Infusions reviewed. Received 1 unit of blood transfusion        Assessment and Plan    Assessment:     Acute renal failuresevereslow improvement with decreased urine output. Hypotension. Refractory  Hypovolemia. Improving  Anemia of acute blood loss. Severe. Worseningrepeat hemoglobin 6.7  Worsening metabolic acidosis. Critical low albumin of 2.2. Critical anemia 7.3 on admission. E. coli sepsis WBC count 21,000        Plan:       Acute renal failuresevere on presentationwith slow improvement of urine output. Liz Millet is 80 and a creatinine of 3.0 on admission. Renal functions are unchanged and creatinine remains critically elevated at 3.0. Improving urine output. No immediate indications for dialysis today. Etiology most likely related to ATNpatient had severe hypotension and hypovolemia on admission. Other risk factor for ATN include E. coli sepsis  Patient needs stat labs today. Continue to monitor urine output. Continue low-dose Lasix 20 mg IV twice daily. Hypovolemiahas improved. Appears to have mild fluid overload. Metabolic acidosisimproving. On bicarbonate infusion. Continue for 1 more day. Electrolytes needs close monitoring. Medications reviewed. All nephrotoxic medications have been discontinued. Hold Ace inhibitors till renal recovery is complete. Antibiotic dose reviewed and appropriate for level of renal function. No immediate indications for dialysis at this time.   Very high risk for possible requiring hemodialysis treatment in the next 24 to 48 hours. Patient remains critically ill. Discussed with RN. Discussed with treatment team.    Electrolytes reviewed. Continue IV Bicarbonate for 24 Hrs. Magnesium levels reviewedand are stable. Medications reviewed and appropriate for level of kidney function. All Nephrotoxic medications have been discontinued. Antibiotic doses are appropriate for level of renal function. Hold ACE inhibitors till renal functions improve. Anemia levels have worsened and continue with Iron and Aranesp as per protocol. High risk for worsening renal failure and need for dialysis if no improvement in the next 24-48 hrs. Deconditioned and  Nutritional status needs Improvement. Discussed with patient., Discussed with treating team. and  Discussed with RN   Multiple complex problems. , Patient at high risk-time spent 35 mins. and Patient is critically ill. and  Time spent 35 mins. EXAM  Vitals:    11/03/21 0839   BP: (!) 129/97   Pulse: 112   Resp: 18   Temp: 96.6 °F (35.9 °C)   SpO2: 96%       Intake/Output Summary (Last 24 hours) at 11/3/2021 1055  Last data filed at 11/3/2021 0437  Gross per 24 hour   Intake 2179.25 ml   Output 1200 ml   Net 979.25 ml       Lethargic. Hypotensive. Less shortness of breath  External exam of the ears and nose are normal  HENT: exam is normal  Eyes: Pupils are equal, round, and reactive to light. Lymph Nodes. No axillary or cervical lymph nodes are palpable. Neck. JVD not visible. No lymph nodes palpable. CVS.  Heart sounds are normal.Palpation of the heart is normal. No murmurs. No pericardial rub.  RS.dullness on percussion of the lower chest wall. Bilateral Basal rales. PA soft , bowel sounds are normal no distension and no tenderness to palpation. Skin No rash , No palpable nodules  Musculoskeletal: Normal range of motion. 1+ edema and no tenderness.    CNS  No focal    Edematrace Lethargic. All pulses are well felt      Active Problems:    Essential hypertension    ROBYN (obstructive sleep apnea)    GI bleed    Atrial fibrillation (HCC)    Severe sepsis (HCC)    E coli bacteremia    Supratherapeutic INR    Hyperkalemia    Normocytic normochromic anemia    Hyponatremia    Bilateral pleural effusion    Diverticulosis    Anasarca    Overweight (BMI 25.0-29. 9)    High anion gap metabolic acidosis    Elevated brain natriuretic peptide (BNP) level  Resolved Problems:    * No resolved hospital problems. *        Medications Reviewed by me   furosemide  20 mg IntraVENous BID    sodium chloride flush  5-40 mL IntraVENous 2 times per day    piperacillin-tazobactam  3,375 mg IntraVENous Q12H    Vitamin D  2 tablet Oral Daily    metoprolol succinate  50 mg Oral BID      sodium chloride      sodium bicarbonate infusion 75 mL/hr at 11/03/21 0620    sodium chloride 5 mL/hr at 11/02/21 1717       Data Review. Labs reviewed by me       CBC:   Recent Labs     11/01/21  1140 11/01/21  1749 11/02/21  1109 11/02/21  2335 11/03/21  0748   WBC 21.6*  --  21.8*  --  16.9*   HGB 6.3*   < > 7.2* 7.7* 6.7*   HCT 20.3*   < > 22.8* 24.7* 21.4*   MCV 85.9  --  87.5  --  88.5     --  234  --  239    < > = values in this interval not displayed. BMP:   Recent Labs     11/01/21  1140 11/02/21  1109 11/03/21  0748   * 129* 131*   K 4.8 5.2* 4.0   CL 94* 100 97*   CO2 14* 15* 18*   BUN 79* 84* 78*   CREATININE 3.0* 3.0* 3.0*     Magnesium:   Lab Results   Component Value Date    MG 2.30 11/01/2021     Lab Results   Component Value Date    CREATININE 3.0 11/03/2021       Arterial Blood Gasses  No results for input(s): PH, PCO2, PO2 in the last 72 hours.     Invalid input(s): [id number], INSPIREDO2    UA:  Recent Labs     11/01/21  1220   COLORU YELLOW   PHUR 6.0   WBCUA >900*   RBCUA 5-10*   BACTERIA 4+*   CLARITYU TURBID*   SPECGRAV 1.016   LEUKOCYTESUR LARGE*   UROBILINOGEN 0.2   BILIRUBINUR SMALL* BLOODU MODERATE*   GLUCOSEU Negative       LIVER PROFILE:   Recent Labs     11/01/21  1140   AST 26   ALT 19   BILITOT 0.7   ALKPHOS 93     PT/INR:    Lab Results   Component Value Date    PROTIME 19.0 11/03/2021    PROTIME 24.4 11/02/2021    PROTIME 51.1 11/01/2021    INR 1.65 11/03/2021    INR 2.09 11/02/2021    INR 4.26 11/01/2021     PTT:    Lab Results   Component Value Date    APTT 32.1 11/01/2021     CANDE:  No results found for: ANATITER, CANDE  CHEMISTRY COMMON GROUP :   Lab Results   Component Value Date    GLUCOSE 103 11/03/2021    TSH 2.54 06/03/2010     Recent Labs     11/01/21  1140 11/02/21  1109 11/03/21  0748   GLUCOSE 173* 110* 103*   CALCIUM 8.4 8.0* 7.4*         RADIOLOGY:        Imaging Results. Chest X Ray reviwed by me    Chest Xray Reviewed by me  Renal Ultrasound Reviewed by me    EKG reviewed by me.                 Electronically Signed: Jeremy Velazquez MD 11/3/2021 10:55 AM

## 2021-11-03 NOTE — PROGRESS NOTES
Post transfusion: 1 hr  1 of 1 units of PRBC transfused VSS. No adverse reactions. Pt educated on and instructed to report any adverse reactions immediately.  Will monitor

## 2021-11-03 NOTE — PROGRESS NOTES
[facility] PROGRESS NOTE    11/3/2021 1:37 PM        Name: Stanislav Chung . Admitted: 11/1/2021  Primary Care Provider: Freddie Mena (Tel: [phone])                        Subjective:  . No acute events overnight. Resting well. Pain control. Diet ok. Labs reviewed  Denies any chest pain sob. Reviewed interval ancillary notes    Current Medications  sodium chloride 0.9 % infusion,   PEG-KCl-NaCl-NaSulf-Na Asc-C (MOVIPREP) solution 100 g, Once  PEG-KCl-NaCl-NaSulf-Na Asc-C (MOVIPREP) solution 100 g, Once  furosemide (LASIX) injection 20 mg, BID  sodium bicarbonate 100 mEq in sodium chloride 0.45 % 1,000 mL infusion, Continuous  sodium chloride flush 0.9 % injection 5-40 mL, 2 times per day  sodium chloride flush 0.9 % injection 5-40 mL, PRN  0.9 % sodium chloride infusion, PRN  piperacillin-tazobactam (ZOSYN) 3,375 mg in dextrose 5 % 50 mL IVPB extended infusion (mini-bag), Q12H  Vitamin D (CHOLECALCIFEROL) tablet 2,000 Units, Daily  metoprolol succinate (TOPROL XL) extended release tablet 50 mg, BID        Objective:  /73   Pulse 113   Temp 96.9 °F (36.1 °C) (Temporal)   Resp 23   Ht 5' 5\" (1.651 m)   Wt 167 lb 8.8 oz (76 kg)   SpO2 100%   BMI 27.88 kg/m²     Intake/Output Summary (Last 24 hours) at 11/3/2021 1337  Last data filed at 11/3/2021 1243  Gross per 24 hour   Intake 2419.25 ml   Output 1600 ml   Net 819.25 ml      Wt Readings from Last 3 Encounters:   11/03/21 167 lb 8.8 oz (76 kg)   10/22/21 156 lb (70.8 kg)   11/16/18 147 lb (66.7 kg)       General appearance:  Appears comfortable  Eyes: Sclera clear. Pupils equal.  ENT: Moist oral mucosa. Trachea midline, no adenopathy. Cardiovascular: Regular rhythm, normal S1, S2. No murmur. No edema in lower extremities  Respiratory: Not using accessory muscles. Good inspiratory effort.  Clear to auscultation bilaterally, no wheeze or crackles. GI: Abdomen soft, no tenderness, not distended, normal bowel sounds  Musculoskeletal: No cyanosis in digits, neck supple  Neurology: CN 2-12 grossly intact. No speech or motor deficits  Psych: Normal affect. Alert and oriented in time, place and person  Skin: Warm, dry, normal turgor    Labs and Tests:  CBC:   Recent Labs     11/01/21  1140 11/01/21  1749 11/02/21  1109 11/02/21  2335 11/03/21  0748   WBC 21.6*  --  21.8*  --  16.9*   HGB 6.3*   < > 7.2* 7.7* 6.7*     --  234  --  239    < > = values in this interval not displayed. BMP:    Recent Labs     11/01/21  1140 11/02/21  1109 11/03/21  0748   * 129* 131*   K 4.8 5.2* 4.0   CL 94* 100 97*   CO2 14* 15* 18*   BUN 79* 84* 78*   CREATININE 3.0* 3.0* 3.0*   GLUCOSE 173* 110* 103*     Hepatic:   Recent Labs     11/01/21  1140   AST 26   ALT 19   BILITOT 0.7   ALKPHOS 80       Discussed care with family and patient             Spent 30  minutes with patient and family at bedside and on unit reviewing medical records and labs, spent greater than 50% time counseling patient and family on diagnosis and plan   Problem List  Active Problems:    Essential hypertension    ROBYN (obstructive sleep apnea)    GI bleed    Atrial fibrillation (HCC)    Severe sepsis (HCC)    E coli bacteremia    Supratherapeutic INR    Hyperkalemia    Normocytic normochromic anemia    Hyponatremia    Bilateral pleural effusion    Diverticulosis    Anasarca    Overweight (BMI 25.0-29. 9)    High anion gap metabolic acidosis    Elevated brain natriuretic peptide (BNP) level  Resolved Problems:    * No resolved hospital problems. *       Assessment & Plan:   1. Sepsis  -Present on admission with E. coli bacteremia versus UTI  -We will continue Rocephin. -WBC still high.   -Multiple risk factors   -ID consulted as well    Lower GI bleed  -Presume this could be from AVM with previous history  -Patient is on anticoagulation which has

## 2021-11-03 NOTE — CARE COORDINATION
CM met with pt and Son Yamila Waggoner regarding dc planning. Family feels pt would benefit from SN on dc and would like for pt to go to [facility] when Medically ready. CM explained to pt/family the process for dc planning and that the first step will be getting the PT/OT evaluations once pt is medically stable. Family verbalized understanding. CM will reach out to [facility] to have them follow pt until ready for dc. Pre-cert will be needed. Electronically signed by Kim Nieves RN on 11/3/2021 at 2:10 PM      CM spoke to [place] at [facility] and they will follow care. Face sheet faxed to [facility].     Electronically signed by Kim Nieves RN on 11/3/2021 at 2:53 PM

## 2021-11-03 NOTE — PROGRESS NOTES
1 of 1units of PRBC transfusing VSS. No adverse reactions. Pt educated on and instructed to report any adverse reactions immediately.  Will monitor

## 2021-11-03 NOTE — PROGRESS NOTES
Infectious Diseases   Progress Note      Admission Date: 11/1/2021  Hospital Day: Hospital Day: 3   Attending: En Orellana MD  Date of service: 11/3/2021     Chief complaint/ Reason for consult:     · Severe sepsis on admission with leukocytosis, hypotension, tachycardia, acute kidney injury  · Gram-negative bacteremia with E. Coli  · High anion gap metabolic acidosis  · Elevated proBNP  · Mild hyperkalemia  · Hyponatremia  · Normocytic normochromic anemia  · Bilateral pleural effusions    Microbiology:        I have reviewed allavailable micro lab data and cultures    · Blood culture (2/2) - collected on 11/1/2021: E coli  · Urine culture  - collected on 11/1/2021: > 100,000 cfu/ml of E coli    Susceptibility    Escherichia coli (1)    Antibiotic Interpretation ERIK Status    ampicillin Resistant >=32 mcg/mL     ceFAZolin Sensitive <=4 mcg/mL      NOTE: Cefazolin should only be used for uncomplicated UTI         for E.coli or Klebsiella pneumoniae. cefepime Sensitive <=0.12 mcg/mL     cefTRIAXone Sensitive <=0.25 mcg/mL     ciprofloxacin Sensitive <=0.25 mcg/mL     ertapenem Sensitive <=0.12 mcg/mL     gentamicin Sensitive <=1 mcg/mL     levofloxacin Sensitive 1 mcg/mL     nitrofurantoin Sensitive <=16 mcg/mL     piperacillin-tazobactam Sensitive <=4 mcg/mL     trimethoprim-sulfamethoxazole Resistant >=320 mcg/mL         Antibiotics and immunizations:       Current antibiotics: All antibiotics and their doses were reviewed by me    Recent Abx Admin                   piperacillin-tazobactam (ZOSYN) 3,375 mg in dextrose 5 % 50 mL IVPB extended infusion (mini-bag) (mg) 3,375 mg New Bag 11/03/21 0124     3,375 mg New Bag 11/02/21 1723    cefTRIAXone (ROCEPHIN) 2000 mg IVPB in D5W 50ml minibag ()  Restarted 11/02/21 1300     2,000 mg New Bag  1254                  Immunization History: All immunization history was reviewed by me today.     Immunization History   Administered Date(s) Administered    Influenza, admission. Blood cultures and urine cultures from admission were positive for E. coli. Serum creatinine is 3.0. White cell count is 16,900. An EKG from admission showed QTc interval of 533 ms. Hence, she would not be a good candidate for fluoroquinolones. Plan:     Diagnostic Workup:      · Continue to follow  fever curve, WBC count and blood cultures. · Continue to monitor blood counts, liver and renal function. · Follow-up on repeat blood culture from yesterday    Antimicrobials:    · Will continue empiric IV Zosyn for now. White cell count seems to be responding better to IV Zosyn  · Continue to monitor her vitals closely  · Will avoid fluoroquinolones due to her prolonged QT interval  · We will follow up on the culture results and clinical progress and will make further recommendations accordingly. · Monitor urine output closely  · Continue close vitals monitoring. · Maintain good glycemic control. · Fall precautions. Aspiration precautions. · Continue to watch for new fever or diarrhea. · DVT prophylaxis. · Discussed all above with patient and RN. Drug Monitoring:    · Continue monitoring for antibiotic toxicity as follows: CBC, CMP   · Continue to watch for following: new or worsening fever, new hypotension, hives, lip swelling and redness or purulence at vascular access sites. I/v access Management:    · Continue to monitor i.v access sites for erythema, induration, discharge or tenderness. · As always, continue efforts to minimize tubes/lines/drains as clinically appropriate to reduce chances of line associated infections. Patient education and counseling:        · The patient was educated in detail about the side-effects of various antibiotics and things to watch for like new rashes, lip swelling, severe reaction, worsening diarrhea, break through fever etc.  · Discussed patient's condition and what to expect.  All of the patient's questions were addressed in a satisfactory manner and patient verbalized understanding all instructions. Level of complexity of visit: High     Risk of Complications/Morbidity: High     · Illness(es)/ Infection present that pose threat to life/bodily function. · There is potential for severe exacerbation of infection/side effects of treatment. · Therapy requires intensive monitoring for antimicrobial agent toxicity. TIME SPENT TODAY:     - Spent over 36  minutes on visit (including interval history, physical exam, review of data including labs, cultures, imaging, development and implementation of treatment plan and coordination of complex care). More than 50 percent of this includes face-to-face time spent with the patient for counseling and coordination of care. Thank you for involving me in the care of your patient. I will continue to follow. If you have anyadditional questions, please do not hesitate to contact me. Subjective: Interval history: Interval history was obtained from chart review and patient/ RN. The patient is awake and responsive but slightly encephalopathic. She seems to be tolerating antibiotics okay. No diarrhea. REVIEW OF SYSTEMS:      Review of Systems   Constitutional: Positive for fatigue. Negative for chills, diaphoresis and unexpected weight change. HENT: Negative for congestion, ear discharge, ear pain, facial swelling, hearing loss, rhinorrhea and trouble swallowing. Eyes: Negative for photophobia, discharge, redness and visual disturbance. Respiratory: Negative for apnea, cough, choking, chest tightness, shortness of breath and stridor. Cardiovascular: Negative for chest pain and palpitations. Gastrointestinal: Negative for abdominal pain, blood in stool, diarrhea and nausea. Endocrine: Negative for polydipsia, polyphagia and polyuria. Genitourinary: Negative for difficulty urinating, dysuria, frequency, hematuria, menstrual problem and vaginal discharge. Musculoskeletal: Negative for arthralgias, joint swelling, myalgias and neck stiffness. Skin: Negative for color change and rash. Allergic/Immunologic: Negative for immunocompromised state. Neurological: Negative for dizziness, seizures, speech difficulty, light-headedness and headaches. Hematological: Negative for adenopathy. Psychiatric/Behavioral: Negative for agitation, hallucinations and suicidal ideas. Past Medical History: All past medical history reviewed today. Past Medical History:   Diagnosis Date    Atrial fibrillation ([facility] [address]     CAD (coronary artery disease)     Depression     Hyperlipidemia     Hypertension     Hypothyroid     Legal blindness     Obstructive sleep apnea (adult) (pediatric)        Past Surgical History: All past surgical history was reviewed today. Past Surgical History:   Procedure Laterality Date    ANGIOPLASTY      COLONOSCOPY  11/16/2018    COLONOSCOPY POLYPECTOMY SNARE/COLD BIOPSY performed by Debi Ivan MD at [address] 10/20/2021    COLONOSCOPY POLYPECTOMY ABLATION performed by Pool Brar MD at [facility] Right     CATARACT EXTRACTION W/ IOL    EYE SURGERY Left     CATARCT EXTRACTION W/ IOL    OH SIGMOIDOSCOPY FLX DX W/COLLJ SPEC BR/WA IF PFRMD N/A 10/19/2018    SIGMOIDOSCOPY DIAGNOSTIC FLEXIBLE performed by Debi Ivan MD at [facility]  10/19/2018    flexible    TONSILLECTOMY AND ADENOIDECTOMY      TUBAL LIGATION      UPPER GASTROINTESTINAL ENDOSCOPY N/A 10/20/2021    EGD BIOPSY performed by Pool Brar MD at [address] ENDOSCOPY       Family History: All family history was reviewed today.         Problem Relation Age of Onset    Heart Disease Mother     Heart Disease Father     Asthma Neg Hx     Cancer Neg Hx     Diabetes Neg Hx     Emphysema Neg Hx     Hypertension Neg Hx     Heart Failure Neg Hx        Objective: PHYSICAL EXAM:      Vitals:   Vitals:    11/03/21 0839 11/03/21 1132 11/03/21 1147 11/03/21 1207   BP: (!) 129/97 132/72 (!) 142/70 122/73   Pulse: 112 125 118 113   Resp: 18 20 22 23   Temp: 96.6 °F (35.9 °C) 97.5 °F (36.4 °C) 97.2 °F (36.2 °C) 96.9 °F (36.1 °C)   TempSrc: Oral Temporal Temporal Temporal   SpO2: 96% 99% 92% 100%   Weight:       Height:           Physical Exam  Vitals and nursing note reviewed. Constitutional:       General: She is not in acute distress. Appearance: She is well-developed. She is not diaphoretic. HENT:      Head: Normocephalic. Right Ear: External ear normal.      Left Ear: External ear normal.      Nose: Nose normal.   Eyes:      General: No scleral icterus. Right eye: No discharge. Left eye: No discharge. Conjunctiva/sclera: Conjunctivae normal.      Pupils: Pupils are equal, round, and reactive to light. Cardiovascular:      Rate and Rhythm: Normal rate and regular rhythm. Heart sounds: No murmur heard. No friction rub. Pulmonary:      Effort: Pulmonary effort is normal.      Breath sounds: No stridor. No wheezing or rales. Chest:      Chest wall: No tenderness. Abdominal:      Palpations: Abdomen is soft. There is no mass. Tenderness: There is no abdominal tenderness. There is no guarding or rebound. Musculoskeletal:         General: No tenderness. Cervical back: Normal range of motion and neck supple. Lymphadenopathy:      Cervical: No cervical adenopathy. Skin:     General: Skin is warm and dry. Findings: No erythema or rash. Neurological:      Mental Status: She is alert. Motor: No abnormal muscle tone. Comments: Slightly encephalopathic   Psychiatric:         Judgment: Judgment normal.            Lines and drains: All vascular access sites are healthy with no local erythema, discharge or tenderness. Intake and output:    I/O last 3 completed shifts: In: 2179.3 [I.V.:2026.3;  IV MRRXEMAUW:634]  Out: 1200 [Urine:1200]    Lab Data:   All available labs and old records have been reviewed by me. CBC:  Recent Labs     11/01/21  1140 11/01/21  1749 11/02/21  1109 11/02/21  2335 11/03/21  0748   WBC 21.6*  --  21.8*  --  16.9*   RBC 2.36*  --  2.61*  --  2.42*   HGB 6.3*   < > 7.2* 7.7* 6.7*   HCT 20.3*   < > 22.8* 24.7* 21.4*     --  234  --  239   MCV 85.9  --  87.5  --  88.5   MCH 26.6  --  27.7  --  27.6   MCHC 30.9*  --  31.7  --  31.2   RDW 19.6*  --  18.2*  --  18.9*   BANDSPCT 1  --  3  --   --     < > = values in this interval not displayed. BMP:  Recent Labs     11/01/21  1140 11/02/21  1109 11/03/21  0748   * 129* 131*   K 4.8 5.2* 4.0   CL 94* 100 97*   CO2 14* 15* 18*   BUN 79* 84* 78*   CREATININE 3.0* 3.0* 3.0*   CALCIUM 8.4 8.0* 7.4*   GLUCOSE 173* 110* 103*        Hepatic Function Panel:   Lab Results   Component Value Date    ALKPHOS 93 11/01/2021    ALT 19 11/01/2021    AST 26 11/01/2021    PROT 5.8 11/01/2021    PROT 7.6 06/03/2010    BILITOT 0.7 11/01/2021    LABALBU 2.2 11/01/2021       CPK: No results found for: CKTOTAL  ESR: No results found for: SEDRATE  CRP: No results found for: CRP        Imaging: All pertinent images and reports for the current visit were reviewed by me during this visit. CT CHEST ABDOMEN PELVIS WO CONTRAST   Final Result   Small bilateral pleural effusions are seen with adjacent consolidation at the   lung bases, likely atelectasis in the absence of clinical signs of pneumonia. Septal thickening is seen, compatible with fluid overload. Mildly enlarged precarinal node, likely reactive due to the suspected fluid   overload. Recommend 3 month follow-up chest CT for further characterization. Scattered colonic diverticula are seen. No significant pericolonic fat   stranding.       Body wall anasarca with trace free fluid in pelvis, compatible with mild   fluid overload         XR CHEST PORTABLE   Final Result Interval resolution of pulmonary edema. Fluid in the minor fissure, and   suspected bilateral pleural effusions. Left basilar airspace disease likely   atelectasis             Medications: All current and past medications were reviewed.  PEG-KCl-NaCl-NaSulf-Na Asc-C  100 g Oral Once    PEG-KCl-NaCl-NaSulf-Na Asc-C  100 g Oral Once    furosemide  20 mg IntraVENous BID    sodium chloride flush  5-40 mL IntraVENous 2 times per day    piperacillin-tazobactam  3,375 mg IntraVENous Q12H    Vitamin D  2 tablet Oral Daily    metoprolol succinate  50 mg Oral BID        sodium chloride      sodium bicarbonate infusion 75 mL/hr at 11/03/21 0620    sodium chloride 5 mL/hr at 11/02/21 1717       sodium chloride flush, sodium chloride      Problem list:       Patient Active Problem List   Diagnosis Code    CAD (coronary artery disease) I25.10    Essential hypertension I10    ROBYN (obstructive sleep apnea) G47.33    Hypothyroid E03.9    Hyperlipidemia E78.5    Depression F32. A    GI bleeding K92.2    Acute blood loss anemia D62    GI bleed K92.2    Atrial fibrillation (HCC) I48.91    Severe sepsis (HCC) A41.9, R65.20    E coli bacteremia R78.81, B96.20    Supratherapeutic INR R79.1    Hyperkalemia E87.5    Normocytic normochromic anemia D64.9    Hyponatremia E87.1    Bilateral pleural effusion J90    Diverticulosis K57.90    Anasarca R60.1    Overweight (BMI 25.0-29. 9) E66.3    High anion gap metabolic acidosis K84.7    Elevated brain natriuretic peptide (BNP) level R79.89       Please note that this chart was generated using Dragon dictation software. Although every effort was made to ensure the accuracy of this automated transcription, some errors in transcription may have occurred inadvertently. If you may need any clarification, please do not hesitate to contact me through EPIC or at the phone number provided below with my electronic signature.   Any pictures or media included in this note were obtained after taking informed verbal consent from the patient and with their approval to include those in the patient's medical record.     Kaylen Hanley MD, MPH  11/3/2021 , 12:25 PM   [facility] Infectious Disease   [address] [place] [address], [address] [place] [address]  Office: [phone]  Fax: [phone]  Clinic days:  Tuesday & Thursday

## 2021-11-03 NOTE — PLAN OF CARE
Problem: Falls - Risk of:  Goal: Will remain free from falls  Description: Will remain free from falls  Outcome: Ongoing  Goal: Absence of physical injury  Description: Absence of physical injury  Outcome: Ongoing     Problem: Skin Integrity:  Goal: Will show no infection signs and symptoms  Description: Will show no infection signs and symptoms  Outcome: Ongoing  Goal: Absence of new skin breakdown  Description: Absence of new skin breakdown  Outcome: Ongoing     Problem: Pain:  Goal: Pain level will decrease  Description: Pain level will decrease  Outcome: Ongoing  Goal: Control of acute pain  Description: Control of acute pain  Outcome: Ongoing  Goal: Control of chronic pain  Description: Control of chronic pain  Outcome: Ongoing     Problem: Nutrition  Goal: Optimal nutrition therapy  11/3/2021 0005 by Yoel Rene RN  Outcome: Ongoing  11/2/2021 1114 by Dillon Cruz RD, LD  Outcome: Ongoing

## 2021-11-04 ENCOUNTER — ANESTHESIA (OUTPATIENT)
Dept: ENDOSCOPY | Age: 86
DRG: 871 | End: 2021-11-04
Payer: COMMERCIAL

## 2021-11-04 ENCOUNTER — ANESTHESIA EVENT (OUTPATIENT)
Dept: ENDOSCOPY | Age: 86
DRG: 871 | End: 2021-11-04
Payer: COMMERCIAL

## 2021-11-04 VITALS — SYSTOLIC BLOOD PRESSURE: 103 MMHG | OXYGEN SATURATION: 100 % | DIASTOLIC BLOOD PRESSURE: 50 MMHG

## 2021-11-04 LAB
HCT VFR BLD CALC: 22.8 % (ref 36–48)
HCT VFR BLD CALC: 24.9 % (ref 36–48)
HEMOGLOBIN: 7.4 G/DL (ref 12–16)
HEMOGLOBIN: 7.9 G/DL (ref 12–16)
INR BLD: 1.45 (ref 0.88–1.12)
PROTHROMBIN TIME: 16.6 SEC (ref 9.9–12.7)

## 2021-11-04 PROCEDURE — 1200000000 HC SEMI PRIVATE

## 2021-11-04 PROCEDURE — 85014 HEMATOCRIT: CPT

## 2021-11-04 PROCEDURE — 7100000001 HC PACU RECOVERY - ADDTL 15 MIN: Performed by: INTERNAL MEDICINE

## 2021-11-04 PROCEDURE — 85018 HEMOGLOBIN: CPT

## 2021-11-04 PROCEDURE — 99233 SBSQ HOSP IP/OBS HIGH 50: CPT | Performed by: INTERNAL MEDICINE

## 2021-11-04 PROCEDURE — 3700000000 HC ANESTHESIA ATTENDED CARE: Performed by: INTERNAL MEDICINE

## 2021-11-04 PROCEDURE — 2700000000 HC OXYGEN THERAPY PER DAY

## 2021-11-04 PROCEDURE — 6370000000 HC RX 637 (ALT 250 FOR IP): Performed by: INTERNAL MEDICINE

## 2021-11-04 PROCEDURE — 6360000002 HC RX W HCPCS: Performed by: INTERNAL MEDICINE

## 2021-11-04 PROCEDURE — 6360000002 HC RX W HCPCS: Performed by: NURSE ANESTHETIST, CERTIFIED REGISTERED

## 2021-11-04 PROCEDURE — 3700000001 HC ADD 15 MINUTES (ANESTHESIA): Performed by: INTERNAL MEDICINE

## 2021-11-04 PROCEDURE — 2580000003 HC RX 258: Performed by: INTERNAL MEDICINE

## 2021-11-04 PROCEDURE — 0W3P8ZZ CONTROL BLEEDING IN GASTROINTESTINAL TRACT, VIA NATURAL OR ARTIFICIAL OPENING ENDOSCOPIC: ICD-10-PCS | Performed by: INTERNAL MEDICINE

## 2021-11-04 PROCEDURE — 2580000003 HC RX 258: Performed by: NURSE ANESTHETIST, CERTIFIED REGISTERED

## 2021-11-04 PROCEDURE — 7100000000 HC PACU RECOVERY - FIRST 15 MIN: Performed by: INTERNAL MEDICINE

## 2021-11-04 PROCEDURE — 94761 N-INVAS EAR/PLS OXIMETRY MLT: CPT

## 2021-11-04 PROCEDURE — 2500000003 HC RX 250 WO HCPCS: Performed by: NURSE ANESTHETIST, CERTIFIED REGISTERED

## 2021-11-04 PROCEDURE — 2500000003 HC RX 250 WO HCPCS: Performed by: INTERNAL MEDICINE

## 2021-11-04 PROCEDURE — 85610 PROTHROMBIN TIME: CPT

## 2021-11-04 PROCEDURE — 2720000010 HC SURG SUPPLY STERILE: Performed by: INTERNAL MEDICINE

## 2021-11-04 PROCEDURE — 2709999900 HC NON-CHARGEABLE SUPPLY: Performed by: INTERNAL MEDICINE

## 2021-11-04 PROCEDURE — 3609010200 HC COLONOSCOPY ABLATION TUMOR POLYP/OTHER LES: Performed by: INTERNAL MEDICINE

## 2021-11-04 PROCEDURE — 2500000003 HC RX 250 WO HCPCS: Performed by: ANESTHESIOLOGY

## 2021-11-04 RX ORDER — PROPOFOL 10 MG/ML
INJECTION, EMULSION INTRAVENOUS PRN
Status: DISCONTINUED | OUTPATIENT
Start: 2021-11-04 | End: 2021-11-04 | Stop reason: SDUPTHER

## 2021-11-04 RX ORDER — LORAZEPAM 2 MG/ML
INJECTION INTRAMUSCULAR
Status: DISPENSED
Start: 2021-11-04 | End: 2021-11-05

## 2021-11-04 RX ORDER — LABETALOL HYDROCHLORIDE 5 MG/ML
5 INJECTION, SOLUTION INTRAVENOUS ONCE
Status: COMPLETED | OUTPATIENT
Start: 2021-11-04 | End: 2021-11-04

## 2021-11-04 RX ORDER — KETAMINE HCL IN NACL, ISO-OSM 100MG/10ML
SYRINGE (ML) INJECTION PRN
Status: DISCONTINUED | OUTPATIENT
Start: 2021-11-04 | End: 2021-11-04 | Stop reason: SDUPTHER

## 2021-11-04 RX ORDER — QUETIAPINE FUMARATE 25 MG/1
50 TABLET, FILM COATED ORAL NIGHTLY
Status: DISCONTINUED | OUTPATIENT
Start: 2021-11-04 | End: 2021-11-15 | Stop reason: HOSPADM

## 2021-11-04 RX ORDER — LORAZEPAM 2 MG/ML
1 INJECTION INTRAMUSCULAR ONCE
Status: COMPLETED | OUTPATIENT
Start: 2021-11-04 | End: 2021-11-04

## 2021-11-04 RX ORDER — LIDOCAINE HYDROCHLORIDE 20 MG/ML
INJECTION, SOLUTION EPIDURAL; INFILTRATION; INTRACAUDAL; PERINEURAL PRN
Status: DISCONTINUED | OUTPATIENT
Start: 2021-11-04 | End: 2021-11-04 | Stop reason: SDUPTHER

## 2021-11-04 RX ORDER — SODIUM CHLORIDE 9 MG/ML
INJECTION, SOLUTION INTRAVENOUS CONTINUOUS PRN
Status: DISCONTINUED | OUTPATIENT
Start: 2021-11-04 | End: 2021-11-04 | Stop reason: SDUPTHER

## 2021-11-04 RX ADMIN — PROPOFOL 10 MG: 10 INJECTION, EMULSION INTRAVENOUS at 09:41

## 2021-11-04 RX ADMIN — Medication 10 ML: at 20:46

## 2021-11-04 RX ADMIN — LABETALOL HYDROCHLORIDE 5 MG: 5 INJECTION INTRAVENOUS at 09:04

## 2021-11-04 RX ADMIN — Medication 2000 UNITS: at 11:45

## 2021-11-04 RX ADMIN — SODIUM CHLORIDE: 9 INJECTION, SOLUTION INTRAVENOUS at 08:47

## 2021-11-04 RX ADMIN — PIPERACILLIN AND TAZOBACTAM 3375 MG: 3; .375 INJECTION, POWDER, LYOPHILIZED, FOR SOLUTION INTRAVENOUS at 15:09

## 2021-11-04 RX ADMIN — PROPOFOL 10 MG: 10 INJECTION, EMULSION INTRAVENOUS at 09:32

## 2021-11-04 RX ADMIN — QUETIAPINE FUMARATE 50 MG: 25 TABLET ORAL at 20:46

## 2021-11-04 RX ADMIN — LIDOCAINE HYDROCHLORIDE 50 MG: 20 INJECTION, SOLUTION EPIDURAL; INFILTRATION; INTRACAUDAL; PERINEURAL at 09:30

## 2021-11-04 RX ADMIN — Medication 5 MG: at 09:30

## 2021-11-04 RX ADMIN — PIPERACILLIN AND TAZOBACTAM 3375 MG: 3; .375 INJECTION, POWDER, LYOPHILIZED, FOR SOLUTION INTRAVENOUS at 01:24

## 2021-11-04 RX ADMIN — LABETALOL HYDROCHLORIDE 5 MG: 5 INJECTION INTRAVENOUS at 08:49

## 2021-11-04 RX ADMIN — PROPOFOL 10 MG: 10 INJECTION, EMULSION INTRAVENOUS at 09:37

## 2021-11-04 RX ADMIN — PROPOFOL 10 MG: 10 INJECTION, EMULSION INTRAVENOUS at 09:45

## 2021-11-04 RX ADMIN — Medication 10 ML: at 11:47

## 2021-11-04 RX ADMIN — LORAZEPAM 1 MG: 2 INJECTION INTRAMUSCULAR; INTRAVENOUS at 15:55

## 2021-11-04 ASSESSMENT — PULMONARY FUNCTION TESTS
PIF_VALUE: 0

## 2021-11-04 ASSESSMENT — PAIN SCALES - GENERAL
PAINLEVEL_OUTOF10: 0
PAINLEVEL_OUTOF10: 0
PAINLEVEL_OUTOF10: 10
PAINLEVEL_OUTOF10: 0

## 2021-11-04 ASSESSMENT — ENCOUNTER SYMPTOMS
COLOR CHANGE: 0
PHOTOPHOBIA: 0
BLOOD IN STOOL: 0
TROUBLE SWALLOWING: 0
COUGH: 0
SHORTNESS OF BREATH: 0
RHINORRHEA: 0
EYE DISCHARGE: 0
EYE REDNESS: 0
CHOKING: 0
ABDOMINAL PAIN: 0
DIARRHEA: 0
NAUSEA: 0
CHEST TIGHTNESS: 0
APNEA: 0
FACIAL SWELLING: 0
STRIDOR: 0

## 2021-11-04 ASSESSMENT — PAIN - FUNCTIONAL ASSESSMENT: PAIN_FUNCTIONAL_ASSESSMENT: 0-10

## 2021-11-04 NOTE — BRIEF OP NOTE
Brief Postoperative Note - Full Note in Chart Review/Procedures tab       Patient: Estela Aggarwal  YOB: 1935  MRN: [id number]    Date of Procedure: 11/4/2021    Pre-Op Diagnosis: Anemia    Post-Op Diagnosis: Same       Procedure(s):  COLONOSCOPY CONTROL HEMORRHAGE    Surgeon(s):  Reyne Schlatter, MD    Assistant:  * No surgical staff found *    Anesthesia: Monitor Anesthesia Care    Estimated Blood Loss (mL): Minimal    Complications: None    Specimens:   * No specimens in log *    Implants:  * No implants in log *      Drains:   Urethral Catheter Non-latex;Straight-tip 16 fr (Active)   Catheter Indications Need for fluid volume management of the critically ill patient in a critical care setting 11/04/21 0600   Site Assessment Pink 11/04/21 0600   Urine Color Yellow 11/04/21 0600   Urine Appearance Cloudy 11/04/21 0600   Output (mL) 300 mL 11/04/21 0420   Provider Notified to Remove Yes 11/01/21 1636       [REMOVED] NG/OG/NJ/NE Tube Nasogastric 16 fr Left nostril (Removed)   Surrounding Skin Intact 11/04/21 0200   Status Open to gravity drainage 11/04/21 0200   Tube Feeding Intake (mL) 1000 ml 11/03/21 2200       [REMOVED] Rectal Tube With balloon (Removed)   Stool Appearance Watery 11/04/21 0824   Stool Color Brown;Black; Red 11/04/21 0824   Stool Amount Large 11/04/21 0824       [REMOVED] External Urinary Catheter (Removed)   Urine Color Yellow 10/19/21 0630   Output (mL) 100 mL 10/20/21 0010       Findings:  Cecal ulcer with non bleeding pigmented protuberances from previous AVM cautery treatment - treated with cautery and hemoclips    Diverticulosis of large intestine - K57.30    Small amount of bloody effluent throughout colon    Normal terminal ileum with bilious effluent    Rec:  Full liquid diet - consider advancing in AM if no bleeding. Continue present treatment.   Followup colonoscopy is not recommeded  Follow up as inpatient  Restart anticoagulation in 72 hrs if no bleeding.     Electronically signed by Cece Locke MD on 11/4/2021 at 10:04 AM

## 2021-11-04 NOTE — ANESTHESIA PRE PROCEDURE
Department of Anesthesiology  Preprocedure Note       Name:  Sandy Luna   Age:  82 y.o.  :  1935                                          MRN:  [id number]         Date:  2021      Surgeon: Brittaney Finch):  Zainab Hagen MD    Procedure: COLONOSCOPY DIAGNOSTIC (N/A )    Medications prior to admission:   Prior to Admission medications    Medication Sig Start Date End Date Taking? Authorizing Provider   ferrous sulfate (IRON 325) 325 (65 Fe) MG tablet Take 1 tablet by mouth 2 times daily (with meals) 10/22/21   NAKUL Lu CNP   docusate sodium (COLACE) 100 MG capsule Take 1 capsule by mouth daily 10/22/21   NAKUL Lu CNP   levothyroxine (SYNTHROID) 112 MCG tablet Take 1 tablet by mouth every morning (before breakfast) 10/23/21   NAKUL Lu CNP   apixaban (ELIQUIS) 2.5 MG TABS tablet Take 1 tablet by mouth 2 times daily 10/22/21   NAKUL Lu CNP   aspirin EC 81 MG EC tablet Take 1 tablet by mouth daily 10/22/21   NAKUL Lu CNP   atorvastatin (LIPITOR) 40 MG tablet Take 40 mg by mouth nightly  10/22/21   NAKUL Lu CNP   amiodarone (PACERONE) 400 MG tablet Take 1 tablet by mouth 2 times daily  Patient taking differently: Take 200 mg by mouth daily  10/22/21   NAKUL Bowens CNP   metoprolol succinate (TOPROL XL) 50 MG extended release tablet Take 1 tablet by mouth 3 times daily  Patient taking differently: Take 50 mg by mouth 2 times daily  10/22/21   NAKUL Bowens CNP   furosemide (LASIX) 40 MG tablet Take 1 tablet by mouth daily Take 1 tab daily until weight back to 152lbs. Then can take as needed thereafter.  10/23/21   NAKUL Bowens CNP   Cholecalciferol (VITAMIN D) 50 MCG (2000 UT) CAPS capsule Take 1 capsule by mouth daily    Historical Provider, MD   Multiple Vitamins-Minerals (THERAPEUTIC MULTIVITAMIN-MINERALS) tablet Take 1 tablet by mouth daily    Historical Provider, MD QUEtiapine (SEROQUEL) 50 MG tablet Take 50 mg by mouth nightly     Historical Provider, MD   prednisoLONE sodium phosphate (INFLAMASE FORTE) 1 % ophthalmic solution Place 1 drop into the right eye daily     Historical Provider, MD       Current medications:    No current facility-administered medications for this visit. No current outpatient medications on file. Facility-Administered Medications Ordered in Other Visits   Medication Dose Route Frequency Provider Last Rate Last Admin    sodium bicarbonate 100 mEq in sodium chloride 0.45 % 1,000 mL infusion   IntraVENous Continuous Armand Luna MD 75 mL/hr at 11/03/21 2010 New Bag at 11/03/21 2010    sodium chloride flush 0.9 % injection 5-40 mL  5-40 mL IntraVENous 2 times per day Armand Luna MD   10 mL at 11/03/21 1946    sodium chloride flush 0.9 % injection 5-40 mL  5-40 mL IntraVENous PRN Stefan Curtis MD        0.9 % sodium chloride infusion  25 mL IntraVENous PRN Armand Luna MD 5 mL/hr at 11/02/21 1717 New Bag at 11/02/21 1717    piperacillin-tazobactam (ZOSYN) 3,375 mg in dextrose 5 % 50 mL IVPB extended infusion (mini-bag)  3,375 mg IntraVENous Q12H Missael Mcguire MD   Stopped at 11/04/21 0552    Vitamin D (CHOLECALCIFEROL) tablet 2,000 Units  2 tablet Oral Daily Tess Pringle MD   2,000 Units at 11/03/21 0948    metoprolol succinate (TOPROL XL) extended release tablet 50 mg  50 mg Oral BID Jessica Singh MD   50 mg at 11/03/21 1947       Allergies: Allergies   Allergen Reactions    Percocet [Oxycodone-Acetaminophen] Other (See Comments)     Pt states it makes her crazy. Problem List:    Patient Active Problem List   Diagnosis Code    CAD (coronary artery disease) I25.10    Essential hypertension I10    ROBYN (obstructive sleep apnea) G47.33    Hypothyroid E03.9    Hyperlipidemia E78.5    Depression F32. A    GI bleeding K92.2    Acute blood loss anemia D62    GI bleed K92.2    Atrial fibrillation ([address] I48.91    Severe sepsis (HCC) A41.9, R65.20    E coli bacteremia R78.81, B96.20    Supratherapeutic INR R79.1    Hyperkalemia E87.5    Normocytic normochromic anemia D64.9    Hyponatremia E87.1    Bilateral pleural effusion J90    Diverticulosis K57.90    Anasarca R60.1    Overweight (BMI 25.0-29. 9) E66.3    High anion gap metabolic acidosis S07.7    Elevated brain natriuretic peptide (BNP) level R79.89       Past Medical History:        Diagnosis Date    Atrial fibrillation ([facility] [address]     CAD (coronary artery disease)     Depression     Hyperlipidemia     Hypertension     Hypothyroid     Legal blindness     Obstructive sleep apnea (adult) (pediatric)        Past Surgical History:        Procedure Laterality Date    ANGIOPLASTY      COLONOSCOPY  11/16/2018    COLONOSCOPY POLYPECTOMY SNARE/COLD BIOPSY performed by Christina Miller MD at [address] [facility] N/A 10/20/2021    COLONOSCOPY POLYPECTOMY ABLATION performed by Galina George MD at [facility] Right     CATARACT EXTRACTION W/ IOL    EYE SURGERY Left     CATARCT EXTRACTION W/ IOL    SC SIGMOIDOSCOPY FLX DX W/COLLJ SPEC BR/WA IF PFRMD N/A 10/19/2018    SIGMOIDOSCOPY DIAGNOSTIC FLEXIBLE performed by Christina Miller MD at [facility]  10/19/2018    flexible    TONSILLECTOMY AND ADENOIDECTOMY      TUBAL LIGATION      UPPER GASTROINTESTINAL ENDOSCOPY N/A 10/20/2021    EGD BIOPSY performed by Galina George MD at [facility] History:    Social History     Tobacco Use    Smoking status: Never Smoker    Smokeless tobacco: Never Used   Substance Use Topics    Alcohol use: Yes     Alcohol/week: 2.0 standard drinks     Types: 2 Glasses of wine per week     Comment: occas. Counseling given: Not Answered      Vital Signs (Current): There were no vitals filed for this visit. BP Readings from Last 3 Encounters:   11/04/21 (!) 149/91   10/22/21 126/76   10/20/21 112/65       NPO Status:                                                                                 BMI:   Wt Readings from Last 3 Encounters:   11/04/21 164 lb 14.5 oz (74.8 kg)   10/22/21 156 lb (70.8 kg)   11/16/18 147 lb (66.7 kg)     There is no height or weight on file to calculate BMI.    CBC:   Lab Results   Component Value Date    WBC 16.9 11/03/2021    RBC 2.42 11/03/2021    HGB 7.4 11/04/2021    HCT 22.8 11/04/2021    MCV 88.5 11/03/2021    RDW 18.9 11/03/2021     11/03/2021       CMP:   Lab Results   Component Value Date     11/03/2021    K 4.0 11/03/2021    K 4.8 11/01/2021    CL 97 11/03/2021    CO2 18 11/03/2021    BUN 78 11/03/2021    CREATININE 3.0 11/03/2021    GFRAA 18 11/03/2021    GFRAA 57 06/03/2010    AGRATIO 0.6 11/01/2021    LABGLOM 15 11/03/2021    GLUCOSE 103 11/03/2021    PROT 5.8 11/01/2021    PROT 7.6 06/03/2010    CALCIUM 7.4 11/03/2021    BILITOT 0.7 11/01/2021    ALKPHOS 93 11/01/2021    AST 26 11/01/2021    ALT 19 11/01/2021       POC Tests: No results for input(s): POCGLU, POCNA, POCK, POCCL, POCBUN, POCHEMO, POCHCT in the last 72 hours.     Coags:   Lab Results   Component Value Date    PROTIME 16.6 11/04/2021    INR 1.45 11/04/2021    APTT 32.1 11/01/2021       HCG (If Applicable): No results found for: PREGTESTUR, PREGSERUM, HCG, HCGQUANT     ABGs: No results found for: PHART, PO2ART, QBG6FGA, YNC9EWV, BEART, M2VMPGFX     Type & Screen (If Applicable):  No results found for: LABABO, [address]    Anesthesia Evaluation  Patient summary reviewed and Nursing notes reviewed no history of anesthetic complications:   Airway: Mallampati: II  TM distance: >3 FB   Neck ROM: full  Mouth opening: > = 3 FB Dental:          Pulmonary:normal exam    (+) sleep apnea:                             Cardiovascular:  Exercise tolerance: poor (<4 METS),   (+) hypertension: moderate, CAD:, CABG/stent (s/p CABG 2009 stents later  good exercise tolerance): no interval change, dysrhythmias: atrial fibrillation, [place]:, hyperlipidemia    (-)  angina, orthopnea and PND      Rhythm: regular  Rate: normal  Echocardiogram reviewed         Beta Blocker:  Dose within 24 Hrs      ROS comment: 2017:  Equivocal ECG for ischemia with graded exercise test.   2. Duke Treadmill Score is 4 which indicates intermediate risk. 3. Hypertensive BP response. Neuro/Psych:   (+) depression/anxiety    (-) psychiatric history            ROS comment: 16 eye surgery  Legally blind  Severe glaucoma GI/Hepatic/Renal:             Endo/Other:    (+) hypothyroidism, blood dyscrasia: anemia:., .                 Abdominal:             Vascular: Other Findings:               Anesthesia Plan      MAC     ASA 3       Induction: intravenous. MIPS: Prophylactic antiemetics administered. Anesthetic plan and risks discussed with patient. Plan discussed with CRNA.     Attending anesthesiologist reviewed and agrees with Angus Issa MD   11/4/2021

## 2021-11-04 NOTE — PROGRESS NOTES
Comprehensive Nutrition Assessment    Type and Reason for Visit:  Reassess    Nutrition Recommendations/Plan:   1. Diet advancement as appropriate per GI  2. Resume Ensure Clear BID as diet resumes    Nutrition Assessment:  Pt is NPO today for colonoscopy; was previously on a clear liquid diet with fair PO intake, consuming 26-50% of meals. Pt had Ensure Clear ordered but unclear if she was taking this. Will monitor for diet advancement s/p colonoscopy, resume ONS as appropriate and monitor for adequate intake. Malnutrition Assessment:  Malnutrition Status:  Insufficient data      Estimated Daily Nutrient Needs:  Energy (kcal):  6807-1108; Weight Used for Energy Requirements:  Current (continue to us 74.5 kg)     Protein (g):  68-86 grams; Weight Used for Protein Requirements:  Ideal (57 kg; 1.2-1.5 grams per kg)        Fluid (ml/day):   ; Method Used for Fluid Requirements:  1 ml/kcal      Nutrition Related Findings:  No edema noted. LBM 11/3. +6.8 liters. Wounds:  Skin Tears       Current Nutrition Therapies:    Diet NPO    Anthropometric Measures:  · Height: 5' 5\" (165.1 cm)  · Current Body Weight: 164 lb 14.5 oz (74.8 kg)   · Admission Body Weight: 164 lb 3.9 oz (74.5 kg)    · Ideal Body Weight: 125 lbs; % Ideal Body Weight 131.9 %   · BMI: 27.4  · BMI Categories: Overweight (BMI 25.0-29. 9)       Nutrition Diagnosis:   · Inadequate protein-energy intake related to inadequate protein-energy intake as evidenced by NPO or clear liquid status due to medical condition      Nutrition Interventions:   Food and/or Nutrient Delivery:  Continue NPO (diet advancement s/p colonoscopy as appropriate per GI)  Nutrition Education/Counseling:  Education not indicated   Coordination of Nutrition Care:  Continue to monitor while inpatient    Goals:  Pt will tolerate diet advancement to solids and consume at least 50% of meals and supplements       Nutrition Monitoring and Evaluation:   Behavioral-Environmental Outcomes:  None Identified   Food/Nutrient Intake Outcomes:  Diet Advancement/Tolerance, Supplement Intake  Physical Signs/Symptoms Outcomes:  GI Status, Skin     Discharge Planning:     Too soon to determine     Electronically signed by Carmen Matos RD, RYAN on 11/4/21 at 9:17 AM EDT    Contact: [phone]

## 2021-11-04 NOTE — PROGRESS NOTES
Received from Endo Procedure Room to PACU Phase 1 Recovery. Drowsy, responds easily to verbal and tactile stimulation. Respirations easy on O2 at 10L/simple mask. VSS. Abdomen soft.

## 2021-11-04 NOTE — ANESTHESIA POSTPROCEDURE EVALUATION
Department of Anesthesiology  Postprocedure Note    Patient: Jory Linton  MRN: [id number]  YOB: 1935  Date of evaluation: 11/4/2021  Time:  10:31 AM     Procedure Summary     Date: 11/04/21 Room / Location: [address] [place] [address]    Anesthesia Start: 5232 Anesthesia Stop: 4534    Procedure: COLONOSCOPY CONTROL HEMORRHAGE (N/A ) Diagnosis: (Anemia)    Surgeons: Delmis Bledsoe MD Responsible Provider: Nadine Woodward MD    Anesthesia Type: MAC ASA Status: 3          Anesthesia Type: MAC    Patricia Phase I: Patricia Score: 8    Patricia Phase II:      Last vitals: Reviewed and per EMR flowsheets.        Anesthesia Post Evaluation    Patient location during evaluation: PACU  Patient participation: complete - patient participated  Level of consciousness: awake and awake and alert  Pain score: 2  Airway patency: patent  Nausea & Vomiting: no vomiting  Complications: no  Cardiovascular status: blood pressure returned to baseline  Respiratory status: acceptable  Hydration status: euvolemic  Multimodal analgesia pain management approach

## 2021-11-04 NOTE — PROGRESS NOTES
Pt alert and oriented but very weak this shift, unable to sit up or move without assistance. Bowel prep completed, pt passing small amount of liquid BM continuously. After X6 BM during X3 bed changes within one hour an order was obtained for a rectal tube from Dr. Kale Jessica due to high risk for skin breakdown. Rectal tube in place but leaking slightly. Skin care in now possible for pt. VSS pt resting well after rectal tube placement.

## 2021-11-04 NOTE — PROGRESS NOTES
Dr. Marquetta Burkitt spoke with patient, but drowsy. Will talk to family. Transported to room per stretcher. Lift used to transfer to bed. Report given to ICU RN, clip card explained. Nguyen Stands Respirations easy on O2 at 3L/NC.  VSS. Remains in A. Fib. Clip card and Procedure Report given to RN to give to family.

## 2021-11-04 NOTE — PLAN OF CARE
Nutrition Problem #1: Inadequate protein-energy intake  Intervention: Food and/or Nutrient Delivery: Continue NPO (diet advancement s/p colonoscopy as appropriate per GI)  Nutritional Goals: Pt will tolerate diet advancement to solids and consume at least 50% of meals and supplements

## 2021-11-04 NOTE — PROGRESS NOTES
Report called to endo. ETA 8:00. Pt bathed/cleaned as needed to maintain skin. Pt confused and removed NG tube.

## 2021-11-04 NOTE — PROGRESS NOTES
Pt back from endo at this time. Vitals obtained and stable. Pt drowsy but arousable. Call light within reach. Bed alarm on.

## 2021-11-04 NOTE — PROGRESS NOTES
Received callback from daughter Yamile Lawrence. Obtained phone consent for anesthesia with 2 RN witness.

## 2021-11-04 NOTE — PROGRESS NOTES
Physical/Occupational Therapy  Jasmin Yu    Attempted to see pt for PT/OT evaluation. Patient currently off the floor. Will hold therapy at this time and will follow up with pt as schedule permits. Thanks, Lucy Poole, PT, DPT [id number], Gayatri Alonzo OTR/L  [id number].

## 2021-11-04 NOTE — PROGRESS NOTES
MD Som Andrade MD Shauna Bacon, MD                                  Office: [phone]                 Fax: [phone]          [facility]                     NEPHROLOGY    ICU     IN PATIENT PROGRESS NOTE:     PATIENT NAME: Jory Linton  : 1935  MRN: [id number]            Subjective:     Patient is weak and lethargic. Had colonoscopy  Less hypotensive  Christiansen catheter. improving urine output. Medications reviewed. Infusions reviewed. Received 1 unit of blood transfusion        Assessment and Plan    Assessment:     Acute renal failuresevereslow improvement with improving urine output  Hypotension. Refractoryimproving  Hypovolemia. Improving  Anemia of acute blood loss. Severe. Worseningrepeat hemoglobin 6.7  Worsening metabolic acidosis. Critical low albumin of 2.2. Critical anemia 7.3 on admission. E. coli sepsis WBC count 21,000        Plan:       Acute renal failuresevere on presentationwith slow improvement of urine output. Deion  is 80 and a creatinine of 3.0 on admission. Renal functions are unchanged and creatinine remains critically elevated at 3.0. Improving urine output. No immediate indications for dialysis today. Etiology most likely related to ATNpatient had severe hypotension and hypovolemia on admission. Other risk factor for ATN include E. coli sepsis  Patient needs stat labs today. Probably ATN diuretic phase    Continue to monitor urine output. Continue low-dose Lasix 20 mg IV twice daily. Hypovolemiahas improved. Appears to have mild fluid overload. Metabolic acidosisimproving. On bicarbonate infusion. Continue for 1 more day. Electrolytes needs close monitoring. Medications reviewed. All nephrotoxic medications have been discontinued. Hold Ace inhibitors till renal recovery is complete. Antibiotic dose reviewed and appropriate for level of renal function.     No immediate indications for dialysis at this time. Very high risk for possible requiring hemodialysis treatment in the next 24 to 48 hours. Patient remains critically ill. Discussed with RN. Discussed with treatment team.    Electrolytes reviewed. Continue IV Bicarbonate for 24 Hrs. Magnesium levels reviewedand are stable. Medications reviewed and appropriate for level of kidney function. All Nephrotoxic medications have been discontinued. Antibiotic doses are appropriate for level of renal function. Hold ACE inhibitors till renal functions improve. Anemia levels have worsened and continue with Iron and Aranesp as per protocol. High risk for worsening renal failure and need for dialysis if no improvement in the next 24-48 hrs. Deconditioned and  Nutritional status needs Improvement. Discussed with patient., Discussed with treating team. and  Discussed with RN   Multiple complex problems. , Patient at high risk-time spent 35 mins. and Patient is critically ill. and  Time spent 35 mins. EXAM  Vitals:    11/04/21 1138   BP: (!) 124/96   Pulse: 118   Resp: 26   Temp: 97 °F (36.1 °C)   SpO2: 93%       Intake/Output Summary (Last 24 hours) at 11/4/2021 1200  Last data filed at 11/4/2021 1040  Gross per 24 hour   Intake 4860 ml   Output 2450 ml   Net 2410 ml       Lethargic. Hypotensive. Less shortness of breath  External exam of the ears and nose are normal  HENT: exam is normal  Eyes: Pupils are equal, round, and reactive to light. Lymph Nodes. No axillary or cervical lymph nodes are palpable. Neck. JVD not visible. No lymph nodes palpable. CVS.  Heart sounds are normal.Palpation of the heart is normal. No murmurs. No pericardial rub.  RS.dullness on percussion of the lower chest wall. Bilateral Basal rales. PA soft , bowel sounds are normal no distension and no tenderness to palpation. Skin No rash , No palpable nodules  Musculoskeletal: Normal range of motion. 1+ edema and no tenderness.    CNS  No focal Edematrace   Lethargic. All pulses are well felt      Active Problems:    Essential hypertension    ROBYN (obstructive sleep apnea)    GI bleed    Atrial fibrillation (HCC)    Severe sepsis (HCC)    E coli bacteremia    Supratherapeutic INR    Hyperkalemia    Normocytic normochromic anemia    Hyponatremia    Bilateral pleural effusion    Diverticulosis    Anasarca    Overweight (BMI 25.0-29. 9)    High anion gap metabolic acidosis    Elevated brain natriuretic peptide (BNP) level  Resolved Problems:    * No resolved hospital problems. *        Medications Reviewed by me   sodium chloride flush  5-40 mL IntraVENous 2 times per day    piperacillin-tazobactam  3,375 mg IntraVENous Q12H    Vitamin D  2 tablet Oral Daily    metoprolol succinate  50 mg Oral BID      sodium bicarbonate infusion 75 mL/hr at 11/03/21 2010    sodium chloride 5 mL/hr at 11/02/21 1717       Data Review. Labs reviewed by me       CBC:   Recent Labs     11/02/21  1109 11/02/21  2335 11/03/21  0748 11/03/21  0748 11/03/21  1630 11/03/21  2345 11/04/21  0601   WBC 21.8*  --  16.9*  --   --   --   --    HGB 7.2*   < > 6.7*   < > 7.6* 7.6* 7.4*   HCT 22.8*   < > 21.4*   < > 23.5* 23.4* 22.8*   MCV 87.5  --  88.5  --   --   --   --      --  239  --   --   --   --     < > = values in this interval not displayed. BMP:   Recent Labs     11/02/21  1109 11/03/21  0748   * 131*   K 5.2* 4.0    97*   CO2 15* 18*   BUN 84* 78*   CREATININE 3.0* 3.0*     Magnesium:   Lab Results   Component Value Date    MG 2.30 11/01/2021     Lab Results   Component Value Date    CREATININE 3.0 11/03/2021       Arterial Blood Gasses  No results for input(s): PH, PCO2, PO2 in the last 72 hours.     Invalid input(s): [id number], INSPIREDO2    UA:  Recent Labs     11/01/21  1220   COLORU YELLOW   PHUR 6.0   WBCUA >900*   RBCUA 5-10*   BACTERIA 4+*   CLARITYU TURBID*   SPECGRAV 1.016   LEUKOCYTESUR LARGE*   UROBILINOGEN 0.2   BILIRUBINUR SMALL* BLOODU MODERATE*   GLUCOSEU Negative       LIVER PROFILE:   No results for input(s): AST, ALT, LIPASE, BILIDIR, BILITOT, ALKPHOS in the last 72 hours. Invalid input(s): AMYLASE,  ALB  PT/INR:    Lab Results   Component Value Date    PROTIME 16.6 11/04/2021    PROTIME 19.0 11/03/2021    PROTIME 24.4 11/02/2021    INR 1.45 11/04/2021    INR 1.65 11/03/2021    INR 2.09 11/02/2021     PTT:    Lab Results   Component Value Date    APTT 32.1 11/01/2021     CANDE:  No results found for: ANATITER, CANDE  CHEMISTRY COMMON GROUP :   Lab Results   Component Value Date    GLUCOSE 103 11/03/2021    TSH 2.54 06/03/2010     Recent Labs     11/02/21  1109 11/03/21  0748   GLUCOSE 110* 103*   CALCIUM 8.0* 7.4*         RADIOLOGY:        Imaging Results. Chest X Ray reviwed by me    Chest Xray Reviewed by me  Renal Ultrasound Reviewed by me    EKG reviewed by me.                 Electronically Signed: Regino Mcginnis MD 11/4/2021 12:00 PM

## 2021-11-04 NOTE — PROGRESS NOTES
Pt  To endo at this time. Rectal tube was removed. VSS, albeit in chronic afib. Placed on telemetry for transport.

## 2021-11-04 NOTE — PLAN OF CARE
Problem: Falls - Risk of:  Goal: Will remain free from falls  Description: Will remain free from falls  Outcome: Ongoing  Goal: Absence of physical injury  Description: Absence of physical injury  Outcome: Ongoing     Problem: Skin Integrity:  Goal: Will show no infection signs and symptoms  Description: Will show no infection signs and symptoms  Outcome: Ongoing  Goal: Absence of new skin breakdown  Description: Absence of new skin breakdown  Outcome: Ongoing     Problem: Pain:  Goal: Pain level will decrease  Description: Pain level will decrease  Outcome: Ongoing  Goal: Control of acute pain  Description: Control of acute pain  Outcome: Ongoing  Goal: Control of chronic pain  Description: Control of chronic pain  Outcome: Ongoing     Problem: Nutrition  Goal: Optimal nutrition therapy  Outcome: Ongoing     Problem: Discharge Planning:  Goal: Discharged to appropriate level of care  Description: Discharged to appropriate level of care  Outcome: Ongoing     Problem:  Bowel Function - Altered:  Goal: Bowel elimination is within specified parameters  Description: Bowel elimination is within specified parameters  Outcome: Ongoing     Problem: Fluid Volume - Imbalance:  Goal: Will show no signs and symptoms of excessive bleeding  Description: Will show no signs and symptoms of excessive bleeding  Outcome: Ongoing  Goal: Absence of imbalanced fluid volume signs and symptoms  Description: Absence of imbalanced fluid volume signs and symptoms  Outcome: Ongoing     Problem: Nausea/Vomiting:  Goal: Absence of nausea/vomiting  Description: Absence of nausea/vomiting  Outcome: Ongoing  Goal: Able to drink  Description: Able to drink  Outcome: Ongoing  Goal: Able to eat  Description: Able to eat  Outcome: Ongoing  Goal: Ability to achieve adequate nutritional intake will improve  Description: Ability to achieve adequate nutritional intake will improve  Outcome: Ongoing

## 2021-11-04 NOTE — PROGRESS NOTES
Infectious Diseases   Progress Note      Admission Date: 11/1/2021  Hospital Day: Hospital Day: 4   Attending: Dariana Puir DO  Date of service: 11/4/2021     Chief complaint/ Reason for consult:     · Severe sepsis on admission with leukocytosis, hypotension, tachycardia, acute kidney injury  · Gram-negative bacteremia with E. Coli  · High anion gap metabolic acidosis  · Elevated proBNP  · Mild hyperkalemia  · Hyponatremia  · Normocytic normochromic anemia  · Bilateral pleural effusions    Microbiology:        I have reviewed allavailable micro lab data and cultures    · Blood culture (2/2) - collected on 11/1/2021: E coli  · Urine culture  - collected on 11/1/2021: > 100,000 cfu/ml of E coli    Susceptibility    Escherichia coli (1)    Antibiotic Interpretation ERIK Status    ampicillin Resistant >=32 mcg/mL     ceFAZolin Sensitive <=4 mcg/mL      NOTE: Cefazolin should only be used for uncomplicated UTI         for E.coli or Klebsiella pneumoniae. cefepime Sensitive <=0.12 mcg/mL     cefTRIAXone Sensitive <=0.25 mcg/mL     ciprofloxacin Sensitive <=0.25 mcg/mL     ertapenem Sensitive <=0.12 mcg/mL     gentamicin Sensitive <=1 mcg/mL     levofloxacin Sensitive 1 mcg/mL     nitrofurantoin Sensitive <=16 mcg/mL     piperacillin-tazobactam Sensitive <=4 mcg/mL     trimethoprim-sulfamethoxazole Resistant >=320 mcg/mL         Antibiotics and immunizations:       Current antibiotics: All antibiotics and their doses were reviewed by me    Recent Abx Admin                   piperacillin-tazobactam (ZOSYN) 3,375 mg in dextrose 5 % 50 mL IVPB extended infusion (mini-bag) (mg) 3,375 mg New Bag 11/04/21 0124     3,375 mg New Bag 11/03/21 1540                  Immunization History: All immunization history was reviewed by me today.     Immunization History   Administered Date(s) Administered    Influenza, High Dose (Fluzone 65 yrs and older) 10/27/2018    Influenza, Quadv, IM, PF (6 mo and older Fluzone, Flulaval, for E. coli. No CBC has been done today. She remains on IV Zosyn. Follow-up blood culture from 11/2/2021 remain negative. Plan:     Diagnostic Workup:    · Will order twelve-lead EKG for tomorrow to assess QTc interval  · Please check CBC and CMP tomorrow  · Continue to follow  fever curve, WBC count and blood cultures. · Continue to monitor blood counts, liver and renal function. Antimicrobials:    · Will continue IV Zosyn 3.375 g at renally adjusted dose of every 12 hours  · Continue to monitor her vitals closely  · Cough and deep breathing exercises  · Start oral probiotics twice daily  · Monitor urine output  · Anemia management per primary  · We will follow up on the culture results and clinical progress and will make further recommendations accordingly. · Continue close vitals monitoring. · Maintain good glycemic control. · Fall precautions. Aspiration precautions. · Continue to watch for new fever or diarrhea. · DVT prophylaxis. · Discussed all above with patient and RN. · Discussed with patient's sister at bedside      Drug Monitoring:    · Continue monitoring for antibiotic toxicity as follows: CBC, CMP   · Continue to watch for following: new or worsening fever, new hypotension, hives, lip swelling and redness or purulence at vascular access sites. I/v access Management:    · Continue to monitor i.v access sites for erythema, induration, discharge or tenderness. · As always, continue efforts to minimize tubes/lines/drains as clinically appropriate to reduce chances of line associated infections. Patient education and counseling:        · The patient was educated in detail about the side-effects of various antibiotics and things to watch for like new rashes, lip swelling, severe reaction, worsening diarrhea, break through fever etc.  · Discussed patient's condition and what to expect.  All of the patient's questions were addressed in a satisfactory manner and patient verbalized understanding all instructions. Level of complexity of visit: High     Risk of Complications/Morbidity: High     · Illness(es)/ Infection present that pose threat to life/bodily function. · There is potential for severe exacerbation of infection/side effects of treatment. · Therapy requires intensive monitoring for antimicrobial agent toxicity. TIME SPENT TODAY:     - Spent over  36 minutes on visit (including interval history, physical exam, review of data including labs, cultures, imaging, development and implementation of treatment plan and coordination of complex care). More than 50 percent of this includes face-to-face time spent with the patient for counseling and coordination of care. Thank you for involving me in the care of your patient. I will continue to follow. If you have anyadditional questions, please do not hesitate to contact me. Subjective: Interval history: Interval history was obtained from chart review and patient/ RN. She is afebrile. She is still appears weak and lethargic. She seems to be tolerating IV Zosyn okay. REVIEW OF SYSTEMS:      Review of Systems   Constitutional: Positive for fatigue. Negative for chills, diaphoresis and unexpected weight change. HENT: Negative for congestion, ear discharge, ear pain, facial swelling, hearing loss, rhinorrhea and trouble swallowing. Eyes: Negative for photophobia, discharge, redness and visual disturbance. Respiratory: Negative for apnea, cough, choking, chest tightness, shortness of breath and stridor. Cardiovascular: Negative for chest pain and palpitations. Gastrointestinal: Negative for abdominal pain, blood in stool, diarrhea and nausea. Endocrine: Negative for polydipsia, polyphagia and polyuria. Genitourinary: Negative for difficulty urinating, dysuria, frequency, hematuria, menstrual problem and vaginal discharge.    Musculoskeletal: Negative for arthralgias, joint swelling, myalgias and neck stiffness. Skin: Negative for color change and rash. Allergic/Immunologic: Negative for immunocompromised state. Neurological: Negative for dizziness, seizures, speech difficulty, light-headedness and headaches. Hematological: Negative for adenopathy. Psychiatric/Behavioral: Negative for agitation, hallucinations and suicidal ideas. The patient is nervous/anxious. Past Medical History: All past medical history reviewed today. Past Medical History:   Diagnosis Date    Atrial fibrillation ([facility] [address]     CAD (coronary artery disease)     Depression     Hyperlipidemia     Hypertension     Hypothyroid     Legal blindness     Obstructive sleep apnea (adult) (pediatric)        Past Surgical History: All past surgical history was reviewed today. Past Surgical History:   Procedure Laterality Date    ANGIOPLASTY      COLONOSCOPY  11/16/2018    COLONOSCOPY POLYPECTOMY SNARE/COLD BIOPSY performed by Rony Carr MD at [facility] N/A 10/20/2021    COLONOSCOPY POLYPECTOMY ABLATION performed by Leah Eaton MD at [facility] N/A 11/4/2021    COLONOSCOPY POLYPECTOMY ABLATION performed by Usha House MD at [address] [place] Right     CATARACT EXTRACTION W/ IOL    EYE SURGERY Left     CATARCT EXTRACTION W/ IOL    UT SIGMOIDOSCOPY FLX DX W/COLLJ SPEC BR/WA IF PFRMD N/A 10/19/2018    SIGMOIDOSCOPY DIAGNOSTIC FLEXIBLE performed by Rony Carr MD at [address]  10/19/2018    flexible    TONSILLECTOMY AND ADENOIDECTOMY      TUBAL LIGATION      UPPER GASTROINTESTINAL ENDOSCOPY N/A 10/20/2021    EGD BIOPSY performed by Leah Eaton MD at [address] ENDOSCOPY       Family History: All family history was reviewed today.         Problem Relation Age of Onset    Heart Disease Mother     Heart Disease Father     Asthma Neg Hx     Cancer Neg Hx     Diabetes Neg Hx     Emphysema Neg Hx     Hypertension Neg Hx     Heart Failure Neg Hx        Objective:       PHYSICAL EXAM:      Vitals:   Vitals:    11/04/21 1025 11/04/21 1030 11/04/21 1045 11/04/21 1138   BP: 122/79 (!) 140/84 (!) 140/84 (!) 124/96   Pulse: 98 119  118   Resp: 20 22  26   Temp:  97 °F (36.1 °C)  97 °F (36.1 °C)   TempSrc:    Temporal   SpO2: 94% 94%  93%   Weight:       Height:           Physical Exam  Vitals and nursing note reviewed. Constitutional:       General: She is not in acute distress. Appearance: She is well-developed. She is not diaphoretic. Comments: Appears tired and anxious. HENT:      Head: Normocephalic. Right Ear: External ear normal.      Left Ear: External ear normal.      Nose: Nose normal.   Eyes:      General: No scleral icterus. Right eye: No discharge. Left eye: No discharge. Conjunctiva/sclera: Conjunctivae normal.      Pupils: Pupils are equal, round, and reactive to light. Cardiovascular:      Rate and Rhythm: Normal rate and regular rhythm. Heart sounds: No murmur heard. No friction rub. Pulmonary:      Effort: Pulmonary effort is normal.      Breath sounds: No stridor. No wheezing or rales. Chest:      Chest wall: No tenderness. Abdominal:      Palpations: Abdomen is soft. There is no mass. Tenderness: There is no abdominal tenderness. There is no guarding or rebound. Musculoskeletal:         General: No tenderness. Cervical back: Normal range of motion and neck supple. Lymphadenopathy:      Cervical: No cervical adenopathy. Skin:     General: Skin is warm and dry. Findings: No erythema or rash. Neurological:      Mental Status: She is alert and oriented to person, place, and time. Motor: No abnormal muscle tone. Psychiatric:         Judgment: Judgment normal.            *    Lines and drains: All vascular access sites are healthy with no local erythema, discharge or tenderness.       Intake and output:    I/O last 3 completed shifts: In: 2763 [P.O.:240; I.V.:1600; Blood:270; NG/GT:2500; IV Piggyback:150]  Out: 2450 [Urine:1450; Stool:1000]    Lab Data:   All available labs and old records have been reviewed by me. CBC:  Recent Labs     11/02/21  1109 11/02/21  2335 11/03/21  0748 11/03/21  0748 11/03/21  1630 11/03/21  2345 11/04/21  0601   WBC 21.8*  --  16.9*  --   --   --   --    RBC 2.61*  --  2.42*  --   --   --   --    HGB 7.2*   < > 6.7*   < > 7.6* 7.6* 7.4*   HCT 22.8*   < > 21.4*   < > 23.5* 23.4* 22.8*     --  239  --   --   --   --    MCV 87.5  --  88.5  --   --   --   --    MCH 27.7  --  27.6  --   --   --   --    MCHC 31.7  --  31.2  --   --   --   --    RDW 18.2*  --  18.9*  --   --   --   --    BANDSPCT 3  --   --   --   --   --   --     < > = values in this interval not displayed. BMP:  Recent Labs     11/02/21  1109 11/03/21  0748   * 131*   K 5.2* 4.0    97*   CO2 15* 18*   BUN 84* 78*   CREATININE 3.0* 3.0*   CALCIUM 8.0* 7.4*   GLUCOSE 110* 103*        Hepatic Function Panel:   Lab Results   Component Value Date    ALKPHOS 93 11/01/2021    ALT 19 11/01/2021    AST 26 11/01/2021    PROT 5.8 11/01/2021    PROT 7.6 06/03/2010    BILITOT 0.7 11/01/2021    LABALBU 2.2 11/01/2021       CPK: No results found for: CKTOTAL  ESR: No results found for: SEDRATE  CRP: No results found for: CRP        Imaging: All pertinent images and reports for the current visit were reviewed by me during this visit. XR CHEST PORTABLE   Final Result   Nasogastric tube is in normal position. Vascular congestion. Stable bilateral pleural effusions with associated atelectasis or airspace   disease. CT CHEST ABDOMEN PELVIS WO CONTRAST   Final Result   Small bilateral pleural effusions are seen with adjacent consolidation at the   lung bases, likely atelectasis in the absence of clinical signs of pneumonia. Septal thickening is seen, compatible with fluid overload. Mildly enlarged precarinal node, likely reactive due to the suspected fluid   overload. Recommend 3 month follow-up chest CT for further characterization. Scattered colonic diverticula are seen. No significant pericolonic fat   stranding. Body wall anasarca with trace free fluid in pelvis, compatible with mild   fluid overload         XR CHEST PORTABLE   Final Result   Interval resolution of pulmonary edema. Fluid in the minor fissure, and   suspected bilateral pleural effusions. Left basilar airspace disease likely   atelectasis             Medications: All current and past medications were reviewed.  sodium chloride flush  5-40 mL IntraVENous 2 times per day    piperacillin-tazobactam  3,375 mg IntraVENous Q12H    Vitamin D  2 tablet Oral Daily    metoprolol succinate  50 mg Oral BID        sodium bicarbonate infusion 75 mL/hr at 11/03/21 2010    sodium chloride 5 mL/hr at 11/02/21 1717       sodium chloride flush, sodium chloride      Problem list:       Patient Active Problem List   Diagnosis Code    CAD (coronary artery disease) I25.10    Essential hypertension I10    ROBYN (obstructive sleep apnea) G47.33    Hypothyroid E03.9    Hyperlipidemia E78.5    Depression F32. A    GI bleeding K92.2    Acute blood loss anemia D62    GI bleed K92.2    Atrial fibrillation (HCC) I48.91    Severe sepsis (HCC) A41.9, R65.20    E coli bacteremia R78.81, B96.20    Supratherapeutic INR R79.1    Hyperkalemia E87.5    Normocytic normochromic anemia D64.9    Hyponatremia E87.1    Bilateral pleural effusion J90    Diverticulosis K57.90    Anasarca R60.1    Overweight (BMI 25.0-29. 9) E66.3    High anion gap metabolic acidosis B31.6    Elevated brain natriuretic peptide (BNP) level R79.89       Please note that this chart was generated using Dragon dictation software.  Although every effort was made to ensure the accuracy of this automated transcription, some errors in transcription may have occurred inadvertently. If you may need any clarification, please do not hesitate to contact me through EPIC or at the phone number provided below with my electronic signature. Any pictures or media included in this note were obtained after taking informed verbal consent from the patient and with their approval to include those in the patient's medical record.     Rhonda Brown MD, MPH  11/4/2021 , 1:59 PM   [facility]   [address] [place] [address], [place] [address] [address] [facility], [address] [place] [address]  Office: [phone]  Fax: [phone]  Clinic days:  Tuesday & Thursday

## 2021-11-04 NOTE — PROGRESS NOTES
Pt screaming out, unreasonable. Can not articulate what exactly is bothering her. Sister at bedside states pt \"abuses meds at home', when asked, stated to  her sister takes benzos at home. When this RN spoke to , stated she did not take benzos at home. Pt shaking and screaming, appears to be in a withdrawal. Hr 160s albeit chronic afib, RR 50-6-, Dr Deidre Cortez at bedside. Ordered one time dose ativan see mar.

## 2021-11-05 LAB
A/G RATIO: 0.8 (ref 1.1–2.2)
ALBUMIN SERPL-MCNC: 2.4 G/DL (ref 3.4–5)
ALP BLD-CCNC: 67 U/L (ref 40–129)
ALT SERPL-CCNC: 14 U/L (ref 10–40)
ANION GAP SERPL CALCULATED.3IONS-SCNC: 16 MMOL/L (ref 3–16)
ANION GAP SERPL CALCULATED.3IONS-SCNC: 16 MMOL/L (ref 3–16)
AST SERPL-CCNC: 20 U/L (ref 15–37)
BASOPHILS ABSOLUTE: 0 K/UL (ref 0–0.2)
BASOPHILS RELATIVE PERCENT: 0.2 %
BILIRUB SERPL-MCNC: 0.4 MG/DL (ref 0–1)
BUN BLDV-MCNC: 63 MG/DL (ref 7–20)
BUN BLDV-MCNC: 64 MG/DL (ref 7–20)
CALCIUM SERPL-MCNC: 7.8 MG/DL (ref 8.3–10.6)
CALCIUM SERPL-MCNC: 7.9 MG/DL (ref 8.3–10.6)
CHLORIDE BLD-SCNC: 100 MMOL/L (ref 99–110)
CHLORIDE BLD-SCNC: 102 MMOL/L (ref 99–110)
CO2: 23 MMOL/L (ref 21–32)
CO2: 24 MMOL/L (ref 21–32)
CREAT SERPL-MCNC: 2.6 MG/DL (ref 0.6–1.2)
CREAT SERPL-MCNC: 2.7 MG/DL (ref 0.6–1.2)
EOSINOPHILS ABSOLUTE: 0 K/UL (ref 0–0.6)
EOSINOPHILS RELATIVE PERCENT: 0.3 %
GFR AFRICAN AMERICAN: 20
GFR AFRICAN AMERICAN: 21
GFR NON-AFRICAN AMERICAN: 17
GFR NON-AFRICAN AMERICAN: 18
GLUCOSE BLD-MCNC: 107 MG/DL (ref 70–99)
GLUCOSE BLD-MCNC: 110 MG/DL (ref 70–99)
HCT VFR BLD CALC: 21.9 % (ref 36–48)
HCT VFR BLD CALC: 23.3 % (ref 36–48)
HCT VFR BLD CALC: 25.3 % (ref 36–48)
HEMOGLOBIN: 7 G/DL (ref 12–16)
HEMOGLOBIN: 7.4 G/DL (ref 12–16)
HEMOGLOBIN: 7.9 G/DL (ref 12–16)
INR BLD: 1.7 (ref 0.88–1.12)
LACTIC ACID, SEPSIS: 2.1 MMOL/L (ref 0.4–1.9)
LIPASE: 93 U/L (ref 13–60)
LYMPHOCYTES ABSOLUTE: 0.4 K/UL (ref 1–5.1)
LYMPHOCYTES RELATIVE PERCENT: 4.3 %
MAGNESIUM: 2.1 MG/DL (ref 1.8–2.4)
MCH RBC QN AUTO: 27.7 PG (ref 26–34)
MCH RBC QN AUTO: 28.5 PG (ref 26–34)
MCHC RBC AUTO-ENTMCNC: 31.2 G/DL (ref 31–36)
MCHC RBC AUTO-ENTMCNC: 32 G/DL (ref 31–36)
MCV RBC AUTO: 88.8 FL (ref 80–100)
MCV RBC AUTO: 89 FL (ref 80–100)
MONOCYTES ABSOLUTE: 0.3 K/UL (ref 0–1.3)
MONOCYTES RELATIVE PERCENT: 2.6 %
NEUTROPHILS ABSOLUTE: 9 K/UL (ref 1.7–7.7)
NEUTROPHILS RELATIVE PERCENT: 92.6 %
PDW BLD-RTO: 17.8 % (ref 12.4–15.4)
PDW BLD-RTO: 18 % (ref 12.4–15.4)
PLATELET # BLD: 256 K/UL (ref 135–450)
PLATELET # BLD: 319 K/UL (ref 135–450)
PMV BLD AUTO: 7.9 FL (ref 5–10.5)
PMV BLD AUTO: 7.9 FL (ref 5–10.5)
POTASSIUM REFLEX MAGNESIUM: 3 MMOL/L (ref 3.5–5.1)
POTASSIUM SERPL-SCNC: 3.6 MMOL/L (ref 3.5–5.1)
PROTHROMBIN TIME: 19.6 SEC (ref 9.9–12.7)
RBC # BLD: 2.61 M/UL (ref 4–5.2)
RBC # BLD: 2.85 M/UL (ref 4–5.2)
REASON FOR REJECTION: NORMAL
REJECTED TEST: NORMAL
SODIUM BLD-SCNC: 139 MMOL/L (ref 136–145)
SODIUM BLD-SCNC: 142 MMOL/L (ref 136–145)
TOTAL PROTEIN: 5.4 G/DL (ref 6.4–8.2)
TROPONIN: 0.02 NG/ML
WBC # BLD: 12.4 K/UL (ref 4–11)
WBC # BLD: 9.7 K/UL (ref 4–11)

## 2021-11-05 PROCEDURE — 6360000002 HC RX W HCPCS: Performed by: INTERNAL MEDICINE

## 2021-11-05 PROCEDURE — 2580000003 HC RX 258: Performed by: INTERNAL MEDICINE

## 2021-11-05 PROCEDURE — 83605 ASSAY OF LACTIC ACID: CPT

## 2021-11-05 PROCEDURE — 85025 COMPLETE CBC W/AUTO DIFF WBC: CPT

## 2021-11-05 PROCEDURE — 36592 COLLECT BLOOD FROM PICC: CPT

## 2021-11-05 PROCEDURE — 97166 OT EVAL MOD COMPLEX 45 MIN: CPT

## 2021-11-05 PROCEDURE — 85610 PROTHROMBIN TIME: CPT

## 2021-11-05 PROCEDURE — 85027 COMPLETE CBC AUTOMATED: CPT

## 2021-11-05 PROCEDURE — 92523 SPEECH SOUND LANG COMPREHEN: CPT

## 2021-11-05 PROCEDURE — 6370000000 HC RX 637 (ALT 250 FOR IP): Performed by: INTERNAL MEDICINE

## 2021-11-05 PROCEDURE — 84484 ASSAY OF TROPONIN QUANT: CPT

## 2021-11-05 PROCEDURE — 84439 ASSAY OF FREE THYROXINE: CPT

## 2021-11-05 PROCEDURE — 6360000002 HC RX W HCPCS: Performed by: HOSPITALIST

## 2021-11-05 PROCEDURE — 85014 HEMATOCRIT: CPT

## 2021-11-05 PROCEDURE — 97162 PT EVAL MOD COMPLEX 30 MIN: CPT

## 2021-11-05 PROCEDURE — 85018 HEMOGLOBIN: CPT

## 2021-11-05 PROCEDURE — P9047 ALBUMIN (HUMAN), 25%, 50ML: HCPCS | Performed by: HOSPITALIST

## 2021-11-05 PROCEDURE — 97530 THERAPEUTIC ACTIVITIES: CPT

## 2021-11-05 PROCEDURE — 2500000003 HC RX 250 WO HCPCS: Performed by: EMERGENCY MEDICINE

## 2021-11-05 PROCEDURE — 84443 ASSAY THYROID STIM HORMONE: CPT

## 2021-11-05 PROCEDURE — 83690 ASSAY OF LIPASE: CPT

## 2021-11-05 PROCEDURE — 92610 EVALUATE SWALLOWING FUNCTION: CPT

## 2021-11-05 PROCEDURE — 87040 BLOOD CULTURE FOR BACTERIA: CPT

## 2021-11-05 PROCEDURE — 80053 COMPREHEN METABOLIC PANEL: CPT

## 2021-11-05 PROCEDURE — 2500000003 HC RX 250 WO HCPCS: Performed by: INTERNAL MEDICINE

## 2021-11-05 PROCEDURE — 93005 ELECTROCARDIOGRAM TRACING: CPT | Performed by: EMERGENCY MEDICINE

## 2021-11-05 PROCEDURE — 99233 SBSQ HOSP IP/OBS HIGH 50: CPT | Performed by: INTERNAL MEDICINE

## 2021-11-05 PROCEDURE — 1200000000 HC SEMI PRIVATE

## 2021-11-05 PROCEDURE — 87150 DNA/RNA AMPLIFIED PROBE: CPT

## 2021-11-05 PROCEDURE — 93005 ELECTROCARDIOGRAM TRACING: CPT | Performed by: INTERNAL MEDICINE

## 2021-11-05 PROCEDURE — 83735 ASSAY OF MAGNESIUM: CPT

## 2021-11-05 RX ORDER — ALBUMIN (HUMAN) 12.5 G/50ML
25 SOLUTION INTRAVENOUS ONCE
Status: COMPLETED | OUTPATIENT
Start: 2021-11-05 | End: 2021-11-06

## 2021-11-05 RX ORDER — POTASSIUM CHLORIDE 7.45 MG/ML
10 INJECTION INTRAVENOUS PRN
Status: DISCONTINUED | OUTPATIENT
Start: 2021-11-05 | End: 2021-11-05

## 2021-11-05 RX ORDER — POTASSIUM CHLORIDE 7.45 MG/ML
10 INJECTION INTRAVENOUS
Status: COMPLETED | OUTPATIENT
Start: 2021-11-05 | End: 2021-11-05

## 2021-11-05 RX ORDER — POTASSIUM CHLORIDE 7.45 MG/ML
30 INJECTION INTRAVENOUS ONCE
Status: DISCONTINUED | OUTPATIENT
Start: 2021-11-05 | End: 2021-11-05 | Stop reason: ALTCHOICE

## 2021-11-05 RX ORDER — POTASSIUM BICARBONATE 25 MEQ/1
25 TABLET, EFFERVESCENT ORAL ONCE
Status: DISCONTINUED | OUTPATIENT
Start: 2021-11-05 | End: 2021-11-05 | Stop reason: SDUPTHER

## 2021-11-05 RX ORDER — LORAZEPAM 2 MG/ML
1 INJECTION INTRAMUSCULAR ONCE
Status: COMPLETED | OUTPATIENT
Start: 2021-11-05 | End: 2021-11-05

## 2021-11-05 RX ORDER — LABETALOL HYDROCHLORIDE 5 MG/ML
20 INJECTION, SOLUTION INTRAVENOUS ONCE
Status: COMPLETED | OUTPATIENT
Start: 2021-11-05 | End: 2021-11-05

## 2021-11-05 RX ORDER — METOPROLOL SUCCINATE 50 MG/1
50 TABLET, EXTENDED RELEASE ORAL 3 TIMES DAILY
Status: DISCONTINUED | OUTPATIENT
Start: 2021-11-06 | End: 2021-11-13

## 2021-11-05 RX ORDER — POTASSIUM CHLORIDE 20 MEQ/1
40 TABLET, EXTENDED RELEASE ORAL PRN
Status: DISCONTINUED | OUTPATIENT
Start: 2021-11-05 | End: 2021-11-05

## 2021-11-05 RX ADMIN — Medication 10 ML: at 09:03

## 2021-11-05 RX ADMIN — POTASSIUM CHLORIDE 10 MEQ: 7.46 INJECTION, SOLUTION INTRAVENOUS at 15:46

## 2021-11-05 RX ADMIN — POTASSIUM CHLORIDE 10 MEQ: 7.46 INJECTION, SOLUTION INTRAVENOUS at 13:43

## 2021-11-05 RX ADMIN — PIPERACILLIN AND TAZOBACTAM 3375 MG: 3; .375 INJECTION, POWDER, LYOPHILIZED, FOR SOLUTION INTRAVENOUS at 16:48

## 2021-11-05 RX ADMIN — ALBUMIN (HUMAN) 25 G: 0.25 INJECTION, SOLUTION INTRAVENOUS at 23:15

## 2021-11-05 RX ADMIN — Medication 2000 UNITS: at 09:03

## 2021-11-05 RX ADMIN — Medication 10 ML: at 21:12

## 2021-11-05 RX ADMIN — PIPERACILLIN AND TAZOBACTAM 3375 MG: 3; .375 INJECTION, POWDER, LYOPHILIZED, FOR SOLUTION INTRAVENOUS at 02:48

## 2021-11-05 RX ADMIN — POTASSIUM CHLORIDE 10 MEQ: 7.46 INJECTION, SOLUTION INTRAVENOUS at 11:53

## 2021-11-05 RX ADMIN — SODIUM CHLORIDE 25 ML: 9 INJECTION, SOLUTION INTRAVENOUS at 02:47

## 2021-11-05 RX ADMIN — QUETIAPINE FUMARATE 50 MG: 25 TABLET ORAL at 21:05

## 2021-11-05 ASSESSMENT — ENCOUNTER SYMPTOMS
STRIDOR: 0
CHOKING: 0
EYE DISCHARGE: 0
NAUSEA: 0
DIARRHEA: 0
CHEST TIGHTNESS: 0
COLOR CHANGE: 0
SHORTNESS OF BREATH: 0
FACIAL SWELLING: 0
COUGH: 0
TROUBLE SWALLOWING: 0
APNEA: 0
ABDOMINAL PAIN: 0
RHINORRHEA: 0
BLOOD IN STOOL: 0
PHOTOPHOBIA: 0
EYE REDNESS: 0

## 2021-11-05 ASSESSMENT — PAIN SCALES - GENERAL
PAINLEVEL_OUTOF10: 0
PAINLEVEL_OUTOF10: 5
PAINLEVEL_OUTOF10: 0

## 2021-11-05 ASSESSMENT — PAIN DESCRIPTION - LOCATION: LOCATION: ABDOMEN

## 2021-11-05 NOTE — PROGRESS NOTES
Facility/Department: [facility] ICU  SLP Clinical Swallow Evaluation and Speech Language Cognitive Assessment     Patient: Jory Linton   : 1935   MRN: [id number]      Evaluation Date: 2021      Admitting Dx: Septicemia ([facility] [address] [A41.9]  GI bleed [K92.2]  Acute GI bleeding [K92.2]  JULIO (acute kidney injury) ([facility] [address] [N17.9]  Blood loss anemia [D50.0]  Atrial fibrillation with RVR ([facility] [address] [I48.91]  Supratherapeutic INR [R79.1]  Acute cystitis without hematuria [N30.00]  Pain: none reported                               H&P: \"Venessa Grant is a 82 y.o. female who presented with complaints of rectal bleed. Patient apparently started some bloody stool again today. Noted bright red blood. Some mixed with stool. 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 At that time no source of bleeding was found. Patient was on anticoagulation on Eliquis which she has been switched to Xarelto apparently. Patient also noted bruising easily. Poor p.o. intake just feeling weak and tired. No falls. No fevers or chills. Nothing that makes it better or worse. \"    Recent Chest xray/Chest CT:   Impression   Interval resolution of pulmonary edema.  Fluid in the minor fissure, and   suspected bilateral pleural effusions.  Left basilar airspace disease likely   atelectasis       History/Prior Level of Function:   Living Status: Private residence   Prior Dysphagia History: None per chart review  Prior Speech History: None per chart review      DYSPHAGIA BEDSIDE SWALLOW EVALUATION       Dysphagia Impressions/Dysphagia Diagnosis: Oropharyngeal Dysphagia   Pt laying in bed on 1L O2 via nasal cannula upon SLP entrance. Pt reports occasional difficulty with swallowing but she was unable to report any details. Oral mechanism examination revealed reduced labial/lingual ROM and coordination.  Trials of thin liquids (via cup edge and straw), mildly thick liquids, puree, and soft chair      SPEECH LANGUAGE COGNITIVE ASSESSMENT:     Speech Diagnosis:   Cognitive-Linguistic Deficits     Impressions:  Pt sitting upright in bed upon SLP entrance. Per pt's  she has been confused for the past month. Pt oriented to self; disoriented to date, location, or situation. Throughout the session pt unable to recall personal information when asked. Delayed auditory processing of basic and complex questions/commands observed. Pt dispalyed decreased verbal initiation and expression; unable to keep eyes open during session. Recommend ongoing assessment of cognitive-linguistic skills as able.        COMPREHENSION  Auditory Comprehension: []WFL []Mild   [x] Moderate  []Severe  []To be assessed  Impaired Basic questions  Impaired Complex questions  Impaired One step commands  Impaired Two step commands  Impaired Multi-step commands  Impaired Complex/Abstract commands  Impaired Conversation    EXPRESSION  Primary Mode of Expression: Verbal  Verbal Expression: []WFL []Mild   [x] Moderate  []Severe  [x]To be assessed  Impaired Initiation  Impaired Thought Organization      Pragmatics/Social Functioning: []WFL [x]Mild   [] Moderate  []Severe  []To be assessed  Impaired Eye contact  Impaired Topic maintenance  Flat Affect  Monotone      MOTOR SPEECH  Motor Speech: [x]WFL []Mild   [] Moderate  []Severe  []To be assessed   []Apraxia   []Dysarthria   []Decreased Intelligibility    VOICE  Voice: [x]WFL []Mild   [] Moderate  []Severe  []To be assessed      COGNITION  []Unable to be assessed secondary to Aphasia     Overall Orientation : []WFL []Mild   [x] Moderate  [x]Severe  []To be assessed   []Unable to be assessed secondary to Aphasia   Disoriented to time   Disoriented to situation   Disoriented to place   Oriented to self    Attention: []WFL []Mild   [x] Moderate  []Severe  []To be assessed  Impaired Selective Attention  Impaired Sustained Attention  Impaired Divided Attention    Memory: []WFL []Mild   [x] Moderate  []Severe  []To be assessed  Impaired Short-term Memory  Impaired Recall of New Learning   Impaired Immediate/working Memory    Problem Solving: []WFL []Mild   [] Moderate  []Severe  [x]To be assessed    Safety/Judgement: []WFL []Mild   [] Moderate  []Severe  [x]To be assessed    Plan of care: 3-5 times a week during acute care stay     Discharge Recommendations:  Recommend ongoing SLP for dysphagia and speech therapy upon discharge from hospital     EDUCATION:   Provided education regarding role of SLP, results of assessment, recommendations and general speech pathology plan of care. [x] Pt verbalized understanding and agreement   [x] Pt requires ongoing learning   [] No evidence of comprehension     GOALS:  Short Term Speech/Language/Cognitive Goals:   Pt will improve auditory processing/comprehension of commands and questions to 80%, via graded tasks   Pt will improve orientation and short term recall via graded tasks to 80%  Pt will improve verbal expression for functional expression via graded tasks to 80%  Pt will participate in ongoing cognitive assessment with goals to be established as indicated     If patient discharges prior to next visit, this note will serve as discharge. Time code minutes: 0 minutes   Total Time: 25 minutes     Electronically signed by:   Brenda Duggan M.A., [address] [place] [id number]  Speech-Language Pathologist    FAHEEM Cabral  Speech-Language Pathology Student    The speech-language pathologist was present, directed the patient's care, made skilled judgment and was responsible for assessment and treatment.

## 2021-11-05 NOTE — CARE COORDINATION
[facility] cannot accept pt due to [employer]" which pt is on Seroquel for. Yelling and screaming.      Electronically signed by Jess Pedro RN on 11/5/2021 at 4:46 PM

## 2021-11-05 NOTE — CARE COORDINATION
EZRA reached out to Mary Anne at [facility] to discuss starting the pre-cert for pt admission upon dc from acute hospital.  YUMI left for [facility] to return call.     Electronically signed by Janet Lizama RN on 11/5/2021 at 11:41 AM

## 2021-11-05 NOTE — CARE COORDINATION
Iqra Butler from [address] [place] [address] called to decline pt admission. CM to discuss additional options and make additional referrals. Electronically signed by Giovanna Mora RN on 11/5/2021 at 3:31 PM     CM Spoke to [facility]) to review SNF options and make selections. Referrals sent to [facility].      Electronically signed by Giovanna Mora RN on 11/5/2021 at 3:54 PM

## 2021-11-05 NOTE — PROGRESS NOTES
[place] or Facility: F From: Cady Garcia RE: Luis E Steele RM: 1135 pt HR has been 120-130 all day. increased now to 140. I have been unable to reach daytime hospitalist. please place orders for RVR. pt is not anticoagulated d/t GI bleed.  Need Callback: NO CALLBACK REQ ICU ROUTINE

## 2021-11-05 NOTE — PROGRESS NOTES
Fluarix, and 3 yrs and older Afluria) 10/20/2021       Known drug allergies: All allergies were reviewed and updated    Allergies   Allergen Reactions    Percocet [Oxycodone-Acetaminophen] Other (See Comments)     Pt states it makes her crazy. Social history:     Social History:  All social andepidemiologic history was reviewed and updated by me today as needed. · Tobacco use:   reports that she has never smoked. She has never used smokeless tobacco.  · Alcohol use:   reports current alcohol use of about 2.0 standard drinks of alcohol per week. · Currently lives in: [address] [place] [address]  ·  reports no history of drug use. COVID VACCINATION AND LAB RESULT RECORDS:     Internal Administration   First Dose      Second Dose           Last COVID Lab SARS-CoV-2, NAAT (no units)   Date Value   11/03/2021 Not Detected            Assessment:     The patient is a 82 y.o. old female who  has a past medical history of Atrial fibrillation ([address] CAD (coronary artery disease), Depression, Hyperlipidemia, Hypertension, Hypothyroid, Legal blindness, and Obstructive sleep apnea (adult) (pediatric). with following problems:    · Severe sepsis on admission with leukocytosis, hypotension, tachycardia, acute kidney injury-covered with Zosyn  · Gram-negative bacteremia with E. Coli-this is covered with Zosyn  · High anion gap metabolic acidosis-this is ongoing  · Elevated proBNP  · Acute kidney injury-serum creatinine is 2.7 today  · Mild hyperkalemia-this is being corrected  · Hyponatremia-being corrected  · Normocytic normochromic anemia-ongoing  · Bilateral pleural effusions  · Colon Diverticulosis  · Generalized anasarca  · Coronary artery disease-stable  · Essential hypertension-blood pressure okay  · Hyperlipidemia  · Obstructive sleep apnea  · Overweight due to excess calorie intake : Body mass index is 27.33 kg/m².-Counseling done      Discussion:      The patient is afebrile.   Blood cultures from 11/1/2021 were positive for E. coli. Serum creatinine is 2.7. White cell count is 12,400 today. It seems to have gone up        Plan:     Diagnostic Workup:    · I had ordered an EKG to be done this morning to assess QTc interval.  Please make sure it gets done  · Continue to follow  fever curve, WBC count and blood cultures. · Continue to monitor blood counts, liver and renal function. Antimicrobials:    · Will leave her on empiric IV Zosyn for now  · Continue to monitor her vitals closely  · Due to her prior prolonged QT interval and as patient is on Seroquel which also is a QT prolonging agent and her advanced age, I am avoiding switching to fluoroquinolones  · We will follow up on the culture results and clinical progress and will make further recommendations accordingly. · Continue close vitals monitoring. · Maintain good glycemic control. · Fall precautions. Aspiration precautions. · Continue to watch for new fever or diarrhea. · DVT prophylaxis. · Discussed all above with patient and RN. Drug Monitoring:    · Continue monitoring for antibiotic toxicity as follows: CBC, CMP   · Continue to watch for following: new or worsening fever, new hypotension, hives, lip swelling and redness or purulence at vascular access sites. I/v access Management:    · Continue to monitor i.v access sites for erythema, induration, discharge or tenderness. · As always, continue efforts to minimize tubes/lines/drains as clinically appropriate to reduce chances of line associated infections. Patient education and counseling:        · The patient was educated in detail about the side-effects of various antibiotics and things to watch for like new rashes, lip swelling, severe reaction, worsening diarrhea, break through fever etc.  · Discussed patient's condition and what to expect.  All of the patient's questions were addressed in a satisfactory manner and patient verbalized understanding all instructions. Level of complexity of visit: High     Weight loss counseling:    Extensive weight loss counseling was done. It is important to set a realistic weight loss goal. First goal should be to avoid gaining more weight and staying at current weight (or within 5 percent). People at high risk of developing diabetes who are able to lose 5 percent of their body weight and maintain this weight will reduce their risk of developing diabetes by about 50 percent and reduce their blood pressure. Losing more than 15 percent of  body weight and staying at this weight is an extremely good result, even if you never reach your \"dream\" or \"ideal\" weight. Lifestyle changes including changing eating habits, substituting excess carbohydrates with proteins, stress reduction, using self-help programs like Weight Watchers®, Overeaters Anonymous®, and Take Off Pounds Sensibly (TOPS)© , following DASH diet and increasing exercise or walking briskly daily for half hour to and hour 5-7 days a week was suggested among other measures. Information was given about various weight loss education programs and their websites like www.cdc.gov/healthyweight, www.choosemyplate.gov and www.health.gov/dietaryguidelines/    TIME SPENT TODAY:     - Spent over  36 minutes on visit (including interval history, physical exam, review of data including labs, cultures, imaging, development and implementation of treatment plan and coordination of complex care). More than 50 percent of this includes face-to-face time spent with the patient for counseling and coordination of care. Thank you for involving me in the care of your patient. I will continue to follow. If you have anyadditional questions, please do not hesitate to contact me. Subjective: Interval history: Interval history was obtained from chart review and patient/ RN. She is afebrile. He seems to be tolerating antibiotics okay.   No diarrhea     REVIEW OF SYSTEMS: Review of Systems   Constitutional: Positive for fatigue. Negative for chills, diaphoresis and unexpected weight change. HENT: Negative for congestion, ear discharge, ear pain, facial swelling, hearing loss, rhinorrhea and trouble swallowing. Eyes: Negative for photophobia, discharge, redness and visual disturbance. Respiratory: Negative for apnea, cough, choking, chest tightness, shortness of breath and stridor. Cardiovascular: Negative for chest pain and palpitations. Gastrointestinal: Negative for abdominal pain, blood in stool, diarrhea and nausea. Endocrine: Negative for polydipsia, polyphagia and polyuria. Genitourinary: Negative for difficulty urinating, dysuria, frequency, hematuria, menstrual problem and vaginal discharge. Musculoskeletal: Negative for arthralgias, joint swelling, myalgias and neck stiffness. Skin: Negative for color change and rash. Allergic/Immunologic: Negative for immunocompromised state. Neurological: Negative for dizziness, seizures, speech difficulty, light-headedness and headaches. Hematological: Negative for adenopathy. Psychiatric/Behavioral: Negative for agitation, hallucinations and suicidal ideas. Past Medical History: All past medical history reviewed today. Past Medical History:   Diagnosis Date    Atrial fibrillation ([facility] [address] [facility]     CAD (coronary artery disease)     Depression     Hyperlipidemia     Hypertension     Hypothyroid     Legal blindness     Obstructive sleep apnea (adult) (pediatric)        Past Surgical History: All past surgical history was reviewed today.     Past Surgical History:   Procedure Laterality Date    ANGIOPLASTY      COLONOSCOPY  11/16/2018    COLONOSCOPY POLYPECTOMY SNARE/COLD BIOPSY performed by Rom Mohamud MD at [facility] COLONOSCOPY N/A 10/20/2021    COLONOSCOPY POLYPECTOMY ABLATION performed by Boo Caal MD at [facility] COLONOSCOPY N/A 11/4/2021    COLONOSCOPY POLYPECTOMY ABLATION performed by Katie De Los Santos MD at [address]     CATARACT EXTRACTION W/ IOL    EYE SURGERY Left     CATARCT EXTRACTION W/ IOL    MS SIGMOIDOSCOPY FLX DX W/COLLJ SPEC BR/WA IF PFRMD N/A 10/19/2018    SIGMOIDOSCOPY DIAGNOSTIC FLEXIBLE performed by Kristen Isabel MD at [address]  10/19/2018    flexible    TONSILLECTOMY AND ADENOIDECTOMY      TUBAL LIGATION      UPPER GASTROINTESTINAL ENDOSCOPY N/A 10/20/2021    EGD BIOPSY performed by Roe Arrington MD at [id number] [facility]       Family History: All family history was reviewed today. Problem Relation Age of Onset    Heart Disease Mother     Heart Disease Father     Asthma Neg Hx     Cancer Neg Hx     Diabetes Neg Hx     Emphysema Neg Hx     Hypertension Neg Hx     Heart Failure Neg Hx        Objective:       PHYSICAL EXAM:      Vitals:   Vitals:    11/05/21 0500 11/05/21 0600 11/05/21 0756 11/05/21 0800   BP: (!) 141/114 (!) 174/70  (!) 141/69   Pulse: 114 109 126 130   Resp: 25 19 21 25   Temp:    96.4 °F (35.8 °C)   TempSrc:    Temporal   SpO2: 98% 97% (!) 88%    Weight:       Height:           Physical Exam  Vitals and nursing note reviewed. Constitutional:       General: She is not in acute distress. Appearance: She is well-developed. She is not diaphoretic. HENT:      Head: Normocephalic. Right Ear: External ear normal.      Left Ear: External ear normal.      Nose: Nose normal.   Eyes:      General: No scleral icterus. Right eye: No discharge. Left eye: No discharge. Conjunctiva/sclera: Conjunctivae normal.      Pupils: Pupils are equal, round, and reactive to light. Cardiovascular:      Rate and Rhythm: Normal rate and regular rhythm. Heart sounds: No murmur heard. No friction rub. Pulmonary:      Effort: Pulmonary effort is normal.      Breath sounds: No stridor. No wheezing or rales.    Chest: Chest wall: No tenderness. Abdominal:      Palpations: Abdomen is soft. There is no mass. Tenderness: There is no abdominal tenderness. There is no guarding or rebound. Musculoskeletal:         General: No tenderness. Cervical back: Normal range of motion and neck supple. Lymphadenopathy:      Cervical: No cervical adenopathy. Skin:     General: Skin is warm and dry. Findings: No erythema or rash. Neurological:      Mental Status: She is alert and oriented to person, place, and time. Motor: No abnormal muscle tone. Psychiatric:         Judgment: Judgment normal.            *    Lines and drains: All vascular access sites are healthy with no local erythema, discharge or tenderness. Intake and output:    I/O last 3 completed shifts: In: 156.2 [I.V.:100; IV Piggyback:56.2]  Out: 975 [Urine:975]    Lab Data:   All available labs and old records have been reviewed by me. CBC:  Recent Labs     11/03/21  0748 11/03/21  1630 11/05/21  0057 11/05/21  0523 11/05/21  1300   WBC 16.9*  --   --  9.7 12.4*   RBC 2.42*  --   --  2.61* 2.85*   HGB 6.7*   < > 7.0* 7.4* 7.9*   HCT 21.4*   < > 21.9* 23.3* 25.3*     --   --  256 319   MCV 88.5  --   --  89.0 88.8   MCH 27.6  --   --  28.5 27.7   MCHC 31.2  --   --  32.0 31.2   RDW 18.9*  --   --  17.8* 18.0*    < > = values in this interval not displayed. BMP:  Recent Labs     11/03/21  0748 11/05/21 0523   * 142   K 4.0 3.0*   CL 97* 102   CO2 18* 24   BUN 78* 64*   CREATININE 3.0* 2.7*   CALCIUM 7.4* 7.8*   GLUCOSE 103* 110*        Hepatic Function Panel:   Lab Results   Component Value Date    ALKPHOS 67 11/05/2021    ALT 14 11/05/2021    AST 20 11/05/2021    PROT 5.4 11/05/2021    PROT 7.6 06/03/2010    BILITOT 0.4 11/05/2021    LABALBU 2.4 11/05/2021       CPK: No results found for: CKTOTAL  ESR: No results found for: SEDRATE  CRP: No results found for: CRP        Imaging:     All pertinent images and reports for the current visit were reviewed by me during this visit. XR CHEST PORTABLE   Final Result   Nasogastric tube is in normal position. Vascular congestion. Stable bilateral pleural effusions with associated atelectasis or airspace   disease. CT CHEST ABDOMEN PELVIS WO CONTRAST   Final Result   Small bilateral pleural effusions are seen with adjacent consolidation at the   lung bases, likely atelectasis in the absence of clinical signs of pneumonia. Septal thickening is seen, compatible with fluid overload. Mildly enlarged precarinal node, likely reactive due to the suspected fluid   overload. Recommend 3 month follow-up chest CT for further characterization. Scattered colonic diverticula are seen. No significant pericolonic fat   stranding. Body wall anasarca with trace free fluid in pelvis, compatible with mild   fluid overload         XR CHEST PORTABLE   Final Result   Interval resolution of pulmonary edema. Fluid in the minor fissure, and   suspected bilateral pleural effusions. Left basilar airspace disease likely   atelectasis             Medications: All current and past medications were reviewed.  potassium bicarbonate  25 mEq Oral Once    QUEtiapine  50 mg Oral Nightly    sodium chloride flush  5-40 mL IntraVENous 2 times per day    piperacillin-tazobactam  3,375 mg IntraVENous Q12H    Vitamin D  2 tablet Oral Daily    metoprolol succinate  50 mg Oral BID        sodium chloride 25 mL (11/05/21 0247)       sodium chloride flush, sodium chloride      Problem list:       Patient Active Problem List   Diagnosis Code    CAD (coronary artery disease) I25.10    Essential hypertension I10    ROBYN (obstructive sleep apnea) G47.33    Hypothyroid E03.9    Hyperlipidemia E78.5    Depression F32. A    GI bleeding K92.2    Acute blood loss anemia D62    GI bleed K92.2    Atrial fibrillation (HCC) I48.91    Severe sepsis (HCC) A41.9, R65.20    E coli bacteremia R78.81, B96.20    Supratherapeutic INR R79.1    Hyperkalemia E87.5    Normocytic normochromic anemia D64.9    Hyponatremia E87.1    Bilateral pleural effusion J90    Diverticulosis K57.90    Anasarca R60.1    Overweight (BMI 25.0-29. 9) E66.3    High anion gap metabolic acidosis E59.5    Elevated brain natriuretic peptide (BNP) level R79.89       Please note that this chart was generated using Dragon dictation software. Although every effort was made to ensure the accuracy of this automated transcription, some errors in transcription may have occurred inadvertently. If you may need any clarification, please do not hesitate to contact me through EPIC or at the phone number provided below with my electronic signature. Any pictures or media included in this note were obtained after taking informed verbal consent from the patient and with their approval to include those in the patient's medical record.     Maye Cooper MD, MPH  11/5/2021 , 1:18 PM   [facility] Infectious Disease   [address] [place] [address], [address] [place] [address]  Office: [phone]  Fax: [phone]  Clinic days:  Tuesday & Thursday

## 2021-11-05 NOTE — PROGRESS NOTES
Perfect Serve to hospitalist:    [phone] Hospital or Facility: [facility] From: [facility]  RE: Keri Wheeler RM: [id number] pt c/o 9/10 pain. also c/o anxiety. does not take anything at home for either of these. can I have an order for anything?  Need Callback: NO CALLBACK REQ

## 2021-11-05 NOTE — PROGRESS NOTES
:    Lower GI hemorrhage - pt presented with red blood stools. her INR is elevated likely from being ill in addition to Eliquis. The cecal lesion seen 2 weeks ago may be responsible vs other angioectasias. She is anemic along with elevated INR requiring resuscitation with PRBC's and Vit K to start. colonoscopy 11/4 with cecal ulcer with nonbleeding pigmented protuberances from prior AVM cautery treatment treated with cautery and clips.     Acute Blood Loss Anemia - Hb 6.3 on admission, down from 8.8 on discharge last month. She has required 2 u prbc here. hgb stable at 7.4.     UTI, E.coli bacteremia    JULIO       PLAN :  - Advance diet - ok with solid food pending SLP recs  - ok to resume anticoagulation on 11/7 if no further bleeding    Discussed with Dr. Lino Haines, PA-C  [address] [place] [address] Drive  I have personally performed a face to face diagnostic evaluation on this patient. I have interviewed and examined the patient and I agree with the findings and recommended plan of care. In summary, my findings and plan are the following: As above, no further bleeding. Now passing old blood. Hb stable at 7. Agree with holding anticoag until 11/7. Diet as tolerated per SLP. Will follow from a distance. Please call with questions.     Yani Moreno, [address]  11/5/2021

## 2021-11-05 NOTE — PROGRESS NOTES
Physical/Occupational Therapy  Fabian Yu    Attempted to see pt for PT/OT evaluation. Patient currently with SLP. Will hold therapy at this time and will follow up with pt as schedule permits.  Thanks, Teodora Resendez, PT, DPT [id number], Ameya Sandhu M.Ed., OTR/L [id number]

## 2021-11-05 NOTE — PROGRESS NOTES
reviewed and appropriate for level of kidney function. All Nephrotoxic medications have been discontinued. Antibiotic doses are appropriate for level of renal function. Hold ACE inhibitors till renal functions improve. Anemia levels have worsened and continue with Iron and Aranesp as per protocol. High risk for worsening renal failure and need for dialysis if no improvement in the next 24-48 hrs. Deconditioned and  Nutritional status needs Improvement. Discussed with patient., Discussed with treating team. and  Discussed with RN   Multiple complex problems. , Patient at high risk-time spent 35 mins. and Patient is critically ill. and  Time spent 35 mins. EXAM  Vitals:    11/05/21 0800   BP: (!) 141/69   Pulse: 130   Resp: 25   Temp: 96.4 °F (35.8 °C)   SpO2:        Intake/Output Summary (Last 24 hours) at 11/5/2021 1152  Last data filed at 11/5/2021 0800  Gross per 24 hour   Intake 299.16 ml   Output 975 ml   Net -675.84 ml       Lethargic. Hypotensive. Less shortness of breath  External exam of the ears and nose are normal  HENT: exam is normal  Eyes: Pupils are equal, round, and reactive to light. Lymph Nodes. No axillary or cervical lymph nodes are palpable. Neck. JVD not visible. No lymph nodes palpable. CVS.  Heart sounds are normal.Palpation of the heart is normal. No murmurs. No pericardial rub.  RS.dullness on percussion of the lower chest wall. Bilateral Basal rales. PA soft , bowel sounds are normal no distension and no tenderness to palpation. Skin No rash , No palpable nodules  Musculoskeletal: Normal range of motion. 1+ edema and no tenderness. CNS  No focal    Edematrace   Lethargic.   All pulses are well felt      Active Problems:    Essential hypertension    ROBYN (obstructive sleep apnea)    GI bleed    Atrial fibrillation (HCC)    Severe sepsis (HCC)    E coli bacteremia    Supratherapeutic INR    Hyperkalemia    Normocytic normochromic anemia    Hyponatremia    Bilateral pleural effusion    Diverticulosis    Anasarca    Overweight (BMI 25.0-29. 9)    High anion gap metabolic acidosis    Elevated brain natriuretic peptide (BNP) level  Resolved Problems:    * No resolved hospital problems. *        Medications Reviewed by me   potassium bicarbonate  25 mEq Oral Once    potassium chloride  10 mEq IntraVENous Q1H    QUEtiapine  50 mg Oral Nightly    sodium chloride flush  5-40 mL IntraVENous 2 times per day    piperacillin-tazobactam  3,375 mg IntraVENous Q12H    Vitamin D  2 tablet Oral Daily    metoprolol succinate  50 mg Oral BID      sodium chloride 25 mL (11/05/21 0247)       Data Review. Labs reviewed by me       CBC:   Recent Labs     11/03/21  0748 11/03/21  1630 11/04/21  1830 11/05/21  0057 11/05/21 0523   WBC 16.9*  --   --   --  9.7   HGB 6.7*   < > 7.9* 7.0* 7.4*   HCT 21.4*   < > 24.9* 21.9* 23.3*   MCV 88.5  --   --   --  89.0     --   --   --  256    < > = values in this interval not displayed. BMP:   Recent Labs     11/03/21  0748 11/05/21 0523   * 142   K 4.0 3.0*   CL 97* 102   CO2 18* 24   BUN 78* 64*   CREATININE 3.0* 2.7*     Magnesium:   Lab Results   Component Value Date    MG 2.10 11/05/2021    MG 2.30 11/01/2021     Lab Results   Component Value Date    CREATININE 2.7 11/05/2021       Arterial Blood Gasses  No results for input(s): PH, PCO2, PO2 in the last 72 hours. Invalid input(s): Layla Burciaga    UA:  No results for input(s): NITRITE, COLORU, PHUR, LABCAST, WBCUA, RBCUA, MUCUS, TRICHOMONAS, YEAST, BACTERIA, CLARITYU, SPECGRAV, LEUKOCYTESUR, UROBILINOGEN, BILIRUBINUR, BLOODU, GLUCOSEU, AMORPHOUS in the last 72 hours.     Invalid input(s): Susie Weems    LIVER PROFILE:   Recent Labs     11/05/21 0523   AST 20   ALT 14   BILITOT 0.4   ALKPHOS 67     PT/INR:    Lab Results   Component Value Date    PROTIME 19.6 11/05/2021    PROTIME 16.6 11/04/2021    PROTIME 19.0 11/03/2021    INR 1.70 11/05/2021    INR 1.45 11/04/2021 INR 1.65 11/03/2021     PTT:    Lab Results   Component Value Date    APTT 32.1 11/01/2021     CANDE:  No results found for: ANATITER, CANDE  CHEMISTRY COMMON GROUP :   Lab Results   Component Value Date    GLUCOSE 110 11/05/2021    TSH 2.54 06/03/2010     Recent Labs     11/03/21  0748 11/05/21  0523   GLUCOSE 103* 110*   CALCIUM 7.4* 7.8*         RADIOLOGY:        Imaging Results. Chest X Ray reviwed by me    Chest Xray Reviewed by me  Renal Ultrasound Reviewed by me    EKG reviewed by me.                 Electronically Signed: Yuval Harvey MD 11/5/2021 11:52 AM

## 2021-11-05 NOTE — PROGRESS NOTES
Physical Therapy    Facility/Department: [facility] ICU  Initial Assessment    NAME: Jing Baez  : 1935  MRN: [id number]    Date of Service: 2021    Discharge Recommendations:  Jing Baez scored a 10/24 on the AM-PAC short mobility form. Current research shows that an AM-PAC score of 17 or less is typically not associated with a discharge to the patient's home setting. Based on the patient's AM-PAC score and their current functional mobility deficits, it is recommended that the patient have 3-5 sessions per week of Physical Therapy at d/c to increase the patient's independence. Please see assessment section for further patient specific details. If patient discharges prior to next session this note will serve as a discharge summary. Please see below for the latest assessment towards goals. 3-5 sessions per week   PT Equipment Recommendations  Equipment Needed: No    Assessment   Body structures, Functions, Activity limitations: Decreased functional mobility ; Decreased safe awareness;Decreased balance;Decreased endurance;Decreased strength  Assessment: Patient not at baseline function and would benefit from skilled PT to address above deficits and facilitate return to baseline function. Not safe to return to current home environment at this time. Treatment Diagnosis: decreased functional mobility, impaired gait, decreased endurance  Prognosis: Good  Decision Making: Medium Complexity  Clinical Presentation: evolving  PT Education: Goals;PT Role;Plan of Care  Patient Education: d/c recommendations - verbalized understanding  Barriers to Learning: visual  REQUIRES PT FOLLOW UP: Yes  Activity Tolerance  Activity Tolerance: Patient limited by endurance  Activity Tolerance: able to continue with frequent rest breaks       Patient Diagnosis(es): The primary encounter diagnosis was Acute GI bleeding.  Diagnoses of Blood loss anemia, Atrial fibrillation with RVR ([address] Acute cystitis without hematuria, Septicemia ([facility] [address] Supratherapeutic INR, and JULIO (acute kidney injury) ([facility] [address] were also pertinent to this visit. has a past medical history of Atrial fibrillation ([facility] [address] CAD (coronary artery disease), Depression, Hyperlipidemia, Hypertension, Hypothyroid, Legal blindness, and Obstructive sleep apnea (adult) (pediatric). has a past surgical history that includes Coronary artery bypass graft (1997); Tubal ligation; angioplasty; sigmoidoscopy (10/19/2018); pr sigmoidoscopy flx dx w/collj spec br/wa if pfrmd (N/A, 10/19/2018); eye surgery (Right); eye surgery (Left); Tonsillectomy and adenoidectomy; Colonoscopy (11/16/2018); Upper gastrointestinal endoscopy (N/A, 10/20/2021); Colonoscopy (N/A, 10/20/2021); and Colonoscopy (N/A, 11/4/2021). Restrictions  Restrictions/Precautions  Restrictions/Precautions: Fall Risk (medium fall risk, pureed, mildly thick liquids)  Required Braces or Orthoses?: No  Position Activity Restriction  Other position/activity restrictions: Kelley Freedman is a 82 y.o. female who presents to the emergency department with a chief complaint of some shortness of breath and general weakness. Her ex- who is taking care of her later presents and states she began to have some bloody stools again today. She was just discharged about a week ago for this and had EGD and colonoscopy that revealed no source of bleeding but she did have 2 clips placed over nonbleeding cecal AVMs. She is anticoagulated on Eliquis. She denies chest pain, abdominal pain, dysuria, hematuria, fevers, nausea, vomiting or any other symptoms. Patient is unsure of how many bloody stools she has had. Anticoagulant on Eliquis with history of atrial fibrillation.   Vision/Hearing  Vision: Impaired  Vision Exceptions: Legally blind (states minimal vision from R eye only - states clarity of vision fluctuates)  Hearing: Within functional limits     Subjective  General  Chart Reviewed: Yes  Family / Caregiver Present: No  Diagnosis: septicemia  Follows Commands: Within Functional Limits  Other (Comment): increased cues  General Comment  Comments: supine in bed upon arrival with alarm on  Subjective  Subjective: Reported 5/10 abdominal pain - nursing aware. Agreeable to therapy. Drowsy. Pain Screening  Patient Currently in Pain: Yes  Pain Assessment  Pain Assessment: 0-10  Pain Location: Abdomen  Vital Signs  Patient Currently in Pain: Yes       Orientation  Orientation  Overall Orientation Status: Within Functional Limits  Social/Functional History  Social/Functional History  Lives With:  (ex-)  Type of Home: House  Home Layout: One level  Home Access: Stairs to enter without rails  Entrance Stairs - Number of Steps: 1 REBEKA  Bathroom Shower/Tub: Tub/Shower unit  Bathroom Equipment: Shower chair  Bathroom Accessibility: Accessible  Home Equipment: Cane, Rolling walker  Receives Help From: Home health (2 hours/day 5 days per week)  ADL Assistance: Independent (typically independent, aid has been assisting lately)  Homemaking Responsibilities: No (ex- performs)  Ambulation Assistance: Independent (HHA in community due to visual deficits, typically independent in home, using RW for last 2 weeks)  Transfer Assistance: Independent  Active : No  Leisure & Hobbies: going to [employer]  Additional Comments: reports 2 recent falls. Reports 2 recent hospital stay and states getting progressively weaker for last month    Objective          AROM RLE (degrees)  RLE AROM: WFL  AROM LLE (degrees)  LLE AROM : WFL  Strength RLE  Strength RLE: WFL  Strength LLE  Strength LLE: WFL        Bed mobility  Supine to Sit: Maximum assistance (use of bed rail, log roll, increased time)  Scooting: Contact guard assistance  Transfers  Sit to Stand:  Moderate Assistance  Stand to sit: Moderate Assistance  Stand Pivot Transfers: 2 Person Assistance (mod A of 1, min A of 1, small shuffling steps to chair placed next to bed)

## 2021-11-05 NOTE — PROGRESS NOTES
Patient limited by fatigue  Safety Devices  Safety Devices in place: Yes  Type of devices: All fall risk precautions in place; Left in chair;Call light within reach;Nurse notified; Chair alarm in place; Patient at risk for falls;Gait belt           Patient Diagnosis(es): The primary encounter diagnosis was Acute GI bleeding. Diagnoses of Blood loss anemia, Atrial fibrillation with RVR ([facility] [address] Acute cystitis without hematuria, Septicemia ([facility] [address] Supratherapeutic INR, and JULIO (acute kidney injury) ([facility] [address] were also pertinent to this visit. has a past medical history of Atrial fibrillation ([facility] [address] CAD (coronary artery disease), Depression, Hyperlipidemia, Hypertension, Hypothyroid, Legal blindness, and Obstructive sleep apnea (adult) (pediatric). has a past surgical history that includes Coronary artery bypass graft (1997); Tubal ligation; angioplasty; sigmoidoscopy (10/19/2018); pr sigmoidoscopy flx dx w/collj spec br/wa if pfrmd (N/A, 10/19/2018); eye surgery (Right); eye surgery (Left); Tonsillectomy and adenoidectomy; Colonoscopy (11/16/2018); Upper gastrointestinal endoscopy (N/A, 10/20/2021); Colonoscopy (N/A, 10/20/2021); and Colonoscopy (N/A, 11/4/2021). Treatment Diagnosis: Decreased ADL status, functional mobility, endurance, balance, strength, and cognition associated with septicemia and recurrent GIB      Restrictions  Restrictions/Precautions  Restrictions/Precautions: Fall Risk (medium fall risk, pureed, mildly thick liquids)  Required Braces or Orthoses?: No  Position Activity Restriction  Other position/activity restrictions: Cheryl Glass is a 82 y.o. female who presents to the emergency department with a chief complaint of some shortness of breath and general weakness. Her ex- who is taking care of her later presents and states she began to have some bloody stools again today.   She was just discharged about a week ago for this and had EGD and colonoscopy that revealed no source of bleeding but she did have 2 clips placed over nonbleeding cecal AVMs. She is anticoagulated on Eliquis. She denies chest pain, abdominal pain, dysuria, hematuria, fevers, nausea, vomiting or any other symptoms. Patient is unsure of how many bloody stools she has had. Anticoagulant on Eliquis with history of atrial fibrillation. Subjective   General  Chart Reviewed: Yes  Family / Caregiver Present: No (Pt's ex-, Santos White, left at beginning of session.)  Diagnosis: Septicemia, recurrent GIB  Subjective  Subjective: Pt supine in bed on arrival, agreeable to OT eval. Pt reports 5/10 pain in abdomen. Patient Currently in Pain: Yes  Pain Assessment  Pain Assessment: 0-10  Pain Level: 5  Pain Location: Abdomen  Pre Treatment Pain Screening  Intervention List: Patient able to continue with treatment;Nurse/Physician notified  Vital Signs  Patient Currently in Pain: Yes  Social/Functional History  Social/Functional History  Lives With:  (ex-)  Type of Home: House  Home Layout: One level  Home Access: Stairs to enter without rails  Entrance Stairs - Number of Steps: 1 REBEKA  Bathroom Shower/Tub: Tub/Shower unit  Bathroom Equipment: Shower chair  Bathroom Accessibility: Accessible  Home Equipment: Cane, Rolling walker  Receives Help From: Home health (2 hours/day 5 days per week)  ADL Assistance: Independent (typically independent, aid has been assisting lately)  Homemaking Responsibilities: No (ex- performs)  Ambulation Assistance: Independent (HHA in community due to visual deficits, typically independent in home, using RW for last 2 weeks)  Transfer Assistance: Independent  Active : No  Leisure & Hobbies: going to [employer]  Additional Comments: reports 2 recent falls.  Reports 2 recent hospital stay and states getting progressively weaker for last month       Objective   Vision: Impaired  Vision Exceptions: Legally blind (states minimal vision from R eye only - states clarity of vision fluctuates)  Hearing: Within functional limits    Orientation  Overall Orientation Status: Impaired  Orientation Level: Disoriented to time;Oriented to person;Oriented to place; Disoriented to situation        ADL  Grooming: Moderate assistance (S/U, S to wash face, D to comb hair)  LE Dressing: Dependent/Total (don socks)        Bed mobility  Supine to Sit: Maximum assistance (use of bed rail, log roll, increased time)  Scooting: Contact guard assistance  Transfers  Stand Pivot Transfers: Dependent/Total;2 Person assistance (Mod A of 1 & Min A of 1)  Sit to stand: Moderate assistance  Stand to sit: 2 Person assistance;Dependent/Total (Mod A of 1 & Min A of 1)     Cognition  Overall Cognitive Status: Exceptions  Arousal/Alertness: Appropriate responses to stimuli  Following Commands: Follows one step commands with increased time  Attention Span: Appears intact  Memory: Decreased recall of recent events  Initiation: Does not require cues  Sequencing: Requires cues for some (Pt has poor vision.)  Cognition Comment: Pt is disoriented to time.   Perception  Overall Perceptual Status: WFL              LUE AROM (degrees)  LUE AROM : WFL  Left Hand AROM (degrees)  Left Hand AROM: WFL  RUE AROM (degrees)  RUE AROM : WFL  Right Hand AROM (degrees)  Right Hand AROM: WFL  LUE Strength  Gross LUE Strength: WFL  L Hand General: 4-/5 (grossly)  RUE Strength  Gross RUE Strength: WFL  R Hand General: 4-/5 (grossly)                   Plan   Plan  Times per week: 3-5  Times per day: Daily  Current Treatment Recommendations: Safety Education & Training, Self-Care / ADL, Endurance Training, Functional Mobility Training, Cognitive Reorientation, Balance Training, Strengthening    AM-PAC Score        AM-[facility] Inpatient Daily Activity Raw Score: 11 (11/05/21 1413)  AM-PAC Inpatient ADL T-Scale Score : 29.04 (11/05/21 1413)  ADL Inpatient CMS 0-100% Score: 70.42 (11/05/21 1413)  ADL Inpatient CMS G-Code Modifier : CL (11/05/21 1413)    Goals  Short term goals  Time Frame for Short term goals: Discharge  Short term goal 1: Min A supine to sit in preparation for ADL activity  Short term goal 2: Min A for functional transfers to ADL surfaces  Short term goal 3: Min A for UB bathing/dressing  Short term goal 4: Mod A for LB bathing/dressing w/AE as needed  Short term goal 5: Pt to tolerate standing 2-4 min for ADL activity/functional mobility       Therapy Time   Individual Concurrent Group Co-treatment   Time In 1310         Time Out 1350         Minutes 40               Timed Code Treatment Minutes:  25 Minutes    Total Treatment Minutes:  40 min    C/ Araceli [address], OT   Ana Packer., OTR/L, [id number]

## 2021-11-06 LAB
ANION GAP SERPL CALCULATED.3IONS-SCNC: 15 MMOL/L (ref 3–16)
ANISOCYTOSIS: ABNORMAL
BACTERIA: ABNORMAL /HPF
BASOPHILS ABSOLUTE: 0 K/UL (ref 0–0.2)
BASOPHILS RELATIVE PERCENT: 0 %
BILIRUBIN URINE: NEGATIVE
BLOOD CULTURE, ROUTINE: ABNORMAL
BLOOD CULTURE, ROUTINE: NORMAL
BLOOD, URINE: NEGATIVE
BUN BLDV-MCNC: 62 MG/DL (ref 7–20)
CALCIUM SERPL-MCNC: 7.9 MG/DL (ref 8.3–10.6)
CHLORIDE BLD-SCNC: 104 MMOL/L (ref 99–110)
CLARITY: ABNORMAL
CO2: 23 MMOL/L (ref 21–32)
COLOR: YELLOW
COMMENT UA: ABNORMAL
CREAT SERPL-MCNC: 2.8 MG/DL (ref 0.6–1.2)
CULTURE, BLOOD 2: ABNORMAL
CULTURE, BLOOD 2: ABNORMAL
EKG ATRIAL RATE: 122 BPM
EKG ATRIAL RATE: 129 BPM
EKG DIAGNOSIS: NORMAL
EKG DIAGNOSIS: NORMAL
EKG Q-T INTERVAL: 260 MS
EKG Q-T INTERVAL: 274 MS
EKG QRS DURATION: 104 MS
EKG QRS DURATION: 108 MS
EKG QTC CALCULATION (BAZETT): 379 MS
EKG QTC CALCULATION (BAZETT): 398 MS
EKG R AXIS: 27 DEGREES
EKG R AXIS: 28 DEGREES
EKG T AXIS: 201 DEGREES
EKG T AXIS: 216 DEGREES
EKG VENTRICULAR RATE: 127 BPM
EKG VENTRICULAR RATE: 128 BPM
EOSINOPHILS ABSOLUTE: 0 K/UL (ref 0–0.6)
EOSINOPHILS RELATIVE PERCENT: 0 %
EPITHELIAL CELLS, UA: 1 /HPF (ref 0–5)
GFR AFRICAN AMERICAN: 19
GFR NON-AFRICAN AMERICAN: 16
GLUCOSE BLD-MCNC: 170 MG/DL (ref 70–99)
GLUCOSE URINE: NEGATIVE MG/DL
HCT VFR BLD CALC: 24.9 % (ref 36–48)
HEMOGLOBIN: 7.9 G/DL (ref 12–16)
HYALINE CASTS: 2 /LPF (ref 0–8)
INR BLD: 1.78 (ref 0.88–1.12)
KETONES, URINE: NEGATIVE MG/DL
LEUKOCYTE ESTERASE, URINE: ABNORMAL
LYMPHOCYTES ABSOLUTE: 1 K/UL (ref 1–5.1)
LYMPHOCYTES RELATIVE PERCENT: 10 %
MAGNESIUM: 2.2 MG/DL (ref 1.8–2.4)
MCH RBC QN AUTO: 28.8 PG (ref 26–34)
MCHC RBC AUTO-ENTMCNC: 31.9 G/DL (ref 31–36)
MCV RBC AUTO: 90.4 FL (ref 80–100)
MICROSCOPIC EXAMINATION: YES
MONOCYTES ABSOLUTE: 0.2 K/UL (ref 0–1.3)
MONOCYTES RELATIVE PERCENT: 2 %
NEUTROPHILS ABSOLUTE: 8.4 K/UL (ref 1.7–7.7)
NEUTROPHILS RELATIVE PERCENT: 88 %
NITRITE, URINE: NEGATIVE
ORGANISM: ABNORMAL
PDW BLD-RTO: 18 % (ref 12.4–15.4)
PH UA: 5 (ref 5–8)
PLATELET # BLD: 289 K/UL (ref 135–450)
PLATELET SLIDE REVIEW: ADEQUATE
PMV BLD AUTO: 7.6 FL (ref 5–10.5)
POLYCHROMASIA: ABNORMAL
POTASSIUM REFLEX MAGNESIUM: 3.2 MMOL/L (ref 3.5–5.1)
PROTEIN UA: 30 MG/DL
PROTHROMBIN TIME: 20.6 SEC (ref 9.9–12.7)
RBC # BLD: 2.75 M/UL (ref 4–5.2)
RBC UA: ABNORMAL /HPF (ref 0–4)
SLIDE REVIEW: ABNORMAL
SODIUM BLD-SCNC: 142 MMOL/L (ref 136–145)
SPECIFIC GRAVITY UA: 1.02 (ref 1–1.03)
T4 FREE: 0.6 NG/DL (ref 0.9–1.8)
T4 FREE: 0.7 NG/DL (ref 0.9–1.8)
TSH REFLEX FT4: 20.16 UIU/ML (ref 0.27–4.2)
TSH REFLEX FT4: 21.06 UIU/ML (ref 0.27–4.2)
URINE REFLEX TO CULTURE: ABNORMAL
URINE TYPE: ABNORMAL
UROBILINOGEN, URINE: 0.2 E.U./DL
WBC # BLD: 9.5 K/UL (ref 4–11)
WBC UA: 6 /HPF (ref 0–5)

## 2021-11-06 PROCEDURE — 92526 ORAL FUNCTION THERAPY: CPT

## 2021-11-06 PROCEDURE — 85610 PROTHROMBIN TIME: CPT

## 2021-11-06 PROCEDURE — 80048 BASIC METABOLIC PNL TOTAL CA: CPT

## 2021-11-06 PROCEDURE — 84443 ASSAY THYROID STIM HORMONE: CPT

## 2021-11-06 PROCEDURE — 83735 ASSAY OF MAGNESIUM: CPT

## 2021-11-06 PROCEDURE — 2580000003 HC RX 258: Performed by: INTERNAL MEDICINE

## 2021-11-06 PROCEDURE — 99233 SBSQ HOSP IP/OBS HIGH 50: CPT | Performed by: INTERNAL MEDICINE

## 2021-11-06 PROCEDURE — 84439 ASSAY OF FREE THYROXINE: CPT

## 2021-11-06 PROCEDURE — 6370000000 HC RX 637 (ALT 250 FOR IP): Performed by: INTERNAL MEDICINE

## 2021-11-06 PROCEDURE — 81001 URINALYSIS AUTO W/SCOPE: CPT

## 2021-11-06 PROCEDURE — 6360000002 HC RX W HCPCS

## 2021-11-06 PROCEDURE — 2500000003 HC RX 250 WO HCPCS

## 2021-11-06 PROCEDURE — 6360000002 HC RX W HCPCS: Performed by: INTERNAL MEDICINE

## 2021-11-06 PROCEDURE — 6370000000 HC RX 637 (ALT 250 FOR IP): Performed by: EMERGENCY MEDICINE

## 2021-11-06 PROCEDURE — 6360000002 HC RX W HCPCS: Performed by: EMERGENCY MEDICINE

## 2021-11-06 PROCEDURE — 93010 ELECTROCARDIOGRAM REPORT: CPT | Performed by: INTERNAL MEDICINE

## 2021-11-06 PROCEDURE — 36592 COLLECT BLOOD FROM PICC: CPT

## 2021-11-06 PROCEDURE — 1200000000 HC SEMI PRIVATE

## 2021-11-06 PROCEDURE — 85025 COMPLETE CBC W/AUTO DIFF WBC: CPT

## 2021-11-06 PROCEDURE — 99291 CRITICAL CARE FIRST HOUR: CPT | Performed by: INTERNAL MEDICINE

## 2021-11-06 RX ORDER — METOPROLOL TARTRATE 5 MG/5ML
5 INJECTION INTRAVENOUS ONCE
Status: COMPLETED | OUTPATIENT
Start: 2021-11-06 | End: 2021-11-06

## 2021-11-06 RX ORDER — FUROSEMIDE 10 MG/ML
40 INJECTION INTRAMUSCULAR; INTRAVENOUS ONCE
Status: COMPLETED | OUTPATIENT
Start: 2021-11-06 | End: 2021-11-06

## 2021-11-06 RX ORDER — POTASSIUM CHLORIDE 7.45 MG/ML
10 INJECTION INTRAVENOUS ONCE
Status: COMPLETED | OUTPATIENT
Start: 2021-11-06 | End: 2021-11-06

## 2021-11-06 RX ORDER — METOPROLOL TARTRATE 5 MG/5ML
INJECTION INTRAVENOUS
Status: COMPLETED
Start: 2021-11-06 | End: 2021-11-06

## 2021-11-06 RX ORDER — FUROSEMIDE 10 MG/ML
INJECTION INTRAMUSCULAR; INTRAVENOUS
Status: COMPLETED
Start: 2021-11-06 | End: 2021-11-06

## 2021-11-06 RX ORDER — MORPHINE SULFATE 2 MG/ML
2 INJECTION, SOLUTION INTRAMUSCULAR; INTRAVENOUS ONCE
Status: COMPLETED | OUTPATIENT
Start: 2021-11-06 | End: 2021-11-06

## 2021-11-06 RX ORDER — MORPHINE SULFATE 2 MG/ML
INJECTION, SOLUTION INTRAMUSCULAR; INTRAVENOUS
Status: COMPLETED
Start: 2021-11-06 | End: 2021-11-06

## 2021-11-06 RX ORDER — POTASSIUM CHLORIDE 20 MEQ/1
40 TABLET, EXTENDED RELEASE ORAL ONCE
Status: COMPLETED | OUTPATIENT
Start: 2021-11-06 | End: 2021-11-06

## 2021-11-06 RX ORDER — DIGOXIN 0.25 MG/ML
250 INJECTION INTRAMUSCULAR; INTRAVENOUS ONCE
Status: COMPLETED | OUTPATIENT
Start: 2021-11-06 | End: 2021-11-06

## 2021-11-06 RX ORDER — LEVOTHYROXINE SODIUM 0.03 MG/1
25 TABLET ORAL DAILY
Status: DISCONTINUED | OUTPATIENT
Start: 2021-11-07 | End: 2021-11-06

## 2021-11-06 RX ADMIN — POTASSIUM CHLORIDE 40 MEQ: 20 TABLET, EXTENDED RELEASE ORAL at 18:45

## 2021-11-06 RX ADMIN — METOPROLOL TARTRATE 5 MG: 5 INJECTION INTRAVENOUS at 16:15

## 2021-11-06 RX ADMIN — PIPERACILLIN AND TAZOBACTAM 3375 MG: 3; .375 INJECTION, POWDER, LYOPHILIZED, FOR SOLUTION INTRAVENOUS at 01:50

## 2021-11-06 RX ADMIN — MORPHINE SULFATE 2 MG: 2 INJECTION, SOLUTION INTRAMUSCULAR; INTRAVENOUS at 16:19

## 2021-11-06 RX ADMIN — Medication 10 ML: at 21:23

## 2021-11-06 RX ADMIN — METOPROLOL SUCCINATE 50 MG: 50 TABLET, EXTENDED RELEASE ORAL at 09:34

## 2021-11-06 RX ADMIN — DIGOXIN 250 MCG: 250 INJECTION, SOLUTION INTRAMUSCULAR; INTRAVENOUS; PARENTERAL at 11:35

## 2021-11-06 RX ADMIN — FUROSEMIDE 40 MG: 10 INJECTION, SOLUTION INTRAMUSCULAR; INTRAVENOUS at 16:16

## 2021-11-06 RX ADMIN — QUETIAPINE FUMARATE 50 MG: 25 TABLET ORAL at 21:23

## 2021-11-06 RX ADMIN — Medication 2000 UNITS: at 09:34

## 2021-11-06 RX ADMIN — Medication 1000 MG: at 16:29

## 2021-11-06 RX ADMIN — Medication 10 ML: at 11:35

## 2021-11-06 RX ADMIN — METOPROLOL SUCCINATE 50 MG: 50 TABLET, EXTENDED RELEASE ORAL at 21:23

## 2021-11-06 RX ADMIN — FUROSEMIDE 40 MG: 10 INJECTION INTRAMUSCULAR; INTRAVENOUS at 16:16

## 2021-11-06 RX ADMIN — POTASSIUM CHLORIDE 10 MEQ: 7.46 INJECTION, SOLUTION INTRAVENOUS at 12:43

## 2021-11-06 ASSESSMENT — ENCOUNTER SYMPTOMS
TROUBLE SWALLOWING: 0
CHEST TIGHTNESS: 0
APNEA: 0
ABDOMINAL PAIN: 0
STRIDOR: 0
RHINORRHEA: 0
CHOKING: 0
SHORTNESS OF BREATH: 0
PHOTOPHOBIA: 0
EYE REDNESS: 0
NAUSEA: 0
EYE DISCHARGE: 0
BLOOD IN STOOL: 0
COUGH: 0
COLOR CHANGE: 0
FACIAL SWELLING: 0
DIARRHEA: 0

## 2021-11-06 ASSESSMENT — PAIN DESCRIPTION - FREQUENCY: FREQUENCY: INTERMITTENT

## 2021-11-06 ASSESSMENT — PAIN DESCRIPTION - PAIN TYPE: TYPE: ACUTE PAIN

## 2021-11-06 ASSESSMENT — PAIN DESCRIPTION - LOCATION: LOCATION: CHEST

## 2021-11-06 ASSESSMENT — PAIN DESCRIPTION - ORIENTATION: ORIENTATION: MID

## 2021-11-06 ASSESSMENT — PAIN SCALES - GENERAL
PAINLEVEL_OUTOF10: 0
PAINLEVEL_OUTOF10: 5
PAINLEVEL_OUTOF10: 0

## 2021-11-06 ASSESSMENT — PAIN DESCRIPTION - PROGRESSION
CLINICAL_PROGRESSION: GRADUALLY IMPROVING

## 2021-11-06 ASSESSMENT — PAIN DESCRIPTION - DESCRIPTORS: DESCRIPTORS: DISCOMFORT

## 2021-11-06 ASSESSMENT — PAIN DESCRIPTION - ONSET: ONSET: UNABLE TO TELL

## 2021-11-06 NOTE — PROGRESS NOTES
Sharmon Lamas, MD Lorrine Lesch, MD Aron Redbird, MD                                  Office: [phone]                 Fax: [phone]          [employer]                  NEPHROLOGY  ICU  INPATIENT PROGRESS NOTE:     PATIENT NAME: Allan Turpin  : 1935  MRN: [id number]            Subjective:   Patient is weak and lethargic. Some confusion  Episodes of rapid rate and low bp needing bolus. BP now better. Assessment and Plan   JULIO on CKD 3bsevere. Baseline crea 1.4. Non-oliguric. Crea about the same today. UA bland. Hypotension. Improved. Follow DBP since higher. Hypovolemia. Improved  Hypokalemia-replace  Hypocalcemia- asymptomatic. Anemia of acute blood loss. follow Hgb. Metabolic acidosis. Improved. Hypoalbuminemia  E. coli sepsis WBC count 21,000-on Zosyn. High risk. Seen in ICU. Discussed with nurse. Objective:  EXAM  Vitals:    21 0800   BP: 134/74   Pulse: 129   Resp: 26   Temp: 96.8 °F (36 °C)   SpO2: 94%       Intake/Output Summary (Last 24 hours) at 2021 1134  Last data filed at 2021 0400  Gross per 24 hour   Intake 1416.38 ml   Output 775 ml   Net 641.38 ml       External exam of the ears and nose are normal  HENT: exam is normal  Eyes: Pupils are equal, round  CVS.  Heart sounds are normal  RS. Rhonchi b/l   PA soft , bowel sounds are normal no distension and no tenderness to palpation. Skin No rash , No palpable nodules  Musculoskeletal: Normal range of motion. 1+ edema and no tenderness. Active Problems:    Essential hypertension    ROBYN (obstructive sleep apnea)    GI bleed    Atrial fibrillation (HCC)    Severe sepsis (HCC)    E coli bacteremia    Supratherapeutic INR    Hyperkalemia    Normocytic normochromic anemia    Hyponatremia    Bilateral pleural effusion    Diverticulosis    Anasarca    Overweight (BMI 25.0-29. 9)    High anion gap metabolic acidosis    Elevated brain natriuretic peptide (BNP) level  Resolved Problems:    * No resolved hospital problems. *        Medications Reviewed by me  [facility] digoxin  250 mcg IntraVENous Once    potassium chloride  10 mEq IntraVENous Once    metoprolol succinate  50 mg Oral TID    QUEtiapine  50 mg Oral Nightly    sodium chloride flush  5-40 mL IntraVENous 2 times per day    piperacillin-tazobactam  3,375 mg IntraVENous Q12H    Vitamin D  2 tablet Oral Daily      vasopressin (Septic Shock) infusion Stopped (11/05/21 2322)    sodium chloride Stopped (11/05/21 2240)       Data Review. Labs reviewed by me       CBC:   Recent Labs     11/05/21 0523 11/05/21 1300 11/06/21 0445   WBC 9.7 12.4* 9.5   HGB 7.4* 7.9* 7.9*   HCT 23.3* 25.3* 24.9*   MCV 89.0 88.8 90.4    319 289     BMP:   Recent Labs     11/05/21 0523 11/05/21 1300 11/06/21 0445    139 142   K 3.0* 3.6 3.2*    100 104   CO2 24 23 23   BUN 64* 63* 62*   CREATININE 2.7* 2.6* 2.8*     Magnesium:   Lab Results   Component Value Date    MG 2.20 11/06/2021    MG 2.10 11/05/2021    MG 2.30 11/01/2021     Lab Results   Component Value Date    CREATININE 2.8 11/06/2021       Arterial Blood Gasses  No results for input(s): PH, PCO2, PO2 in the last 72 hours.     Invalid input(s): [id number], INSPIREDO2    UA:  Recent Labs     11/06/21 0445   COLORU YELLOW   PHUR 5.0   WBCUA 6*   RBCUA 0-2   BACTERIA RARE*   CLARITYU CLOUDY*   SPECGRAV 1.018   LEUKOCYTESUR SMALL*   UROBILINOGEN 0.2   BILIRUBINUR Negative   BLOODU Negative   GLUCOSEU Negative       LIVER PROFILE:   Recent Labs     11/05/21 0523 11/05/21 2245   AST 20  --    ALT 14  --    LIPASE  --  93.0*   BILITOT 0.4  --    ALKPHOS 67  --      PT/INR:    Lab Results   Component Value Date    PROTIME 20.6 11/06/2021    PROTIME 19.6 11/05/2021    PROTIME 16.6 11/04/2021    INR 1.78 11/06/2021    INR 1.70 11/05/2021    INR 1.45 11/04/2021     PTT:    Lab Results   Component Value Date    APTT 32.1 11/01/2021     CANDE:  No results found for: CANDE COELLO  CHEMISTRY COMMON GROUP :   Lab Results   Component Value Date    GLUCOSE 170 11/06/2021    TSH 2.54 06/03/2010     Recent Labs     11/05/21  0523 11/05/21  1300 11/06/21  0445   GLUCOSE 110* 107* 170*   CALCIUM 7.8* 7.9* 7.9*         RADIOLOGY:        Imaging Results.   Chest X Ray reviewed by me      Electronically Signed: Paola Lugo MD 11/6/2021 11:34 AM

## 2021-11-06 NOTE — PROGRESS NOTES
Pt in resp distress, O2 sats in high 70's, low 80's. O2 increased to 15L, NRB placed over top of NC. 5 mg metoprolol, 40 mg of lasix and 2 mg of morphine administered.

## 2021-11-06 NOTE — PROGRESS NOTES
Infectious Diseases   Progress Note      Admission Date: 11/1/2021  Hospital Day: Hospital Day: 6   Attending: Chace Horton DO  Date of service: 11/6/2021     Chief complaint/ Reason for consult:     · Severe sepsis on admission with leukocytosis, hypotension, tachycardia, acute kidney injury  · Gram-negative bacteremia with E. Coli  · High anion gap metabolic acidosis  · Elevated proBNP  · Mild hyperkalemia  · Hyponatremia  · Normocytic normochromic anemia  · Bilateral pleural effusions    Microbiology:        I have reviewed allavailable micro lab data and cultures    · Blood culture (2/2) - collected on 11/1/2021: E coli    Susceptibility     Escherichia coli     BACTERIAL SUSCEPTIBILITY PANEL BY ERIK     ampicillin >=32 mcg/mL Resistant     ceFAZolin <=4 mcg/mL Sensitive     cefepime <=0.12 mcg/mL Sensitive     cefTRIAXone <=0.25 mcg/mL Sensitive     ciprofloxacin <=0.25 mcg/mL Sensitive     ertapenem <=0.12 mcg/mL Sensitive     gentamicin <=1 mcg/mL Sensitive     levofloxacin 1 mcg/mL Sensitive     piperacillin-tazobactam <=4 mcg/mL Sensitive     trimethoprim-sulfamethoxazole >=320 mcg/mL Resistant        · Urine culture  - collected on 11/1/2021: > 100,000 cfu/ml of E coli    Susceptibility    Escherichia coli (1)    Antibiotic Interpretation ERIK Status    ampicillin Resistant >=32 mcg/mL     ceFAZolin Sensitive <=4 mcg/mL      NOTE: Cefazolin should only be used for uncomplicated UTI         for E.coli or Klebsiella pneumoniae.    cefepime Sensitive <=0.12 mcg/mL     cefTRIAXone Sensitive <=0.25 mcg/mL     ciprofloxacin Sensitive <=0.25 mcg/mL     ertapenem Sensitive <=0.12 mcg/mL     gentamicin Sensitive <=1 mcg/mL     levofloxacin Sensitive 1 mcg/mL     nitrofurantoin Sensitive <=16 mcg/mL     piperacillin-tazobactam Sensitive <=4 mcg/mL     trimethoprim-sulfamethoxazole Resistant >=320 mcg/mL         Antibiotics and immunizations:       Current antibiotics: All antibiotics and their doses were reviewed by me    Recent Abx Admin                   piperacillin-tazobactam (ZOSYN) 3,375 mg in dextrose 5 % 50 mL IVPB extended infusion (mini-bag) (mg) 3,375 mg New Bag 11/06/21 0150     3,375 mg New Bag 11/05/21 1648                  Immunization History: All immunization history was reviewed by me today. Immunization History   Administered Date(s) Administered    Influenza, High Dose (Fluzone 65 yrs and older) 10/27/2018    Influenza, Quadv, IM, PF (6 mo and older Fluzone, Flulaval, Fluarix, and 3 yrs and older Afluria) 10/20/2021       Known drug allergies: All allergies were reviewed and updated    Allergies   Allergen Reactions    Percocet [Oxycodone-Acetaminophen] Other (See Comments)     Pt states it makes her crazy. Social history:     Social History:  All social andepidemiologic history was reviewed and updated by me today as needed. · Tobacco use:   reports that she has never smoked. She has never used smokeless tobacco.  · Alcohol use:   reports current alcohol use of about 2.0 standard drinks of alcohol per week. · Currently lives in: [address] [place] [address]  ·  reports no history of drug use. COVID VACCINATION AND LAB RESULT RECORDS:     Internal Administration   First Dose      Second Dose           Last COVID Lab SARS-CoV-2, NAAT (no units)   Date Value   11/03/2021 Not Detected            Assessment:     The patient is a 82 y.o. old female who  has a past medical history of Atrial fibrillation ([address] CAD (coronary artery disease), Depression, Hyperlipidemia, Hypertension, Hypothyroid, Legal blindness, and Obstructive sleep apnea (adult) (pediatric).  with following problems:    · Severe sepsis on admission with leukocytosis, hypotension, tachycardia, acute kidney injury-resolved  · Gram-negative bacteremia with E. Coli-currently covered with Zosyn  · High anion gap metabolic acidosis-improved  · Elevated proBNP  · Acute kidney injury-nephrology following, creatinine has gone up slightly and is 2.8 today  · Mild hyperkalemia-this is being corrected  · Hyponatremia-being corrected  · Normocytic normochromic anemia-ongoing  · Bilateral pleural effusions  · Colon Diverticulosis  · Generalized anasarca  · Coronary artery disease-stable  · Essential hypertension-blood pressure is controlled  · Hyperlipidemia  · Obstructive sleep apnea  · Overweight due to excess calorie intake : Body mass index is 27.33 kg/m².-Counseling done      Discussion:      The patient is afebrile. White cell count 9500 today. She is on IV Zosyn. Blood cultures from 11/5/2021 are negative so far. Serum creatinine is 2.8 today. Susceptibility of E. coli isolated from the blood in the urine culture reviewed. The patient had twelve-lead EKG done yesterday. QTc interval was 398 ms. It has shown improvement. Plan:     Diagnostic Workup:      · Continue to follow  fever curve, WBC count and blood cultures. · Continue to monitor blood counts, liver and renal function. Antimicrobials:    · Will switch her antibiotic from IV Zosyn to IV ceftriaxone at a renally adjusted dose of 1 g every 24 hour  · Will see how she does on IV ceftriaxone  · If the patient continues to do well, will plan to switch her to oral antibiotic at discharge  · We will follow up on the culture results and clinical progress and will make further recommendations accordingly. · Continue close vitals monitoring. · Maintain good glycemic control. · Fall precautions. Aspiration precautions. · Continue to watch for new fever or diarrhea. · DVT prophylaxis. · Discussed all above with patient and RN. Drug Monitoring:    · Continue monitoring for antibiotic toxicity as follows: CBC, CMP   · Continue to watch for following: new or worsening fever, new hypotension, hives, lip swelling and redness or purulence at vascular access sites.      I/v access Management:    · Continue to monitor i.v access sites for erythema, induration, discharge or tenderness. · As always, continue efforts to minimize tubes/lines/drains as clinically appropriate to reduce chances of line associated infections. Patient education and counseling:        · The patient was educated in detail about the side-effects of various antibiotics and things to watch for like new rashes, lip swelling, severe reaction, worsening diarrhea, break through fever etc.  · Discussed patient's condition and what to expect. All of the patient's questions were addressed in a satisfactory manner and patient verbalized understanding all instructions. Level of complexity of visit: High     TIME SPENT TODAY:     - Spent over  36 minutes on visit (including interval history, physical exam, review of data including labs, cultures, imaging, development and implementation of treatment plan and coordination of complex care). More than 50 percent of this includes face-to-face time spent with the patient for counseling and coordination of care. Thank you for involving me in the care of your patient. I will continue to follow. If you have anyadditional questions, please do not hesitate to contact me. Subjective: Interval history: Interval history was obtained from chart review and patient/ RN. The patient is afebrile. She has waxing and waning encephalopathy issues. She also has chronic anxiety issues. She is tolerating Zosyn okay     REVIEW OF SYSTEMS:      Review of Systems   Constitutional: Negative for chills, diaphoresis and unexpected weight change. HENT: Negative for congestion, ear discharge, ear pain, facial swelling, hearing loss, rhinorrhea and trouble swallowing. Eyes: Negative for photophobia, discharge, redness and visual disturbance. Respiratory: Negative for apnea, cough, choking, chest tightness, shortness of breath and stridor. Cardiovascular: Negative for chest pain and palpitations.    Gastrointestinal: Negative for abdominal pain, blood in stool, diarrhea and nausea. Endocrine: Negative for polydipsia, polyphagia and polyuria. Genitourinary: Negative for difficulty urinating, dysuria, frequency, hematuria, menstrual problem and vaginal discharge. Musculoskeletal: Negative for arthralgias, joint swelling, myalgias and neck stiffness. Skin: Negative for color change and rash. Allergic/Immunologic: Negative for immunocompromised state. Neurological: Negative for dizziness, seizures, speech difficulty, light-headedness and headaches. Hematological: Negative for adenopathy. Psychiatric/Behavioral: Negative for agitation, hallucinations and suicidal ideas. The patient is nervous/anxious. Past Medical History: All past medical history reviewed today. Past Medical History:   Diagnosis Date    Atrial fibrillation ([facility]     CAD (coronary artery disease)     Depression     Hyperlipidemia     Hypertension     Hypothyroid     Legal blindness     Obstructive sleep apnea (adult) (pediatric)        Past Surgical History: All past surgical history was reviewed today.     Past Surgical History:   Procedure Laterality Date    ANGIOPLASTY      COLONOSCOPY  11/16/2018    COLONOSCOPY POLYPECTOMY SNARE/COLD BIOPSY performed by Rhett Maxwell MD at [place] [address] N/A 10/20/2021    COLONOSCOPY POLYPECTOMY ABLATION performed by Sandra Crandall MD at [address] COLONOSCOPY N/A 11/4/2021    COLONOSCOPY POLYPECTOMY ABLATION performed by Oren Monsivais MD at [address] [place] [address]    EYE SURGERY Right     CATARACT EXTRACTION W/ IOL    EYE SURGERY Left     CATARCT EXTRACTION W/ IOL    [place] SIGMOIDOSCOPY FLX DX W/COLLJ SPEC BR/WA IF PFRMD N/A 10/19/2018    SIGMOIDOSCOPY DIAGNOSTIC FLEXIBLE performed by Rhett Maxwell MD at [facility]  10/19/2018    flexible    TONSILLECTOMY AND ADENOIDECTOMY      TUBAL LIGATION      UPPER GASTROINTESTINAL ENDOSCOPY N/A 10/20/2021    EGD BIOPSY performed by Partha Robertson MD at [address] [email or web address] ENDOSCOPY       Family History: All family history was reviewed today. Problem Relation Age of Onset    Heart Disease Mother     Heart Disease Father     Asthma Neg Hx     Cancer Neg Hx     Diabetes Neg Hx     Emphysema Neg Hx     Hypertension Neg Hx     Heart Failure Neg Hx        Objective:       PHYSICAL EXAM:      Vitals:   Vitals:    11/06/21 0800 11/06/21 1000 11/06/21 1200 11/06/21 1254   BP: 134/74 (!) 154/140 133/73    Pulse: 129 136 123 127   Resp: 26 19 16    Temp: 96.8 °F (36 °C)  97 °F (36.1 °C)    TempSrc: Temporal  Temporal    SpO2: 94%  92%    Weight:       Height:           Physical Exam  Vitals and nursing note reviewed. Constitutional:       General: She is not in acute distress. Appearance: She is well-developed. She is not diaphoretic. HENT:      Head: Normocephalic. Right Ear: External ear normal.      Left Ear: External ear normal.      Nose: Nose normal.   Eyes:      General: No scleral icterus. Right eye: No discharge. Left eye: No discharge. Conjunctiva/sclera: Conjunctivae normal.      Pupils: Pupils are equal, round, and reactive to light. Cardiovascular:      Rate and Rhythm: Normal rate and regular rhythm. Heart sounds: No murmur heard. No friction rub. Pulmonary:      Effort: Pulmonary effort is normal.      Breath sounds: No stridor. No wheezing or rales. Chest:      Chest wall: No tenderness. Abdominal:      Palpations: Abdomen is soft. There is no mass. Tenderness: There is no abdominal tenderness. There is no guarding or rebound. Musculoskeletal:         General: No tenderness. Cervical back: Normal range of motion and neck supple. Lymphadenopathy:      Cervical: No cervical adenopathy. Skin:     General: Skin is warm and dry. Findings: No erythema or rash. Neurological:      Mental Status: She is alert and oriented to person, place, and time. Motor: No abnormal muscle tone. Psychiatric:         Judgment: Judgment normal.            *    Lines and drains: All vascular access sites are healthy with no local erythema, discharge or tenderness. Intake and output:    I/O last 3 completed shifts: In: 1899.4 [P.O.:720; I.V.:598.6; IV Piggyback:580.8]  Out: 775 [Urine:775]    Lab Data:   All available labs and old records have been reviewed by me. CBC:  Recent Labs     11/05/21 0523 11/05/21  1300 11/06/21  0445   WBC 9.7 12.4* 9.5   RBC 2.61* 2.85* 2.75*   HGB 7.4* 7.9* 7.9*   HCT 23.3* 25.3* 24.9*    319 289   MCV 89.0 88.8 90.4   MCH 28.5 27.7 28.8   MCHC 32.0 31.2 31.9   RDW 17.8* 18.0* 18.0*        BMP:  Recent Labs     11/05/21 0523 11/05/21  1300 11/06/21  0445    139 142   K 3.0* 3.6 3.2*    100 104   CO2 24 23 23   BUN 64* 63* 62*   CREATININE 2.7* 2.6* 2.8*   CALCIUM 7.8* 7.9* 7.9*   GLUCOSE 110* 107* 170*        Hepatic Function Panel:   Lab Results   Component Value Date    ALKPHOS 67 11/05/2021    ALT 14 11/05/2021    AST 20 11/05/2021    PROT 5.4 11/05/2021    PROT 7.6 06/03/2010    BILITOT 0.4 11/05/2021    LABALBU 2.4 11/05/2021       CPK: No results found for: CKTOTAL  ESR: No results found for: SEDRATE  CRP: No results found for: CRP        Imaging: All pertinent images and reports for the current visit were reviewed by me during this visit. XR CHEST PORTABLE   Final Result   Nasogastric tube is in normal position. Vascular congestion. Stable bilateral pleural effusions with associated atelectasis or airspace   disease. CT CHEST ABDOMEN PELVIS WO CONTRAST   Final Result   Small bilateral pleural effusions are seen with adjacent consolidation at the   lung bases, likely atelectasis in the absence of clinical signs of pneumonia. Septal thickening is seen, compatible with fluid overload. Mildly enlarged precarinal node, likely reactive due to the suspected fluid   overload.   Recommend 3 month follow-up chest CT for further characterization. Scattered colonic diverticula are seen. No significant pericolonic fat   stranding. Body wall anasarca with trace free fluid in pelvis, compatible with mild   fluid overload         XR CHEST PORTABLE   Final Result   Interval resolution of pulmonary edema. Fluid in the minor fissure, and   suspected bilateral pleural effusions. Left basilar airspace disease likely   atelectasis         US GALLBLADDER RUQ    (Results Pending)       Medications: All current and past medications were reviewed.  metoprolol succinate  50 mg Oral TID    QUEtiapine  50 mg Oral Nightly    sodium chloride flush  5-40 mL IntraVENous 2 times per day    piperacillin-tazobactam  3,375 mg IntraVENous Q12H    Vitamin D  2 tablet Oral Daily        vasopressin (Septic Shock) infusion Stopped (11/05/21 2322)    sodium chloride Stopped (11/05/21 2240)       sodium chloride flush, sodium chloride      Problem list:       Patient Active Problem List   Diagnosis Code    CAD (coronary artery disease) I25.10    Essential hypertension I10    ROBYN (obstructive sleep apnea) G47.33    Hypothyroid E03.9    Hyperlipidemia E78.5    Depression F32. A    GI bleeding K92.2    Acute blood loss anemia D62    Acute GI bleeding K92.2    Atrial fibrillation with RVR (HCC) I48.91    Severe sepsis (HCC) A41.9, R65.20    E coli bacteremia R78.81, B96.20    Supratherapeutic INR R79.1    Hyperkalemia E87.5    Normocytic normochromic anemia D64.9    Hyponatremia E87.1    Bilateral pleural effusion J90    Diverticulosis K57.90    Anasarca R60.1    Overweight (BMI 25.0-29. 9) E66.3    High anion gap metabolic acidosis S07.2    Elevated brain natriuretic peptide (BNP) level R79.89    JULIO (acute kidney injury) ([address] N17.9    Hypokalemia E87.6       Please note that this chart was generated using Dragon dictation software.  Although every effort was made to ensure the accuracy of this automated transcription, some errors in transcription may have occurred inadvertently. If you may need any clarification, please do not hesitate to contact me through EP[employer] or at the phone number provided below with my electronic signature. Any pictures or media included in this note were obtained after taking informed verbal consent from the patient and with their approval to include those in the patient's medical record.     Smitha Young MD, MPH  11/6/2021 , 2:45 PM   [facility] Infectious Disease   [address] [place] [address], [address] [place] [address]  Office: [phone]  Fax: [phone]  Clinic days:  Tuesday & Thursday

## 2021-11-06 NOTE — PROGRESS NOTES
Dr. Yudi Marinelli contacted at 2200 via [email or web address] for increasing HR to 150's afib RVR, RR in the 40's, as well as chest pain and agitation. Dr. Elisa Centeno responded to bedside and ordered one time dose of labetalol, ativan, EKG, and labs. All orders completed. Dr. Yudi Marinelli contacted again at [phone] for drop in blood pressure 70's/30's. Albumin and 250mL bolus given x3. Patient normotensive after interventions. Patient now resting comfortably in bed with -130's and controlled respiratory rate.

## 2021-11-06 NOTE — PROGRESS NOTES
[facility]    11/5/2021 8:56 PM        Name: Teddy Fernando . Admitted: 11/1/2021  Primary Care Provider: Prem Pineda (Tel: [phone])      Subjective:  DAY 4: Pt denies any new problems over last 24 hours. She is requesting ativan for her anxiety. She says she takes it everyday at home. Her sister said she thinks she takes it too much. She says she takes it almost everyday whether she needs it not. Reviewed interval ancillary notes    Current Medications  QUEtiapine (SEROQUEL) tablet 50 mg, Nightly  sodium chloride flush 0.9 % injection 5-40 mL, 2 times per day  sodium chloride flush 0.9 % injection 5-40 mL, PRN  0.9 % sodium chloride infusion, PRN  piperacillin-tazobactam (ZOSYN) 3,375 mg in dextrose 5 % 50 mL IVPB extended infusion (mini-bag), Q12H  Vitamin D (CHOLECALCIFEROL) tablet 2,000 Units, Daily  metoprolol succinate (TOPROL XL) extended release tablet 50 mg, BID        Objective:  /84   Pulse 120   Temp 97.4 °F (36.3 °C) (Temporal)   Resp 30   Ht 5' 5\" (1.651 m)   Wt 169 lb 1.5 oz (76.7 kg)   SpO2 94%   BMI 28.14 kg/m²     Intake/Output Summary (Last 24 hours) at 11/5/2021 2056  Last data filed at 11/5/2021 1730  Gross per 24 hour   Intake 1299.57 ml   Output 625 ml   Net 674.57 ml      Wt Readings from Last 3 Encounters:   11/05/21 169 lb 1.5 oz (76.7 kg)   10/22/21 156 lb (70.8 kg)   11/16/18 147 lb (66.7 kg)       General appearance:  Appears comfortable-answering questions but confused  Eyes: Sclera clear. Pupils equal.  ENT: Moist oral mucosa. Trachea midline, no adenopathy. Cardiovascular: tachycardia  Respiratory: Not using accessory muscles. Good inspiratory effort. Clear to auscultation bilaterally, no wheeze or crackles.    GI: Abdomen soft, no tenderness, not distended, normal bowel sounds  Musculoskeletal: No cyanosis in digits, neck supple  Neurology: CN 2-12 grossly intact. able to move all extremities on command  Psych: Alert to herself  And where she is but does not not know what day it is nor the year  Skin: Warm, dry, normal turgor    Labs and Tests:  CBC:   Recent Labs     11/03/21  0748 11/03/21  1630 11/05/21  0057 11/05/21 0523 11/05/21  1300   WBC 16.9*  --   --  9.7 12.4*   HGB 6.7*   < > 7.0* 7.4* 7.9*     --   --  256 319    < > = values in this interval not displayed. BMP:    Recent Labs     11/03/21  0748 11/05/21 0523 11/05/21  1300   * 142 139   K 4.0 3.0* 3.6   CL 97* 102 100   CO2 18* 24 23   BUN 78* 64* 63*   CREATININE 3.0* 2.7* 2.6*   GLUCOSE 103* 110* 107*     Hepatic:   Recent Labs     11/05/21 0523   AST 20   ALT 14   BILITOT 0.4   ALKPHOS 67       Problem List  Active Problems:    Essential hypertension    ROBYN (obstructive sleep apnea)    GI bleed    Atrial fibrillation (HCC)    Severe sepsis (HCC)    E coli bacteremia    Supratherapeutic INR    Hyperkalemia    Normocytic normochromic anemia    Hyponatremia    Bilateral pleural effusion    Diverticulosis    Anasarca    Overweight (BMI 25.0-29. 9)    High anion gap metabolic acidosis    Elevated brain natriuretic peptide (BNP) level  Resolved Problems:    * No resolved hospital problems. *       Assessment & Plan:   1. ANEMIA  2. CONFUSION  3. TACHYCARDIA  4. Suspicion of substance withdrawal.  5. HYOKAKALEMIA  6. JULIO    PLAN:Will continue to follow pt for her anemia/JULIO and manage tachycardia. Pt low potassium was treated with replacement potassium and has corrected. Pt has been tachycardic today and is currently on a beta blocker. He tachycardia may be from her anemia and she may need blood to help maintain a better controlled rate. I ordered increase in her metoprolol and labetalol ordered now to help bring her rate down. Diet: ADULT ORAL NUTRITION SUPPLEMENT; Lunch, Dinner; Clear Liquid Oral Supplement  ADULT DIET;  Dysphagia - Pureed; Mildly Thick (Nectar)  Code:Full

## 2021-11-06 NOTE — PROGRESS NOTES
Speech Language Pathology  Dysphagia Treatment Note    Name:  Celeste Macias  :   1935  Medical Diagnosis:  Septicemia ([facility] [address] [A41.9]  GI bleed [K92.2]  Acute GI bleeding [K92.2]  JULIO (acute kidney injury) ([facility] [address] [N17.9]  Blood loss anemia [D50.0]  Atrial fibrillation with RVR ([facility] [address] [I48.91]  Supratherapeutic INR [R79.1]  Acute cystitis without hematuria [N30.00]  Treatment Diagnosis: Oropharyngeal Dysphagia  Pain level: None reported     Diet level prior to treatment: IDDSI Level 4: Pureed and Mildly thick   Tolerance of Current Diet Level:  No reported difficulties. Patient did state she prefers thin liquids   Assessment of Texture Tolerance:  -Impressions: Patient seen this date for follow-up dysphagia treatment. Trials of regular foods, liquids provided this date. Patient demonstrated improved bolus control and initiation with presentations this date. No noted anterior labial spillage, and good timely swallow noted. During intake of po audible rush of liquids observed leading therapist to suspect premature spillage however no noted coughing and good laryngeal excursion demonstrated via palpation. Intermittent multiple swallows were reflexively demonstrated. During po of regular foods good mandibular coordination and rotary chew. Pending GI impressions patient able to tolerate regular foods. Diet and Treatment Recommendations 2021:  Diet upgrade of thin liquids and pending EGD could upgrade regular foods. Compensatory strategies:  Alternate solids/liquids , Effortful Swallows     Dysphagia Goals:   Pt will functionally tolerate recommended diet with no overt clinical s/s of aspiration  Pt will demonstrate understanding of aspiration risk and precautions via education/demonstration with occasional prompting  Pt will advance to least restrictive diet as indicated   If clinical s/s of aspiration/penetration continue to be noted, Pt will participate in Modified Barium Swallow Study Plan:  Continued daily Dysphagia treatment with goals per plan of care. Patient/Family Education:Education given to the Pt and nurse, who verbalized understanding    Discharge Recommendations:  Pt will benefit from continued skilled Speech Therapy for Dysphagia services, prior to returning home. Timed Code Treatment:  0    Total Treatment Time: 20    If patient discharges prior to next session this note will serve as a discharge summary.        Signature:   Katherine Nails MA- [facility] SLP   Speech Language Pathologist   [place]. [id number]

## 2021-11-06 NOTE — PROGRESS NOTES
Amy 83   Electrophysiology Progress Note     Admit Date: 2021     Reason for follow up: Atrial fibrillation    HPI and Interval History:   Patient seen and examined. Clinical notes reviewed. Telemetry reviewed. No major events overnight. Denies having chest pain, shortness of breath, dyspnea on exertion, Orthopnea, PND at the time of this visit. Patient is feeling weak. She is a still in A. fib with RVR. Review of System:  All other systems reviewed and are negative except for that noted above. Pertinent negatives are:     · General: negative for fever, chills   · Ophthalmic ROS: negative for - eye pain or loss of vision  · ENT ROS: negative for - headaches, sore throat   · Respiratory: negative for - cough, sputum  · Cardiovascular: Reviewed in HPI  · Gastrointestinal: negative for - abdominal pain, diarrhea, N/V  · Hematology: negative for - bleeding, blood clots, bruising or jaundice  · Genito-Urinary:  negative for - Dysuria or incontinence  · Musculoskeletal: negative for - Joint swelling, muscle pain  · Neurological: negative for - confusion, dizziness, headaches   · Psychiatric: No anxiety, no depression. · Dermatological: negative for - rash      Physical Examination:  Vitals:    21 1200   BP: 133/73   Pulse: 123   Resp: 16   Temp: 97 °F (36.1 °C)   SpO2: 92%      In: 882.4 [P.O.:240;  I.V.:287.6]  Out: 750    Wt Readings from Last 3 Encounters:   21 171 lb 15.3 oz (78 kg)   10/22/21 156 lb (70.8 kg)   18 147 lb (66.7 kg)     Temp  Av.4 °F (36.3 °C)  Min: 96.8 °F (36 °C)  Max: 97.8 °F (36.6 °C)  Pulse  Av.9  Min: 105  Max: 141  BP  Min: 91/71  Max: 154/140  SpO2  Av.1 %  Min: 92 %  Max: 100 %  FiO2   Av %  Min: 32 %  Max: 32 %    Intake/Output Summary (Last 24 hours) at 2021 1234  Last data filed at 2021 0800  Gross per 24 hour   Intake 1176.38 ml   Output 1050 ml   Net 126.38 ml       · Telemetry: Atrial fibrillation · Constitutional: Oriented but is sleepy. No distress. · Head: Normocephalic and atraumatic. · Mouth/Throat: Oropharynx is clear and moist.   · Eyes: Conjunctivae normal. EOM are normal.   · Neck: Neck supple. No rigidity. No JVD present. · Cardiovascular: Normal rate, irregular rhythm, S1&S2. · Pulmonary/Chest: Bilateral respiratory sounds. No wheezes, No rhonchi. · Abdominal: Soft. Bowel sounds present. No distension, No tenderness. · Musculoskeletal: No tenderness. No edema    · Lymphadenopathy: Has no cervical adenopathy. · Neurological: Sleepy but arousable. Cranial nerve appears intact, No Gross deficit   · Skin: Skin is warm and dry. No rash noted. · Psychiatric: She is a sleepy    Labs, diagnostic and imaging results reviewed. Reviewed. Recent Labs     11/05/21 0523 11/05/21  1300 11/06/21  0445    139 142   K 3.0* 3.6 3.2*    100 104   CO2 24 23 23   BUN 64* 63* 62*   CREATININE 2.7* 2.6* 2.8*     Recent Labs     11/05/21 0523 11/05/21  1300 11/06/21  0445   WBC 9.7 12.4* 9.5   HGB 7.4* 7.9* 7.9*   HCT 23.3* 25.3* 24.9*   MCV 89.0 88.8 90.4    319 289     Lab Results   Component Value Date    TROPONINI 0.02 11/05/2021     Estimated Creatinine Clearance: 15 mL/min (A) (based on SCr of 2.8 mg/dL (H)).    No results found for: BNP  Lab Results   Component Value Date    PROTIME 20.6 11/06/2021    PROTIME 19.6 11/05/2021    PROTIME 16.6 11/04/2021    INR 1.78 11/06/2021    INR 1.70 11/05/2021    INR 1.45 11/04/2021     Lab Results   Component Value Date    TRIG 200 12/03/2009       Scheduled Meds:   potassium chloride  10 mEq IntraVENous Once    metoprolol succinate  50 mg Oral TID    QUEtiapine  50 mg Oral Nightly    sodium chloride flush  5-40 mL IntraVENous 2 times per day    piperacillin-tazobactam  3,375 mg IntraVENous Q12H    Vitamin D  2 tablet Oral Daily     Continuous Infusions:   vasopressin (Septic Shock) infusion Stopped (11/05/21 4898)    sodium chloride Stopped (11/05/21 2240)     PRN Meds:sodium chloride flush, sodium chloride     Patient Active Problem List    Diagnosis Date Noted    Atrial fibrillation ([facility] 11/02/2021    Severe sepsis (HCC)     E coli bacteremia     Supratherapeutic INR     Hyperkalemia     Normocytic normochromic anemia     Hyponatremia     Bilateral pleural effusion     Diverticulosis     Anasarca     Overweight (BMI 25.0-29. 9)     High anion gap metabolic acidosis     Elevated brain natriuretic peptide (BNP) level     GI bleed 11/01/2021    Acute blood loss anemia 10/18/2021    GI bleeding 10/19/2018    Depression 05/07/2014    ROBYN (obstructive sleep apnea)     Hypothyroid     Hyperlipidemia     CAD (coronary artery disease) 05/31/2012    Essential hypertension 05/31/2012      Active Hospital Problems    Diagnosis Date Noted    Atrial fibrillation ([facility] [I48.91] 11/02/2021    Severe sepsis ([facility] [A41.9, R65.20]     E coli bacteremia [R78.81, B96.20]     Supratherapeutic INR [R79.1]     Hyperkalemia [E87.5]     Normocytic normochromic anemia [D64.9]     Hyponatremia [E87.1]     Bilateral pleural effusion [J90]     Diverticulosis [K57.90]     Anasarca [R60.1]     Overweight (BMI 25.0-29. 9) [E66.3]     High anion gap metabolic acidosis [J21.5]     Elevated brain natriuretic peptide (BNP) level [R79.89]     GI bleed [K92.2] 11/01/2021    ROBYN (obstructive sleep apnea) [G47.33]     Essential hypertension [I10] 05/31/2012       Assessment:       Plan:     - Paroxymal  atrial fibrillation:     Patient has been having episodes of atrial fibrillation. The last episode seems to have a started in late September. There are several EKGs prior to that that the report indicates she was in sinus rhythm. Patient's pulse rate has been an average of 120.  Given her anemia, renal failure, overall weakness it is expected that the heart rate will be elevated and therefore to aggressive management of her heart rate is not necessary. It is possible to increase the dose of her metoprolol to 100 mg twice daily of metoprolol succinate. However if the blood pressure does not allow it then   we have to continue with the current dose. Patient has high [id number] score and requires anticoagulation to prevent thromboembolic events. Unfortunately patient has had GI bleeding and is at high risk of bleeding and not a good candidate for long term anticoagulation therapy. Amiodarone for maintaining sinus rhythm can be also considered in the future if necessary. At this point there is no need for amiodarone due to the fact that she cannot be anticoagulated immediately. According with GI she can be resumed on anticoagulation on or after 11/07/21.                   Patient is following with Dr. Corina Molina at [facility] and would like to follow up with his cardiologist for watchman evaluation and workup.      - CAD s/p CAD and PCI              Recent cath at [facility]               Stable. Seen by IC for elevated troponin which is likely related to demand ischemia   Maintain the hemoglobin above 7.     - Multiple GI bleeding:               Off anticoagulation. Colonoscopy showed cecal ulcer       - JULIO    Due to GI bleed  Stable     -Hypokalemia    Replace and monitor. Total critical care time was 35 minutes, for caring of this patient with life-threatening condition and organ failure, excluding separately reportable procedures. Services, included in critical care time were chart data review, documentation time, obtaining info from patient, review of nursing notes, and vital sign assessment and management of the patient. Due to the high probability of clinically significant life threating deterioration of the patient's condition that required my urgent intervention, a total critical care time 30 minutes was used. This time excludes any time that may have been spent performing procedures.  This includes but not limited to vital sign monitoring, telemetry monitoring, continuous pulse oximety, IV medication, clinical response to the IV medications, documentation time , consultation time, interpretation of lab data, review of nursing notes and old record review. NOTE: This report was transcribed using voice recognition software. Every effort was made to ensure accuracy, however, inadvertent computerized transcription errors may be present.

## 2021-11-06 NOTE — CARE COORDINATION
Bed at existing facility no longer available, due to facility's COVID status. Will need to find new placement for her prior to d/c.

## 2021-11-07 LAB — REPORT: NORMAL

## 2021-11-07 PROCEDURE — 6370000000 HC RX 637 (ALT 250 FOR IP): Performed by: INTERNAL MEDICINE

## 2021-11-07 PROCEDURE — 2580000003 HC RX 258: Performed by: INTERNAL MEDICINE

## 2021-11-07 PROCEDURE — 6370000000 HC RX 637 (ALT 250 FOR IP): Performed by: EMERGENCY MEDICINE

## 2021-11-07 PROCEDURE — 94761 N-INVAS EAR/PLS OXIMETRY MLT: CPT

## 2021-11-07 PROCEDURE — 2700000000 HC OXYGEN THERAPY PER DAY

## 2021-11-07 PROCEDURE — 6360000002 HC RX W HCPCS: Performed by: INTERNAL MEDICINE

## 2021-11-07 PROCEDURE — 1200000000 HC SEMI PRIVATE

## 2021-11-07 RX ADMIN — METOPROLOL SUCCINATE 50 MG: 50 TABLET, EXTENDED RELEASE ORAL at 08:35

## 2021-11-07 RX ADMIN — Medication 10 ML: at 20:19

## 2021-11-07 RX ADMIN — Medication 1000 MG: at 15:19

## 2021-11-07 RX ADMIN — METOPROLOL SUCCINATE 50 MG: 50 TABLET, EXTENDED RELEASE ORAL at 14:00

## 2021-11-07 RX ADMIN — QUETIAPINE FUMARATE 50 MG: 25 TABLET ORAL at 20:19

## 2021-11-07 RX ADMIN — Medication 10 ML: at 08:35

## 2021-11-07 RX ADMIN — Medication 2000 UNITS: at 08:36

## 2021-11-07 RX ADMIN — METOPROLOL SUCCINATE 50 MG: 50 TABLET, EXTENDED RELEASE ORAL at 20:19

## 2021-11-07 ASSESSMENT — PAIN SCALES - GENERAL
PAINLEVEL_OUTOF10: 0

## 2021-11-07 NOTE — FLOWSHEET NOTE
See flow sheets for assessment. Resting quietly, oriented to person and knows she is in the hospital. Monitor A-fib. BP stable. Repositioned for comfort. Plan of care discussed.

## 2021-11-07 NOTE — PROGRESS NOTES
MD Sully Mclaughlin MD Lovett Cordial, MD                                  Office: [phone]                 Fax: [phone]          [employer]                  NEPHROLOGY  ICU  INPATIENT PROGRESS NOTE:     PATIENT NAME: Lito Allen  : 1935  MRN: [id number]            Subjective:   Patient is weak and lethargic. BP now better. Assessment and Plan   JULIO on CKD 3bsevere. Baseline crea 1.4. Non-oliguric. Follow crea today. UA bland. Received IV Lasix x 1. Hypotension. Improved. Follow DBP since higher. Hypovolemia. Improved  Hypokalemia-replaced. Follow K today. Hypocalcemia- asymptomatic. Anemia of acute blood loss. follow Hgb. Metabolic acidosis. Improved. Hypoalbuminemia  E. coli sepsis WBC count 21,000-on Ceftriaxone. Continue herron for today. High risk. Seen in ICU. Objective:  EXAM  Vitals:    21 0835   BP: (!) 144/92   Pulse: 122   Resp:    Temp:    SpO2:        Intake/Output Summary (Last 24 hours) at 2021 0918  Last data filed at 2021 0835  Gross per 24 hour   Intake 395.26 ml   Output 1050 ml   Net -654.74 ml       External exam of the ears and nose are normal  HENT: exam is normal  Eyes: Pupils are equal, round  CVS.  Heart sounds are normal  RS. Rhonchi b/l   PA soft , bowel sounds are normal no distension and no tenderness to palpation. Skin No rash , No palpable nodules  Musculoskeletal: Normal range of motion. 1+ edema and no tenderness. Active Problems:    Essential hypertension    ROBYN (obstructive sleep apnea)    Acute GI bleeding    Atrial fibrillation with RVR (HCC)    Severe sepsis (HCC)    E coli bacteremia    Supratherapeutic INR    Hyperkalemia    Normocytic normochromic anemia    Hyponatremia    Bilateral pleural effusion    Diverticulosis    Anasarca    Overweight (BMI 25.0-29. 9)    High anion gap metabolic acidosis    Elevated brain natriuretic peptide (BNP) level    JULIO (acute kidney injury) ([facility]    Hypokalemia  Resolved Problems:    * No resolved hospital problems. *        Medications Reviewed by me   cefTRIAXone (ROCEPHIN) IV  1,000 mg IntraVENous Q24H    metoprolol succinate  50 mg Oral TID    QUEtiapine  50 mg Oral Nightly    sodium chloride flush  5-40 mL IntraVENous 2 times per day    Vitamin D  2 tablet Oral Daily      vasopressin (Septic Shock) infusion Stopped (11/05/21 2322)    sodium chloride Stopped (11/05/21 2240)       Data Review. Labs reviewed by me       CBC:   Recent Labs     11/05/21 0523 11/05/21 1300 11/06/21 0445   WBC 9.7 12.4* 9.5   HGB 7.4* 7.9* 7.9*   HCT 23.3* 25.3* 24.9*   MCV 89.0 88.8 90.4    319 289     BMP:   Recent Labs     11/05/21 0523 11/05/21 1300 11/06/21 0445    139 142   K 3.0* 3.6 3.2*    100 104   CO2 24 23 23   BUN 64* 63* 62*   CREATININE 2.7* 2.6* 2.8*     Magnesium:   Lab Results   Component Value Date    MG 2.20 11/06/2021    MG 2.10 11/05/2021    MG 2.30 11/01/2021     Lab Results   Component Value Date    CREATININE 2.8 11/06/2021       Arterial Blood Gasses  No results for input(s): PH, PCO2, PO2 in the last 72 hours.     Invalid input(s): [id number], INSPIREDO2    UA:  Recent Labs     11/06/21 0445   COLORU YELLOW   PHUR 5.0   WBCUA 6*   RBCUA 0-2   BACTERIA RARE*   CLARITYU CLOUDY*   SPECGRAV 1.018   LEUKOCYTESUR SMALL*   UROBILINOGEN 0.2   BILIRUBINUR Negative   BLOODU Negative   GLUCOSEU Negative       LIVER PROFILE:   Recent Labs     11/05/21 0523 11/05/21 2245   AST 20  --    ALT 14  --    LIPASE  --  93.0*   BILITOT 0.4  --    ALKPHOS 67  --      PT/INR:    Lab Results   Component Value Date    PROTIME 20.6 11/06/2021    PROTIME 19.6 11/05/2021    PROTIME 16.6 11/04/2021    INR 1.78 11/06/2021    INR 1.70 11/05/2021    INR 1.45 11/04/2021     PTT:    Lab Results   Component Value Date    APTT 32.1 11/01/2021     CANDE:  No results found for: ANATITER, CANDE  CHEMISTRY COMMON GROUP :   Lab Results Component Value Date    GLUCOSE 170 11/06/2021    TSH 2.54 06/03/2010     Recent Labs     11/05/21  0523 11/05/21  1300 11/06/21  0445   GLUCOSE 110* 107* 170*   CALCIUM 7.8* 7.9* 7.9*         RADIOLOGY:        Imaging Results.   Chest X Ray reviewed by me      Electronically Signed: Wilfrid Dietrich MD 11/7/2021 9:18 AM

## 2021-11-07 NOTE — PROGRESS NOTES
[facility]    11/6/2021 10:08 PM        Name: Shankar Patel . Admitted: 11/1/2021  Primary Care Provider: Vanessa Echevarria (Tel: [phone])      Subjective:  Pt last evening had an episode of heart rate of 140's. She was given labetolol and her heart rate dropped to  but so did her blood pressure. She was given IVF bolus and albumin with some improvement of her blood pressure. The nocturnist decided to give her a dose of vasopressin and this stabilized her blood pressure. She continues to have an uncontrolled rVr today. Nursing reports pt less confused today and more talkative. Reviewed interval ancillary notes    Current Medications  cefTRIAXone (ROCEPHIN) 1000 mg in sterile water 10 mL IV syringe, Q24H  metoprolol succinate (TOPROL XL) extended release tablet 50 mg, TID  vasopressin 20 Units in dextrose 5 % 100 mL infusion, Continuous  QUEtiapine (SEROQUEL) tablet 50 mg, Nightly  sodium chloride flush 0.9 % injection 5-40 mL, 2 times per day  sodium chloride flush 0.9 % injection 5-40 mL, PRN  0.9 % sodium chloride infusion, PRN  Vitamin D (CHOLECALCIFEROL) tablet 2,000 Units, Daily        Objective:  BP (!) 144/84   Pulse 107   Temp 97.1 °F (36.2 °C) (Temporal)   Resp 25   Ht 5' 5\" (1.651 m)   Wt 171 lb 15.3 oz (78 kg)   SpO2 94%   BMI 28.62 kg/m²     Intake/Output Summary (Last 24 hours) at 11/6/2021 2208  Last data filed at 11/6/2021 2136  Gross per 24 hour   Intake 737.64 ml   Output 1075 ml   Net -337.36 ml      Wt Readings from Last 3 Encounters:   11/06/21 171 lb 15.3 oz (78 kg)   10/22/21 156 lb (70.8 kg)   11/16/18 147 lb (66.7 kg)       General appearance:  Appears alert and confused but talkative  Eyes: Sclera clear. Pupils equal.  ENT: Moist oral mucosa. Trachea midline, no adenopathy. Cardiovascular: rapid irregular rhythm.  No edema in lower extremities  Respiratory: Not using accessory muscles. Good inspiratory effort. Clear to auscultation bilaterally, no wheeze or crackles. GI: Abdomen soft, no tenderness, not distended, normal bowel sounds  Musculoskeletal: No cyanosis in digits, neck supple  Neurology: CN 2-12 grossly intact and pt following commands  Psych: Alert talking but confused  Skin: Warm, dry, normal turgor    Labs and Tests:  CBC:   Recent Labs     11/05/21 0523 11/05/21  1300 11/06/21  0445   WBC 9.7 12.4* 9.5   HGB 7.4* 7.9* 7.9*    319 289     BMP:    Recent Labs     11/05/21 0523 11/05/21  1300 11/06/21  0445    139 142   K 3.0* 3.6 3.2*    100 104   CO2 24 23 23   BUN 64* 63* 62*   CREATININE 2.7* 2.6* 2.8*   GLUCOSE 110* 107* 170*     Hepatic:   Recent Labs     11/05/21 0523   AST 20   ALT 14   BILITOT 0.4   ALKPHOS 67       Problem List  Active Problems:    Essential hypertension    ROBYN (obstructive sleep apnea)    Acute GI bleeding    Atrial fibrillation with RVR (HCC)    Severe sepsis (HCC)    E coli bacteremia    Supratherapeutic INR    Hyperkalemia    Normocytic normochromic anemia    Hyponatremia    Bilateral pleural effusion    Diverticulosis    Anasarca    Overweight (BMI 25.0-29. 9)    High anion gap metabolic acidosis    Elevated brain natriuretic peptide (BNP) level    JULIO (acute kidney injury) ([facility] [address]    Hypokalemia  Resolved Problems:    * No resolved hospital problems. *       Assessment & Plan:   1. Atrial fibrillation with rVr   2. Hypokalemia  3. Bacteremia      PLAN:Pt was seen by cardiology for her Afib with rVr and no anticogulation due to her recent GI bleed. It was recommended by cardiology to continue metoprolol and restart anticoagulation tomorrow. Pt was given potassium supplementation for her potassium of 3.2. She is currently being treated for bacteremia with rocephin for a blood culture that grew out E.coli on Nov 1,2021. No sensitivity report seen. I will repeat blood cultures and follow results. Diet: ADULT ORAL NUTRITION SUPPLEMENT; Lunch, Dinner; Clear Liquid Oral Supplement  ADULT DIET;  Dysphagia - Pureed  Code:Full Code  DVT PPX      CATHRYN BRICEÑO DO   11/6/2021 10:08 PM

## 2021-11-07 NOTE — PROGRESS NOTES
[facility] PROGRESS NOTE    11/7/2021 1:36 PM        Name: Lito Allen . Admitted: 11/1/2021  Primary Care Provider: Rohith Phillip (Tel: [phone])      Subjective:   Pt is awake and alert today. Denies anymore bleeding. No BM today. Bp now better. RN unable to draw labs today. Reviewed interval ancillary notes    Current Medications  cefTRIAXone (ROCEPHIN) 1000 mg in sterile water 10 mL IV syringe, Q24H  metoprolol succinate (TOPROL XL) extended release tablet 50 mg, TID  vasopressin 20 Units in dextrose 5 % 100 mL infusion, Continuous  QUEtiapine (SEROQUEL) tablet 50 mg, Nightly  sodium chloride flush 0.9 % injection 5-40 mL, 2 times per day  sodium chloride flush 0.9 % injection 5-40 mL, PRN  0.9 % sodium chloride infusion, PRN  Vitamin D (CHOLECALCIFEROL) tablet 2,000 Units, Daily        Objective:  BP (!) 144/92   Pulse 122   Temp 97 °F (36.1 °C) (Temporal)   Resp 29   Ht 5' 5\" (1.651 m)   Wt 167 lb 1.7 oz (75.8 kg)   SpO2 93%   BMI 27.81 kg/m²     Intake/Output Summary (Last 24 hours) at 11/7/2021 1336  Last data filed at 11/7/2021 0835  Gross per 24 hour   Intake 395.26 ml   Output 1050 ml   Net -654.74 ml      Wt Readings from Last 3 Encounters:   11/07/21 167 lb 1.7 oz (75.8 kg)   10/22/21 156 lb (70.8 kg)   11/16/18 147 lb (66.7 kg)       General appearance:  Appears alert and confused but talkative  Eyes: Sclera clear. Pupils equal.  ENT: Moist oral mucosa. Trachea midline, no adenopathy. Cardiovascular: rapid irregular rhythm. No edema in lower extremities  Respiratory: Not using accessory muscles. Good inspiratory effort. Clear to auscultation bilaterally, no wheeze or crackles.    GI: Abdomen soft, no tenderness, not distended, normal bowel sounds  Musculoskeletal: No cyanosis in digits, neck supple  Neurology: CN 2-12 grossly intact and pt following commands  Psych: Alert talking but confused  Skin: Warm, dry, normal turgor    Labs and Tests:  CBC:   Recent Labs     11/05/21 0523 11/05/21  1300 11/06/21  0445   WBC 9.7 12.4* 9.5   HGB 7.4* 7.9* 7.9*    319 289     BMP:    Recent Labs     11/05/21 0523 11/05/21  1300 11/06/21  0445    139 142   K 3.0* 3.6 3.2*    100 104   CO2 24 23 23   BUN 64* 63* 62*   CREATININE 2.7* 2.6* 2.8*   GLUCOSE 110* 107* 170*     Hepatic:   Recent Labs     11/05/21 0523   AST 20   ALT 14   BILITOT 0.4   ALKPHOS 67       Problem List  Active Problems:    Essential hypertension    ROBYN (obstructive sleep apnea)    Acute GI bleeding    Atrial fibrillation with RVR (HCC)    Severe sepsis (HCC)    E coli bacteremia    Supratherapeutic INR    Hyperkalemia    Normocytic normochromic anemia    Hyponatremia    Bilateral pleural effusion    Diverticulosis    Anasarca    Overweight (BMI 25.0-29. 9)    High anion gap metabolic acidosis    Elevated brain natriuretic peptide (BNP) level    JULIO (acute kidney injury) ([facility]    Hypokalemia  Resolved Problems:    * No resolved hospital problems. 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 H/H has been stable    Atrial fibrillation with rVr  Pt was seen by cardiology for her Afib with rVr and no anticogulation due to her recent GI bleed. It was recommended by cardiology to continue metoprolol and restart anticoagulation on or after 11/7. GI wants to hold off on [facility] till 11/7. On Toprol XL   Patient is following with Dr. Magnus Thibodeaux at [facility] and would like to follow up with his cardiologist for watchman evaluation and workup. E. coli sepsis   WBC count normalized   On Ceftriaxone. Hypokalemia  Replaced  Follow K today. Diet: ADULT ORAL NUTRITION SUPPLEMENT; Lunch, Dinner; Clear Liquid Oral Supplement  ADULT DIET;  Dysphagia - Pureed  Code:Full Code  DVT PPX      Cielo Sánchez MD   11/7/2021 1:36 PM

## 2021-11-07 NOTE — CARE COORDINATION
RONNIE received a call from the Admissions Coordinator Paul Nunn at [facility]. Facility can accept patient when patient is medically ready for discharge. RONNIE informed patient's RN, Amy Valentine.     Electronically signed by SARAVANAN Puga on 11/7/2021 at 1:47 PM

## 2021-11-07 NOTE — PLAN OF CARE
Problem: Falls - Risk of:  Goal: Will remain free from falls  Description: Will remain free from falls  Outcome: Ongoing  Goal: Absence of physical injury  Description: Absence of physical injury  Outcome: Ongoing     Problem: Skin Integrity:  Goal: Will show no infection signs and symptoms  Description: Will show no infection signs and symptoms  Outcome: Ongoing  Goal: Absence of new skin breakdown  Description: Absence of new skin breakdown  Outcome: Ongoing     Problem: Pain:  Description: Pain management should include both nonpharmacologic and pharmacologic interventions. Goal: Pain level will decrease  Description: Pain level will decrease  Outcome: Ongoing  Goal: Control of acute pain  Description: Control of acute pain  Outcome: Ongoing  Goal: Control of chronic pain  Description: Control of chronic pain  Outcome: Ongoing     Problem: Nutrition  Goal: Optimal nutrition therapy  Outcome: Ongoing     Problem: Discharge Planning:  Goal: Discharged to appropriate level of care  Description: Discharged to appropriate level of care  Outcome: Ongoing     Problem:  Bowel Function - Altered:  Goal: Bowel elimination is within specified parameters  Description: Bowel elimination is within specified parameters  Outcome: Ongoing     Problem: Fluid Volume - Imbalance:  Goal: Will show no signs and symptoms of excessive bleeding  Description: Will show no signs and symptoms of excessive bleeding  Outcome: Ongoing  Goal: Absence of imbalanced fluid volume signs and symptoms  Description: Absence of imbalanced fluid volume signs and symptoms  Outcome: Ongoing     Problem: Nausea/Vomiting:  Goal: Absence of nausea/vomiting  Description: Absence of nausea/vomiting  Outcome: Ongoing  Goal: Able to drink  Description: Able to drink  Outcome: Ongoing  Goal: Able to eat  Description: Able to eat  Outcome: Ongoing  Goal: Ability to achieve adequate nutritional intake will improve  Description: Ability to achieve adequate nutritional intake will improve  Outcome: Ongoing

## 2021-11-08 ENCOUNTER — APPOINTMENT (OUTPATIENT)
Dept: GENERAL RADIOLOGY | Age: 86
DRG: 871 | End: 2021-11-08
Payer: COMMERCIAL

## 2021-11-08 LAB
ANION GAP SERPL CALCULATED.3IONS-SCNC: 16 MMOL/L (ref 3–16)
ANISOCYTOSIS: ABNORMAL
BANDED NEUTROPHILS RELATIVE PERCENT: 4 % (ref 0–7)
BASOPHILS ABSOLUTE: 0 K/UL (ref 0–0.2)
BASOPHILS RELATIVE PERCENT: 0 %
BUN BLDV-MCNC: 46 MG/DL (ref 7–20)
BURR CELLS: ABNORMAL
CALCIUM SERPL-MCNC: 8.5 MG/DL (ref 8.3–10.6)
CHLORIDE BLD-SCNC: 101 MMOL/L (ref 99–110)
CO2: 23 MMOL/L (ref 21–32)
CREAT SERPL-MCNC: 1.8 MG/DL (ref 0.6–1.2)
EOSINOPHILS ABSOLUTE: 0 K/UL (ref 0–0.6)
EOSINOPHILS RELATIVE PERCENT: 0 %
GFR AFRICAN AMERICAN: 32
GFR NON-AFRICAN AMERICAN: 27
GLUCOSE BLD-MCNC: 119 MG/DL (ref 70–99)
HCT VFR BLD CALC: 26.9 % (ref 36–48)
HEMOGLOBIN: 8.1 G/DL (ref 12–16)
INR BLD: 1.54 (ref 0.88–1.12)
LYMPHOCYTES ABSOLUTE: 0.2 K/UL (ref 1–5.1)
LYMPHOCYTES RELATIVE PERCENT: 1 %
MAGNESIUM: 1.9 MG/DL (ref 1.8–2.4)
MCH RBC QN AUTO: 28.2 PG (ref 26–34)
MCHC RBC AUTO-ENTMCNC: 30.1 G/DL (ref 31–36)
MCV RBC AUTO: 93.8 FL (ref 80–100)
MONOCYTES ABSOLUTE: 0.3 K/UL (ref 0–1.3)
MONOCYTES RELATIVE PERCENT: 2 %
NEUTROPHILS ABSOLUTE: 15.8 K/UL (ref 1.7–7.7)
NEUTROPHILS RELATIVE PERCENT: 93 %
OVALOCYTES: ABNORMAL
PDW BLD-RTO: 20 % (ref 12.4–15.4)
PLATELET # BLD: 303 K/UL (ref 135–450)
PMV BLD AUTO: 7.8 FL (ref 5–10.5)
POLYCHROMASIA: ABNORMAL
POTASSIUM REFLEX MAGNESIUM: 3.2 MMOL/L (ref 3.5–5.1)
PROTHROMBIN TIME: 17.7 SEC (ref 9.9–12.7)
RBC # BLD: 2.86 M/UL (ref 4–5.2)
SODIUM BLD-SCNC: 140 MMOL/L (ref 136–145)
TEAR DROP CELLS: ABNORMAL
WBC # BLD: 16.3 K/UL (ref 4–11)

## 2021-11-08 PROCEDURE — 99232 SBSQ HOSP IP/OBS MODERATE 35: CPT | Performed by: INTERNAL MEDICINE

## 2021-11-08 PROCEDURE — 6370000000 HC RX 637 (ALT 250 FOR IP): Performed by: HOSPITALIST

## 2021-11-08 PROCEDURE — 83735 ASSAY OF MAGNESIUM: CPT

## 2021-11-08 PROCEDURE — 6360000002 HC RX W HCPCS: Performed by: INTERNAL MEDICINE

## 2021-11-08 PROCEDURE — 92526 ORAL FUNCTION THERAPY: CPT

## 2021-11-08 PROCEDURE — 6370000000 HC RX 637 (ALT 250 FOR IP): Performed by: EMERGENCY MEDICINE

## 2021-11-08 PROCEDURE — 97535 SELF CARE MNGMENT TRAINING: CPT

## 2021-11-08 PROCEDURE — 80048 BASIC METABOLIC PNL TOTAL CA: CPT

## 2021-11-08 PROCEDURE — 85025 COMPLETE CBC W/AUTO DIFF WBC: CPT

## 2021-11-08 PROCEDURE — 36415 COLL VENOUS BLD VENIPUNCTURE: CPT

## 2021-11-08 PROCEDURE — 6360000002 HC RX W HCPCS: Performed by: NURSE PRACTITIONER

## 2021-11-08 PROCEDURE — 71045 X-RAY EXAM CHEST 1 VIEW: CPT

## 2021-11-08 PROCEDURE — 94761 N-INVAS EAR/PLS OXIMETRY MLT: CPT

## 2021-11-08 PROCEDURE — 97530 THERAPEUTIC ACTIVITIES: CPT

## 2021-11-08 PROCEDURE — 2580000003 HC RX 258: Performed by: INTERNAL MEDICINE

## 2021-11-08 PROCEDURE — 2700000000 HC OXYGEN THERAPY PER DAY

## 2021-11-08 PROCEDURE — 6370000000 HC RX 637 (ALT 250 FOR IP): Performed by: INTERNAL MEDICINE

## 2021-11-08 PROCEDURE — 85610 PROTHROMBIN TIME: CPT

## 2021-11-08 PROCEDURE — 97116 GAIT TRAINING THERAPY: CPT

## 2021-11-08 PROCEDURE — 1200000000 HC SEMI PRIVATE

## 2021-11-08 RX ORDER — POTASSIUM CHLORIDE 7.45 MG/ML
10 INJECTION INTRAVENOUS
Status: DISPENSED | OUTPATIENT
Start: 2021-11-08 | End: 2021-11-09

## 2021-11-08 RX ORDER — IPRATROPIUM BROMIDE AND ALBUTEROL SULFATE 2.5; .5 MG/3ML; MG/3ML
1 SOLUTION RESPIRATORY (INHALATION) EVERY 4 HOURS PRN
Status: DISCONTINUED | OUTPATIENT
Start: 2021-11-08 | End: 2021-11-11

## 2021-11-08 RX ORDER — POTASSIUM CHLORIDE 750 MG/1
40 TABLET, FILM COATED, EXTENDED RELEASE ORAL ONCE
Status: DISCONTINUED | OUTPATIENT
Start: 2021-11-08 | End: 2021-11-08

## 2021-11-08 RX ADMIN — APIXABAN 2.5 MG: 2.5 TABLET, FILM COATED ORAL at 15:10

## 2021-11-08 RX ADMIN — METOPROLOL SUCCINATE 50 MG: 50 TABLET, EXTENDED RELEASE ORAL at 21:15

## 2021-11-08 RX ADMIN — Medication 10 ML: at 21:15

## 2021-11-08 RX ADMIN — QUETIAPINE FUMARATE 50 MG: 25 TABLET ORAL at 21:15

## 2021-11-08 RX ADMIN — METOPROLOL SUCCINATE 50 MG: 50 TABLET, EXTENDED RELEASE ORAL at 10:04

## 2021-11-08 RX ADMIN — Medication 1000 MG: at 16:32

## 2021-11-08 RX ADMIN — Medication 2000 UNITS: at 10:04

## 2021-11-08 RX ADMIN — Medication 10 MEQ: at 23:25

## 2021-11-08 RX ADMIN — METOPROLOL SUCCINATE 50 MG: 50 TABLET, EXTENDED RELEASE ORAL at 15:10

## 2021-11-08 RX ADMIN — APIXABAN 2.5 MG: 2.5 TABLET, FILM COATED ORAL at 21:15

## 2021-11-08 ASSESSMENT — PAIN SCALES - GENERAL: PAINLEVEL_OUTOF10: 0

## 2021-11-08 ASSESSMENT — ENCOUNTER SYMPTOMS
STRIDOR: 0
PHOTOPHOBIA: 0
CHEST TIGHTNESS: 0
EYE DISCHARGE: 0
SHORTNESS OF BREATH: 0
BLOOD IN STOOL: 0
RHINORRHEA: 0
COLOR CHANGE: 0
FACIAL SWELLING: 0
APNEA: 0
ABDOMINAL PAIN: 0
CHOKING: 0
DIARRHEA: 0
EYE REDNESS: 0
TROUBLE SWALLOWING: 0
COUGH: 0
NAUSEA: 0

## 2021-11-08 NOTE — CARE COORDINATION
Discharge planning note;    Patient has been accepted at [facility]. Spoke with Omer Beauchamp ([phone]) and they are starting precert when updated PT/OT notes are available    Spoke with daughter Guerrero Wallis and discussed discharge plan.     Jeanette Win RN BSN  Case Management  [phone]

## 2021-11-08 NOTE — CONSULTS
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 Baseline crea 1.4. Non-oliguric. Follow crea today. UA bland. Received IV Lasix x 1. Labs pending    D/c if stable labs    Hypotension. Improved. Follow DBP since higher. Hypovolemia. Improved  Hypokalemia-replaced. Follow K today. Hypocalcemia- asymptomatic. Anemia of acute blood loss. follow Hgb. Metabolic acidosis. Improved. Hypoalbuminemia  E. coli sepsis WBC count 21,000-on Ceftriaxone. Continue herron     High complexity. Multiple medical problems. Discussed with patient and treatment team.    Thank you for allowing me to participate in this patient's care. Please do not hesitate to contact me for any questions/concerns. We will follow along with you. Temo Looney MD  [facility] [address] [place] [address]   Phone: [phone] or [address] [employer]  Fax: [phone]             Subjective:   Patient is weak and lethargic. BP now higher    Objective:      Objective:  EXAM  Vitals:    21 0800   BP: (!) 142/81   Pulse: 86   Resp: 16   Temp: 97.8 °F (36.6 °C)   SpO2: 96%       Intake/Output Summary (Last 24 hours) at 2021 0930  Last data filed at 2021 0408  Gross per 24 hour   Intake 220 ml   Output 825 ml   Net -605 ml       External exam of the ears and nose are normal  HENT: exam is normal  Eyes: Pupils are equal, round  CVS.  Heart sounds are normal  RS. Rhonchi b/l   PA soft , bowel sounds are normal no distension and no tenderness to palpation. Skin No rash , No palpable nodules  Musculoskeletal: Normal range of motion. 1+ edema and no tenderness.          Active Problems:    Essential hypertension    ROBYN (obstructive sleep apnea)    Acute GI bleeding    Atrial fibrillation with RVR (HCC)    Severe sepsis (HCC)    E coli bacteremia    Supratherapeutic INR    Hyperkalemia    Normocytic normochromic anemia    Hyponatremia    Bilateral pleural effusion    Diverticulosis    Anasarca    Overweight (BMI 25.0-29. 9)    High anion gap metabolic acidosis    Elevated brain natriuretic peptide (BNP) level    JULIO (acute kidney injury) ([facility] [address]    Hypokalemia  Resolved Problems:    * No resolved hospital problems. *        Medications Reviewed by me   cefTRIAXone (ROCEPHIN) IV  1,000 mg IntraVENous Q24H    metoprolol succinate  50 mg Oral TID    QUEtiapine  50 mg Oral Nightly    sodium chloride flush  5-40 mL IntraVENous 2 times per day    Vitamin D  2 tablet Oral Daily      vasopressin (Septic Shock) infusion Stopped (11/05/21 2322)    sodium chloride Stopped (11/05/21 2240)       Data Review. Labs reviewed by me       CBC:   Recent Labs     11/05/21  1300 11/06/21  0445   WBC 12.4* 9.5   HGB 7.9* 7.9*   HCT 25.3* 24.9*   MCV 88.8 90.4    289     BMP:   Recent Labs     11/05/21  1300 11/06/21  0445    142   K 3.6 3.2*    104   CO2 23 23   BUN 63* 62*   CREATININE 2.6* 2.8*     Magnesium:   Lab Results   Component Value Date    MG 2.20 11/06/2021    MG 2.10 11/05/2021    MG 2.30 11/01/2021     Lab Results   Component Value Date    CREATININE 2.8 11/06/2021       Arterial Blood Gasses  No results for input(s): PH, PCO2, PO2 in the last 72 hours.     Invalid input(s): [id number], INSPIREDO2    UA:  Recent Labs     11/06/21  0445   COLORU YELLOW   PHUR 5.0   WBCUA 6*   RBCUA 0-2   BACTERIA RARE*   CLARITYU CLOUDY*   SPECGRAV 1.018   LEUKOCYTESUR SMALL*   UROBILINOGEN 0.2   BILIRUBINUR Negative   BLOODU Negative   GLUCOSEU Negative       LIVER PROFILE:   Recent Labs     11/05/21 2245   LIPASE 93.0*     PT/INR:    Lab Results   Component Value Date    PROTIME 20.6 11/06/2021    PROTIME 19.6 11/05/2021    PROTIME 16.6 11/04/2021    INR 1.78 11/06/2021    INR 1.70 11/05/2021    INR 1.45 11/04/2021     PTT:    Lab Results   Component Value Date    APTT 32.1 11/01/2021     CANDE:  No results found for: ANATITER, CANDE  CHEMISTRY COMMON GROUP :   Lab Results   Component Value Date    GLUCOSE 170 11/06/2021    TSH 2.54 06/03/2010     Recent Labs     11/05/21  1300 11/06/21  0445   GLUCOSE 107* 170*   CALCIUM 7.9* 7.9*         RADIOLOGY:        Imaging Results.   Chest X Ray reviewed by me      Electronically Signed: Gabriela Mcfarlane MD 11/8/2021 9:30 AM

## 2021-11-08 NOTE — PROGRESS NOTES
Comprehensive Nutrition Assessment    Type and Reason for Visit:  Reassess    Nutrition Recommendations/Plan:   1. Diet consistency per SLP  2.  Con't Ensure Clear BID      Nutrition Assessment:  Pt receives pureed diet & requires staff to feed. Po intake 25% or less of meals. Pt also receives Ensure Clear supplements to promote kcal & protein intake. Wt stable @ this time. Will con't to monitor progress. Malnutrition Assessment:  Malnutrition Status:  Insufficient data      Estimated Daily Nutrient Needs:  Energy (kcal):  6493-8864; Weight Used for Energy Requirements:   (con't to use 74.5 kg)     Protein (g):  68-86 grams; Weight Used for Protein Requirements:  Ideal (57 kg; 1.2-1.5 grams per kg)        Fluid (ml/day):   ; Method Used for Fluid Requirements:  1 ml/kcal      Nutrition Related Findings:  LBM 11/3 per EMR.  +2 pitting edema extremities, k+ 3.2      Wounds:  Skin Tears       Current Nutrition Therapies:    ADULT ORAL NUTRITION SUPPLEMENT; Lunch, Dinner; Clear Liquid Oral Supplement  ADULT DIET; Dysphagia - Pureed    Anthropometric Measures:  · Height: 5' 5\" (165.1 cm)  · Current Body Weight: 167 lb (75.8 kg)   · Admission Body Weight: 164 lb 3.9 oz (74.5 kg)    · Usual Body Weight:       · Ideal Body Weight: 125 lbs; % Ideal Body Weight 133.6 %   · BMI: 27.8  · Adjusted Body Weight:  ; No Adjustment   · Adjusted BMI:      · BMI Categories: Overweight (BMI 25.0-29. 9)       Nutrition Diagnosis:   · Inadequate oral intake related to inadequate protein-energy intake as evidenced by intake 0-25%      Nutrition Interventions:   Food and/or Nutrient Delivery:  Continue Current Diet, Continue Oral Nutrition Supplement  Nutrition Education/Counseling:  Education not indicated   Coordination of Nutrition Care:  Continue to monitor while inpatient    Goals:  Pt will tolerate diet advancement to solids and consume at least 50% of meals and supplements       Nutrition Monitoring and Evaluation: Behavioral-Environmental Outcomes:  None Identified   Food/Nutrient Intake Outcomes:  Food and Nutrient Intake, Supplement Intake  Physical Signs/Symptoms Outcomes:  GI Status, Skin     Discharge Planning:     Too soon to determine     Electronically signed by Ganesh Sawyer RD, LD on 11/8/21 at 2:01 PM EST    Contact: [phone]

## 2021-11-08 NOTE — PROGRESS NOTES
Speech Language Pathology  Dysphagia Treatment Note    Name:  Gary Friday  :   1935  Medical Diagnosis:  Septicemia ([facility] [address] [A41.9]  GI bleed [K92.2]  Acute GI bleeding [K92.2]  JULIO (acute kidney injury) ([facility] [address] [N17.9]  Blood loss anemia [D50.0]  Atrial fibrillation with RVR ([facility] [address] [I48.91]  Supratherapeutic INR [R79.1]  Acute cystitis without hematuria [N30.00]  Treatment Diagnosis: Oropharyngeal Dysphagia  Pain level: Patient denies pain at this time. Diet level prior to treatment: IDDSI Level 4: Pureed and Thin liquids  Tolerance of Current Diet Level: RN reports instance of coughing with medications last night, though states did well with meds crushed in puree today. Assessment of Texture Tolerance:  Impressions: Patient seen this date for follow-up dysphagia treatment, alert and upright in bed on 3L O2 via nasal cannula upon SLP entry.  present at bedside. SLP presented trials of puree solids and thin liquids via straw. Patient demonstrated adequate bolus control and timely initiation of swallow with puree solids. Manual palpation on trials of thin via straw revealed sufficient hyolaryngeal elevation. No overt s/s of aspiration with either texture. With all PO trials this date, patient noted to have rapid decline in O2 sats, at one point dropping to 80%. RN reports typical sats for this patient are in the low 90s. Noted increased effort and difficulty with respiration-coordination during PO intake. Due to respiratory status, patient is at increased risk of aspiration, thus recommend continuation of puree solids at this time as advanced textures may exacerbate this difficulty with respiration-coordination. Patient and family verbalized understanding. Diet and Treatment Recommendations 2021:  Puree, Thin  Pending EGD could upgrade regular foods. Compensatory strategies:  Alternate solids/liquids , Effortful Swallows     Dysphagia Goals:   Pt will functionally tolerate recommended diet with no overt clinical s/s of aspiration (onoing 11/8/2021)  Pt will demonstrate understanding of aspiration risk and precautions via education/demonstration with occasional prompting (ongoing 11/8/2021)  Pt will advance to least restrictive diet as indicated (ongoing 11/8/2021)  If clinical s/s of aspiration/penetration continue to be noted, Pt will participate in Modified Barium Swallow Study (ongoing 11/8/2021)    Plan:  Continued daily Dysphagia treatment with goals per plan of care. Patient/Family Education: Education given to the Pt and nurse, who verbalized understanding. Discharge Recommendations:  Pt will benefit from continued skilled Speech Therapy for Dysphagia services, prior to returning home. Timed Code Treatment:  0    Total Treatment Time: 14 minutes    If patient discharges prior to next session this note will serve as a discharge summary.        Signature:   Wilmer Dugan M.A., [address] [place] [address]  Speech-Language Pathologist

## 2021-11-08 NOTE — PROGRESS NOTES
Speech Language Pathology      Name:  Alexandria Fitzgerald  :   1935     Medical Diagnosis:  Septicemia ([facility] [address] [A41.9]  GI bleed [K92.2]  Acute GI bleeding [K92.2]  JULIO (acute kidney injury) ([facility] [address] [N17.9]  Blood loss anemia [D50.0]  Atrial fibrillation with RVR ([facility] [address] [I48.91]  Supratherapeutic INR [R79.1]  Acute cystitis without hematuria [N30.00]     Treatment Diagnosis: Oropharyngeal Dysphagia; Cognitive-Linguistic Deficits     Pain level: Patient denies pain at this time. Dysphagia Treatment Note    Subjective:   Speech visit requested secondary to reported episode of coughing while drinking water via straw. Per RN, pt now has audible wheezing. Pt is currently on 4L of O2 via nasal canula, responding to commands and basic questions but appears fatigued. She was upright in chair but with limited mobility for feeding and compensatory postures. She had frequent complaints of feeling dry and thirsty. She had two family members present. Assessment of Texture Tolerance:  Ice chips: dry throat clear   Thin Water: DNT due to fluctuating oxygen saturation (82-90)   Mildly thick liquid: overt weak coughing on 2/6 presentations; overt signs of premature bolus loss; moderately reduced laryngeal elevation; fluctuating oxygen saturation   Moderately thick liquid: DNT due to poor tolerance of thick and thinner textures   Puree (applesauce): No overt s/s of aspiration; moderately reduced laryngeal elevation ; fluctuating oxygen saturation   Attempted use of chin tuck however pt was unable to coordinate/execute. Sip sizes variances did not alter signs of tolerance. Pt with reduced tolerance of PO intake from previous session. Suspect a decline in respiratory status after a suspected aspiration event resulting in reduced airway protection during the swallow. She may benefit from instrumental assessment pending ongoing bedside tolerance and respiratory status.      Diet and Treatment Recommendations 2021:  NPO with ongoing SLP assessment   Necessary meds crushed in pudding/puree   Dry mouth - allow toothettes in water     Compensatory strategies: Alternate solids/liquids , Effortful Swallows     Dysphagia Goals:   Pt will functionally tolerate recommended diet with no overt clinical s/s of aspiration (onoing 11/8/2021)  Pt will demonstrate understanding of aspiration risk and precautions via education/demonstration with occasional prompting (ongoing 11/8/2021)  Pt will advance to least restrictive diet as indicated (ongoing 11/8/2021)  If clinical s/s of aspiration/penetration continue to be noted, Pt will participate in Modified Barium Swallow Study (ongoing 11/8/2021)      Speech Language Cognition Treatment   Speech, language and cognitive goals were not addressed this session. GOALS:  Short Term Speech/Language/Cognitive Goals:   1. Pt will improve auditory processing/comprehension of commands and questions to 80%, via graded tasks   2. Pt will improve orientation and short term recall via graded tasks to 80%  3. Pt will improve verbal expression for functional expression via graded tasks to 80%  4. Pt will participate in ongoing cognitive assessment with goals to be established as indicated     Plan:    3-5 times a week during acute care stay     Patient/Family Education:   Provided education regarding role of SLP, results of assessment, recommendations and general speech pathology plan of care. [x]? Pt verbalized understanding and agreement   [x]? Pt requires ongoing learning   []? No evidence of comprehension     Discharge Recommendations:    Recommend ongoing SLP for dysphagia and speech therapy upon discharge from hospital; pending diet tolerance and progression towards goals. Timed Code Treatment:    0     Total Treatment Time:   15 minutes    If patient discharges prior to next session this note will serve as a discharge summary. Signature:   Stacey Carty M.A.  [facility] [id number]  Speech-Language Pathologist   11/8/2021 4:00 PM

## 2021-11-08 NOTE — PROGRESS NOTES
Physical Therapy  Facility/Department: [address] ORTHO/NEURO NURSING  Daily Treatment Note  NAME: Kizzy Mayfield  : 1935  MRN: [id number]    Date of Service: 2021    Discharge Recommendations: Kizzy Mayfield scored a 13/24 on the AM-PAC short mobility form. Current research shows that an AM-PAC score of 17 or less is typically not associated with a discharge to the patient's home setting. Based on the patient's AM-PAC score and their current functional mobility deficits, it is recommended that the patient have 3-5 sessions per week of Physical Therapy at d/c to increase the patient's independence. Please see assessment section for further patient specific details. If patient discharges prior to next session this note will serve as a discharge summary. Please see below for the latest assessment towards goals. 3-5 sessions per week   PT Equipment Recommendations  Equipment Needed: No    Assessment   Body structures, Functions, Activity limitations: Decreased functional mobility ; Decreased safe awareness; Decreased balance; Decreased endurance; Decreased strength  Assessment: Patient continues to not be at baseline function and would benefit from skilled PT to address above deficits and facilitate return to baseline function. Not safe to return to current home environment at this time due to decreased functional mobility, impaired balance, and decreased cognitive function  Treatment Diagnosis: decreased functional mobility, impaired gait, decreased endurance  Prognosis: Good  Decision Making: Medium Complexity  Clinical Presentation: evolving  PT Education: Goals; PT Role; Plan of Care; Transfer Training; General Safety; Orientation; Low Vision Education; Gait Training; Functional Mobility Training  Patient Education: Several of pts questions answered throughout session. Educated on importance of OOB activity.  Educated pt on use of call light and applied raised surface on nursing call light due to pt visual impairment. Pt verbalized understanding but will require reinforcement for carryover  Barriers to Learning: visual, cognition  REQUIRES PT FOLLOW UP: Yes  Activity Tolerance  Activity Tolerance: Patient limited by endurance; Patient Tolerated treatment well; Patient limited by fatigue  Activity Tolerance: Pt limited by legal blindness and unfamiliar environment. Self limiting behaviors     Patient Diagnosis(es): The primary encounter diagnosis was Acute GI bleeding. Diagnoses of Blood loss anemia, Atrial fibrillation with RVR ([facility] [address] Acute cystitis without hematuria, Septicemia ([address] Supratherapeutic INR, and JULIO (acute kidney injury) ([facility] [address] were also pertinent to this visit. has a past medical history of Atrial fibrillation ([facility] [address] CAD (coronary artery disease), Depression, Hyperlipidemia, Hypertension, Hypothyroid, Legal blindness, and Obstructive sleep apnea (adult) (pediatric). has a past surgical history that includes Coronary artery bypass graft (1997); Tubal ligation; angioplasty; sigmoidoscopy (10/19/2018); pr sigmoidoscopy flx dx w/collj spec br/wa if pfrmd (N/A, 10/19/2018); eye surgery (Right); eye surgery (Left); Tonsillectomy and adenoidectomy; Colonoscopy (11/16/2018); Upper gastrointestinal endoscopy (N/A, 10/20/2021); Colonoscopy (N/A, 10/20/2021); and Colonoscopy (N/A, 11/4/2021). Restrictions  Restrictions/Precautions  Restrictions/Precautions: Fall Risk, Modified Diet (high fall risk)  Required Braces or Orthoses?: No  Position Activity Restriction  Other position/activity restrictions: Kiran Schwartz is a 82 y.o. female who presents to the emergency department with a chief complaint of some shortness of breath and general weakness. Her ex- who is taking care of her later presents and states she began to have some bloody stools again today.   She was just discharged about a week ago for this and had EGD and colonoscopy that revealed no source of bleeding but she did have 2 clips placed over nonbleeding cecal AVMs. She is anticoagulated on Eliquis. She denies chest pain, abdominal pain, dysuria, hematuria, fevers, nausea, vomiting or any other symptoms. Patient is unsure of how many bloody stools she has had. Anticoagulant on Eliquis with history of atrial fibrillation. Subjective   General  Chart Reviewed: Yes  Response To Previous Treatment: Patient with no complaints from previous session. Family / Caregiver Present: Yes (pt friend present)  Subjective  Subjective: Pt reports no pain at this time. General Comment  Comments: Pt supine in bed upon arrival. Pt agreeable to therapy. On 3 L O2  Pain Screening  Patient Currently in Pain: No  Vital Signs  Patient Currently in Pain: No       Orientation  Orientation  Overall Orientation Status: Impaired  Orientation Level: Oriented to person; Disoriented to place; Disoriented to time; Disoriented to situation  Cognition   Cognition  Overall Cognitive Status: Exceptions  Arousal/Alertness: Appropriate responses to stimuli  Following Commands: Follows one step commands with increased time  Attention Span: Appears intact  Memory: Decreased recall of recent events  Insights: Decreased awareness of deficits  Initiation: Requires cues for some  Sequencing: Requires cues for some  Cognition Comment: Requires significant VC and tactile cues to navigate unfamiliar environment due to legal blindness  Objective   Bed mobility  Supine to Sit: Moderate assistance; 2 Person assistance (mod A x2)  Scooting: Maximal assistance; Contact guard assistance (CGA EOB, Max A x2 to scoot back in recliner)  Comment: HOB elevated, grab bars.  Max VC and tactile cues for sequencing due to pt legal blindness and unfamiliar environment  Transfers  Sit to Stand: Minimal Assistance  Stand to sit: Minimal Assistance  Stand Pivot Transfers: Minimal Assistance  Comment: Pt able to complete stand step to transfer from EOB to recliner with min A, max VC and tactile cues for sequencing and assist with navigating RW  Ambulation  Ambulation?: Yes  Ambulation 1  Surface: level tile  Device: Rolling Walker  Assistance: Minimal assistance  Quality of Gait: Pt requires assist with guiding RW and navigating environment due to legal blindness  Gait Deviations: Slow Jessica; Decreased step length; Decreased step height  Distance: 5 ft  Comments: Pt is able to ambulate 5 ft from EOB to recliner with min A x1 and RW. Pt fatigues quickly. Balance  Posture: Fair  Sitting - Static: Fair; + (mod A progressing to CGA)  Sitting - Dynamic: Fair (mod A progressing to CGA)  Standing - Static: Fair; - (with RW and min A)  Standing - Dynamic: Poor; + (with RW and min A)  Comments: Pt sat EOB ~8 mins to complete bathing and gown change. Pt sat in recliner ~8 mins to complete oral hygeine. Several bouts of coughing after drinking water throughout session. Nursing notified. Pt on 3 L O2,  Unable to assess O2 saturation levels throughout session due to poor pulse ox readings. G-Code     OutComes Score                                                     AM-PAC Score  AM-PAC Inpatient Mobility Raw Score : 13 (11/08/21 1508)  AM-PAC Inpatient T-Scale Score : 36.74 (11/08/21 1508)  Mobility Inpatient CMS 0-100% Score: 64.91 (11/08/21 1508)  Mobility Inpatient CMS G-Code Modifier : CL (11/08/21 1508)          Goals  Short term goals  Time Frame for Short term goals:  To be met prior to discharge  Short term goal 1: Bed mobility with min A  Short term goal 2: Sit to/from stand with min A (Met 11/8/21)  Short term goal 3: Ambulate 25' with RW and min A  Short term goal 4: Pt will complete sit<>stand transfers with SBA  Patient Goals   Patient goals : did not state  ONE GOAL MET THIS DATE    Plan    Plan  Times per week: 3-5  Times per day: Daily  Current Treatment Recommendations: Strengthening, Transfer Training, Endurance Training, Balance Training, Gait Training, Functional Mobility Training, Stair training, Safety Education & Training, Home Exercise Program, Patient/Caregiver Education & Training  Safety Devices  Type of devices:  All fall risk precautions in place, Call light within reach, Left in chair, Patient at risk for falls, Chair alarm in place, Gait belt, Nurse notified, Telesitter in use  Restraints  Initially in place: No     Therapy Time   Individual Concurrent Group Co-treatment   Time In       8583   Time Out       1447   Minutes       58   Timed Code Treatment Minutes: [address], [address] [place], [place], [place] [address]

## 2021-11-08 NOTE — PROGRESS NOTES
Attempted USG venipuncture for lab draw. One small vein identified for use. Very slow blood return. Passed off several syringes to [place] to avoid clotting or lysing. Had assessed both arms before attempt and attempting the only vein visualized.

## 2021-11-08 NOTE — PROGRESS NOTES
11/08/21 1741   RT Protocol   History Pulmonary Disease 0   Respiratory pattern 0   Breath sounds 0   Cough 0   Bronchodilator Assessment Score 0

## 2021-11-08 NOTE — PLAN OF CARE
Problem: Falls - Risk of:  Goal: Will remain free from falls  Description: Will remain free from falls  Outcome: Met This Shift  Goal: Absence of physical injury  Description: Absence of physical injury  Outcome: Met This Shift     Problem: Skin Integrity:  Goal: Will show no infection signs and symptoms  Description: Will show no infection signs and symptoms  Outcome: Ongoing  Goal: Absence of new skin breakdown  Description: Absence of new skin breakdown  Outcome: Ongoing     Problem: Pain:  Goal: Pain level will decrease  Description: Pain level will decrease  Outcome: Met This Shift  Goal: Control of acute pain  Description: Control of acute pain  Outcome: Met This Shift  Goal: Control of chronic pain  Description: Control of chronic pain  Outcome: Met This Shift

## 2021-11-08 NOTE — PROGRESS NOTES
Pt assessment complete. HR elevated all other VVS. Meds given per MAR. Pt has no complaints of pain only asking for water at this time. Pt is confused asked about where she could get money at this time and states she is at [facility]. Skin and herron care done. New Mepilex placed on wound. Call light within reach, will continue to monitor.

## 2021-11-08 NOTE — PROGRESS NOTES
[facility]    11/8/2021 11:04 AM        Name: Lito Allen . Admitted: 11/1/2021  Primary Care Provider: Rohith Phillip (Tel: [phone])      Subjective:   Pt is awake and alert today. Denies anymore bleeding. Heart rate is better. Reviewed interval ancillary notes    Current Medications  apixaban (ELIQUIS) tablet 2.5 mg, BID  cefTRIAXone (ROCEPHIN) 1000 mg in sterile water 10 mL IV syringe, Q24H  metoprolol succinate (TOPROL XL) extended release tablet 50 mg, TID  vasopressin 20 Units in dextrose 5 % 100 mL infusion, Continuous  QUEtiapine (SEROQUEL) tablet 50 mg, Nightly  sodium chloride flush 0.9 % injection 5-40 mL, 2 times per day  sodium chloride flush 0.9 % injection 5-40 mL, PRN  0.9 % sodium chloride infusion, PRN  Vitamin D (CHOLECALCIFEROL) tablet 2,000 Units, Daily        Objective:  BP (!) 142/81   Pulse 86   Temp 97.8 °F (36.6 °C) (Axillary)   Resp 16   Ht 5' 5\" (1.651 m)   Wt 167 lb 1.7 oz (75.8 kg)   SpO2 96%   BMI 27.81 kg/m²     Intake/Output Summary (Last 24 hours) at 11/8/2021 1104  Last data filed at 11/8/2021 0408  Gross per 24 hour   Intake 220 ml   Output 825 ml   Net -605 ml      Wt Readings from Last 3 Encounters:   11/07/21 167 lb 1.7 oz (75.8 kg)   10/22/21 156 lb (70.8 kg)   11/16/18 147 lb (66.7 kg)       General appearance:  Appears alert and confused but talkative  Eyes: Sclera clear. Pupils equal.  ENT: Moist oral mucosa. Trachea midline, no adenopathy. Cardiovascular: rapid irregular rhythm. No edema in lower extremities  Respiratory: Not using accessory muscles. Good inspiratory effort. Clear to auscultation bilaterally, no wheeze or crackles.    GI: Abdomen soft, no tenderness, not distended, normal bowel sounds  Musculoskeletal: No cyanosis in digits, neck supple  Neurology: CN 2-12 grossly intact and pt following commands  Psych: Alert talking but confused  Skin: Warm, dry, normal turgor    Labs and Tests:  CBC:   Recent Labs     11/05/21  1300 11/06/21  0445   WBC 12.4* 9.5   HGB 7.9* 7.9*    289     BMP:    Recent Labs     11/05/21  1300 11/06/21  0445    142   K 3.6 3.2*    104   CO2 23 23   BUN 63* 62*   CREATININE 2.6* 2.8*   GLUCOSE 107* 170*     Hepatic:   No results for input(s): AST, ALT, ALB, BILITOT, ALKPHOS in the last 72 hours. Problem List  Active Problems:    Essential hypertension    ROBYN (obstructive sleep apnea)    Acute GI bleeding    Atrial fibrillation with RVR (HCC)    Severe sepsis (HCC)    E coli bacteremia    Supratherapeutic INR    Hyperkalemia    Normocytic normochromic anemia    Hyponatremia    Bilateral pleural effusion    Diverticulosis    Anasarca    Overweight (BMI 25.0-29. 9)    High anion gap metabolic acidosis    Elevated brain natriuretic peptide (BNP) level    JULIO (acute kidney injury) ([address]    Hypokalemia  Resolved Problems:    * No resolved hospital problems. 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 H/H has been stable  -Restart anticoagulation today    Atrial fibrillation with rVr  Pt was seen by cardiology for her Afib with rVr and no anticogulation due to her recent GI bleed. It was recommended by cardiology to continue metoprolol and  On Toprol XL   Patient is following with Dr. Nichole Harkins at [place] and would like to follow up with his cardiologist for watchman evaluation and workup. E. coli sepsis   WBC count normalized   On Ceftriaxone.   -Plan to switch to oral    Hypokalemia  Replaced  Follow K today. Diet: ADULT ORAL NUTRITION SUPPLEMENT; Lunch, Dinner; Clear Liquid Oral Supplement  ADULT DIET;  Dysphagia - Pureed  Code:Full Code  DVT PPX    Disposition-switch to oral antibiotics and discharge to skilled nursing today      Cami Landaverde MD   11/8/2021 11:04 AM

## 2021-11-08 NOTE — PROGRESS NOTES
Infectious Diseases   Progress Note      Admission Date: 11/1/2021  Hospital Day: Hospital Day: 8   Attending: Lisette Casas MD  Date of service: 11/8/2021     Chief complaint/ Reason for consult:     · Severe sepsis on admission with leukocytosis, hypotension, tachycardia, acute kidney injury  · Gram-negative bacteremia with E. Coli  · High anion gap metabolic acidosis  · Elevated proBNP  · Mild hyperkalemia  · Hyponatremia  · Normocytic normochromic anemia  · Bilateral pleural effusions    Microbiology:        I have reviewed allavailable micro lab data and cultures    · Blood culture (2/2) - collected on 11/1/2021: E coli    Susceptibility     Escherichia coli     BACTERIAL SUSCEPTIBILITY PANEL BY ERIK     ampicillin >=32 mcg/mL Resistant     ceFAZolin <=4 mcg/mL Sensitive     cefepime <=0.12 mcg/mL Sensitive     cefTRIAXone <=0.25 mcg/mL Sensitive     ciprofloxacin <=0.25 mcg/mL Sensitive     ertapenem <=0.12 mcg/mL Sensitive     gentamicin <=1 mcg/mL Sensitive     levofloxacin 1 mcg/mL Sensitive     piperacillin-tazobactam <=4 mcg/mL Sensitive     trimethoprim-sulfamethoxazole >=320 mcg/mL Resistant        · Urine culture  - collected on 11/1/2021: > 100,000 cfu/ml of E coli    Susceptibility    Escherichia coli (1)    Antibiotic Interpretation ERIK Status    ampicillin Resistant >=32 mcg/mL     ceFAZolin Sensitive <=4 mcg/mL      NOTE: Cefazolin should only be used for uncomplicated UTI         for E.coli or Klebsiella pneumoniae.    cefepime Sensitive <=0.12 mcg/mL     cefTRIAXone Sensitive <=0.25 mcg/mL     ciprofloxacin Sensitive <=0.25 mcg/mL     ertapenem Sensitive <=0.12 mcg/mL     gentamicin Sensitive <=1 mcg/mL     levofloxacin Sensitive 1 mcg/mL     nitrofurantoin Sensitive <=16 mcg/mL     piperacillin-tazobactam Sensitive <=4 mcg/mL     trimethoprim-sulfamethoxazole Resistant >=320 mcg/mL         Antibiotics and immunizations:       Current antibiotics: All antibiotics and their doses were reviewed by me    Recent Abx Admin                   cefTRIAXone (ROCEPHIN) 1000 mg in sterile water 10 mL IV syringe (mg) 1,000 mg Given 11/07/21 1519                  Immunization History: All immunization history was reviewed by me today. Immunization History   Administered Date(s) Administered    Influenza, High Dose (Fluzone 65 yrs and older) 10/27/2018    Influenza, Quadv, IM, PF (6 mo and older Fluzone, Flulaval, Fluarix, and 3 yrs and older Afluria) 10/20/2021       Known drug allergies: All allergies were reviewed and updated    Allergies   Allergen Reactions    Percocet [Oxycodone-Acetaminophen] Other (See Comments)     Pt states it makes her crazy. Social history:     Social History:  All social andepidemiologic history was reviewed and updated by me today as needed. · Tobacco use:   reports that she has never smoked. She has never used smokeless tobacco.  · Alcohol use:   reports current alcohol use of about 2.0 standard drinks of alcohol per week. · Currently lives in: [address] [place] [address]  ·  reports no history of drug use. COVID VACCINATION AND LAB RESULT RECORDS:     Internal Administration   First Dose      Second Dose           Last COVID Lab SARS-CoV-2, NAAT (no units)   Date Value   11/03/2021 Not Detected            Assessment:     The patient is a 82 y.o. old female who  has a past medical history of Atrial fibrillation ([address] CAD (coronary artery disease), Depression, Hyperlipidemia, Hypertension, Hypothyroid, Legal blindness, and Obstructive sleep apnea (adult) (pediatric).  with following problems:    · Severe sepsis on admission with leukocytosis, hypotension, tachycardia, acute kidney injury-resolved  · Gram-negative bacteremia with E. Coli-currently on ceftriaxone  · High anion gap metabolic acidosis-this is improved  · Elevated proBNP  · Acute kidney injury-stable, serum creatinine is 2.8 today  · Mild hyperkalemia-corrected  · Hyponatremia-being corrected  · Normocytic normochromic anemia-ongoing  · Bilateral pleural effusions  · Colon Diverticulosis  · Generalized anasarca  · Coronary artery disease-stable  · Essential hypertension-BP controlled. · Hyperlipidemia  · Obstructive sleep apnea  · Overweight due to excess calorie intake : Body mass index is 27.33 kg/m².-Counseling done      Discussion:      The patient is afebrile. 1 set of blood culture from 11/5/2021 was positive for staph epidermidis. That was likely contaminant from the skin        Plan:     Diagnostic Workup:      · Continue to follow  fever curve, WBC count and blood cultures. · Continue to monitor blood counts, liver and renal function. Antimicrobials:    · Plan to switch antibiotic from IV ceftriaxone to oral Ceftin at a renally adjusted dose of 250 mg every 12 hour at discharge  · Plan for 1 week of oral Ceftin at discharge  · We will follow up on the culture results and clinical progress and will make further recommendations accordingly. · Continue close vitals monitoring. · Maintain good glycemic control. · Fall precautions. Aspiration precautions. · Continue to watch for new fever or diarrhea. · DVT prophylaxis. · Discussed all above with patient and RN. Drug Monitoring:    · Continue monitoring for antibiotic toxicity as follows: CBC, CMP   · Continue to watch for following: new or worsening fever, new hypotension, hives, lip swelling and redness or purulence at vascular access sites. I/v access Management:    · Continue to monitor i.v access sites for erythema, induration, discharge or tenderness. · As always, continue efforts to minimize tubes/lines/drains as clinically appropriate to reduce chances of line associated infections.     Patient education and counseling:        · The patient was educated in detail about the side-effects of various antibiotics and things to watch for like new rashes, lip swelling, severe reaction, worsening diarrhea, break through fever etc.  · Discussed patient's condition and what to expect. All of the patient's questions were addressed in a satisfactory manner and patient verbalized understanding all instructions. Level of complexity of visit: High       Thank you for involving me in the care of your patient. I will continue to follow. If you have anyadditional questions, please do not hesitate to contact me. Subjective: Interval history: Interval history was obtained from chart review and patient/ RN. He is afebrile. Encephalopathy is improving. She is tolerating antibiotics okay     REVIEW OF SYSTEMS:      Review of Systems   Constitutional: Negative for chills, diaphoresis and unexpected weight change. HENT: Negative for congestion, ear discharge, ear pain, facial swelling, hearing loss, rhinorrhea and trouble swallowing. Eyes: Negative for photophobia, discharge, redness and visual disturbance. Respiratory: Negative for apnea, cough, choking, chest tightness, shortness of breath and stridor. Cardiovascular: Negative for chest pain and palpitations. Gastrointestinal: Negative for abdominal pain, blood in stool, diarrhea and nausea. Endocrine: Negative for polydipsia, polyphagia and polyuria. Genitourinary: Negative for difficulty urinating, dysuria, frequency, hematuria, menstrual problem and vaginal discharge. Musculoskeletal: Negative for arthralgias, joint swelling, myalgias and neck stiffness. Skin: Negative for color change and rash. Allergic/Immunologic: Negative for immunocompromised state. Neurological: Negative for dizziness, seizures, speech difficulty, light-headedness and headaches. Hematological: Negative for adenopathy. Psychiatric/Behavioral: Negative for agitation, hallucinations and suicidal ideas. Past Medical History: All past medical history reviewed today.     Past Medical History:   Diagnosis Date    Atrial fibrillation ([facility]     CAD (coronary artery disease)     Depression     Hyperlipidemia     Hypertension     Hypothyroid     Legal blindness     Obstructive sleep apnea (adult) (pediatric)        Past Surgical History: All past surgical history was reviewed today. Past Surgical History:   Procedure Laterality Date    ANGIOPLASTY      COLONOSCOPY  11/16/2018    COLONOSCOPY POLYPECTOMY SNARE/COLD BIOPSY performed by Jasmin Liu MD at [address] N/A 10/20/2021    COLONOSCOPY POLYPECTOMY ABLATION performed by Partha Robertson MD at [address] N/A 11/4/2021    COLONOSCOPY POLYPECTOMY ABLATION performed by Cece Locke MD at [address] Right     CATARACT EXTRACTION W/ IOL    EYE SURGERY Left     CATARCT EXTRACTION W/ IOL    SD SIGMOIDOSCOPY FLX DX W/COLLJ SPEC BR/WA IF PFRMD N/A 10/19/2018    SIGMOIDOSCOPY DIAGNOSTIC FLEXIBLE performed by Jasmin Liu MD at [address]  10/19/2018    flexible    TONSILLECTOMY AND ADENOIDECTOMY      TUBAL LIGATION      UPPER GASTROINTESTINAL ENDOSCOPY N/A 10/20/2021    EGD BIOPSY performed by Partha Robertson MD at [address] ENDOSCOPY       Family History: All family history was reviewed today. Problem Relation Age of Onset    Heart Disease Mother     Heart Disease Father     Asthma Neg Hx     Cancer Neg Hx     Diabetes Neg Hx     Emphysema Neg Hx     Hypertension Neg Hx     Heart Failure Neg Hx        Objective:       PHYSICAL EXAM:      Vitals:   Vitals:    11/08/21 0334 11/08/21 0800 11/08/21 1109 11/08/21 1232   BP:  (!) 142/81 (!) 163/83    Pulse: 97 86 104    Resp:  16 16    Temp:  97.8 °F (36.6 °C) 98.3 °F (36.8 °C)    TempSrc:  Axillary Oral    SpO2: 92% 96% 91% 92%   Weight:       Height:           Physical Exam  Vitals and nursing note reviewed. Constitutional:       General: She is not in acute distress. Appearance: She is well-developed. She is not diaphoretic.    HENT: Head: Normocephalic. Right Ear: External ear normal.      Left Ear: External ear normal.      Nose: Nose normal.   Eyes:      General: No scleral icterus. Right eye: No discharge. Left eye: No discharge. Conjunctiva/sclera: Conjunctivae normal.      Pupils: Pupils are equal, round, and reactive to light. Cardiovascular:      Rate and Rhythm: Normal rate and regular rhythm. Heart sounds: No murmur heard. No friction rub. Pulmonary:      Effort: Pulmonary effort is normal.      Breath sounds: No stridor. No wheezing or rales. Chest:      Chest wall: No tenderness. Abdominal:      Palpations: Abdomen is soft. There is no mass. Tenderness: There is no abdominal tenderness. There is no guarding or rebound. Musculoskeletal:         General: No tenderness. Cervical back: Normal range of motion and neck supple. Lymphadenopathy:      Cervical: No cervical adenopathy. Skin:     General: Skin is warm and dry. Findings: No erythema or rash. Neurological:      Mental Status: She is alert and oriented to person, place, and time. Motor: No abnormal muscle tone. Psychiatric:         Judgment: Judgment normal.            *    Lines and drains: All vascular access sites are healthy with no local erythema, discharge or tenderness. Intake and output:    I/O last 3 completed shifts: In: 26 [P.O.:280]  Out: 65 [Urine:825]    Lab Data:   All available labs and old records have been reviewed by me.     CBC:  Recent Labs     11/06/21  0445   WBC 9.5   RBC 2.75*   HGB 7.9*   HCT 24.9*      MCV 90.4   MCH 28.8   MCHC 31.9   RDW 18.0*        BMP:  Recent Labs     11/06/21  0445      K 3.2*      CO2 23   BUN 62*   CREATININE 2.8*   CALCIUM 7.9*   GLUCOSE 170*        Hepatic Function Panel:   Lab Results   Component Value Date    ALKPHOS 67 11/05/2021    ALT 14 11/05/2021    AST 20 11/05/2021    PROT 5.4 11/05/2021    PROT 7.6 06/03/2010    BILITOT 0.4 11/05/2021    LABALBU 2.4 11/05/2021       CPK: No results found for: CKTOTAL  ESR: No results found for: SEDRATE  CRP: No results found for: CRP        Imaging: All pertinent images and reports for the current visit were reviewed by me during this visit. XR CHEST PORTABLE   Final Result   Nasogastric tube is in normal position. Vascular congestion. Stable bilateral pleural effusions with associated atelectasis or airspace   disease. CT CHEST ABDOMEN PELVIS WO CONTRAST   Final Result   Small bilateral pleural effusions are seen with adjacent consolidation at the   lung bases, likely atelectasis in the absence of clinical signs of pneumonia. Septal thickening is seen, compatible with fluid overload. Mildly enlarged precarinal node, likely reactive due to the suspected fluid   overload. Recommend 3 month follow-up chest CT for further characterization. Scattered colonic diverticula are seen. No significant pericolonic fat   stranding. Body wall anasarca with trace free fluid in pelvis, compatible with mild   fluid overload         XR CHEST PORTABLE   Final Result   Interval resolution of pulmonary edema. Fluid in the minor fissure, and   suspected bilateral pleural effusions. Left basilar airspace disease likely   atelectasis             Medications: All current and past medications were reviewed.      apixaban  2.5 mg Oral BID    cefTRIAXone (ROCEPHIN) IV  1,000 mg IntraVENous Q24H    metoprolol succinate  50 mg Oral TID    QUEtiapine  50 mg Oral Nightly    sodium chloride flush  5-40 mL IntraVENous 2 times per day    Vitamin D  2 tablet Oral Daily        vasopressin (Septic Shock) infusion Stopped (11/05/21 2322)    sodium chloride Stopped (11/05/21 2240)       sodium chloride flush, sodium chloride      Problem list:       Patient Active Problem List   Diagnosis Code    CAD (coronary artery disease) I25.10    Essential hypertension I10    ROBYN (obstructive

## 2021-11-08 NOTE — PLAN OF CARE
Nutrition Problem #1: Inadequate oral intake  Intervention: Food and/or Nutrient Delivery: Continue Current Diet, Continue Oral Nutrition Supplement  Nutritional Goals: Pt will tolerate diet advancement to solids and consume at least 50% of meals and supplements

## 2021-11-08 NOTE — RT PROTOCOL NOTE
RT Inhaler-Nebulizer Bronchodilator Protocol Note    There is a bronchodilator order in the chart from a provider indicating to follow the RT Bronchodilator Protocol and there is an Initiate RT Inhaler-Nebulizer Bronchodilator Protocol order as well (see protocol at bottom of note). CXR Findings:  No results found. The findings from the last RT Protocol Assessment were as follows:   History Pulmonary Disease: None or smoker <15 pack years  Respiratory Pattern: Regular pattern and RR 12-20 bpm  Breath Sounds: Clear breath sounds  Cough: Strong, spontaneous, non-productive  Indication for Bronchodilator Therapy:    Bronchodilator Assessment Score: 0    Aerosolized bronchodilator medication orders have been revised according to the RT Inhaler-Nebulizer Bronchodilator Protocol below. Respiratory Therapist to perform RT Therapy Protocol Assessment initially then follow the protocol. Repeat RT Therapy Protocol Assessment PRN for score 0-3 or on second treatment, BID, and PRN for scores above 3. No Indications  adjust the frequency to every 6 hours PRN wheezing or bronchospasm, if no treatments needed after 48 hours then discontinue using Per Protocol order mode. If indication present, adjust the RT bronchodilator orders based on the Bronchodilator Assessment Score as indicated below. Use Inhaler orders unless patient has one or more of the following: on home nebulizer, not able to hold breath for 10 seconds, is not alert and oriented, cannot activate and use MDI correctly, or respiratory rate 25 breaths per minute or more, then use the equivalent nebulizer order(s) with same Frequency and PRN reasons based on the score. If a patient is on this medication at home then do not decrease Frequency below that used at home.     0-3  enter or revise RT bronchodilator order(s) to equivalent RT Bronchodilator order with Frequency of every 4 hours PRN for wheezing or increased work of breathing using Per Protocol order mode. 4-6  enter or revise RT Bronchodilator order(s) to two equivalent RT bronchodilator orders with one order with BID Frequency and one order with Frequency of every 4 hours PRN wheezing or increased work of breathing using Per Protocol order mode. 7-10  enter or revise RT Bronchodilator order(s) to two equivalent RT bronchodilator orders with one order with TID Frequency and one order with Frequency of every 4 hours PRN wheezing or increased work of breathing using Per Protocol order mode. 11-13  enter or revise RT Bronchodilator order(s) to one equivalent RT bronchodilator order with QID Frequency and an Albuterol order with Frequency of every 4 hours PRN wheezing or increased work of breathing using Per Protocol order mode. Greater than 13  enter or revise RT Bronchodilator order(s) to one equivalent RT bronchodilator order with every 4 hours Frequency and an Albuterol order with Frequency of every 2 hours PRN wheezing or increased work of breathing using Per Protocol order mode. RT to enter RT Home Evaluation for COPD & MDI Assessment order using Per Protocol order mode.     Electronically signed by Emily Harris RCP on 11/8/2021 at 5:45 PM

## 2021-11-08 NOTE — DISCHARGE INSTR - COC
Continuity of Care Form    Patient Name: Kelley Freedman   :  1935  MRN:  [id number]    Admit date:  2021  Discharge date:  ***    Code Status Order: Full Code   Advance Directives:   Advance Care Flowsheet Documentation       Date/Time Healthcare Directive Type of Healthcare Directive Copy in [address] Agent's Name Healthcare Agent's Phone Number    21 0845 Unknown, patient unable to respond due to medical condition                 Admitting Physician:  Shamika Landaverde MD  PCP: Kaylin Vicente    Discharging Nurse: [facility] Unit/Room#: 9TN-1879/0570-22  Discharging Unit Phone Number: ***    Emergency Contact:   Extended Emergency Contact Information  Primary Emergency Contact: Dahiana Gao  Address: [facility] [address] Phone: [phone]  Work Phone: [phone]  Relation: Spouse  Secondary Emergency Contact: Bel Sanchez  Mobile Phone: [phone]  Relation: Child    Past Surgical History:  Past Surgical History:   Procedure Laterality Date    ANGIOPLASTY      COLONOSCOPY  2018    COLONOSCOPY POLYPECTOMY SNARE/COLD BIOPSY performed by Geni Reece MD at [facility] N/A 10/20/2021    COLONOSCOPY POLYPECTOMY ABLATION performed by Ofelia Holder MD at [facility] N/A 2021    COLONOSCOPY POLYPECTOMY ABLATION performed by Diane Alfaro MD at [address] [place] [address] Right     CATARACT EXTRACTION W/ IOL    EYE SURGERY Left     CATARCT EXTRACTION W/ IOL    KS SIGMOIDOSCOPY FLX DX W/COLLJ SPEC BR/WA IF PFRMD N/A 10/19/2018    SIGMOIDOSCOPY DIAGNOSTIC FLEXIBLE performed by Geni Reece MD at [address] [place] [address]  10/19/2018    flexible    TONSILLECTOMY AND ADENOIDECTOMY      TUBAL LIGATION      UPPER GASTROINTESTINAL ENDOSCOPY N/A 10/20/2021    EGD BIOPSY performed by Ofelia Holder MD at [address] [facility] ENDOSCOPY       Immunization History:   Immunization History   Administered Date(s) Administered    Influenza, High Dose (Fluzone 65 yrs and older) 10/27/2018    Influenza, Quadv, IM, PF (6 mo and older Fluzone, Flulaval, Fluarix, and 3 yrs and older Afluria) 10/20/2021       Active Problems:  Patient Active Problem List   Diagnosis Code    CAD (coronary artery disease) I25.10    Essential hypertension I10    ROBYN (obstructive sleep apnea) G47.33    Hypothyroid E03.9    Hyperlipidemia E78.5    Depression F32. A    GI bleeding K92.2    Acute blood loss anemia D62    Acute GI bleeding K92.2    Atrial fibrillation with RVR (HCC) I48.91    Severe sepsis (HCC) A41.9, R65.20    E coli bacteremia R78.81, B96.20    Supratherapeutic INR R79.1    Hyperkalemia E87.5    Normocytic normochromic anemia D64.9    Hyponatremia E87.1    Bilateral pleural effusion J90    Diverticulosis K57.90    Anasarca R60.1    Overweight (BMI 25.0-29. 9) E66.3    High anion gap metabolic acidosis O71.7    Elevated brain natriuretic peptide (BNP) level R79.89    JULIO (acute kidney injury) ([facility] [address] N17.9    Hypokalemia E87.6       Isolation/Infection:   Isolation            No Isolation          Patient Infection Status       None to display            Nurse Assessment:  Last Vital Signs: BP (!) 142/81   Pulse 86   Temp 97.8 °F (36.6 °C) (Axillary)   Resp 16   Ht 5' 5\" (1.651 m)   Wt 167 lb 1.7 oz (75.8 kg)   SpO2 96%   BMI 27.81 kg/m²     Last documented pain score (0-10 scale): Pain Level: 0  Last Weight:   Wt Readings from Last 1 Encounters:   21 167 lb 1.7 oz (75.8 kg)     Mental Status:  {IP PT MENTAL STATUS:}    IV Access:  {[facility] IV ACCESS:659896985}    Nursing Mobility/ADLs:  Walking   {[facility] DME FLPQ:289690522}  Transfer  {[facility] DME VAS}  Bathing  {[facility] DME ULFL:600628421}  Dressing  {[facility] DME KXPN:770405284}  Toileting  {[facility] DME EVHT:710379561}  Feeding  {[facility] DME TRKM:314433022}  Med Admin  {[facility] DME FCXA:390220783}  Med Delivery   [employer] MED Delivery:114895920}    Wound Care Documentation and Therapy:  Wound 11/01/21 Right;Posterior; Lower skin tear right posterior lower leg (Active)   Wound Image   11/03/21 1726   Wound Etiology Skin Tear 11/08/21 0830   Dressing Status Clean; Dry; Intact 11/08/21 0830   Dressing/Treatment Foam 11/08/21 0830   Dressing Change Due 11/05/21 11/03/21 1726   Wound Length (cm) 4 cm 11/03/21 1726   Wound Width (cm) 0.5 cm 11/03/21 1726   Wound Surface Area (cm^2) 2 cm^2 11/03/21 1726   Wound Assessment Purple/maroon 11/08/21 0830   Drainage Amount None 11/08/21 0830   Odor None 11/08/21 0830   Scarlett-wound Assessment Ecchymosis 11/08/21 0830   Number of days: 6        Elimination:  Continence: Bowel: {YES / DB:37004}  Bladder: {YES / MU:08191}  Urinary Catheter: {Urinary Catheter:698079349}   Colostomy/Ileostomy/Ileal Conduit: {YES / JX:02671}  [REMOVED] Rectal Tube With balloon-Stool Appearance: Watery  [REMOVED] Rectal Tube With balloon-Stool Color: Brown, Black, Red  [REMOVED] Rectal Tube With balloon-Stool Amount: Large    Date of Last BM: ***    Intake/Output Summary (Last 24 hours) at 11/8/2021 1022  Last data filed at 11/8/2021 0408  Gross per 24 hour   Intake 220 ml   Output 825 ml   Net -605 ml     I/O last 3 completed shifts:   In: 26 [P.O.:280]  Out: 825 [Urine:825]    Safety Concerns:     [employer] Safety Concerns:286435294}    Impairments/Disabilities:      [employer] Impairments/Disabilities:998630652}    Nutrition Therapy:  Current Nutrition Therapy:   [employer] Diet List:435840041}    Routes of Feeding: {[facility] DME Other Feedings:690108619}  Liquids: {Slp liquid thickness:71810}  Daily Fluid Restriction: {[facility] DME Yes amt example:557111635}  Last Modified Barium Swallow with Video (Video Swallowing Test): {Done Not Done JHEB:463962513}    Treatments at the Time of Hospital Discharge:   Respiratory Treatments: ***  Oxygen Therapy:  {Therapy; copd oxygen:45660}  Ventilator: [facility] Vent [facility]:272633343}    Rehab Therapies: {THERAPEUTIC INTERVENTION:4924730681}  Weight Bearing Status/Restrictions: { CC Weight Bearin}  Other Medical Equipment (for information only, NOT a DME order):  {EQUIPMENT:895874740}  Other Treatments: ***    Patient's personal belongings (please select all that are sent with patient):  {[facility] DME Belongings:973346510}    RN SIGNATURE:  {Esignature:423807673}    CASE MANAGEMENT/SOCIAL WORK SECTION    Inpatient Status Date: ***    Readmission Risk Assessment Score:  Readmission Risk              Risk of Unplanned Readmission:  26           Discharging to Facility/ Agency   Name:   Address:  Phone:  Fax:    Dialysis Facility (if applicable)   Name:  Address:  Dialysis Schedule:  Phone:  Fax:    / signature: {Esignature:948244277}    PHYSICIAN SECTION    Prognosis: Good    Condition at Discharge: Stable    Rehab Potential (if transferring to Rehab): Good    Recommended Labs or Other Treatments After Discharge:     Physician Certification: I certify the above information and transfer of Jens Hernandez  is necessary for the continuing treatment of the diagnosis listed and that she requires [facility] for greater 30 days.      Update Admission H&P: No change in H&P    PHYSICIAN SIGNATURE:  Electronically signed by Saintclair Fujisawa, MD on 21 at 10:23 AM EST

## 2021-11-08 NOTE — PROGRESS NOTES
Occupational Therapy  Facility/Department: [address] ORTHO/NEURO NURSING  Daily Treatment Note  NAME: Swapnil Kenny  : 1935  MRN: [id number]    Date of Service: 2021    Discharge Recommendations:      Swapnil Kenny scored a  on the AM-PAC ADL Inpatient form. Current research shows that an AM-PAC score of 17 or less is typically not associated with a discharge to the patient's home setting. Based on the patient's AM-PAC score and their current ADL deficits, it is recommended that the patient have 3-5 sessions per week of Occupational Therapy at d/c to increase the patient's independence. Please see assessment section for further patient specific details. If patient discharges prior to next session this note will serve as a discharge summary. Please see below for the latest assessment towards goals. OT Equipment Recommendations  Equipment Needed: No (TBD next level of care)    Assessment   Performance deficits / Impairments: Decreased functional mobility ; Decreased cognition; Decreased strength; Decreased balance; Decreased ADL status; Decreased endurance  Assessment: Pt is below her baseline level of occupational function based on the above deficits associated with septicemia and recurrent GIB. Pt would benefit from continued skilled acute OT services to address these deficits. Treatment Diagnosis: Decreased ADL status, functional mobility, endurance, balance, strength, and cognition associated with septicemia and recurrent GIB  Prognosis: Good; Fair  OT Education: OT Role; Plan of Care; Family Education; Transfer Training; Orientation; ADL Adaptive Strategies  Patient Education: D/C recommendation. Needs reinforcement.   Barriers to Learning: Cognition, vision  REQUIRES OT FOLLOW UP: Yes  Activity Tolerance  Activity Tolerance: Patient Tolerated treatment well; Patient limited by fatigue; Treatment limited secondary to decreased cognition  Safety Devices  Safety Devices in place: Yes  Type of devices: All fall risk precautions in place; Call light within reach; Chair alarm in place; Gait belt; Patient at risk for falls; Left in chair; Nurse notified         Patient Diagnosis(es): The primary encounter diagnosis was Acute GI bleeding. Diagnoses of Blood loss anemia, Atrial fibrillation with RVR ([facility] [address] Acute cystitis without hematuria, Septicemia ([facility] [address] Supratherapeutic INR, and JULIO (acute kidney injury) ([facility] [address] were also pertinent to this visit. has a past medical history of Atrial fibrillation ([facility] [address] CAD (coronary artery disease), Depression, Hyperlipidemia, Hypertension, Hypothyroid, Legal blindness, and Obstructive sleep apnea (adult) (pediatric). has a past surgical history that includes Coronary artery bypass graft (1997); Tubal ligation; angioplasty; sigmoidoscopy (10/19/2018); pr sigmoidoscopy flx dx w/collj spec br/wa if pfrmd (N/A, 10/19/2018); eye surgery (Right); eye surgery (Left); Tonsillectomy and adenoidectomy; Colonoscopy (11/16/2018); Upper gastrointestinal endoscopy (N/A, 10/20/2021); Colonoscopy (N/A, 10/20/2021); and Colonoscopy (N/A, 11/4/2021). Restrictions  Restrictions/Precautions  Restrictions/Precautions: Fall Risk, Modified Diet (high fall risk)  Required Braces or Orthoses?: No  Position Activity Restriction  Other position/activity restrictions: Mariah Andujar is a 82 y.o. female who presents to the emergency department with a chief complaint of some shortness of breath and general weakness. Her ex- who is taking care of her later presents and states she began to have some bloody stools again today. She was just discharged about a week ago for this and had EGD and colonoscopy that revealed no source of bleeding but she did have 2 clips placed over nonbleeding cecal AVMs. She is anticoagulated on Eliquis. She denies chest pain, abdominal pain, dysuria, hematuria, fevers, nausea, vomiting or any other symptoms.   Patient is unsure of how many bloody stools she has had. Anticoagulant on Eliquis with history of atrial fibrillation. Subjective   General  Chart Reviewed: Yes  Patient assessed for rehabilitation services?: Yes  Response to previous treatment: Patient with no complaints from previous session  Family / Caregiver Present: Yes (multiple family members at [address] [place] [address] and end of session)  Diagnosis: Septicemia, recurrent GIB  Subjective  Subjective: Pt supine in bed upon entry, agreeable to OT/PT co-treatment  Vital Signs  Patient Currently in Pain: No   Orientation  Orientation  Overall Orientation Status: Impaired  Orientation Level: Disoriented to time; Oriented to person; Disoriented to situation; Disoriented to place  Objective    ADL  Grooming: Setup; Minimal assistance (A for set up)  UE Bathing: Setup; Stand by assistance; Verbal cueing  LE Bathing: None  UE Dressing: Maximum assistance (gown change)  LE Dressing: Dependent/Total (A to doff/marie brief)  Toileting: None (herron catheter present)        Balance  Sitting Balance: Stand by assistance  Standing Balance: Contact guard assistance  Standing Balance  Time: ~1-2 minutes  Activity: functional transfers, stance for ADL completion  Comment: with use of rw, no LOB  Bed mobility  Supine to Sit: Dependent/Total; 2 Person assistance (mod A of 2)  Scooting: Maximal assistance; Contact guard assistance (CGA EOB, Max A x2 to scoot back in recliner)  Transfers  Sit to stand: Minimal assistance  Stand to sit: Minimal assistance  Transfer Comments: EOB > recliner chair ~3-4' w/ rw     Cognition  Overall Cognitive Status: Exceptions  Arousal/Alertness: Appropriate responses to stimuli  Following Commands:  Follows one step commands with increased time  Attention Span: Appears intact  Memory: Decreased recall of recent events  Initiation: Requires cues for some  Sequencing: Requires cues for some     Perception  Overall Perceptual Status: [facility]        Plan   Plan  Times per week: 3-5  Times per day: Daily  Current Treatment Recommendations: Safety Education & Training, Self-Care / ADL, Endurance Training, Functional Mobility Training, Cognitive Reorientation, Balance Training, Strengthening    AM-PAC Score        AM-PAC Inpatient Daily Activity Raw Score: 11 (11/08/21 1439)  AM-PAC Inpatient ADL T-Scale Score : 29.04 (11/08/21 1439)  ADL Inpatient CMS 0-100% Score: 70.42 (11/08/21 1439)  ADL Inpatient CMS G-Code Modifier : CL (11/08/21 1439)    Goals  Short term goals  Time Frame for Short term goals: Discharge  Short term goal 1: Min A supine to sit in preparation for ADL activity-ongoing 11/8  Short term goal 2: Min A for functional transfers to ADL surfaces-ongoing 11/8  Short term goal 3: Min A for UB bathing/dressing-ongoing 11/8  Short term goal 4: Mod A for LB bathing/dressing w/AE as needed--ongoing 11/8  Short term goal 5: Pt to tolerate standing 2-4 min for ADL activity/functional mobility-ongoing 11/8       Therapy Time   Individual Concurrent Group Co-treatment   Time In       1349   Time Out       1447   Minutes       58         Timed Code Treatment Minutes:  58 Minutes  Total Treatment Minutes:  [address]

## 2021-11-09 ENCOUNTER — APPOINTMENT (OUTPATIENT)
Dept: GENERAL RADIOLOGY | Age: 86
DRG: 871 | End: 2021-11-09
Payer: COMMERCIAL

## 2021-11-09 ENCOUNTER — APPOINTMENT (OUTPATIENT)
Dept: CT IMAGING | Age: 86
DRG: 871 | End: 2021-11-09
Payer: COMMERCIAL

## 2021-11-09 LAB
CULTURE, BLOOD 2: NORMAL
PRO-BNP: ABNORMAL PG/ML (ref 0–449)
PROCALCITONIN: 0.61 NG/ML (ref 0–0.15)
SARS-COV-2, PCR: NOT DETECTED

## 2021-11-09 PROCEDURE — 92526 ORAL FUNCTION THERAPY: CPT

## 2021-11-09 PROCEDURE — 6360000002 HC RX W HCPCS: Performed by: NURSE PRACTITIONER

## 2021-11-09 PROCEDURE — U0005 INFEC AGEN DETEC AMPLI PROBE: HCPCS

## 2021-11-09 PROCEDURE — 2580000003 HC RX 258: Performed by: HOSPITALIST

## 2021-11-09 PROCEDURE — 6360000002 HC RX W HCPCS: Performed by: INTERNAL MEDICINE

## 2021-11-09 PROCEDURE — 71045 X-RAY EXAM CHEST 1 VIEW: CPT

## 2021-11-09 PROCEDURE — 6370000000 HC RX 637 (ALT 250 FOR IP): Performed by: HOSPITALIST

## 2021-11-09 PROCEDURE — 84145 PROCALCITONIN (PCT): CPT

## 2021-11-09 PROCEDURE — 6370000000 HC RX 637 (ALT 250 FOR IP): Performed by: EMERGENCY MEDICINE

## 2021-11-09 PROCEDURE — U0003 INFECTIOUS AGENT DETECTION BY NUCLEIC ACID (DNA OR RNA); SEVERE ACUTE RESPIRATORY SYNDROME CORONAVIRUS 2 (SARS-COV-2) (CORONAVIRUS DISEASE [COVID-19]), AMPLIFIED PROBE TECHNIQUE, MAKING USE OF HIGH THROUGHPUT TECHNOLOGIES AS DESCRIBED BY CMS-2020-01-R: HCPCS

## 2021-11-09 PROCEDURE — 6370000000 HC RX 637 (ALT 250 FOR IP): Performed by: INTERNAL MEDICINE

## 2021-11-09 PROCEDURE — 87040 BLOOD CULTURE FOR BACTERIA: CPT

## 2021-11-09 PROCEDURE — 2500000003 HC RX 250 WO HCPCS: Performed by: HOSPITALIST

## 2021-11-09 PROCEDURE — 1200000000 HC SEMI PRIVATE

## 2021-11-09 PROCEDURE — 36415 COLL VENOUS BLD VENIPUNCTURE: CPT

## 2021-11-09 PROCEDURE — 87641 MR-STAPH DNA AMP PROBE: CPT

## 2021-11-09 PROCEDURE — 99233 SBSQ HOSP IP/OBS HIGH 50: CPT | Performed by: INTERNAL MEDICINE

## 2021-11-09 PROCEDURE — 71250 CT THORAX DX C-: CPT

## 2021-11-09 PROCEDURE — 92507 TX SP LANG VOICE COMM INDIV: CPT

## 2021-11-09 PROCEDURE — 2580000003 HC RX 258: Performed by: INTERNAL MEDICINE

## 2021-11-09 PROCEDURE — 99223 1ST HOSP IP/OBS HIGH 75: CPT | Performed by: INTERNAL MEDICINE

## 2021-11-09 PROCEDURE — 83880 ASSAY OF NATRIURETIC PEPTIDE: CPT

## 2021-11-09 PROCEDURE — 6360000002 HC RX W HCPCS: Performed by: HOSPITALIST

## 2021-11-09 RX ORDER — DILTIAZEM HYDROCHLORIDE 5 MG/ML
10 INJECTION INTRAVENOUS ONCE
Status: COMPLETED | OUTPATIENT
Start: 2021-11-09 | End: 2021-11-09

## 2021-11-09 RX ORDER — FUROSEMIDE 10 MG/ML
40 INJECTION INTRAMUSCULAR; INTRAVENOUS ONCE
Status: COMPLETED | OUTPATIENT
Start: 2021-11-09 | End: 2021-11-09

## 2021-11-09 RX ORDER — POTASSIUM CHLORIDE 7.45 MG/ML
10 INJECTION INTRAVENOUS
Status: COMPLETED | OUTPATIENT
Start: 2021-11-09 | End: 2021-11-09

## 2021-11-09 RX ORDER — LIDOCAINE HYDROCHLORIDE 10 MG/ML
5 INJECTION, SOLUTION EPIDURAL; INFILTRATION; INTRACAUDAL; PERINEURAL ONCE
Status: DISCONTINUED | OUTPATIENT
Start: 2021-11-09 | End: 2021-11-15 | Stop reason: HOSPADM

## 2021-11-09 RX ADMIN — POTASSIUM CHLORIDE 10 MEQ: 7.46 INJECTION, SOLUTION INTRAVENOUS at 03:09

## 2021-11-09 RX ADMIN — DILTIAZEM HYDROCHLORIDE 15 MG/HR: 5 INJECTION INTRAVENOUS at 20:15

## 2021-11-09 RX ADMIN — DILTIAZEM HYDROCHLORIDE 10 MG: 5 INJECTION INTRAVENOUS at 08:23

## 2021-11-09 RX ADMIN — FUROSEMIDE 40 MG: 10 INJECTION, SOLUTION INTRAMUSCULAR; INTRAVENOUS at 12:55

## 2021-11-09 RX ADMIN — DILTIAZEM HYDROCHLORIDE 5 MG/HR: 5 INJECTION INTRAVENOUS at 11:30

## 2021-11-09 RX ADMIN — QUETIAPINE FUMARATE 50 MG: 25 TABLET ORAL at 20:15

## 2021-11-09 RX ADMIN — PIPERACILLIN AND TAZOBACTAM 3375 MG: 3; .375 INJECTION, POWDER, LYOPHILIZED, FOR SOLUTION INTRAVENOUS at 20:21

## 2021-11-09 RX ADMIN — APIXABAN 2.5 MG: 2.5 TABLET, FILM COATED ORAL at 11:40

## 2021-11-09 RX ADMIN — Medication 10 ML: at 11:37

## 2021-11-09 RX ADMIN — Medication 2000 UNITS: at 11:34

## 2021-11-09 RX ADMIN — SODIUM CHLORIDE 25 ML: 9 INJECTION, SOLUTION INTRAVENOUS at 20:20

## 2021-11-09 RX ADMIN — METOPROLOL SUCCINATE 50 MG: 50 TABLET, EXTENDED RELEASE ORAL at 11:31

## 2021-11-09 RX ADMIN — METOPROLOL SUCCINATE 50 MG: 50 TABLET, EXTENDED RELEASE ORAL at 14:44

## 2021-11-09 RX ADMIN — APIXABAN 2.5 MG: 2.5 TABLET, FILM COATED ORAL at 20:15

## 2021-11-09 RX ADMIN — Medication 10 MEQ: at 01:20

## 2021-11-09 RX ADMIN — POTASSIUM CHLORIDE 10 MEQ: 7.46 INJECTION, SOLUTION INTRAVENOUS at 04:34

## 2021-11-09 ASSESSMENT — PAIN DESCRIPTION - PROGRESSION
CLINICAL_PROGRESSION: GRADUALLY IMPROVING
CLINICAL_PROGRESSION: GRADUALLY IMPROVING

## 2021-11-09 ASSESSMENT — PAIN SCALES - GENERAL
PAINLEVEL_OUTOF10: 0

## 2021-11-09 NOTE — PROGRESS NOTES
Awaiting covid PCR results. Per protocol will transfer to  pending results. Called report to  Bill JOEL. All questions answered at this time. Pt transferred to  with all belongings.

## 2021-11-09 NOTE — PROGRESS NOTES
MD Sharif Cardoso MD Merril Pitt, MD                                  Office: [phone]                 Fax: [phone]          [employer]                  NEPHROLOGY  INPATIENT PROGRESS NOTE:     PATIENT NAME: Mainor Dumont  : 1935  MRN: [id number]        Assessment and Plan   JULIO on CKD 3bsevere. Baseline crea 1.4. Non-oliguric. Follow crea today. UA bland. Received IV Lasix x 1 recently     Hypoxia worsening  CXR reviewed - fluid overload concerns too     Agree w/ lasix x1    Labs pending   Change midline    Hypotension. Improved. Hypovolemia. Improved  Hypokalemia-replaced. Follow K. Hypocalcemia- asymptomatic. Anemia of acute blood loss. follow Hgb. Metabolic acidosis. Improved. Hypoalbuminemia  E. coli sepsis WBC count 21,000-on Ceftriaxone. Continue herron     High complexity. Multiple medical problems. Discussed with patient and treatment team.    Thank you for allowing me to participate in this patient's care. Please do not hesitate to contact me for any questions/concerns. We will follow along with you. Ky Vanegas MD  [facility] [address] [place] [address] Road   Phone: [phone] or [address] [employer]  Fax: [phone]             Subjective:   Patient is weak and lethargic. Hypoxia worsening     Objective:      Objective:  EXAM  Vitals:    21 0730   BP: (!) 142/94   Pulse: 120   Resp: 20   Temp: 97.4 °F (36.3 °C)   SpO2: 93%       Intake/Output Summary (Last 24 hours) at 2021 1027  Last data filed at 2021 0909  Gross per 24 hour   Intake 10 ml   Output 1050 ml   Net -1040 ml     General: sleepy  HENT: Atraumatic, normocephalic   Eyes: Normal conjunctiva, Non-incteral sclera. Neck: Supple, JVD not visible. CVS:  Heart sounds are normal. No loud murmur. RS: abNormal respiratory effort, Breat sound: diminished at bases. Abd: Soft , bowel sounds are normal, no distension and no tenderness .    Skin: No rash , some bruises,   Extremities/MSK: ++ Edema, no cyanosis. Active Problems:    Essential hypertension    ROBYN (obstructive sleep apnea)    Acute GI bleeding    Atrial fibrillation with RVR (HCC)    Severe sepsis (HCC)    E coli bacteremia    Supratherapeutic INR    Hyperkalemia    Normocytic normochromic anemia    Hyponatremia    Bilateral pleural effusion    Diverticulosis    Anasarca    Overweight (BMI 25.0-29. 9)    High anion gap metabolic acidosis    Elevated brain natriuretic peptide (BNP) level    JULIO (acute kidney injury) ([facility] [address]    Hypokalemia  Resolved Problems:    * No resolved hospital problems. *        Medications Reviewed by me   furosemide  40 mg IntraVENous Once    apixaban  2.5 mg Oral BID    cefTRIAXone (ROCEPHIN) IV  1,000 mg IntraVENous Q24H    metoprolol succinate  50 mg Oral TID    QUEtiapine  50 mg Oral Nightly    sodium chloride flush  5-40 mL IntraVENous 2 times per day    Vitamin D  2 tablet Oral Daily      dilTIAZem      sodium chloride Stopped (11/05/21 2240)       Data Review. Labs reviewed by me       CBC:   Recent Labs     11/08/21  1245   WBC 16.3*   HGB 8.1*   HCT 26.9*   MCV 93.8        BMP:   Recent Labs     11/08/21  1245      K 3.2*      CO2 23   BUN 46*   CREATININE 1.8*     Magnesium:   Lab Results   Component Value Date    MG 1.90 11/08/2021    MG 2.20 11/06/2021    MG 2.10 11/05/2021     Lab Results   Component Value Date    CREATININE 1.8 11/08/2021       Arterial Blood Gasses  No results for input(s): PH, PCO2, PO2 in the last 72 hours. Invalid input(s): [employer]    UA:  No results for input(s): NITRITE, COLORU, PHUR, LABCAST, WBCUA, RBCUA, MUCUS, TRICHOMONAS, YEAST, BACTERIA, CLARITYU, SPECGRAV, LEUKOCYTESUR, UROBILINOGEN, BILIRUBINUR, BLOODU, GLUCOSEU, AMORPHOUS in the last 72 hours.     Invalid input(s): Barbara Valentine    LIVER PROFILE:   No results for input(s): AST, ALT, LIPASE, BILIDIR, BILITOT, ALKPHOS in the last 72 hours.    Invalid input(s): AMYLASE,  ALB  PT/INR:    Lab Results   Component Value Date    PROTIME 17.7 11/08/2021    PROTIME 20.6 11/06/2021    PROTIME 19.6 11/05/2021    INR 1.54 11/08/2021    INR 1.78 11/06/2021    INR 1.70 11/05/2021     PTT:    Lab Results   Component Value Date    APTT 32.1 11/01/2021     CANDE:  No results found for: ANATITER, CANDE  CHEMISTRY COMMON GROUP :   Lab Results   Component Value Date    GLUCOSE 119 11/08/2021    TSH 2.54 06/03/2010     Recent Labs     11/08/21  1245   GLUCOSE 119*   CALCIUM 8.5         RADIOLOGY:        Imaging Results.   Chest X Ray reviewed by me      Electronically Signed: Nelson Renteria MD 11/9/2021 10:27 AM

## 2021-11-09 NOTE — PROGRESS NOTES
Occupational and Physical Therapy    OT/PT  tx session attempted. Pt currently receiving medication requiring vitals every 15 mins. Will hold therapy and follow up at an alternative time/date.      Electronically signed by Simone Fung OTR/L on 11/9/2021 at 2:24 PM   Erasmo Pickard PT, DPT [id number]

## 2021-11-09 NOTE — PROGRESS NOTES
Vitals obtained. Provider notified of patient in A-fib. Cardizem given. Head to toe assessment complete. Patient is is calm in bed working with speech therapy.

## 2021-11-09 NOTE — PROGRESS NOTES
Speech Language Pathology      Name:  Kelley Freedman  :   1935     Medical Diagnosis:  Septicemia ([facility] [A41.9]  GI bleed [K92.2]  Acute GI bleeding [K92.2]  JULIO (acute kidney injury) ([facility] [N17.9]  Blood loss anemia [D50.0]  At[facility] ([facility] [I48.91]  Supratherapeutic INR [R79.1]  Acute cystitis without hematuria [N30.00]     Treatment Diagnosis: Oropharyngeal Dysphagia; Cognitive-Linguistic Deficits  Pain level: Patient denies pain    Dysphagia Treatment Note    Subjective:   Pt alert and receptive although lethargic, multiple verbal cues provided throughout session to maintain appropriate KJ. On 4L O2 via NC with RR between 22-28/min and O2 sats fluctuating between 84-92%. Assessment of Texture Tolerance:  Ice chips: reduced oral acceptance and labial seal; prolonged oral manipulation; clinical s/s delayed swallow initiation  Thin Water: clinical s/s delayed swallow initiation and premature posterior spillage; intermittent throat clearing; fluctuating oxygen saturation and elevated respiratory rate; belching post swallow  Mildly thick liquid: clinical s/s delayed swallow initiation and premature posterior spillage; intermittent throat clearing; fluctuating oxygen saturation and elevated respiratory rate  Puree (applesauce): No overt s/s of aspiration; moderately reduced laryngeal elevation ; fluctuating oxygen saturation and elevated respiratory rate     Limited PO presentations provided this date d/t pt report of fatigue and elevation RR/fluctuating O2 sats (RN aware). Pt with similar clinical presentation with PO intake as compared to previous session. Suspect impact of respiratory status and reduced KJ on swallow function. Recommend ongoing clinical assessment and instrumental swallow evaluation as pt is able; limited at this time d/t droplet plus precautions.     Diet and Treatment Recommendations 2021:  Continue NPO with ongoing SLP assessment  Modified Barium Swallow Study as pt is appropriate (consistent KJ, out of droplet plus precautions)  Necessary meds crushed in puree    Compensatory strategies: Alternate solids/liquids , Effortful Swallows     Dysphagia Goals:   Pt will functionally tolerate recommended diet with no overt clinical s/s of aspiration (onoing 11/9/2021)  Pt will demonstrate understanding of aspiration risk and precautions via education/demonstration with occasional prompting (ongoing 11/9/2021)  Pt will advance to least restrictive diet as indicated (ongoing 11/9/2021)  If clinical s/s of aspiration/penetration continue to be noted, Pt will participate in Modified Barium Swallow Study (ongoing 11/9/2021)      Speech Language Cognition Treatment     Pt alert and verbally responsive given min-mod verbal cues. Pt not fully oriented to location, date, or situation, stated she was \"not at [employer] and that the month was October. SLP provided training for environmental orientation supports (e.g., whiteboard), pt referred to board for orientation information given mod cues. Pt answered simple open-ended questions given response elaboration cues, answered yes/no questions with 60% accuracy. Pt increasingly more lethargic as session progressed. Pt continues to benefit from skilled speech-language and cognitive intervention as above. GOALS:  Short Term Speech/Language/Cognitive Goals:   1. Pt will improve auditory processing/comprehension of commands and questions to 80%, via graded tasks. (ongoing 11/9/2021)   2. Pt will improve orientation and short term recall via graded tasks to 80%. (ongoing 11/9/2021)  3. Pt will improve verbal expression for functional expression via graded tasks to 80%. (ongoing 11/9/2021)  4. Pt will participate in ongoing cognitive assessment with goals to be established as indicated.  (ongoing 11/9/2021)    Plan:    3-5 times a week during acute care stay     Patient/Family Education:   Provided education regarding role of SLP, results of assessment, recommendations and general speech pathology plan of care. [x]? Pt verbalized understanding and agreement   [x]? Pt requires ongoing learning   []? No evidence of comprehension     Discharge Recommendations:    Recommend ongoing SLP for dysphagia and speech therapy upon discharge from hospital; pending diet tolerance and progression towards goals. Timed Code Treatment: 0     Total Treatment Time: 25    If patient discharges prior to next session this note will serve as a discharge summary.        Signature:   Chris Reyes, [id number] [facility]  Speech-Language Pathologist  [place]. [id number]

## 2021-11-09 NOTE — PROGRESS NOTES
[facility]    11/9/2021 11:57 AM        Name: Tiana Rodas . Admitted: 11/1/2021  Primary Care Provider: Noemí Bryan (Tel: [phone])                        Subjective:  . No acute events overnight. Resting well. Pain control. Diet ok. Labs reviewed  Worsening breathing this morning with increased  -A. fib not controlled. Reviewed interval ancillary notes    Current Medications  furosemide (LASIX) injection 40 mg, Once  dilTIAZem 125 mg in dextrose 5 % 125 mL infusion, Continuous  apixaban (ELIQUIS) tablet 2.5 mg, BID  ipratropium-albuterol (DUONEB) nebulizer solution 1 ampule, Q4H PRN  cefTRIAXone (ROCEPHIN) 1000 mg in sterile water 10 mL IV syringe, Q24H  metoprolol succinate (TOPROL XL) extended release tablet 50 mg, TID  QUEtiapine (SEROQUEL) tablet 50 mg, Nightly  sodium chloride flush 0.9 % injection 5-40 mL, 2 times per day  sodium chloride flush 0.9 % injection 5-40 mL, PRN  0.9 % sodium chloride infusion, PRN  Vitamin D (CHOLECALCIFEROL) tablet 2,000 Units, Daily        Objective:  /87   Pulse 144   Temp 97.4 °F (36.3 °C) (Axillary)   Resp 20   Ht 5' 5\" (1.651 m)   Wt 167 lb 1.7 oz (75.8 kg)   SpO2 93%   BMI 27.81 kg/m²     Intake/Output Summary (Last 24 hours) at 11/9/2021 1157  Last data filed at 11/9/2021 0909  Gross per 24 hour   Intake 10 ml   Output 1050 ml   Net -1040 ml      Wt Readings from Last 3 Encounters:   11/07/21 167 lb 1.7 oz (75.8 kg)   10/22/21 156 lb (70.8 kg)   11/16/18 147 lb (66.7 kg)       General appearance:  Appears comfortable  Eyes: Sclera clear. Pupils equal.  ENT: Moist oral mucosa. Trachea midline, no adenopathy. Cardiovascular: Regular rhythm, normal S1, S2. No murmur. No edema in lower extremities  Respiratory: Not using accessory muscles. Good inspiratory effort.  Clear to auscultation bilaterally, no wheeze or crackles. GI: Abdomen soft, no tenderness, not distended, normal bowel sounds  Musculoskeletal: No cyanosis in digits, neck supple  Neurology: CN 2-12 grossly intact. No speech or motor deficits  Psych: Normal affect. Alert and oriented in time, place and person  Skin: Warm, dry, normal turgor    Labs and Tests:  CBC:   Recent Labs     11/08/21  1245   WBC 16.3*   HGB 8.1*        BMP:    Recent Labs     11/08/21  1245      K 3.2*      CO2 23   BUN 46*   CREATININE 1.8*   GLUCOSE 119*     Hepatic: No results for input(s): AST, ALT, ALB, BILITOT, ALKPHOS in the last 72 hours. Discussed care with family and patient             Spent 30  minutes with patient and family at bedside and on unit reviewing medical records and labs, spent greater than 50% time counseling patient and family on diagnosis and plan   Problem List  Active Problems:    Essential hypertension    ROBYN (obstructive sleep apnea)    Acute GI bleeding    Atrial fibrillation with RVR (HCC)    Severe sepsis (HCC)    E coli bacteremia    Supratherapeutic INR    Hyperkalemia    Normocytic normochromic anemia    Hyponatremia    Bilateral pleural effusion    Diverticulosis    Anasarca    Overweight (BMI 25.0-29. 9)    High anion gap metabolic acidosis    Elevated brain natriuretic peptide (BNP) level    JULIO (acute kidney injury) ([facility]    Hypokalemia  Resolved Problems:    * No resolved hospital problems. *       Assessment & Plan:   1. Sepsis  -Secondary to bacteremia E.  Coli  -Leukocytosis was 18 yesterday  -Still with low-grade fever  -Appreciate ID recommendation continue IV antibiotics per them    A. fib with RVR  -Patient had episode yesterday after trying to eat concern for possible aspiration that initiated the event  -This morning still not controlled we will started on Cardizem drip  -We will resume p.o. amiodarone as well  -May need cardiology reconsulted    E. 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   H/H has been stable    Diet: Diet NPO  Code:Full Code  DVT PPX lovenox       En Orellana MD   11/9/2021 11:57 AM

## 2021-11-09 NOTE — PROGRESS NOTES
Order for PICC line per Dr. Forrest Andrade. Pre procedure and timeout done with pt's RN. Pt currently has left upper midline that is not drawing blood per RN. Pt is a Right arm only for PICC placement per nephrology. After assessment of patient, patients arm was noted to have a huge hematoma and swelling. Pt skin black/purple. RN at bedside and aware. Given circumstances of patients arm, Peripheral IV placed for extra access. PICC held for now until swelling dissipates. IF central access needed recommend IR for central line placement.      Kaycee Bustillo RN given handoff report

## 2021-11-09 NOTE — CARE COORDINATION
EZRA returned call to daughter Louise Ribeiro who states that she does not want pt going to [facility] and that the referral was made and that was not their choice. She states communication has not been good. EZRA informed her I will have to have them stop the pre cert and asked for her other choices. Her first choice is [place] and states she has talked to [facility]. EZRA spoke to [place] at [address] [facility] [phone] and she is stopping pre cert. EZRA called Lilliam at [facility] and she is reviewing pt and will call CM back. If [facility] cannot accept daughter wants pt referred to [place] [facility] and [facility].      Yonathan Cooley, RN, BSN  [phone]

## 2021-11-09 NOTE — PROGRESS NOTES
Infectious Diseases   Progress Note      Admission Date: 11/1/2021  Hospital Day: Hospital Day: 9   Attending: Jhoana Langston MD  Date of service: 11/9/2021     Chief complaint/ Reason for consult:     · Severe sepsis on admission with leukocytosis, hypotension, tachycardia, acute kidney injury  · Gram-negative bacteremia with E. Coli  · High anion gap metabolic acidosis  · Elevated proBNP  · Mild hyperkalemia  · Hyponatremia  · Normocytic normochromic anemia  · Bilateral pleural effusions    Microbiology:        I have reviewed allavailable micro lab data and cultures    · Blood culture (2/2) - collected on 11/1/2021: E coli    Susceptibility     Escherichia coli     BACTERIAL SUSCEPTIBILITY PANEL BY ERIK     ampicillin >=32 mcg/mL Resistant     ceFAZolin <=4 mcg/mL Sensitive     cefepime <=0.12 mcg/mL Sensitive     cefTRIAXone <=0.25 mcg/mL Sensitive     ciprofloxacin <=0.25 mcg/mL Sensitive     ertapenem <=0.12 mcg/mL Sensitive     gentamicin <=1 mcg/mL Sensitive     levofloxacin 1 mcg/mL Sensitive     piperacillin-tazobactam <=4 mcg/mL Sensitive     trimethoprim-sulfamethoxazole >=320 mcg/mL Resistant        · Urine culture  - collected on 11/1/2021: > 100,000 cfu/ml of E coli    Susceptibility    Escherichia coli (1)    Antibiotic Interpretation ERIK Status    ampicillin Resistant >=32 mcg/mL     ceFAZolin Sensitive <=4 mcg/mL      NOTE: Cefazolin should only be used for uncomplicated UTI         for E.coli or Klebsiella pneumoniae.    cefepime Sensitive <=0.12 mcg/mL     cefTRIAXone Sensitive <=0.25 mcg/mL     ciprofloxacin Sensitive <=0.25 mcg/mL     ertapenem Sensitive <=0.12 mcg/mL     gentamicin Sensitive <=1 mcg/mL     levofloxacin Sensitive 1 mcg/mL     nitrofurantoin Sensitive <=16 mcg/mL     piperacillin-tazobactam Sensitive <=4 mcg/mL     trimethoprim-sulfamethoxazole Resistant >=320 mcg/mL         Antibiotics and immunizations:       Current antibiotics: All antibiotics and their doses were reviewed by me    Recent Abx Admin                   cefTRIAXone (ROCEPHIN) 1000 mg in sterile water 10 mL IV syringe (mg) 1,000 mg Given 11/08/21 1632                  Immunization History: All immunization history was reviewed by me today. Immunization History   Administered Date(s) Administered    Influenza, High Dose (Fluzone 65 yrs and older) 10/27/2018    Influenza, Quadv, IM, PF (6 mo and older Fluzone, Flulaval, Fluarix, and 3 yrs and older Afluria) 10/20/2021       Known drug allergies: All allergies were reviewed and updated    Allergies   Allergen Reactions    Percocet [Oxycodone-Acetaminophen] Other (See Comments)     Pt states it makes her crazy. Social history:     Social History:  All social andepidemiologic history was reviewed and updated by me today as needed. · Tobacco use:   reports that she has never smoked. She has never used smokeless tobacco.  · Alcohol use:   reports current alcohol use of about 2.0 standard drinks of alcohol per week. · Currently lives in: [address] [place] [address]  ·  reports no history of drug use. COVID VACCINATION AND LAB RESULT RECORDS:     Internal Administration   First Dose      Second Dose           Last COVID Lab SARS-CoV-2, NAAT (no units)   Date Value   11/03/2021 Not Detected            Assessment:     The patient is a 82 y.o. old female who  has a past medical history of Atrial fibrillation ([address] CAD (coronary artery disease), Depression, Hyperlipidemia, Hypertension, Hypothyroid, Legal blindness, and Obstructive sleep apnea (adult) (pediatric).  with following problems:    · Severe sepsis on admission with leukocytosis, hypotension, tachycardia, acute kidney injury-White cell count has gone up  · Acute respiratory failure with hypoxia-this is new development  · Gram-negative bacteremia with E. Coli-this is on ceftriaxone  · Bilateral atypical pneumonia, rule out COVID-19  · High anion gap metabolic acidosis-this is improved  · Elevated proBNP  · Acute kidney injury-has shown some improvement, serum creatinine is 1.8  · Hypokalemia-potassium is 3.2 today  · Hyponatremia-this has been corrected  · Normocytic normochromic anemia-ongoing  · Bilateral pleural effusions  · Colon Diverticulosis  · Generalized anasarca  · Coronary artery disease-stable  · Essential hypertension-blood pressure is okay  · Hyperlipidemia  · Obstructive sleep apnea  · Overweight due to excess calorie intake : Body mass index is 27.33 kg/m².-Counseling done      Discussion:      The patient has been on IV ceftriaxone. Her respiratory status worsened yesterday. She is now on high flow oxygen via nasal cannula. She is now being tested for COVID-19. Also noted to have elevation in the white cell count. It is 16,300 today    Chest x-ray reviewed. Shows worsening bilateral lung infiltrates. Plan:     Diagnostic Workup:    · Will order *2 sets of blood cultures today  · Check procalcitonin level  · Follow-up on COVID-19 test result  · Continue to follow  fever curve, WBC count and blood cultures. · Will order nasal MRSA probe  · Continue to monitor blood counts, liver and renal function. Antimicrobials:    · I am concerned about aspiration. Will escalate antibiotic from IV ceftriaxone to IV Zosyn 3.375 g at a renally adjusted dose of every 12 hours  · Continue high flow oxygen support to maintain oxygen saturation above 92%  · Cough and deep breathing exercises  · We will follow up on the culture results and clinical progress and will make further recommendations accordingly. · Continue close vitals monitoring. · Maintain good glycemic control. · Fall precautions. Aspiration precautions. · Continue to watch for new fever or diarrhea. · DVT prophylaxis. · Discussed all above with  RN.       Drug Monitoring:    · Continue monitoring for antibiotic toxicity as follows: CBC, CMP   · Continue to watch for following: new or worsening fever, new hypotension, hives, lip swelling and redness or purulence at vascular access sites. I/v access Management:    · Continue to monitor i.v access sites for erythema, induration, discharge or tenderness. · As always, continue efforts to minimize tubes/lines/drains as clinically appropriate to reduce chances of line associated infections. Level of complexity of visit: High     Risk of Complications/Morbidity: High     · Illness(es)/ Infection present that pose threat to life/bodily function. · There is potential for severe exacerbation of infection/side effects of treatment. · Therapy requires intensive monitoring for antimicrobial agent toxicity. Thank you for involving me in the care of your patient. I will continue to follow. If you have anyadditional questions, please do not hesitate to contact me. Subjective: Interval history: Interval history was obtained from chart review and RN. The patient has become more hypoxic. She is now requiring high flow oxygen. REVIEW OF SYSTEMS:      Review of Systems   Unable to perform ROS: Acuity of condition         Past Medical History: All past medical history reviewed today. Past Medical History:   Diagnosis Date    Atrial fibrillation ([facility]     CAD (coronary artery disease)     Depression     Hyperlipidemia     Hypertension     Hypothyroid     Legal blindness     Obstructive sleep apnea (adult) (pediatric)        Past Surgical History: All past surgical history was reviewed today.     Past Surgical History:   Procedure Laterality Date    ANGIOPLASTY      COLONOSCOPY  11/16/2018    COLONOSCOPY POLYPECTOMY SNARE/COLD BIOPSY performed by Darrion Salinas MD at [address] [facility] N/A 10/20/2021    COLONOSCOPY POLYPECTOMY ABLATION performed by Annmarie Mackey MD at [address] [facility] [address] COLONOSCOPY N/A 11/4/2021    COLONOSCOPY POLYPECTOMY ABLATION performed by Anneliese Szymanski MD at [address] [facility] SURGERY Right     CATARACT EXTRACTION W/ IOL    EYE SURGERY Left     CATARCT EXTRACTION W/ IOL    CO SIGMOIDOSCOPY FLX DX W/COLLJ SPEC BR/WA IF PFRMD N/A 10/19/2018    SIGMOIDOSCOPY DIAGNOSTIC FLEXIBLE performed by Luis Ivory MD at [address] [place] [address]  10/19/2018    flexible    TONSILLECTOMY AND ADENOIDECTOMY      TUBAL LIGATION      UPPER GASTROINTESTINAL ENDOSCOPY N/A 10/20/2021    EGD BIOPSY performed by Tobi Glass MD at [address] [place] [address]       Family History: All family history was reviewed today. Problem Relation Age of Onset    Heart Disease Mother     Heart Disease Father     Asthma Neg Hx     Cancer Neg Hx     Diabetes Neg Hx     Emphysema Neg Hx     Hypertension Neg Hx     Heart Failure Neg Hx        Objective:       PHYSICAL EXAM:      Vitals:   Vitals:    11/09/21 1400 11/09/21 1415 11/09/21 1430 11/09/21 1445   BP: (!) 144/75 (!) 145/84 135/82 (!) 146/83   Pulse: 81 80 77 89   Resp:       Temp:       TempSrc:       SpO2:       Weight:       Height:           Physical Exam       General: Encephalopathic but protecting the airway so far, on high flow oxygen  HEENT: normocephalic, atraumatic, sclera clear, pupils equal, light reflex preserved bilaterally  Cardiovascular: RRR, no murmurs/rubs/gallops detected  Pulmonary:bibasilar crackles  Abdomen/GI: soft, no organomegaly, bowel sounds positive  Neuro: Encephalopathic, pupils are equal and reactive to light, moves all extremities  Skin: no rash,   Musculoskeletal:  No obvious joint swelling, redness. No limitation of range of passive motion  Genitourinary: Christiansen's catheter in place   Psych: could not assess  Lymphatic/Immunologic: No obvious bruising, no cervical lymphadenopathy    Lines: All vascular access sites are healthy with no local erythema, discharge or tenderness    Lines and drains: All vascular access sites are healthy with no local erythema, discharge or tenderness.       Intake and output:    I/O last 3 completed shifts: In: 10 [I.V.:10]  Out: 650 [Urine:650]    Lab Data:   All available labs and old records have been reviewed by me. CBC:  Recent Labs     11/08/21  1245   WBC 16.3*   RBC 2.86*   HGB 8.1*   HCT 26.9*      MCV 93.8   MCH 28.2   MCHC 30.1*   RDW 20.0*   BANDSPCT 4        BMP:  Recent Labs     11/08/21  1245      K 3.2*      CO2 23   BUN 46*   CREATININE 1.8*   CALCIUM 8.5   GLUCOSE 119*        Hepatic Function Panel:   Lab Results   Component Value Date    ALKPHOS 67 11/05/2021    ALT 14 11/05/2021    AST 20 11/05/2021    PROT 5.4 11/05/2021    PROT 7.6 06/03/2010    BILITOT 0.4 11/05/2021    LABALBU 2.4 11/05/2021       CPK: No results found for: CKTOTAL  ESR: No results found for: SEDRATE  CRP: No results found for: CRP        Imaging: All pertinent images and reports for the current visit were reviewed by me during this visit. XR CHEST PORTABLE   Final Result   Mild interval worsening of the bilateral airspace opacities, when compared to   the previous study performed 11/08/2021. Rule out worsening CHF. Pneumonia   cannot be entirely excluded. Decrease in the right pleural effusion. XR CHEST PORTABLE   Final Result   Worsening patchy bilateral airspace opacities. Consider infection. Stable   bilateral pleural effusions. XR CHEST PORTABLE   Final Result   Nasogastric tube is in normal position. Vascular congestion. Stable bilateral pleural effusions with associated atelectasis or airspace   disease. CT CHEST ABDOMEN PELVIS WO CONTRAST   Final Result   Small bilateral pleural effusions are seen with adjacent consolidation at the   lung bases, likely atelectasis in the absence of clinical signs of pneumonia. Septal thickening is seen, compatible with fluid overload. Mildly enlarged precarinal node, likely reactive due to the suspected fluid   overload. Recommend 3 month follow-up chest CT for further characterization. Scattered colonic diverticula are seen. No significant pericolonic fat   stranding. Body wall anasarca with trace free fluid in pelvis, compatible with mild   fluid overload         XR CHEST PORTABLE   Final Result   Interval resolution of pulmonary edema. Fluid in the minor fissure, and   suspected bilateral pleural effusions. Left basilar airspace disease likely   atelectasis             Medications: All current and past medications were reviewed.  piperacillin-tazobactam  3,375 mg IntraVENous Q12H    apixaban  2.5 mg Oral BID    metoprolol succinate  50 mg Oral TID    QUEtiapine  50 mg Oral Nightly    sodium chloride flush  5-40 mL IntraVENous 2 times per day    Vitamin D  2 tablet Oral Daily        dilTIAZem 15 mg/hr (11/09/21 1353)    sodium chloride Stopped (11/05/21 2240)       ipratropium-albuterol, sodium chloride flush, sodium chloride      Problem list:       Patient Active Problem List   Diagnosis Code    CAD (coronary artery disease) I25.10    Essential hypertension I10    ROBYN (obstructive sleep apnea) G47.33    Hypothyroid E03.9    Hyperlipidemia E78.5    Depression F32. A    GI bleeding K92.2    Acute blood loss anemia D62    Acute GI bleeding K92.2    Atrial fibrillation with RVR (HCC) I48.91    Severe sepsis (HCC) A41.9, R65.20    E coli bacteremia R78.81, B96.20    Supratherapeutic INR R79.1    Hyperkalemia E87.5    Normocytic normochromic anemia D64.9    Hyponatremia E87.1    Bilateral pleural effusion J90    Diverticulosis K57.90    Anasarca R60.1    Overweight (BMI 25.0-29. 9) E66.3    High anion gap metabolic acidosis L45.1    Elevated brain natriuretic peptide (BNP) level R79.89    JULIO (acute kidney injury) ([facility] N17.9    Hypokalemia E87.6       Please note that this chart was generated using Dragon dictation software.  Although every effort was made to ensure the accuracy of this automated transcription, some errors in transcription may have occurred inadvertently. If you may need any clarification, please do not hesitate to contact me through EPIC or at the phone number provided below with my electronic signature. Any pictures or media included in this note were obtained after taking informed verbal consent from the patient and with their approval to include those in the patient's medical record.     Sasha Wilkinson MD, MPH  11/9/2021 , 3:06 PM   [facility] Infectious Disease   [address] [place] [address], [address] [place] [address]  Office: [phone]  Fax: [phone]  Clinic days:  Tuesday & Thursday

## 2021-11-09 NOTE — PROGRESS NOTES
Shift assessment complete. Vital signs stable. 94% on 4L O2 Nasal cannula. Encouraged use of incentive spirometer and deep breathing exercises. Will wean O2 as tolerated. Lung fields diminished. Denies CP, some dyspnea with exertion. Alert, oriented x2. Christiansen care, oral care, and repositioning provided. Pt NPO but meds given crushed in applesauce (see MAR) d/t afib. Pt tolerated. The care plan and education has been reviewed and mutually agreed upon with the patient. Fall precautions and camera in place. Call light within reach. All needs met at this time.

## 2021-11-09 NOTE — CARE COORDINATION
CM sent referral over epic to the [facility] and [address] [place] [address].     Johnny Farris RN, BSN  [phone]

## 2021-11-09 NOTE — CONSULTS
P Pulmonary and Critical Care   Consult Note      Reason for Consult: Acute hypoxemic respiratory failure  Requesting Physician: Dr. Sydnie Mcdaniel:   [facility] / HPI:                The patient is a 82 y.o. female with significant past medical history of:      Diagnosis Date    Atrial fibrillation ([address]     CAD (coronary artery disease)     Depression     Hyperlipidemia     Hypertension     Hypothyroid     Legal blindness     Obstructive sleep apnea (adult) (pediatric)      Patient was originally admitted 11/1/2021 with shortness of breath and acute blood loss anemia. She also had acute kidney injury. Ultimately she was noted to have E. coli bacteremia. Recently she has had worsening hypoxemic respiratory failure requiring high flow nasal cannula oxygen support.       Past Surgical History:        Procedure Laterality Date    ANGIOPLASTY      COLONOSCOPY  11/16/2018    COLONOSCOPY POLYPECTOMY SNARE/COLD BIOPSY performed by Nathan Butler MD at [address] [facility] N/A 10/20/2021    COLONOSCOPY POLYPECTOMY ABLATION performed by Rosas Hall MD at [address] COLONOSCOPY N/A 11/4/2021    COLONOSCOPY POLYPECTOMY ABLATION performed by Mary Arguelles MD at [address] Right     CATARACT EXTRACTION W/ IOL    EYE SURGERY Left     CATARCT EXTRACTION W/ IOL    NV SIGMOIDOSCOPY FLX DX W/COLLJ SPEC BR/WA IF PFRMD N/A 10/19/2018    SIGMOIDOSCOPY DIAGNOSTIC FLEXIBLE performed by Nathan Butler MD at [address]  10/19/2018    flexible    TONSILLECTOMY AND ADENOIDECTOMY      TUBAL LIGATION      UPPER GASTROINTESTINAL ENDOSCOPY N/A 10/20/2021    EGD BIOPSY performed by Rosas Hall MD at [address] ENDOSCOPY     Current Medications:    Current Facility-Administered Medications: dilTIAZem 125 mg in dextrose 5 % 125 mL infusion, 5-15 mg/hr, IntraVENous, Continuous  piperacillin-tazobactam (ZOSYN) 3,375 mg in dextrose 5 % 50 mL IVPB extended infusion (mini-bag), 3,375 mg, IntraVENous, Q12H  lidocaine PF 1 % injection 5 mL, 5 mL, IntraDERmal, Once  apixaban (ELIQUIS) tablet 2.5 mg, 2.5 mg, Oral, BID  ipratropium-albuterol (DUONEB) nebulizer solution 1 ampule, 1 ampule, Inhalation, Q4H PRN  metoprolol succinate (TOPROL XL) extended release tablet 50 mg, 50 mg, Oral, TID  QUEtiapine (SEROQUEL) tablet 50 mg, 50 mg, Oral, Nightly  sodium chloride flush 0.9 % injection 5-40 mL, 5-40 mL, IntraVENous, 2 times per day  sodium chloride flush 0.9 % injection 5-40 mL, 5-40 mL, IntraVENous, PRN  0.9 % sodium chloride infusion, 25 mL, IntraVENous, PRN  Vitamin D (CHOLECALCIFEROL) tablet 2,000 Units, 2 tablet, Oral, Daily    Allergies   Allergen Reactions    Percocet [Oxycodone-Acetaminophen] Other (See Comments)     Pt states it makes her crazy. Social History:    TOBACCO:   reports that she has never smoked. She has never used smokeless tobacco.  ETOH:   reports current alcohol use of about 2.0 standard drinks of alcohol per week. Patient currently lives independently  Environmental/chemical exposure: None known    Family History:       Problem Relation Age of Onset    Heart Disease Mother     Heart Disease Father     Asthma Neg Hx     Cancer Neg Hx     Diabetes Neg Hx     Emphysema Neg Hx     Hypertension Neg Hx     Heart Failure Neg Hx      REVIEW OF SYSTEMS:    CONSTITUTIONAL:  negative for fevers, chills, diaphoresis, activity change, appetite change, fatigue, night sweats and unexpected weight change.    EYES:  negative for blurred vision, eye discharge, visual disturbance and icterus  HEENT:  negative for hearing loss, tinnitus, ear drainage, sinus pressure, nasal congestion, epistaxis and snoring  RESPIRATORY:  See HPI  CARDIOVASCULAR:  negative for chest pain, palpitations, exertional chest pressure/discomfort, edema, syncope  GASTROINTESTINAL:  negative for nausea, vomiting, diarrhea, constipation, blood in stool and abdominal pain  GENITOURINARY:  negative for frequency, dysuria, urinary incontinence, decreased urine volume, and hematuria  HEMATOLOGIC/LYMPHATIC:  negative for easy bruising, bleeding and lymphadenopathy  ALLERGIC/IMMUNOLOGIC:  negative for recurrent infections, angioedema, anaphylaxis and drug reactions  ENDOCRINE:  negative for weight changes and diabetic symptoms including polyuria, polydipsia and polyphagia  MUSCULOSKELETAL:  negative for  pain, joint swelling, decreased range of motion and muscle weakness  NEUROLOGICAL:  negative for headaches, slurred speech, unilateral weakness  PSYCHIATRIC/BEHAVIORAL: negative for hallucinations, behavioral problems, confusion and agitation. Objective:   PHYSICAL EXAM:      VITALS:  BP (!) 153/85   Pulse 85   Temp 98.6 °F (37 °C) (Oral)   Resp 20   Ht 5' 5\" (1.651 m)   Wt 167 lb 1.7 oz (75.8 kg)   SpO2 90%   BMI 27.81 kg/m²      24HR INTAKE/OUTPUT:      Intake/Output Summary (Last 24 hours) at 11/9/2021 1755  Last data filed at 11/9/2021 3231  Gross per 24 hour   Intake 10 ml   Output 650 ml   Net -640 ml     CONSTITUTIONAL:  awake, alert, cooperative, no apparent distress, and appears stated age  NECK:  Supple, symmetrical, trachea midline, no adenopathy, thyroid symmetric, not enlarged and no tenderness, skin normal  LUNGS:  no increased work of breathing and inspiratory crackles to auscultation. No accessory muscle use  CARDIOVASCULAR: S1 and S2, 2-3+ peripheral pitting edema  ABDOMEN:  normal bowel sounds, non-distended and no masses palpated, and no tenderness to palpation. No hepatospleenomegaly  LYMPHADENOPATHY:  no axillary or supraclavicular adenopathy. No cervical adnenopathy  PSYCHIATRIC: Oriented to person place and time. No obvious depression or anxiety. MUSCULOSKELETAL: No obvious misalignment or effusion of the joints. No clubbing, cyanosis of the digits.   SKIN:  normal skin color, texture, turgor and no redness, warmth, or swelling. No palpable nodules    DATA:    Old records have been reviewed    CBC:  Recent Labs     11/08/21  1245   WBC 16.3*   RBC 2.86*   HGB 8.1*   HCT 26.9*      MCV 93.8   MCH 28.2   MCHC 30.1*   RDW 20.0*   BANDSPCT 4      BMP:  Recent Labs     11/08/21  1245      K 3.2*      CO2 23   BUN 46*   CREATININE 1.8*   CALCIUM 8.5   GLUCOSE 119*      ABG:  No results for input(s): PHART, UNN0WLM, PO2ART, PIF9VIH, J2JNDRVA, BEART in the last 72 hours. Procalcitonin  Recent Labs     11/09/21  1657   PROCAL 0.61*       No results found for: BNP  Lab Results   Component Value Date    TROPONINI 0.02 (H) 11/05/2021           Radiology Review:  All pertinent images / reports were reviewed as a part of this visit. Assessment:     1. Acute pulmonary edema  2. Acute kidney injury  3. E. coli bacteremia      Plan:     I have reviewed laboratories, medical records and images for this visit  Chest x-ray shows worsening bilateral airspace disease suggestive of pulmonary edema  Pneumonia is not completely excluded  I did request procalcitonin which is come back at 0.61  Antibiotics has been adjusted to include Zosyn over concern for possible aspiration  We will also get BNP and echocardiogram  Obtain CT chest  Continue supplemental oxygen  She does have impressive edema. Appears volume up. May benefit from some diuresis.   Creatinine has been improving and nephrology is following

## 2021-11-09 NOTE — CARE COORDINATION
CM spoke to [address] at [facility] and they cannot accept patient at this time d/t currently on 10 liters of oxygen and also she reviewed previous notes that state patient was acting out and staff was told by daughter pt abuses benzo's. EZRA called Ella Michelle at [facility] [phone] and she will review pt. Another covid test still pending at this time. Pt's previous test was negative. CM called pt's daughter Daly King and discussed the above and discussed the above. She verbalized understanding. There is a palliative care consult pending.     Kathi Davison RN, BSN  [phone]

## 2021-11-09 NOTE — PROGRESS NOTES
On call NP notified of chest xray results & K+ 3.2 . Per NP, Will defer abx tx to ID. Covid PCR ordered. IV K+ replacement ordered.

## 2021-11-10 LAB
ALBUMIN SERPL-MCNC: 2 G/DL (ref 3.4–5)
ANION GAP SERPL CALCULATED.3IONS-SCNC: 12 MMOL/L (ref 3–16)
BASOPHILS ABSOLUTE: 0 K/UL (ref 0–0.2)
BASOPHILS RELATIVE PERCENT: 0.2 %
BLOOD CULTURE, ROUTINE: ABNORMAL
BLOOD CULTURE, ROUTINE: ABNORMAL
BUN BLDV-MCNC: 34 MG/DL (ref 7–20)
CALCIUM SERPL-MCNC: 8.5 MG/DL (ref 8.3–10.6)
CHLORIDE BLD-SCNC: 109 MMOL/L (ref 99–110)
CO2: 21 MMOL/L (ref 21–32)
CREAT SERPL-MCNC: 1.3 MG/DL (ref 0.6–1.2)
EOSINOPHILS ABSOLUTE: 0 K/UL (ref 0–0.6)
EOSINOPHILS RELATIVE PERCENT: 0.1 %
GFR AFRICAN AMERICAN: 47
GFR NON-AFRICAN AMERICAN: 39
GLUCOSE BLD-MCNC: 105 MG/DL (ref 70–99)
HCT VFR BLD CALC: 27.6 % (ref 36–48)
HEMOGLOBIN: 8.6 G/DL (ref 12–16)
INR BLD: 1.74 (ref 0.88–1.12)
LV EF: 58 %
LVEF MODALITY: NORMAL
LYMPHOCYTES ABSOLUTE: 0.2 K/UL (ref 1–5.1)
LYMPHOCYTES RELATIVE PERCENT: 1.9 %
MAGNESIUM: 2.2 MG/DL (ref 1.8–2.4)
MCH RBC QN AUTO: 28.4 PG (ref 26–34)
MCHC RBC AUTO-ENTMCNC: 31.1 G/DL (ref 31–36)
MCV RBC AUTO: 91.4 FL (ref 80–100)
MONOCYTES ABSOLUTE: 0.2 K/UL (ref 0–1.3)
MONOCYTES RELATIVE PERCENT: 1.3 %
NEUTROPHILS ABSOLUTE: 11.5 K/UL (ref 1.7–7.7)
NEUTROPHILS RELATIVE PERCENT: 96.5 %
ORGANISM: ABNORMAL
ORGANISM: ABNORMAL
PDW BLD-RTO: 20.5 % (ref 12.4–15.4)
PHOSPHORUS: 3.7 MG/DL (ref 2.5–4.9)
PLATELET # BLD: 202 K/UL (ref 135–450)
PMV BLD AUTO: 7.6 FL (ref 5–10.5)
POTASSIUM SERPL-SCNC: 3.1 MMOL/L (ref 3.5–5.1)
PROTHROMBIN TIME: 20.1 SEC (ref 9.9–12.7)
RBC # BLD: 3.02 M/UL (ref 4–5.2)
REASON FOR REJECTION: NORMAL
REJECTED TEST: NORMAL
SODIUM BLD-SCNC: 142 MMOL/L (ref 136–145)
WBC # BLD: 11.9 K/UL (ref 4–11)

## 2021-11-10 PROCEDURE — 2580000003 HC RX 258: Performed by: HOSPITALIST

## 2021-11-10 PROCEDURE — 80069 RENAL FUNCTION PANEL: CPT

## 2021-11-10 PROCEDURE — 97129 THER IVNTJ 1ST 15 MIN: CPT

## 2021-11-10 PROCEDURE — 6370000000 HC RX 637 (ALT 250 FOR IP): Performed by: INTERNAL MEDICINE

## 2021-11-10 PROCEDURE — 2500000003 HC RX 250 WO HCPCS: Performed by: HOSPITALIST

## 2021-11-10 PROCEDURE — 2580000003 HC RX 258: Performed by: INTERNAL MEDICINE

## 2021-11-10 PROCEDURE — 6360000002 HC RX W HCPCS: Performed by: HOSPITALIST

## 2021-11-10 PROCEDURE — 6370000000 HC RX 637 (ALT 250 FOR IP): Performed by: EMERGENCY MEDICINE

## 2021-11-10 PROCEDURE — 6360000002 HC RX W HCPCS: Performed by: INTERNAL MEDICINE

## 2021-11-10 PROCEDURE — 83735 ASSAY OF MAGNESIUM: CPT

## 2021-11-10 PROCEDURE — 36415 COLL VENOUS BLD VENIPUNCTURE: CPT

## 2021-11-10 PROCEDURE — 6370000000 HC RX 637 (ALT 250 FOR IP): Performed by: NURSE PRACTITIONER

## 2021-11-10 PROCEDURE — 1200000000 HC SEMI PRIVATE

## 2021-11-10 PROCEDURE — 99233 SBSQ HOSP IP/OBS HIGH 50: CPT | Performed by: INTERNAL MEDICINE

## 2021-11-10 PROCEDURE — 85610 PROTHROMBIN TIME: CPT

## 2021-11-10 PROCEDURE — 93306 TTE W/DOPPLER COMPLETE: CPT

## 2021-11-10 PROCEDURE — 6370000000 HC RX 637 (ALT 250 FOR IP): Performed by: HOSPITALIST

## 2021-11-10 PROCEDURE — 92526 ORAL FUNCTION THERAPY: CPT

## 2021-11-10 PROCEDURE — 85025 COMPLETE CBC W/AUTO DIFF WBC: CPT

## 2021-11-10 RX ORDER — POTASSIUM CHLORIDE 7.45 MG/ML
10 INJECTION INTRAVENOUS
Status: ACTIVE | OUTPATIENT
Start: 2021-11-10 | End: 2021-11-10

## 2021-11-10 RX ORDER — FUROSEMIDE 10 MG/ML
40 INJECTION INTRAMUSCULAR; INTRAVENOUS ONCE
Status: COMPLETED | OUTPATIENT
Start: 2021-11-10 | End: 2021-11-10

## 2021-11-10 RX ORDER — FUROSEMIDE 10 MG/ML
20 INJECTION INTRAMUSCULAR; INTRAVENOUS 2 TIMES DAILY
Status: DISCONTINUED | OUTPATIENT
Start: 2021-11-10 | End: 2021-11-15

## 2021-11-10 RX ORDER — LORAZEPAM 0.5 MG/1
0.5 TABLET ORAL
Status: COMPLETED | OUTPATIENT
Start: 2021-11-10 | End: 2021-11-10

## 2021-11-10 RX ADMIN — Medication 10 ML: at 09:13

## 2021-11-10 RX ADMIN — PIPERACILLIN AND TAZOBACTAM 3375 MG: 3; .375 INJECTION, POWDER, LYOPHILIZED, FOR SOLUTION INTRAVENOUS at 18:02

## 2021-11-10 RX ADMIN — FUROSEMIDE 20 MG: 10 INJECTION, SOLUTION INTRAMUSCULAR; INTRAVENOUS at 17:58

## 2021-11-10 RX ADMIN — METOPROLOL SUCCINATE 50 MG: 50 TABLET, EXTENDED RELEASE ORAL at 15:08

## 2021-11-10 RX ADMIN — DILTIAZEM HYDROCHLORIDE 30 MG: 30 TABLET, FILM COATED ORAL at 16:20

## 2021-11-10 RX ADMIN — QUETIAPINE FUMARATE 50 MG: 25 TABLET ORAL at 20:00

## 2021-11-10 RX ADMIN — LORAZEPAM 0.5 MG: 0.5 TABLET ORAL at 22:13

## 2021-11-10 RX ADMIN — DILTIAZEM HYDROCHLORIDE 30 MG: 30 TABLET, FILM COATED ORAL at 23:57

## 2021-11-10 RX ADMIN — SODIUM CHLORIDE 25 ML: 9 INJECTION, SOLUTION INTRAVENOUS at 18:01

## 2021-11-10 RX ADMIN — FUROSEMIDE 40 MG: 10 INJECTION, SOLUTION INTRAMUSCULAR; INTRAVENOUS at 07:51

## 2021-11-10 RX ADMIN — Medication 2000 UNITS: at 07:51

## 2021-11-10 RX ADMIN — APIXABAN 2.5 MG: 2.5 TABLET, FILM COATED ORAL at 20:00

## 2021-11-10 RX ADMIN — APIXABAN 2.5 MG: 2.5 TABLET, FILM COATED ORAL at 07:51

## 2021-11-10 RX ADMIN — METOPROLOL SUCCINATE 50 MG: 50 TABLET, EXTENDED RELEASE ORAL at 07:51

## 2021-11-10 RX ADMIN — METOPROLOL SUCCINATE 50 MG: 50 TABLET, EXTENDED RELEASE ORAL at 20:00

## 2021-11-10 RX ADMIN — DILTIAZEM HYDROCHLORIDE 7.5 MG/HR: 5 INJECTION INTRAVENOUS at 11:06

## 2021-11-10 RX ADMIN — FUROSEMIDE 20 MG: 10 INJECTION, SOLUTION INTRAMUSCULAR; INTRAVENOUS at 15:10

## 2021-11-10 ASSESSMENT — ENCOUNTER SYMPTOMS
BLOOD IN STOOL: 0
ABDOMINAL PAIN: 0
EYE DISCHARGE: 0
SHORTNESS OF BREATH: 1
TROUBLE SWALLOWING: 0
CHOKING: 0
PHOTOPHOBIA: 0
COUGH: 1
CHEST TIGHTNESS: 0
COLOR CHANGE: 0
NAUSEA: 0
STRIDOR: 0
EYE REDNESS: 0
DIARRHEA: 0
FACIAL SWELLING: 0
APNEA: 0
RHINORRHEA: 0

## 2021-11-10 ASSESSMENT — PAIN SCALES - GENERAL
PAINLEVEL_OUTOF10: 0

## 2021-11-10 ASSESSMENT — PAIN DESCRIPTION - PROGRESSION
CLINICAL_PROGRESSION: GRADUALLY IMPROVING
CLINICAL_PROGRESSION: GRADUALLY IMPROVING

## 2021-11-10 NOTE — PROGRESS NOTES
[facility]    11/10/2021 10:16 AM        Name: Ranjith Jones . Admitted: 11/1/2021  Primary Care Provider: Mani Díaz (Tel: [phone])                        Subjective:  . No acute events overnight. Resting well. Pain control. Diet ok. Labs reviewed  Worsening breathing this morning with increased  -A. fib not controlled. Reviewed interval ancillary notes    Current Medications  dilTIAZem 125 mg in dextrose 5 % 125 mL infusion, Continuous  piperacillin-tazobactam (ZOSYN) 3,375 mg in dextrose 5 % 50 mL IVPB extended infusion (mini-bag), Q12H  lidocaine PF 1 % injection 5 mL, Once  apixaban (ELIQUIS) tablet 2.5 mg, BID  ipratropium-albuterol (DUONEB) nebulizer solution 1 ampule, Q4H PRN  metoprolol succinate (TOPROL XL) extended release tablet 50 mg, TID  QUEtiapine (SEROQUEL) tablet 50 mg, Nightly  sodium chloride flush 0.9 % injection 5-40 mL, 2 times per day  sodium chloride flush 0.9 % injection 5-40 mL, PRN  0.9 % sodium chloride infusion, PRN  Vitamin D (CHOLECALCIFEROL) tablet 2,000 Units, Daily        Objective:  BP (!) 117/54   Pulse 75   Temp 97.7 °F (36.5 °C) (Axillary)   Resp 20   Ht 5' 5\" (1.651 m)   Wt 167 lb 1.7 oz (75.8 kg)   SpO2 94%   BMI 27.81 kg/m²     Intake/Output Summary (Last 24 hours) at 11/10/2021 1016  Last data filed at 11/10/2021 0804  Gross per 24 hour   Intake 217.69 ml   Output 1000 ml   Net -782.31 ml      Wt Readings from Last 3 Encounters:   11/07/21 167 lb 1.7 oz (75.8 kg)   10/22/21 156 lb (70.8 kg)   11/16/18 147 lb (66.7 kg)       General appearance:  Appears comfortable  Eyes: Sclera clear. Pupils equal.  ENT: Moist oral mucosa. Trachea midline, no adenopathy. Cardiovascular: Regular rhythm, normal S1, S2. No murmur. No edema in lower extremities  Respiratory: Not using accessory muscles.  Good inspiratory effort. Clear to auscultation bilaterally, no wheeze or crackles. GI: Abdomen soft, no tenderness, not distended, normal bowel sounds  Musculoskeletal: No cyanosis in digits, neck supple  Neurology: CN 2-12 grossly intact. No speech or motor deficits  Psych: Normal affect. Alert and oriented in time, place and person  Skin: Warm, dry, normal turgor    Labs and Tests:  CBC:   Recent Labs     11/08/21  1245 11/10/21  0623   WBC 16.3* 11.9*   HGB 8.1* 8.6*    202     BMP:    Recent Labs     11/08/21  1245 11/10/21  0556    142   K 3.2* 3.1*    109   CO2 23 21   BUN 46* 34*   CREATININE 1.8* 1.3*   GLUCOSE 119* 105*     Hepatic: No results for input(s): AST, ALT, ALB, BILITOT, ALKPHOS in the last 72 hours. Discussed care with family and patient             Spent 30  minutes with patient and family at bedside and on unit reviewing medical records and labs, spent greater than 50% time counseling patient and family on diagnosis and plan   Problem List  Active Problems:    Essential hypertension    ROBYN (obstructive sleep apnea)    Acute GI bleeding    Atrial fibrillation with RVR (HCC)    Severe sepsis (HCC)    E coli bacteremia    Supratherapeutic INR    Hyperkalemia    Normocytic normochromic anemia    Hyponatremia    Bilateral pleural effusion    Diverticulosis    Anasarca    Overweight (BMI 25.0-29. 9)    High anion gap metabolic acidosis    Elevated brain natriuretic peptide (BNP) level    JULIO (acute kidney injury) ([facility] [address]    Hypokalemia  Resolved Problems:    * No resolved hospital problems. *       Assessment & Plan:   1. Sepsis  -Secondary to bacteremia E. Coli  - was doing well but likely had aspiration episode   - abx  Adjusted to cover for aspiration on zosyn.    - monitor     A. fib with RVR  -needed cardizem gtt we will wean it off now HR is improving  - We will resume p.o. amiodarone as well  - EP cardio following    Aspiration PNA  Bilateral pleural effusion  - was on 10 l nc yesterday now on 6 l  - also likely has some pulm edema  - given lasix x1 dose again today. Acute on chronic kidney disease  -Nephrology following will follow the recommendation    Acute blood loss anemia  - initial presentation   Colonoscopy 11/4 with cecal ulcer with nonbleeding pigmented protuberances from prior AVM cautery treatment treated with cautery and clips.   H/H has been stable    Diet: Diet NPO  Code:Full Code  DVT PPX yadira Rich MD   11/10/2021 10:16 AM

## 2021-11-10 NOTE — PROGRESS NOTES
MD Era Michel MD Reginald Francis, MD                                  Office: [phone]                 Fax: [phone]          [place]                  NEPHROLOGY  INPATIENT PROGRESS NOTE:     PATIENT NAME: Teddy Fernando  : 1935  MRN: [id number]        Assessment and Plan   JULIO on CKD 3bsevere. Baseline crea 1.4. Non-oliguric. UA bland. Hypoxia worsend  CXR reviewed - fluid overload concerns too     Add RTC lasix 20 Mg BID IV  Add KCL 40 mEq IV   Continue herron   Per PICC RN: Peripheral IV placed for extra access. PICC held for now until swelling dissipates. Hypotension. Improved. Hypovolemia. Improved  Hypokalemia-replaced. Follow K. Hypocalcemia- asymptomatic. Anemia of acute blood loss. follow Hgb. Metabolic acidosis. Improved. Hypoalbuminemia  E. coli sepsis   Bacteremia     High complexity. Multiple medical problems. Discussed with patient and treatment team.    Thank you for allowing me to participate in this patient's care. Please do not hesitate to contact me for any questions/concerns. We will follow along with you. Julio Nunn MD  [facility] [address] [place] [address] Road   Phone: [phone] or [address] [employer]  Fax: [phone]             Subjective:   Patient is weak and lethargic. Hypoxia worsened  Mild fluid overload     Objective:      Objective:  EXAM  Vitals:    11/10/21 0700   BP: (!) 117/54   Pulse: 75   Resp: 20   Temp: 97.7 °F (36.5 °C)   SpO2: 94%       Intake/Output Summary (Last 24 hours) at 11/10/2021 1006  Last data filed at 11/10/2021 0804  Gross per 24 hour   Intake 217.69 ml   Output 1000 ml   Net -782.31 ml     General: sleepy  HENT: Atraumatic, normocephalic   Eyes: Normal conjunctiva, Non-incteral sclera. Neck: Supple, JVD not visible. CVS:  Heart sounds are normal. No loud murmur. RS: abNormal respiratory effort, Breat sound: diminished at bases.    Abd: Soft , bowel sounds are normal, no Dara Whitfield in the last 72 hours. Invalid input(s): Bhargavi Fernández    LIVER PROFILE:   No results for input(s): AST, ALT, LIPASE, BILIDIR, BILITOT, ALKPHOS in the last 72 hours. Invalid input(s): AMYLASE,  ALB  PT/INR:    Lab Results   Component Value Date    PROTIME 20.1 11/10/2021    PROTIME 17.7 11/08/2021    PROTIME 20.6 11/06/2021    INR 1.74 11/10/2021    INR 1.54 11/08/2021    INR 1.78 11/06/2021     PTT:    Lab Results   Component Value Date    APTT 32.1 11/01/2021     CANDE:  No results found for: ANATITER, CANDE  CHEMISTRY COMMON GROUP :   Lab Results   Component Value Date    GLUCOSE 105 11/10/2021    TSH 2.54 06/03/2010     Recent Labs     11/08/21  1245 11/10/21  0556   GLUCOSE 119* 105*   CALCIUM 8.5 8.5         RADIOLOGY:        Imaging Results.   Chest X Ray reviewed by me      Electronically Signed: Marky Yarbrough MD 11/10/2021 10:06 AM

## 2021-11-10 NOTE — CONSULTS
PALLIATIVE MEDICINE CONSULTATION     Patient name:Venessa Yu   HEV:[id number]    :1935  Room/Bed:5TN-5581/5581-01   LOS: 9 days         Date of consult:11/10/2021    Consult Information  Palliative Medicine Consult performed by: NAKUL Caraballo CNP      Inpatient consult to Palliative Care  Consult performed by: NAKUL Caraballo CNP  Consult ordered by: Dionicio Noe MD  Reason for consult: [address] and code status        Number of admissions past 12 months: 4      ASSESSMENT/RECOMMENDATIONS     82 y.o. female with Hypoxia and AMS      Symptom Management:  Hypoxia- pt on 5L oxygen today improving   AMS- pt oriented to self only pleasant she has had multiple admissions in last couple months that have effected her mentation  Goals of Care- Talked to spouse Ced Salazar who asked me to talk to daughter Kavita Oliva who is a Hospice RN, plan for short stay in rehab then if pt needs long term care will move pt to [place] to be near her children. We discussed code status and daughter feel like [facility] aligns with pts wishes but wants to talk it over with spouse and siblings. Patient/Family Goals of Care :    Educated patient and family on CODE STATUS: Although in some cases it does save lives, CPR (cardiopulmonary resuscitation) frequently is not successful or does not benefit those who receive it, especially elderly people or those with serious medical conditions. Even if revived, the person can be left with painful injuries, or in a debilitated state, or with brain damage resulting from oxygen deprivation. Resuscitation can involve such things as drugs, forcefully pressing on the chest, giving electric shocks to restart the heart or placing a tube down the nose or throat to provide artificial breathing. People with terminal illnesses or other serious health conditions may prefer not to be resuscitated. [facility] has established two standardized DNR orders.    DNR Comfort Care-Arrest Order you will receive all the appropriate medical treatment, including resuscitation, until the patient has a cardiac arrest (heart has stopped beating) or pulmonary arrest (breathing has stopped), at which point comfort care will be provided. DNR Comfort Care Order [facility]), a patient chooses other measures such as drugs to correct abnormal heart rhythms. With this order, comfort care or other requested treatment is provided at a point before the heart or breathing stops. Comfort care involves keeping the patient comfortable with pain medication and providing palliative (supportive medical) care. A DNR-CC does not mean do not treat.      Talked to spouse Maddison Lester who asked me to talk to daughter Toy Gonsalez who is a Hospice RN, plan for short stay in rehab then if pt needs long term care will move pt to [place] to be near her children. We discussed code status and daughter feel like [facility] aligns with pts wishes but wants to talk it over with spouse and siblings. Disposition/Discharge Plan:   pending    Advance Directives:  Surrogate Decision Maker: Giuseppe-spouse  Code status:  Full Code    Case discussed with: patient, floor RN, Toy Gonsalez- daughter  Thank you for allowing us to participate in the care of this patient. HISTORY     CC: Hypoxia  HPI: The patient is a 82 y.o. female with originally admitted 11/1/2021 with shortness of breath and acute blood loss anemia. She also had acute kidney injury. Ultimately she was noted to have E. coli bacteremia. Recently she has had worsening hypoxemic respiratory failure requiring high flow nasal cannula oxygen support. Palliative Medicine SymptomScreening/ROS:  Review of Systems -   History obtained from chart review and unobtainable from patient due to mental status     Patient unable to complete full ROS due to current cognitive status. Information that is obtained from nursing and chart.      Pain:  Denies    Home med list and hospital medications reviewed in chart as of 11/10/2021     EXAM     Vitals:    11/10/21 0700   BP: (!) 117/54   Pulse: 75   Resp: 20   Temp: 97.7 °F (36.5 °C)   SpO2: 94%       Physical Examination:   General appearance - chronically ill appearing  Mental status - normal mood, behavior, speech, dress, motor activity, and thought processes, disoriented to place and time  Neck - supple, no significant adenopathy  Chest - no tachypnea, retractions or cyanosis  Abdomen - soft, nontender, nondistended, no masses or organomegaly  Musculoskeletal - no joint tenderness, deformity or swelling  Skin - normal coloration and turgor, no rashes, no suspicious skin lesions noted        Current labs in the epic chart reviewed as of 11/10/2021   Review of previous notes, admits, labs, radiology and testing relevant to this consult done in this chart today 11/10/2021      Total time: 70 minutes  >50% of time spent counseling patient at bedside or POA/family member if applicable , reviewing information and discussing care, coordinating with care team  Signed By: Electronically signed by NAKUL Kapoor CNP on 11/10/2021 at 8:41 AM  Palliative Medicine   [phone]    November 10, 2021

## 2021-11-10 NOTE — PROGRESS NOTES
P Pulmonary and Critical Care  Progress note      Reason for Consult: Acute hypoxemic respiratory failure  Requesting Physician: Dr. Matthieu Murphy:   [facility] / HPI:                The patient is a 82 y.o. female with significant past medical history of:      Diagnosis Date    Atrial fibrillation ([address]     CAD (coronary artery disease)     Depression     Hyperlipidemia     Hypertension     Hypothyroid     Legal blindness     Obstructive sleep apnea (adult) (pediatric)      Interval history:      Past Surgical History:        Procedure Laterality Date    ANGIOPLASTY      COLONOSCOPY  2018    COLONOSCOPY POLYPECTOMY SNARE/COLD BIOPSY performed by Luis Ivory MD at [address] N/A 10/20/2021    COLONOSCOPY POLYPECTOMY ABLATION performed by Tobi Glass MD at [address] N/A 2021    COLONOSCOPY POLYPECTOMY ABLATION performed by Harley Antoine MD at [address] Right     CATARACT EXTRACTION W/ IOL    EYE SURGERY Left     CATARCT EXTRACTION W/ IOL    PA SIGMOIDOSCOPY FLX DX W/COLLJ SPEC BR/WA IF PFRMD N/A 10/19/2018    SIGMOIDOSCOPY DIAGNOSTIC FLEXIBLE performed by Luis Ivory MD at [address] [place] [address]  10/19/2018    flexible    TONSILLECTOMY AND ADENOIDECTOMY      TUBAL LIGATION      UPPER GASTROINTESTINAL ENDOSCOPY N/A 10/20/2021    EGD BIOPSY performed by Tobi Glass MD at [address] [place] [address]     Current Medications:    Current Facility-Administered Medications: dilTIAZem 125 mg in dextrose 5 % 125 mL infusion, 5-15 mg/hr, IntraVENous, Continuous  piperacillin-tazobactam (ZOSYN) 3,375 mg in dextrose 5 % 50 mL IVPB extended infusion (mini-bag), 3,375 mg, IntraVENous, Q12H  lidocaine PF 1 % injection 5 mL, 5 mL, IntraDERmal, Once  apixaban (ELIQUIS) tablet 2.5 mg, 2.5 mg, Oral, BID  ipratropium-albuterol (DUONEB) nebulizer solution 1 ampule, 1 ampule, Inhalation, Q4H PRN  metoprolol succinate (TOPROL XL) extended release tablet 50 mg, 50 mg, Oral, TID  QUEtiapine (SEROQUEL) tablet 50 mg, 50 mg, Oral, Nightly  sodium chloride flush 0.9 % injection 5-40 mL, 5-40 mL, IntraVENous, 2 times per day  sodium chloride flush 0.9 % injection 5-40 mL, 5-40 mL, IntraVENous, PRN  0.9 % sodium chloride infusion, 25 mL, IntraVENous, PRN  Vitamin D (CHOLECALCIFEROL) tablet 2,000 Units, 2 tablet, Oral, Daily    Allergies   Allergen Reactions    Percocet [Oxycodone-Acetaminophen] Other (See Comments)     Pt states it makes her crazy. Social History:    TOBACCO:   reports that she has never smoked. She has never used smokeless tobacco.  ETOH:   reports current alcohol use of about 2.0 standard drinks of alcohol per week. Patient currently lives independently  Environmental/chemical exposure: None known    Family History:       Problem Relation Age of Onset    Heart Disease Mother     Heart Disease Father     Asthma Neg Hx     Cancer Neg Hx     Diabetes Neg Hx     Emphysema Neg Hx     Hypertension Neg Hx     Heart Failure Neg Hx      REVIEW OF SYSTEMS:    CONSTITUTIONAL:  negative for fevers, chills, diaphoresis, activity change, appetite change, fatigue, night sweats and unexpected weight change.    EYES:  negative for blurred vision, eye discharge, visual disturbance and icterus  HEENT:  negative for hearing loss, tinnitus, ear drainage, sinus pressure, nasal congestion, epistaxis and snoring  RESPIRATORY:  See HPI  CARDIOVASCULAR:  negative for chest pain, palpitations, exertional chest pressure/discomfort, edema, syncope  GASTROINTESTINAL:  negative for nausea, vomiting, diarrhea, constipation, blood in stool and abdominal pain  GENITOURINARY:  negative for frequency, dysuria, urinary incontinence, decreased urine volume, and hematuria  HEMATOLOGIC/LYMPHATIC:  negative for easy bruising, bleeding and lymphadenopathy  ALLERGIC/IMMUNOLOGIC:  negative for recurrent 31.1   RDW 20.0* 20.5*   BANDSPCT 4  --       BMP:  Recent Labs     11/08/21  1245 11/10/21  0556    142   K 3.2* 3.1*    109   CO2 23 21   BUN 46* 34*   CREATININE 1.8* 1.3*   CALCIUM 8.5 8.5   GLUCOSE 119* 105*      ABG:  No results for input(s): PHART, BNO1CRZ, PO2ART, NIB1RLQ, P7QFJZMI, BEART in the last 72 hours. Procalcitonin  Recent Labs     11/09/21  1657   PROCAL 0.61*       No results found for: BNP  Lab Results   Component Value Date    TROPONINI 0.02 (H) 11/05/2021           Radiology Review:  All pertinent images / reports were reviewed as a part of this visit. Assessment:     1. Acute pulmonary edema  2. Acute kidney injury  3. E. coli bacteremia      Plan:     I have reviewed laboratories, medical records and images for this visit  Reviewed CT imaging which reveals significant bilateral airspace disease and bilateral pleural effusions. This could be a combination of pneumonia and pulmonary edema  I did request procalcitonin which is come back at 0.61  Continue Zosyn  BNP is over 50,000. Even in the face of renal insufficiency, this is likely significant  She does have peripheral pitting edema  Echo is pending  Looks like she would benefit from diuresis and she is ordered a one-time dose of Lasix. Nephrology is following  She continues to have an oxygen requirement of 6 L/min high flow nasal cannula supplement to maintain saturations in the low to mid 90s.

## 2021-11-10 NOTE — PROGRESS NOTES
Speech Language Pathology      Name:  Radha Adams  :   1935     Medical Diagnosis:  Septicemia ([facility] [address] [A41.9]  GI bleed [K92.2]  Acute GI bleeding [K92.2]  JULIO (acute kidney injury) ([facility] [address] [N17.9]  Blood loss anemia [D50.0]  Atrial fibrillation with RVR ([facility] [address] [I48.91]  Supratherapeutic INR [R79.1]  Acute cystitis without hematuria [N30.00]     Treatment Diagnosis: Oropharyngeal Dysphagia; Cognitive-Linguistic Deficits  Pain level: Patient denies pain    Dysphagia Treatment Note    Subjective:   Pt alert and receptive although lethargic, multiple verbal cues provided throughout session to maintain appropriate KJ. On 6L O2 via NC. Assessment of Texture Tolerance: Thin Water: clinical s/s delayed swallow initiation and premature posterior spillage; intermittent throat clearing; fluctuating oxygen saturation and elevated respiratory rate; belching post swallow  Mildly thick liquid: Improved bolus control and A-P propulsion; Suspected pharyngeal pooling with mild delayed swallow initiation; Mild reduced laryngeal excursion; No overt signs of aspiration noted  Puree (applesauce): No overt s/s of aspiration; mild reduced laryngeal elevation; suspected improved pharyngeal clearing after the swallow  Soft solids:  Prolonged and \"effortful\" mastication and A-P propulsion. Apparent piecemeal bolus transfer to pharynx with delayed oral clearing. Nectar thick liquid rinse required to clear soft solid from oral cavity. Pt's  present and received education regarding aspiration risk and precautions. Pt demonstrates improved alertness and some improved po texture tolerance this date. Will advance to po diet. Diet and Treatment Recommendations 11/10/2021:  1) Advance diet to Dysphagia I Pureed with Mildly thick (nectar) liquids with meals/no straws/meds with applesauce  2) Allow small sips of thin water/cup between meals only, with good oral care    Compensatory strategies:  Alternate solids/liquids , Effortful Swallows     Dysphagia Goals: 1. Pt will functionally tolerate recommended diet with no overt clinical s/s of aspiration (onoing 11/10/2021)  2. Pt will demonstrate understanding of aspiration risk and precautions via education/demonstration with occasional prompting (ongoing 11/10/2021)  3. Pt will advance to least restrictive diet as indicated (ongoing 11/10/2021)  3. If clinical s/s of aspiration/penetration continue to be noted, Pt will participate in Modified Barium Swallow Study (ongoing 11/10/2021)      Speech Language Cognition Treatment   Impressions: Pt more alert and verbally responsive this date. Pt oriented to person and place ('hospital) but not oriented to name of place, time or specifics of current situation. Pt able to maintain alertness with only mild redirection this date. Pt able to follow 1-unit commands with 80% accuracy given mild prompts and repetition. GOALS:  Short Term Speech/Language/Cognitive Goals:   1. Pt will improve auditory processing/comprehension of commands and questions to 80%, via graded tasks. (ongoing 11/10/2021)   2. Pt will improve orientation and short term recall via graded tasks to 80%. (ongoing 11/10/2021)  3. Pt will improve verbal expression for functional expression via graded tasks to 80%. (ongoing 11/10/2021)  4. Pt will participate in ongoing cognitive assessment with goals to be established as indicated. (ongoing 11/10/2021)    Plan:    3-5 times a week during acute care stay     Patient/Family Education:   Provided education regarding role of SLP, results of assessment, recommendations and general speech pathology plan of care. [x]? Pt verbalized understanding and agreement   [x]? Pt requires ongoing learning   []? No evidence of comprehension     Discharge Recommendations:    Recommend ongoing SLP for dysphagia and speech therapy upon discharge from hospital; pending diet tolerance and progression towards goals.      Timed Code Treatment: 10 min Total Treatment Time: 30 min    If patient discharges prior to next session this note will serve as a discharge summary.        Real Shackle MAC[facility]-SLP#[id number]   Speech-Language Pathologist  [phone]

## 2021-11-10 NOTE — CARE COORDINATION
CM received vm this afternoon from pt's daughter Masoud Macdonald stating they now want a referral sent to [facility] ([facility]) in [facility]. CM sent referral over [facility]. CM called pt's spouse and left vm about poc. CM called pt's daughter Masoud Macdonald back and her brother answered and she was unavailable. CM informed him I was placing referral but have made many referrals and if we get an accepting facility we need to have patient go there. CM called [facility] in [address] [place] [address] and spoke to [facility] in admissions and they will review pt and contact CM back. CM informed her pt on 6 liters which she states is fine but pt will need to be off of Cardizem drip.     Brooke Florence RN, BSN  [phone]

## 2021-11-10 NOTE — PROGRESS NOTES
Infectious Diseases   Progress Note      Admission Date: 11/1/2021  Hospital Day: Hospital Day: 10   Attending: Bradley Nieves MD  Date of service: 11/10/2021     Chief complaint/ Reason for consult:     · Severe sepsis on admission with leukocytosis, hypotension, tachycardia, acute kidney injury  · Gram-negative bacteremia with E. Coli  · High anion gap metabolic acidosis  · Elevated proBNP  · Mild hyperkalemia  · Hyponatremia  · Normocytic normochromic anemia  · Bilateral pleural effusions    Microbiology:        I have reviewed allavailable micro lab data and cultures    · Blood culture (2/2) - collected on 11/1/2021: E coli    Susceptibility     Escherichia coli     BACTERIAL SUSCEPTIBILITY PANEL BY ERIK     ampicillin >=32 mcg/mL Resistant     ceFAZolin <=4 mcg/mL Sensitive     cefepime <=0.12 mcg/mL Sensitive     cefTRIAXone <=0.25 mcg/mL Sensitive     ciprofloxacin <=0.25 mcg/mL Sensitive     ertapenem <=0.12 mcg/mL Sensitive     gentamicin <=1 mcg/mL Sensitive     levofloxacin 1 mcg/mL Sensitive     piperacillin-tazobactam <=4 mcg/mL Sensitive     trimethoprim-sulfamethoxazole >=320 mcg/mL Resistant        · Urine culture  - collected on 11/1/2021: > 100,000 cfu/ml of E coli    Susceptibility    Escherichia coli (1)    Antibiotic Interpretation ERIK Status    ampicillin Resistant >=32 mcg/mL     ceFAZolin Sensitive <=4 mcg/mL      NOTE: Cefazolin should only be used for uncomplicated UTI         for E.coli or Klebsiella pneumoniae.    cefepime Sensitive <=0.12 mcg/mL     cefTRIAXone Sensitive <=0.25 mcg/mL     ciprofloxacin Sensitive <=0.25 mcg/mL     ertapenem Sensitive <=0.12 mcg/mL     gentamicin Sensitive <=1 mcg/mL     levofloxacin Sensitive 1 mcg/mL     nitrofurantoin Sensitive <=16 mcg/mL     piperacillin-tazobactam Sensitive <=4 mcg/mL     trimethoprim-sulfamethoxazole Resistant >=320 mcg/mL         Antibiotics and immunizations:       Current antibiotics: All antibiotics and their doses were reviewed by me    Recent Abx Admin                   piperacillin-tazobactam (ZOSYN) 3,375 mg in dextrose 5 % 50 mL IVPB extended infusion (mini-bag) (mg) 3,375 mg New Bag 11/09/21 2021                  Immunization History: All immunization history was reviewed by me today. Immunization History   Administered Date(s) Administered    Influenza, High Dose (Fluzone 65 yrs and older) 10/27/2018    Influenza, Quadv, IM, PF (6 mo and older Fluzone, Flulaval, Fluarix, and 3 yrs and older Afluria) 10/20/2021       Known drug allergies: All allergies were reviewed and updated    Allergies   Allergen Reactions    Percocet [Oxycodone-Acetaminophen] Other (See Comments)     Pt states it makes her crazy. Social history:     Social History:  All social andepidemiologic history was reviewed and updated by me today as needed. · Tobacco use:   reports that she has never smoked. She has never used smokeless tobacco.  · Alcohol use:   reports current alcohol use of about 2.0 standard drinks of alcohol per week. · Currently lives in: [address] [place] [address]  ·  reports no history of drug use. COVID VACCINATION AND LAB RESULT RECORDS:     Internal Administration   First Dose      Second Dose           Last COVID Lab SARS-CoV-2, PCR (no units)   Date Value   11/09/2021 Not Detected     SARS-CoV-2, NAAT (no units)   Date Value   11/03/2021 Not Detected            Assessment:     The patient is a 82 y.o. old female who  has a past medical history of Atrial fibrillation ([facility] [address] CAD (coronary artery disease), Depression, Hyperlipidemia, Hypertension, Hypothyroid, Legal blindness, and Obstructive sleep apnea (adult) (pediatric).  with following problems:    · Severe sepsis on admission with leukocytosis, hypotension, tachycardia, acute kidney injury-White cell count has gone up  · Acute respiratory failure with hypoxia-pulmonary edema versus aspiration-covered with Zosyn  · Gram-negative bacteremia with E. Coli-now on IV Zosyn  · Bilateral atypical pneumonia, rule out COVID-19  · High anion gap metabolic acidosis-resolved  · Elevated proBNP  · Acute kidney injury-improving, serum creatinine is 113 today  · Hypokalemia-ongoing, serum potassium is 3.1 today  · Hyponatremia-this has been corrected  · Normocytic normochromic anemia-ongoing  · Bilateral pleural effusions  · Colon Diverticulosis  · Generalized anasarca  · Coronary artery disease-stable  · Essential hypertension-BP controlled. · Hyperlipidemia  · Obstructive sleep apnea  · Overweight due to excess calorie intake : Body mass index is 27.33 kg/m².-Counseling done      Discussion:      I had escalated her antibiotic from ceftriaxone to Zosyn yesterday due to concern for possible aspiration. Procalcitonin was 0.61 yesterday. proBNP however was quite elevated and was 52,367 yesterday. Serum creatinine is 1.3 today. White cell count is 11,900    COVID-19 PCR done yesterday was negative. Staph epidermidis isolated in 1 set of blood culture from 11/5/2021 was likely contaminant from the skin. Repeat blood cultures from yesterday are in process and are negative so far. CT scan of the chest from yesterday reviewed. Showed bilateral atypical lung infiltrates. This likely diffuse pulmonary edema. Plan:     Diagnostic Workup:    · Follow-up on blood cultures from yesterday  · Continue to follow  fever curve, WBC count and blood cultures. · Continue to monitor blood counts, liver and renal function.   · Follow-up on 2D echo    Antimicrobials:    · Will continue IV Zosyn 3.375 g every 8 hour  · Continue to monitor her vitals closely  · Continue high flow oxygen support to maintain oxygen saturation above 92%  · Cough and deep breathing exercises  · Aspiration precautions  · Diuresis and pulmonary edema management per primary and pulmonary  · We will follow up on the culture results and clinical progress and will make further recommendations accordingly. · Continue close vitals monitoring. · Maintain good glycemic control. · Fall precautions. · Aspiration precautions. · Continue to watch for new fever or diarrhea. · DVT prophylaxis. · Discussed all above with patient and RN. Drug Monitoring:    · Continue monitoring for antibiotic toxicity as follows: CBC, CMP   · Continue to watch for following: new or worsening fever, new hypotension, hives, lip swelling and redness or purulence at vascular access sites. I/v access Management:    · Continue to monitor i.v access sites for erythema, induration, discharge or tenderness. · As always, continue efforts to minimize tubes/lines/drains as clinically appropriate to reduce chances of line associated infections. Patient education and counseling:        · The patient was educated in detail about the side-effects of various antibiotics and things to watch for like new rashes, lip swelling, severe reaction, worsening diarrhea, break through fever etc.  · Discussed patient's condition and what to expect. All of the patient's questions were addressed in a satisfactory manner and patient verbalized understanding all instructions. Level of complexity of visit: High     Risk of Complications/Morbidity: High     · Illness(es)/ Infection present that pose threat to life/bodily function. · There is potential for severe exacerbation of infection/side effects of treatment. · Therapy requires intensive monitoring for antimicrobial agent toxicity. TIME SPENT TODAY:     - Spent over  36  minutes on visit (including interval history, physical exam, review of data including labs, cultures, imaging, development and implementation of treatment plan and coordination of complex care). More than 50 percent of this includes face-to-face time spent with the patient for counseling and coordination of care. Thank you for involving me in the care of your patient. I will continue to follow.  If you have anyadditional questions, please do not hesitate to contact me. Subjective: Interval history: Interval history was obtained from chart review and RN. She is on 6 L oxygen via high flow nasal cannula. She is tolerating antibiotics okay. No diarrhea. REVIEW OF SYSTEMS:      Review of Systems   Constitutional: Positive for fatigue. Negative for chills, diaphoresis and unexpected weight change. HENT: Negative for congestion, ear discharge, ear pain, facial swelling, hearing loss, rhinorrhea and trouble swallowing. Eyes: Negative for photophobia, discharge, redness and visual disturbance. Respiratory: Positive for cough and shortness of breath. Negative for apnea, choking, chest tightness and stridor. Cardiovascular: Negative for chest pain and palpitations. Gastrointestinal: Negative for abdominal pain, blood in stool, diarrhea and nausea. Endocrine: Negative for polydipsia, polyphagia and polyuria. Genitourinary: Negative for difficulty urinating, dysuria, frequency, hematuria, menstrual problem and vaginal discharge. Musculoskeletal: Negative for arthralgias, joint swelling, myalgias and neck stiffness. Skin: Negative for color change and rash. Allergic/Immunologic: Negative for immunocompromised state. Neurological: Negative for dizziness, seizures, speech difficulty, light-headedness and headaches. Hematological: Negative for adenopathy. Psychiatric/Behavioral: Negative for agitation, hallucinations and suicidal ideas. Past Medical History: All past medical history reviewed today. Past Medical History:   Diagnosis Date    Atrial fibrillation ([address]     CAD (coronary artery disease)     Depression     Hyperlipidemia     Hypertension     Hypothyroid     Legal blindness     Obstructive sleep apnea (adult) (pediatric)        Past Surgical History: All past surgical history was reviewed today.     Past Surgical History:   Procedure Laterality Date    ANGIOPLASTY      COLONOSCOPY  11/16/2018    COLONOSCOPY POLYPECTOMY SNARE/COLD BIOPSY performed by Rodney Michaud MD at [address] N/A 10/20/2021    COLONOSCOPY POLYPECTOMY ABLATION performed by Kelly Bautista MD at [address] N/A 11/4/2021    COLONOSCOPY POLYPECTOMY ABLATION performed by Yani Moreno MD at [address]     CATARACT EXTRACTION W/ IOL    EYE SURGERY Left     CATARCT EXTRACTION W/ IOL    OH SIGMOIDOSCOPY FLX DX W/COLLJ SPEC BR/WA IF PFRMD N/A 10/19/2018    SIGMOIDOSCOPY DIAGNOSTIC FLEXIBLE performed by Rodney Michaud MD at [address]  10/19/2018    flexible    TONSILLECTOMY AND ADENOIDECTOMY      TUBAL LIGATION      UPPER GASTROINTESTINAL ENDOSCOPY N/A 10/20/2021    EGD BIOPSY performed by Kelly Bautista MD at [address] ENDOSCOPY       Family History: All family history was reviewed today. Problem Relation Age of Onset    Heart Disease Mother     Heart Disease Father     Asthma Neg Hx     Cancer Neg Hx     Diabetes Neg Hx     Emphysema Neg Hx     Hypertension Neg Hx     Heart Failure Neg Hx        Objective:       PHYSICAL EXAM:      Vitals:   Vitals:    11/10/21 0500 11/10/21 0546 11/10/21 0700 11/10/21 1157   BP: 122/86 (!) 147/72 (!) 117/54 121/80   Pulse: 73 72 75 72   Resp: 20  20 20   Temp: 97.6 °F (36.4 °C)  97.7 °F (36.5 °C) 97.4 °F (36.3 °C)   TempSrc: Axillary  Axillary Axillary   SpO2: 90%  94% 97%   Weight:       Height:           Physical Exam  Vitals and nursing note reviewed. Constitutional:       General: She is not in acute distress. Appearance: She is well-developed. She is not diaphoretic. HENT:      Head: Normocephalic. Right Ear: External ear normal.      Left Ear: External ear normal.      Nose: Nose normal.   Eyes:      General: No scleral icterus. Right eye: No discharge. Left eye: No discharge. Conjunctiva/sclera: Conjunctivae normal.      Pupils: Pupils are equal, round, and reactive to light. Cardiovascular:      Rate and Rhythm: Normal rate and regular rhythm. Heart sounds: No murmur heard. No friction rub. Pulmonary:      Effort: Pulmonary effort is normal.      Breath sounds: No stridor. Rales (Bibasilar crackles noted) present. No wheezing. Comments: Patient coughing throughout the exam  Chest:      Chest wall: No tenderness. Abdominal:      Palpations: Abdomen is soft. There is no mass. Tenderness: There is no abdominal tenderness. There is no guarding or rebound. Musculoskeletal:         General: No tenderness. Cervical back: Normal range of motion and neck supple. Lymphadenopathy:      Cervical: No cervical adenopathy. Skin:     General: Skin is warm and dry. Findings: No erythema or rash. Neurological:      Mental Status: She is alert and oriented to person, place, and time. Motor: No abnormal muscle tone. Psychiatric:         Judgment: Judgment normal.           Intake and output:    I/O last 3 completed shifts: In: 217.7 [I.V.:167.7; IV Piggyback:50]  Out: 1100 [Urine:1100]    Lab Data:   All available labs and old records have been reviewed by me.     CBC:  Recent Labs     11/08/21  1245 11/10/21  0623   WBC 16.3* 11.9*   RBC 2.86* 3.02*   HGB 8.1* 8.6*   HCT 26.9* 27.6*    202   MCV 93.8 91.4   MCH 28.2 28.4   MCHC 30.1* 31.1   RDW 20.0* 20.5*   BANDSPCT 4  --         BMP:  Recent Labs     11/08/21  1245 11/10/21  0556    142   K 3.2* 3.1*    109   CO2 23 21   BUN 46* 34*   CREATININE 1.8* 1.3*   CALCIUM 8.5 8.5   GLUCOSE 119* 105*        Hepatic Function Panel:   Lab Results   Component Value Date    ALKPHOS 67 11/05/2021    ALT 14 11/05/2021    AST 20 11/05/2021    PROT 5.4 11/05/2021    PROT 7.6 06/03/2010    BILITOT 0.4 11/05/2021    LABALBU 2.0 11/10/2021       CPK: No results found for: CKTOTAL  ESR: No results found for: SEDRATE  CRP: No results found for: CRP        Imaging: All pertinent images and reports for the current visit were reviewed by me during this visit. CT CHEST WO CONTRAST   Final Result   Extensive bilateral pneumonia. Small pleural effusions have enlarged. XR CHEST PORTABLE   Final Result   Mild interval worsening of the bilateral airspace opacities, when compared to   the previous study performed 11/08/2021. Rule out worsening CHF. Pneumonia   cannot be entirely excluded. Decrease in the right pleural effusion. XR CHEST PORTABLE   Final Result   Worsening patchy bilateral airspace opacities. Consider infection. Stable   bilateral pleural effusions. XR CHEST PORTABLE   Final Result   Nasogastric tube is in normal position. Vascular congestion. Stable bilateral pleural effusions with associated atelectasis or airspace   disease. CT CHEST ABDOMEN PELVIS WO CONTRAST   Final Result   Small bilateral pleural effusions are seen with adjacent consolidation at the   lung bases, likely atelectasis in the absence of clinical signs of pneumonia. Septal thickening is seen, compatible with fluid overload. Mildly enlarged precarinal node, likely reactive due to the suspected fluid   overload. Recommend 3 month follow-up chest CT for further characterization. Scattered colonic diverticula are seen. No significant pericolonic fat   stranding. Body wall anasarca with trace free fluid in pelvis, compatible with mild   fluid overload         XR CHEST PORTABLE   Final Result   Interval resolution of pulmonary edema. Fluid in the minor fissure, and   suspected bilateral pleural effusions. Left basilar airspace disease likely   atelectasis             Medications: All current and past medications were reviewed.      furosemide  20 mg IntraVENous BID    potassium chloride  10 mEq IntraVENous Q1H    piperacillin-tazobactam  3,375 mg IntraVENous Q12H    [facility], [address] [place] [address]  Office: [phone]  Fax: [phone]  Clinic days:  Tuesday & Thursday

## 2021-11-10 NOTE — PROGRESS NOTES
Shift assessment completed. Routine vitals stable. Scheduled medications given. On cardizem gtt. Patient is awake, resting in bed. Christiansen in place. Respirations are easy and unlabored. Patient does not appear to be in distress. Call light within reach.

## 2021-11-10 NOTE — PROGRESS NOTES
Vitals obtained. Meds given per MAR. Head to toe assessment complete. Patient calm in bed with call light in reach. Will continue to monitor.

## 2021-11-11 ENCOUNTER — APPOINTMENT (OUTPATIENT)
Dept: GENERAL RADIOLOGY | Age: 86
DRG: 871 | End: 2021-11-11
Payer: COMMERCIAL

## 2021-11-11 LAB
ALBUMIN SERPL-MCNC: 2.8 G/DL (ref 3.4–5)
ANION GAP SERPL CALCULATED.3IONS-SCNC: 13 MMOL/L (ref 3–16)
BASOPHILS ABSOLUTE: 0 K/UL (ref 0–0.2)
BASOPHILS RELATIVE PERCENT: 0.1 %
BUN BLDV-MCNC: 27 MG/DL (ref 7–20)
CALCIUM SERPL-MCNC: 8.5 MG/DL (ref 8.3–10.6)
CHLORIDE BLD-SCNC: 106 MMOL/L (ref 99–110)
CO2: 31 MMOL/L (ref 21–32)
CREAT SERPL-MCNC: 1.1 MG/DL (ref 0.6–1.2)
EOSINOPHILS ABSOLUTE: 0 K/UL (ref 0–0.6)
EOSINOPHILS RELATIVE PERCENT: 0.1 %
GFR AFRICAN AMERICAN: 57
GFR NON-AFRICAN AMERICAN: 47
GLUCOSE BLD-MCNC: 117 MG/DL (ref 70–99)
HCT VFR BLD CALC: 27.6 % (ref 36–48)
HEMOGLOBIN: 8.5 G/DL (ref 12–16)
INR BLD: 2.09 (ref 0.88–1.12)
LYMPHOCYTES ABSOLUTE: 0.2 K/UL (ref 1–5.1)
LYMPHOCYTES RELATIVE PERCENT: 1.8 %
MAGNESIUM: 1.6 MG/DL (ref 1.8–2.4)
MCH RBC QN AUTO: 28.2 PG (ref 26–34)
MCHC RBC AUTO-ENTMCNC: 31 G/DL (ref 31–36)
MCV RBC AUTO: 91.1 FL (ref 80–100)
MONOCYTES ABSOLUTE: 0.2 K/UL (ref 0–1.3)
MONOCYTES RELATIVE PERCENT: 1.6 %
MRSA SCREEN RT-PCR: NORMAL
NEUTROPHILS ABSOLUTE: 11.5 K/UL (ref 1.7–7.7)
NEUTROPHILS RELATIVE PERCENT: 96.4 %
PDW BLD-RTO: 20.6 % (ref 12.4–15.4)
PHOSPHORUS: 2.7 MG/DL (ref 2.5–4.9)
PLATELET # BLD: 180 K/UL (ref 135–450)
PMV BLD AUTO: 7.5 FL (ref 5–10.5)
POTASSIUM SERPL-SCNC: 3 MMOL/L (ref 3.5–5.1)
PROTHROMBIN TIME: 24.4 SEC (ref 9.9–12.7)
RBC # BLD: 3.03 M/UL (ref 4–5.2)
SODIUM BLD-SCNC: 150 MMOL/L (ref 136–145)
WBC # BLD: 11.9 K/UL (ref 4–11)

## 2021-11-11 PROCEDURE — 99233 SBSQ HOSP IP/OBS HIGH 50: CPT | Performed by: INTERNAL MEDICINE

## 2021-11-11 PROCEDURE — 36569 INSJ PICC 5 YR+ W/O IMAGING: CPT

## 2021-11-11 PROCEDURE — 85025 COMPLETE CBC W/AUTO DIFF WBC: CPT

## 2021-11-11 PROCEDURE — 80069 RENAL FUNCTION PANEL: CPT

## 2021-11-11 PROCEDURE — 85610 PROTHROMBIN TIME: CPT

## 2021-11-11 PROCEDURE — 83735 ASSAY OF MAGNESIUM: CPT

## 2021-11-11 PROCEDURE — 94761 N-INVAS EAR/PLS OXIMETRY MLT: CPT

## 2021-11-11 PROCEDURE — C1751 CATH, INF, PER/CENT/MIDLINE: HCPCS

## 2021-11-11 PROCEDURE — 6370000000 HC RX 637 (ALT 250 FOR IP): Performed by: EMERGENCY MEDICINE

## 2021-11-11 PROCEDURE — 71045 X-RAY EXAM CHEST 1 VIEW: CPT

## 2021-11-11 PROCEDURE — 2580000003 HC RX 258

## 2021-11-11 PROCEDURE — 6370000000 HC RX 637 (ALT 250 FOR IP): Performed by: INTERNAL MEDICINE

## 2021-11-11 PROCEDURE — 2700000000 HC OXYGEN THERAPY PER DAY

## 2021-11-11 PROCEDURE — 1200000000 HC SEMI PRIVATE

## 2021-11-11 PROCEDURE — 6360000002 HC RX W HCPCS: Performed by: INTERNAL MEDICINE

## 2021-11-11 PROCEDURE — 6370000000 HC RX 637 (ALT 250 FOR IP): Performed by: HOSPITALIST

## 2021-11-11 PROCEDURE — 92526 ORAL FUNCTION THERAPY: CPT

## 2021-11-11 PROCEDURE — 36415 COLL VENOUS BLD VENIPUNCTURE: CPT

## 2021-11-11 PROCEDURE — 2580000003 HC RX 258: Performed by: INTERNAL MEDICINE

## 2021-11-11 PROCEDURE — 05HA33Z INSERTION OF INFUSION DEVICE INTO LEFT BRACHIAL VEIN, PERCUTANEOUS APPROACH: ICD-10-PCS | Performed by: INTERNAL MEDICINE

## 2021-11-11 RX ORDER — SODIUM CHLORIDE 9 MG/ML
INJECTION, SOLUTION INTRAVENOUS
Status: COMPLETED
Start: 2021-11-11 | End: 2021-11-11

## 2021-11-11 RX ORDER — POTASSIUM CHLORIDE 7.45 MG/ML
10 INJECTION INTRAVENOUS PRN
Status: DISCONTINUED | OUTPATIENT
Start: 2021-11-11 | End: 2021-11-11

## 2021-11-11 RX ORDER — ONDANSETRON 2 MG/ML
4 INJECTION INTRAMUSCULAR; INTRAVENOUS EVERY 6 HOURS PRN
Status: DISCONTINUED | OUTPATIENT
Start: 2021-11-11 | End: 2021-11-15 | Stop reason: HOSPADM

## 2021-11-11 RX ORDER — POTASSIUM CHLORIDE 7.45 MG/ML
10 INJECTION INTRAVENOUS PRN
Status: DISCONTINUED | OUTPATIENT
Start: 2021-11-11 | End: 2021-11-15 | Stop reason: HOSPADM

## 2021-11-11 RX ORDER — POTASSIUM CHLORIDE 20 MEQ/1
40 TABLET, EXTENDED RELEASE ORAL PRN
Status: DISCONTINUED | OUTPATIENT
Start: 2021-11-11 | End: 2021-11-11

## 2021-11-11 RX ORDER — MAGNESIUM SULFATE 1 G/100ML
1000 INJECTION INTRAVENOUS ONCE
Status: COMPLETED | OUTPATIENT
Start: 2021-11-11 | End: 2021-11-11

## 2021-11-11 RX ORDER — MAGNESIUM SULFATE 1 G/100ML
1000 INJECTION INTRAVENOUS PRN
Status: DISCONTINUED | OUTPATIENT
Start: 2021-11-11 | End: 2021-11-15 | Stop reason: HOSPADM

## 2021-11-11 RX ADMIN — MAGNESIUM SULFATE HEPTAHYDRATE 1000 MG: 1 INJECTION, SOLUTION INTRAVENOUS at 12:03

## 2021-11-11 RX ADMIN — POTASSIUM CHLORIDE 10 MEQ: 7.46 INJECTION, SOLUTION INTRAVENOUS at 20:52

## 2021-11-11 RX ADMIN — SODIUM CHLORIDE 25 ML: 9 INJECTION, SOLUTION INTRAVENOUS at 05:27

## 2021-11-11 RX ADMIN — Medication 10 ML: at 20:45

## 2021-11-11 RX ADMIN — Medication 2000 UNITS: at 08:47

## 2021-11-11 RX ADMIN — SODIUM CHLORIDE 25 ML: 9 INJECTION, SOLUTION INTRAVENOUS at 12:02

## 2021-11-11 RX ADMIN — POTASSIUM CHLORIDE 10 MEQ: 7.46 INJECTION, SOLUTION INTRAVENOUS at 14:53

## 2021-11-11 RX ADMIN — PIPERACILLIN AND TAZOBACTAM 3375 MG: 3; .375 INJECTION, POWDER, LYOPHILIZED, FOR SOLUTION INTRAVENOUS at 16:39

## 2021-11-11 RX ADMIN — SODIUM CHLORIDE 100 ML: 9 INJECTION, SOLUTION INTRAVENOUS at 16:39

## 2021-11-11 RX ADMIN — POTASSIUM CHLORIDE 10 MEQ: 7.46 INJECTION, SOLUTION INTRAVENOUS at 18:19

## 2021-11-11 RX ADMIN — METOPROLOL SUCCINATE 50 MG: 50 TABLET, EXTENDED RELEASE ORAL at 14:49

## 2021-11-11 RX ADMIN — METOPROLOL SUCCINATE 50 MG: 50 TABLET, EXTENDED RELEASE ORAL at 08:47

## 2021-11-11 RX ADMIN — POTASSIUM CHLORIDE 10 MEQ: 7.46 INJECTION, SOLUTION INTRAVENOUS at 16:20

## 2021-11-11 RX ADMIN — DILTIAZEM HYDROCHLORIDE 30 MG: 30 TABLET, FILM COATED ORAL at 12:03

## 2021-11-11 RX ADMIN — QUETIAPINE FUMARATE 50 MG: 25 TABLET ORAL at 20:45

## 2021-11-11 RX ADMIN — PIPERACILLIN AND TAZOBACTAM 3375 MG: 3; .375 INJECTION, POWDER, LYOPHILIZED, FOR SOLUTION INTRAVENOUS at 05:28

## 2021-11-11 RX ADMIN — Medication 10 ML: at 08:47

## 2021-11-11 RX ADMIN — DILTIAZEM HYDROCHLORIDE 30 MG: 30 TABLET, FILM COATED ORAL at 18:20

## 2021-11-11 RX ADMIN — METOPROLOL SUCCINATE 50 MG: 50 TABLET, EXTENDED RELEASE ORAL at 20:45

## 2021-11-11 RX ADMIN — POTASSIUM CHLORIDE 10 MEQ: 7.46 INJECTION, SOLUTION INTRAVENOUS at 19:47

## 2021-11-11 RX ADMIN — POTASSIUM CHLORIDE 10 MEQ: 7.46 INJECTION, SOLUTION INTRAVENOUS at 22:22

## 2021-11-11 RX ADMIN — DILTIAZEM HYDROCHLORIDE 30 MG: 30 TABLET, FILM COATED ORAL at 05:39

## 2021-11-11 RX ADMIN — APIXABAN 2.5 MG: 2.5 TABLET, FILM COATED ORAL at 08:47

## 2021-11-11 RX ADMIN — FUROSEMIDE 20 MG: 10 INJECTION, SOLUTION INTRAMUSCULAR; INTRAVENOUS at 08:47

## 2021-11-11 RX ADMIN — APIXABAN 2.5 MG: 2.5 TABLET, FILM COATED ORAL at 20:45

## 2021-11-11 ASSESSMENT — PAIN SCALES - GENERAL
PAINLEVEL_OUTOF10: 0

## 2021-11-11 ASSESSMENT — ENCOUNTER SYMPTOMS
RHINORRHEA: 0
BLOOD IN STOOL: 0
CHOKING: 0
EYE DISCHARGE: 0
APNEA: 0
ABDOMINAL PAIN: 0
FACIAL SWELLING: 0
STRIDOR: 0
NAUSEA: 0
SHORTNESS OF BREATH: 1
COUGH: 1
CHEST TIGHTNESS: 0
TROUBLE SWALLOWING: 0
EYE REDNESS: 0
DIARRHEA: 0
COLOR CHANGE: 0
PHOTOPHOBIA: 0

## 2021-11-11 NOTE — RT PROTOCOL NOTE
RT Inhaler-Nebulizer Bronchodilator Protocol Note    There is a bronchodilator order in the chart from a provider indicating to follow the RT Bronchodilator Protocol and there is an Initiate RT Inhaler-Nebulizer Bronchodilator Protocol order as well (see protocol at bottom of note). CXR Findings:  XR CHEST PORTABLE    Result Date: 11/11/2021  Extensive bilateral airspace opacities persist suggesting multifocal pneumonia       The findings from the last RT Protocol Assessment were as follows:   History Pulmonary Disease: None or smoker <15 pack years  Respiratory Pattern: Regular pattern and RR 12-20 bpm  Breath Sounds: Clear breath sounds  Cough: Strong, spontaneous, non-productive  Indication for Bronchodilator Therapy:    Bronchodilator Assessment Score: 0    Aerosolized bronchodilator medication orders have been revised according to the RT Inhaler-Nebulizer Bronchodilator Protocol below. Respiratory Therapist to perform RT Therapy Protocol Assessment initially then follow the protocol. Repeat RT Therapy Protocol Assessment PRN for score 0-3 or on second treatment, BID, and PRN for scores above 3. No Indications  adjust the frequency to every 6 hours PRN wheezing or bronchospasm, if no treatments needed after 48 hours then discontinue using Per Protocol order mode. If indication present, adjust the RT bronchodilator orders based on the Bronchodilator Assessment Score as indicated below. Use Inhaler orders unless patient has one or more of the following: on home nebulizer, not able to hold breath for 10 seconds, is not alert and oriented, cannot activate and use MDI correctly, or respiratory rate 25 breaths per minute or more, then use the equivalent nebulizer order(s) with same Frequency and PRN reasons based on the score. If a patient is on this medication at home then do not decrease Frequency below that used at home.     0-3  enter or revise RT bronchodilator order(s) to equivalent RT Bronchodilator order with Frequency of every 4 hours PRN for wheezing or increased work of breathing using Per Protocol order mode. 4-6  enter or revise RT Bronchodilator order(s) to two equivalent RT bronchodilator orders with one order with BID Frequency and one order with Frequency of every 4 hours PRN wheezing or increased work of breathing using Per Protocol order mode. 7-10  enter or revise RT Bronchodilator order(s) to two equivalent RT bronchodilator orders with one order with TID Frequency and one order with Frequency of every 4 hours PRN wheezing or increased work of breathing using Per Protocol order mode. 11-13  enter or revise RT Bronchodilator order(s) to one equivalent RT bronchodilator order with QID Frequency and an Albuterol order with Frequency of every 4 hours PRN wheezing or increased work of breathing using Per Protocol order mode. Greater than 13  enter or revise RT Bronchodilator order(s) to one equivalent RT bronchodilator order with every 4 hours Frequency and an Albuterol order with Frequency of every 2 hours PRN wheezing or increased work of breathing using Per Protocol order mode. RT to enter RT Home Evaluation for COPD & MDI Assessment order using Per Protocol order mode.     Electronically signed by Tacos Zacarias RCP on 11/11/2021 at 3:53 PM

## 2021-11-11 NOTE — CARE COORDINATION
CM received message from [employer] stating they could not access pt in [facility]. CM called admissions [phone] and left information on confidential and secure line. EZRA called Vielka Landis at [facility] [phone] to see if she has reviewed pt for acceptance.     Jason Smith RN, BSN  [phone]

## 2021-11-11 NOTE — PROGRESS NOTES
Pt found to have pull Midline out 4cm from previous 2cm. Documentation in flowsheet. Call made to [place], PICC RN. Ok to redressed if ok to flush, still no blood return, this is not new. See flowsheet for additional info.

## 2021-11-11 NOTE — PROGRESS NOTES
Shift assessment completed. Routine vitals stable. SpO2 90% on 6L HFNC. Scheduled medications given. Patient is awake, resting in bed. Respirations are easy and unlabored. Patient does not appear to be in distress. Call light within reach.

## 2021-11-11 NOTE — PROGRESS NOTES
Pt no longer nauseated, IV potassium half way replaced will pass on to night shift to do other 3 bags per protocol.

## 2021-11-11 NOTE — PROGRESS NOTES
PALLIATIVE MEDICINE PROGRESS NOTE     Patient name:Venessa Kaur    PAY:[id number] :1935  Room/Bed:N-5581/5581-01    LOS: 10 days        ASSESSMENT/RECOMMENDATIONS     82 y.o. female with Hypoxia and AMS        Symptom Management:  Hypoxia- pt on 5L oxygen today improving   AMS- pt oriented to self only pleasant she has had multiple admissions in last couple months that have effected her mentation  Goals of Care- Talked to spouse Tyrell Forbes who asked me to talk to daughter Ariana Ramesh who is a Hospice RN, plan for short stay in rehab then if pt needs long term care will move pt to [place] to be near her children. We discussed code status and daughter feel like [facility] aligns with pts wishes but wants to talk it over with spouse and siblings.      Patient/Family Goals of Care :    Educated patient and family on CODE STATUS: Although in some cases it does save lives, CPR (cardiopulmonary resuscitation) frequently is not successful or does not benefit those who receive it, especially elderly people or those with serious medical conditions. Even if revived, the person can be left with painful injuries, or in a debilitated state, or with brain damage resulting from oxygen deprivation. Resuscitation can involve such things as drugs, forcefully pressing on the chest, giving electric shocks to restart the heart or placing a tube down the nose or throat to provide artificial breathing. People with terminal illnesses or other serious health conditions may prefer not to be resuscitated.   [address] [facility] has established two standardized DNR orders. DNR Comfort Care-Arrest Order you will receive all the appropriate medical treatment, including resuscitation, until the patient has a cardiac arrest (heart has stopped beating) or pulmonary arrest (breathing has stopped), at which point comfort care will be provided.    DNR Comfort Care Order [facility]), a patient chooses other measures such as drugs to correct abnormal heart rhythms. With this order, comfort care or other requested treatment is provided at a point before the heart or breathing stops. Comfort care involves keeping the patient comfortable with pain medication and providing palliative (supportive medical) care. A DNR-CC does not mean do not treat.       Talked to spouse Cori Crawford who asked me to talk to daughter Josephine Lynne who is a Hospice RN, plan for short stay in rehab then if pt needs long term care will move pt to [place] to be near her children. We discussed code status and daughter feel like [facility] aligns with pts wishes but wants to talk it over with spouse and siblings. 11/11  Talked to spouse Cori Crawford he is agreeable to [facility]. Family working toward finding and SNF. Pt is stable currently     Disposition/Discharge Plan:   pending     Advance Directives:  Surrogate Decision Maker: Giuseppe-spouse  Code status:  Full Code     Case discussed with: patient, floor RN, Josephine Lynne- daughter  Thank you for allowing us to participate in the care of this patient. SUBJECTIVE     Chief Complaint: AMS    Last 24 hours:   Pt continues to be confused today and disoriented. ROS:  Review of Systems -   History obtained from chart review and unobtainable from patient due to mental status      Patient unable to complete full ROS due to current cognitive status. Information that is obtained from nursing and chart. OBJECTIVE   /72   Pulse 75   Temp 98.3 °F (36.8 °C) (Oral)   Resp 16   Ht 5' 5\" (1.651 m)   Wt 171 lb 8 oz (77.8 kg)   SpO2 91%   BMI 28.54 kg/m²   I/O last 3 completed shifts: In: 161.5 [P.O.:120; IV Piggyback:41.5]  Out: 2475 [Urine:2475]  No intake/output data recorded.       Physical Examination:   General appearance - chronically ill appearing  Mental status - normal mood, behavior, speech, dress, motor activity, and thought processes, disoriented to place and time  Neck - supple, no significant adenopathy  Chest - no tachypnea, retractions or cyanosis  Abdomen - soft, nontender, nondistended, no masses or organomegaly  Musculoskeletal - no joint tenderness, deformity or swelling  Skin - normal coloration and turgor, no rashes, no suspicious skin lesions noted         Total time: 30 minutes  >50% of time spent counseling patient at bedside or POA/family member if applicable , reviewing information and discussing care, coordinating with care team       Signed By: Electronically signed by NAKUL Bravo CNP on 11/11/2021 at 10:56 AM   Palliative Medicine   [phone]    November 11, 2021

## 2021-11-11 NOTE — PROGRESS NOTES
Speech Language Pathology  Dysphagia Treatment Note    Name:  Georgina Olvera  :   1935  Medical Diagnosis:  Septicemia ([facility] [address] [A41.9]  GI bleed [K92.2]  Acute GI bleeding [K92.2]  JULIO (acute kidney injury) ([facility] [address] [N17.9]  Blood loss anemia [D50.0]  Atrial fibrillation with RVR ([facility] [address] [I48.91]  Supratherapeutic INR [R79.1]  Acute cystitis without hematuria [N30.00]  Treatment Diagnosis: Oropharyngeal Dysphagia  Pain level: no reports of pain     Diet level prior to treatment: Dyspahgia I  Pureed diet with mildly thick liquids (nectar)  Tolerance of Current Diet Level:  Appetite has been poor    Assessment of Texture Tolerance:  -Impressions: Patient was alert but lethargic. She had not eaten breakfast.  She was oriented to self only. Attempted to place patient in a seated position with raising HOB. Patient complained of difficulty breathing when in an upright position. Facilitated deep breathing. Provided multiple verbal cues throughout the session to maintain alertness and orientation. She was able to follow simple one step commands with moderate verbal cues. Educated patient on swallowing strategies. Mouth was very dry. She tolerated a few sips of nectar thick liquids. Patient was able to hold cup and feed self small sips of liquids with minimal verbal cues. No overt s/s of aspiration. Audible swallow . Patient refused pureed foods and requested to [facility] to be reclined        Diet and Treatment Recommendations 2021:  Continue with pureed diet and nectar thick liquids      Compensatory strategies: Upright as possible with all PO intake , No straws , Assist Feed , Small bites/sips , Swallow 2 times per bite , Eat/feed slowly, Remain upright 30-45 min     Dysphagia Goals:   Short Term Speech/Language/Cognitive Goals:   1. Pt will improve auditory processing/comprehension of commands and questions to 80%, via graded tasks. (ongoing 2021)   2.  Pt will improve orientation and short term recall via graded tasks to 80%. (ongoing 11/11/2021)  3. Pt will improve verbal expression for functional expression via graded tasks to 80%. (ongoing 11/11/2021)  4. Pt will participate in ongoing cognitive assessment with goals to be established as indicated. (ongoing 11/11/2021      Plan:  Continued daily Dysphagia treatment with goals per plan of care. Patient/Family Education:Education given to the Pt and nurse, who verbalized understanding    Discharge Recommendations:  Pt will benefit from continued skilled Speech Therapy for Dysphagia services, prior to returning home. Timed Code Treatment:     Total Treatment Time: 32 minutes dysphagia tx    If patient discharges prior to next session this note will serve as a discharge summary.        Signature:  Jesus June M.S., CCC-SLP #[id number] 11/11/2021 11:00 AM  Speech-Language Pathologist

## 2021-11-11 NOTE — CONSULTS
TRIMMABLE POWER MIDLINE PROCEDURE NOTE  Chart reviewed for allergies, diagnosis, labs, known contraindications, reason for line placement and planned length of treatment. Insertion procedure discussed with patient/family member. Informed consent not required for Midline placement. Three patient identifiers  Patient name,   and MRN -  completed &  confirmed verbally. Hat, mask and eye shield donned. Midline site scrubbed with Chloraprep  One-Step applicator  for 30 seconds x 1. Hand Hygiene  performed with 3% Chlorhexidine surgical scrub x1 min prior to  Sterile gloves, sterile gown being donned. Patient draped using maximal sterile barrier technique ( head to toe ). Midline site scrubbed a 2nd time with Chloraprep One-Step applicator x 30 sec. Vein located by Ultra Sound and site marked with sterile pen. 1% Lidocaine 5 ml injected intradermal pre-insertion. Midline inserted. Positive brisk blood return obtained Midline flushed with 10 mls  0.9% Sterile Sodium Chloride. Midline flushes easily with no resistance. Valve placed on all lumens followed by Alcohol Swab Caps on end of each. Skin prep applied to site. Bio-patch in place. Catheter secured with non-sutured locking device per hospital protocol. Sterile Tegaderm applied over Midline site. Sterile field maintained during procedure. Positioning wire accounted for post procedure. Pt. Response to procedure, tolerated well. Appearance of site: Clean dry and intact without bleeding or edema. All edges of Tegaderm occlusive. Site marked with date and initials of RN placing line. Top 2 side rails in up position, call button in reach, RN notified of all of the above. A Trimmable Power Midline   14 CM placed in the L UA  Brachial vein. 0 cm showing from insertion site.

## 2021-11-11 NOTE — PROGRESS NOTES
Infectious Diseases   Progress Note      Admission Date: 11/1/2021  Hospital Day: Hospital Day: 11   Attending: Karlee Miller MD  Date of service: 11/11/2021     Chief complaint/ Reason for consult:     · Severe sepsis on admission with leukocytosis, hypotension, tachycardia, acute kidney injury  · Gram-negative bacteremia with E. Coli  · High anion gap metabolic acidosis  · Elevated proBNP  · Mild hyperkalemia  · Hyponatremia  · Normocytic normochromic anemia  · Bilateral pleural effusions    Microbiology:        I have reviewed allavailable micro lab data and cultures    · Blood culture (2/2) - collected on 11/1/2021: E coli    Susceptibility     Escherichia coli     BACTERIAL SUSCEPTIBILITY PANEL BY ERIK     ampicillin >=32 mcg/mL Resistant     ceFAZolin <=4 mcg/mL Sensitive     cefepime <=0.12 mcg/mL Sensitive     cefTRIAXone <=0.25 mcg/mL Sensitive     ciprofloxacin <=0.25 mcg/mL Sensitive     ertapenem <=0.12 mcg/mL Sensitive     gentamicin <=1 mcg/mL Sensitive     levofloxacin 1 mcg/mL Sensitive     piperacillin-tazobactam <=4 mcg/mL Sensitive     trimethoprim-sulfamethoxazole >=320 mcg/mL Resistant        · Urine culture  - collected on 11/1/2021: > 100,000 cfu/ml of E coli    Susceptibility    Escherichia coli (1)    Antibiotic Interpretation ERIK Status    ampicillin Resistant >=32 mcg/mL     ceFAZolin Sensitive <=4 mcg/mL      NOTE: Cefazolin should only be used for uncomplicated UTI         for E.coli or Klebsiella pneumoniae.    cefepime Sensitive <=0.12 mcg/mL     cefTRIAXone Sensitive <=0.25 mcg/mL     ciprofloxacin Sensitive <=0.25 mcg/mL     ertapenem Sensitive <=0.12 mcg/mL     gentamicin Sensitive <=1 mcg/mL     levofloxacin Sensitive 1 mcg/mL     nitrofurantoin Sensitive <=16 mcg/mL     piperacillin-tazobactam Sensitive <=4 mcg/mL     trimethoprim-sulfamethoxazole Resistant >=320 mcg/mL         Antibiotics and immunizations:       Current antibiotics: All antibiotics and their doses were reviewed by me    Recent Abx Admin                   piperacillin-tazobactam (ZOSYN) 3,375 mg in dextrose 5 % 50 mL IVPB extended infusion (mini-bag) (mg) 3,375 mg New Bag 11/11/21 0528     3,375 mg New Bag 11/10/21 1802                  Immunization History: All immunization history was reviewed by me today. Immunization History   Administered Date(s) Administered    Influenza, High Dose (Fluzone 65 yrs and older) 10/27/2018    Influenza, Quadv, IM, PF (6 mo and older Fluzone, Flulaval, Fluarix, and 3 yrs and older Afluria) 10/20/2021       Known drug allergies: All allergies were reviewed and updated    Allergies   Allergen Reactions    Percocet [Oxycodone-Acetaminophen] Other (See Comments)     Pt states it makes her crazy. Social history:     Social History:  All social andepidemiologic history was reviewed and updated by me today as needed. · Tobacco use:   reports that she has never smoked. She has never used smokeless tobacco.  · Alcohol use:   reports current alcohol use of about 2.0 standard drinks of alcohol per week. · Currently lives in: [address] [place] [address]  ·  reports no history of drug use. COVID VACCINATION AND LAB RESULT RECORDS:     Internal Administration   First Dose      Second Dose           Last COVID Lab SARS-CoV-2, PCR (no units)   Date Value   11/09/2021 Not Detected     SARS-CoV-2, NAAT (no units)   Date Value   11/03/2021 Not Detected            Assessment:     The patient is a 82 y.o. old female who  has a past medical history of Atrial fibrillation ([facility] [address] CAD (coronary artery disease), Depression, Hyperlipidemia, Hypertension, Hypothyroid, Legal blindness, and Obstructive sleep apnea (adult) (pediatric).  with following problems:    · Severe sepsis on admission with leukocytosis, hypotension, tachycardia, acute kidney injury-white cell count is stable  · Acute respiratory failure with hypoxia-aspiration pneumonia-covered with Zosyn  · Gram-negative bacteremia with E. Coli-this is covered with Zosyn as well  · Bilateral atypical pneumonia, rule out COVID-19  · High anion gap metabolic acidosis-resolved  · Elevated proBNP  · Acute kidney injury-serum creatinine is 1.1 today  · Hypokalemia-serum potassium is 3.0  · Hyponatremia-slightly hypernatremic today  · Normocytic normochromic anemia-ongoing  · Bilateral pleural effusions  · Colon Diverticulosis  · Generalized anasarca  · Coronary artery disease-stable  · Essential hypertension- blood pressure okay  · Hyperlipidemia  · Obstructive sleep apnea  · Overweight due to excess calorie intake : Body mass index is 27.33 kg/m².-Counseling done      Discussion:      She is afebrile. She is on IV Zosyn. She is tolerating antibiotics okay. White cell count is stable at 11,900. Nasal MRSA screen from 11/9/2021 was negative. Blood cultures from admission have been negative so far. Serum creatinine is 1.1. She had a 2D echo done yesterday. It showed ejection fraction of 55 to 60% with moderate to severe mitral regurgitation. No vegetations noted in the echo report. She is now hypernatremic with serum sodium of 150      Plan:     Diagnostic Workup:    · Follow-up with chest x-ray  · Continue to follow  fever curve, WBC count and blood cultures. · Hypernatremia and hypokalemia management per primary  · Continue to monitor blood counts, liver and renal function. Antimicrobials:    · Will continue IV Zosyn 3.375 g every 8 hours  · Continue to monitor her vitals closely  · Cough and deep breathing exercises. · Continue high flow oxygen support to maintain oxygen saturation above 92%  · Continue oral probiotics twice daily  · Slow improvement is expected  · We will follow up on the culture results and clinical progress and will make further recommendations accordingly. · Continue close vitals monitoring. · Maintain good glycemic control. · Fall precautions. · Aspiration precautions.   · Continue to watch for new fever or diarrhea. · DVT prophylaxis. · Discussed all above with patient and RN. Drug Monitoring:    · Continue monitoring for antibiotic toxicity as follows: CBC, CMP   · Continue to watch for following: new or worsening fever, new hypotension, hives, lip swelling and redness or purulence at vascular access sites. I/v access Management:    · Continue to monitor i.v access sites for erythema, induration, discharge or tenderness. · As always, continue efforts to minimize tubes/lines/drains as clinically appropriate to reduce chances of line associated infections. Patient education and counseling:        · The patient was educated in detail about the side-effects of various antibiotics and things to watch for like new rashes, lip swelling, severe reaction, worsening diarrhea, break through fever etc.  · Discussed patient's condition and what to expect. All of the patient's questions were addressed in a satisfactory manner and patient verbalized understanding all instructions. Level of complexity of visit: High     Risk of Complications/Morbidity: High     · Illness(es)/ Infection present that pose threat to life/bodily function. · There is potential for severe exacerbation of infection/side effects of treatment. · Therapy requires intensive monitoring for antimicrobial agent toxicity. TIME SPENT TODAY:     - Spent over  37 minutes on visit (including interval history, physical exam, review of data including labs, cultures, imaging, development and implementation of treatment plan and coordination of complex care). More than 50 percent of this includes face-to-face time spent with the patient for counseling and coordination of care. Thank you for involving me in the care of your patient. I will continue to follow. If you have anyadditional questions, please do not hesitate to contact me. Subjective: Interval history: Interval history was obtained from chart review and RN.   She is on 5 L oxygen via nasal cannula. She is tolerating antibiotics okay. No diarrhea. REVIEW OF SYSTEMS:      Review of Systems   Constitutional: Positive for fatigue. Negative for chills, diaphoresis and unexpected weight change. HENT: Negative for congestion, ear discharge, ear pain, facial swelling, hearing loss, rhinorrhea and trouble swallowing. Eyes: Negative for photophobia, discharge, redness and visual disturbance. Respiratory: Positive for cough and shortness of breath. Negative for apnea, choking, chest tightness and stridor. Cardiovascular: Negative for chest pain and palpitations. Gastrointestinal: Negative for abdominal pain, blood in stool, diarrhea and nausea. Endocrine: Negative for polydipsia, polyphagia and polyuria. Genitourinary: Negative for difficulty urinating, dysuria, frequency, hematuria, menstrual problem and vaginal discharge. Musculoskeletal: Negative for arthralgias, joint swelling, myalgias and neck stiffness. Skin: Negative for color change and rash. Allergic/Immunologic: Negative for immunocompromised state. Neurological: Negative for dizziness, seizures, speech difficulty, light-headedness and headaches. Hematological: Negative for adenopathy. Psychiatric/Behavioral: Negative for agitation, hallucinations and suicidal ideas. Past Medical History: All past medical history reviewed today. Past Medical History:   Diagnosis Date    Atrial fibrillation ([facility] [address]     CAD (coronary artery disease)     Depression     Hyperlipidemia     Hypertension     Hypothyroid     Legal blindness     Obstructive sleep apnea (adult) (pediatric)        Past Surgical History: All past surgical history was reviewed today.     Past Surgical History:   Procedure Laterality Date    ANGIOPLASTY      COLONOSCOPY  11/16/2018    COLONOSCOPY POLYPECTOMY SNARE/COLD BIOPSY performed by Katy Thorne MD at [facility] N/A 10/20/2021 COLONOSCOPY POLYPECTOMY ABLATION performed by Francia Rosales MD at [facility] COLONOSCOPY N/A 11/4/2021    COLONOSCOPY POLYPECTOMY ABLATION performed by oJjo Escalera MD at [address]     CATARACT EXTRACTION W/ IOL    EYE SURGERY Left     CATARCT EXTRACTION W/ IOL    RI SIGMOIDOSCOPY FLX DX W/COLLJ SPEC BR/WA IF PFRMD N/A 10/19/2018    SIGMOIDOSCOPY DIAGNOSTIC FLEXIBLE performed by Jalen Vaughan MD at [address]  10/19/2018    flexible    TONSILLECTOMY AND ADENOIDECTOMY      TUBAL LIGATION      UPPER GASTROINTESTINAL ENDOSCOPY N/A 10/20/2021    EGD BIOPSY performed by Francia Rosales MD at [address] ENDOSCOPY       Family History: All family history was reviewed today. Problem Relation Age of Onset    Heart Disease Mother     Heart Disease Father     Asthma Neg Hx     Cancer Neg Hx     Diabetes Neg Hx     Emphysema Neg Hx     Hypertension Neg Hx     Heart Failure Neg Hx        Objective:       PHYSICAL EXAM:      Vitals:   Vitals:    11/11/21 0741 11/11/21 1200 11/11/21 1220 11/11/21 1319   BP:   112/77    Pulse:  76 80    Resp:   16    Temp:   97.6 °F (36.4 °C)    TempSrc:   Axillary    SpO2:   92%    Weight: 171 lb 8 oz (77.8 kg)      Height:    5' 5\" (1.651 m)       Physical Exam  Vitals and nursing note reviewed. Constitutional:       General: She is not in acute distress. Appearance: She is well-developed. She is not diaphoretic. HENT:      Head: Normocephalic. Right Ear: External ear normal.      Left Ear: External ear normal.      Nose: Nose normal.   Eyes:      General: No scleral icterus. Right eye: No discharge. Left eye: No discharge. Conjunctiva/sclera: Conjunctivae normal.      Pupils: Pupils are equal, round, and reactive to light. Cardiovascular:      Rate and Rhythm: Normal rate and regular rhythm. Heart sounds: No murmur heard.   No friction rub.   Pulmonary:      Effort: Pulmonary effort is normal.      Breath sounds: No stridor. Rales (Bilateral patchy) present. No wheezing. Chest:      Chest wall: No tenderness. Abdominal:      Palpations: Abdomen is soft. There is no mass. Tenderness: There is no abdominal tenderness. There is no guarding or rebound. Musculoskeletal:         General: No tenderness. Cervical back: Normal range of motion and neck supple. Lymphadenopathy:      Cervical: No cervical adenopathy. Skin:     General: Skin is warm and dry. Findings: No erythema or rash. Neurological:      Mental Status: She is alert and oriented to person, place, and time. Motor: No abnormal muscle tone. Psychiatric:         Judgment: Judgment normal.             Intake and output:    I/O last 3 completed shifts: In: 161.5 [P.O.:120; IV Piggyback:41.5]  Out: 2475 [Urine:2475]    Lab Data:   All available labs and old records have been reviewed by me. CBC:  Recent Labs     11/10/21  0623 11/11/21  0623   WBC 11.9* 11.9*   RBC 3.02* 3.03*   HGB 8.6* 8.5*   HCT 27.6* 27.6*    180   MCV 91.4 91.1   MCH 28.4 28.2   MCHC 31.1 31.0   RDW 20.5* 20.6*        BMP:  Recent Labs     11/10/21  0556 11/11/21  0623    150*   K 3.1* 3.0*    106   CO2 21 31   BUN 34* 27*   CREATININE 1.3* 1.1   CALCIUM 8.5 8.5   GLUCOSE 105* 117*        Hepatic Function Panel:   Lab Results   Component Value Date    ALKPHOS 67 11/05/2021    ALT 14 11/05/2021    AST 20 11/05/2021    PROT 5.4 11/05/2021    PROT 7.6 06/03/2010    BILITOT 0.4 11/05/2021    LABALBU 2.8 11/11/2021       CPK: No results found for: CKTOTAL  ESR: No results found for: SEDRATE  CRP: No results found for: CRP        Imaging: All pertinent images and reports for the current visit were reviewed by me during this visit. CT CHEST WO CONTRAST   Final Result   Extensive bilateral pneumonia. Small pleural effusions have enlarged.          XR CHEST PORTABLE   Final  sodium chloride 25 mL (11/11/21 1202)       magnesium sulfate, potassium chloride, ipratropium-albuterol, sodium chloride flush, sodium chloride      Problem list:       Patient Active Problem List   Diagnosis Code    CAD (coronary artery disease) I25.10    Essential hypertension I10    ROBYN (obstructive sleep apnea) G47.33    Hypothyroid E03.9    Hyperlipidemia E78.5    Depression F32. A    GI bleeding K92.2    Acute blood loss anemia D62    Acute GI bleeding K92.2    Atrial fibrillation with RVR (HCC) I48.91    Severe sepsis (HCC) A41.9, R65.20    E coli bacteremia R78.81, B96.20    Supratherapeutic INR R79.1    Hyperkalemia E87.5    Normocytic normochromic anemia D64.9    Hyponatremia E87.1    Bilateral pleural effusion J90    Diverticulosis K57.90    Anasarca R60.1    Overweight (BMI 25.0-29. 9) E66.3    High anion gap metabolic acidosis A88.5    Elevated brain natriuretic peptide (BNP) level R79.89    JULIO (acute kidney injury) ([facility] [address] N17.9    Hypokalemia E87.6    Hypernatremia E87.0       Please note that this chart was generated using Dragon dictation software. Although every effort was made to ensure the accuracy of this automated transcription, some errors in transcription may have occurred inadvertently. If you may need any clarification, please do not hesitate to contact me through EPIC or at the phone number provided below with my electronic signature. Any pictures or media included in this note were obtained after taking informed verbal consent from the patient and with their approval to include those in the patient's medical record.     Kenyon Butcher MD, MPH  11/11/2021 , 2:40 PM   [facility] Infectious Disease   [address] [place] [address], [address] [place] [address]  Office: [phone]  Fax: [phone]  Clinic days:  Tuesday & Thursday

## 2021-11-11 NOTE — PROGRESS NOTES
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 Also forgot to tell you this when you rounded but she did pull out her midline last night and she has a small IV in her wrist. super hard stick, swollen - when we go to peripherally draw her labs fluid just comes out. thanks.

## 2021-11-11 NOTE — PROGRESS NOTES
Comprehensive Nutrition Assessment    Type and Reason for Visit:  Reassess    Nutrition Recommendations/Plan:   Boost Pudding at lunch and Magic cup at dinner  Recommend to start bowel regimen as appropriate per MD Ny or Miralax  Monitor PO and supplement intake while in hospital    Nutrition Assessment:  PO intake 26-50% of meals. Switch Ensure Clear to Boost pudding at lunch and Magic Cup at dinner. Pt requires staff to feed her. Noted weight has increased. Continue to monitor PO and supplement intake while in hospital.    Malnutrition Assessment:  Malnutrition Status: At risk for malnutrition (Comment)    Context:  Acute Illness     Findings of the 6 clinical characteristics of malnutrition:  Energy Intake:  7 - 50% or less of estimated energy requirements for 5 or more days  Weight Loss:  No significant weight loss     Body Fat Loss:  No significant body fat loss     Muscle Mass Loss:  No significant muscle mass loss    Fluid Accumulation:  No significant fluid accumulation     Strength:  Not Performed    Estimated Daily Nutrient Needs:  Energy (kcal):  4595-6072; Weight Used for Energy Requirements:   (con't to use 74.5 kg)     Protein (g):  68-86 grams; Weight Used for Protein Requirements:  Ideal (57 kg; 1.2-1.5 grams per kg)        Fluid (ml/day):   ; Method Used for Fluid Requirements:  1 ml/kcal      Nutrition Related Findings:  LBM 11/3 per EMR, +2 pitting BLE edema, , K 3.0, Glucose 117      Wounds:  None       Current Nutrition Therapies:    ADULT DIET; Dysphagia - Pureed; Mildly Thick (Nectar); No Drinking Straws  ADULT ORAL NUTRITION SUPPLEMENT; Lunch;  Fortified Pudding Oral Supplement  ADULT ORAL NUTRITION SUPPLEMENT; Dinner; Frozen Oral Supplement    Anthropometric Measures:  · Height: 5' 5\" (165.1 cm)  · Current Body Weight: 171 lb (77.6 kg)   · Admission Body Weight: 164 lb 3.9 oz (74.5 kg)    · Ideal Body Weight: 125 lbs; % Ideal Body Weight 136.8 %   · BMI: 28.5  · BMI Categories: Overweight (BMI 25.0-29. 9)       Nutrition Diagnosis:   · Inadequate oral intake related to inadequate protein-energy intake as evidenced by intake 0-25%, intake 26-50%      Nutrition Interventions:   Food and/or Nutrient Delivery:  Continue Current Diet, Modify Oral Nutrition Supplement  Nutrition Education/Counseling:  Education not indicated, No recommendation at this time   Coordination of Nutrition Care:  Continue to monitor while inpatient    Goals:  PO and Supplement intake 50% of greater       Nutrition Monitoring and Evaluation:   Behavioral-Environmental Outcomes:  None Identified   Food/Nutrient Intake Outcomes:  Food and Nutrient Intake  Physical Signs/Symptoms Outcomes:  GI Status, Skin     Discharge Planning:     Too soon to determine     Electronically signed by Hanh Burroughs RD, LD on 11/11/21 at 1:35 PM EST    Contact: [phone]

## 2021-11-11 NOTE — PLAN OF CARE
Nutrition Problem #1: Inadequate oral intake  Intervention: Food and/or Nutrient Delivery: Continue Current Diet, Modify Oral Nutrition Supplement  Nutritional Goals: PO and Supplement intake 50% of greater

## 2021-11-11 NOTE — PROGRESS NOTES
11/11/21 1553   RT Protocol   History Pulmonary Disease 0   Respiratory pattern 0   Breath sounds 0   Cough 0   Bronchodilator Assessment Score 0

## 2021-11-11 NOTE — PROGRESS NOTES
P Pulmonary and Critical Care  Progress note      Reason for Consult: Acute hypoxemic respiratory failure  Requesting Physician: Dr. Tariq Rene:   [facility] / HPI:                The patient is a 82 y.o. female with significant past medical history of:      Diagnosis Date    Atrial fibrillation ([address]     CAD (coronary artery disease)     Depression     Hyperlipidemia     Hypertension     Hypothyroid     Legal blindness     Obstructive sleep apnea (adult) (pediatric)      Interval history: Patient looks worse this morning. She is confused.       Past Surgical History:        Procedure Laterality Date    ANGIOPLASTY      COLONOSCOPY  11/16/2018    COLONOSCOPY POLYPECTOMY SNARE/COLD BIOPSY performed by Rony Carr MD at [address] [facility] N/A 10/20/2021    COLONOSCOPY POLYPECTOMY ABLATION performed by Leah Eaton MD at [address] 11/4/2021    COLONOSCOPY POLYPECTOMY ABLATION performed by Usha House MD at [address] [facility] Right     CATARACT EXTRACTION W/ IOL    EYE SURGERY Left     CATARCT EXTRACTION W/ IOL    KS SIGMOIDOSCOPY FLX DX W/COLLJ SPEC BR/WA IF PFRMD N/A 10/19/2018    SIGMOIDOSCOPY DIAGNOSTIC FLEXIBLE performed by Rony Carr MD at [address] [facility]  10/19/2018    flexible    TONSILLECTOMY AND ADENOIDECTOMY      TUBAL LIGATION      UPPER GASTROINTESTINAL ENDOSCOPY N/A 10/20/2021    EGD BIOPSY performed by Leah Eaton MD at [address] [facility] ENDOSCOPY     Current Medications:    Current Facility-Administered Medications: magnesium sulfate 1000 mg in dextrose 5% 100 mL IVPB, 1,000 mg, IntraVENous, PRN  potassium chloride 10 mEq/100 mL IVPB (Peripheral Line), 10 mEq, IntraVENous, PRN  magnesium sulfate 1000 mg in dextrose 5% 100 mL IVPB, 1,000 mg, IntraVENous, Once  [Held by provider] furosemide (LASIX) injection 20 mg, 20 mg, IntraVENous, BID  dilTIAZem (CARDIZEM) tablet 30 mg, 30 mg, Oral, 4 times per day  piperacillin-tazobactam (ZOSYN) 3,375 mg in dextrose 5 % 50 mL IVPB extended infusion (mini-bag), 3,375 mg, IntraVENous, Q12H  lidocaine PF 1 % injection 5 mL, 5 mL, IntraDERmal, Once  apixaban (ELIQUIS) tablet 2.5 mg, 2.5 mg, Oral, BID  ipratropium-albuterol (DUONEB) nebulizer solution 1 ampule, 1 ampule, Inhalation, Q4H PRN  metoprolol succinate (TOPROL XL) extended release tablet 50 mg, 50 mg, Oral, TID  QUEtiapine (SEROQUEL) tablet 50 mg, 50 mg, Oral, Nightly  sodium chloride flush 0.9 % injection 5-40 mL, 5-40 mL, IntraVENous, 2 times per day  sodium chloride flush 0.9 % injection 5-40 mL, 5-40 mL, IntraVENous, PRN  0.9 % sodium chloride infusion, 25 mL, IntraVENous, PRN  Vitamin D (CHOLECALCIFEROL) tablet 2,000 Units, 2 tablet, Oral, Daily    Allergies   Allergen Reactions    Percocet [Oxycodone-Acetaminophen] Other (See Comments)     Pt states it makes her crazy. Social History:    TOBACCO:   reports that she has never smoked. She has never used smokeless tobacco.  ETOH:   reports current alcohol use of about 2.0 standard drinks of alcohol per week. Patient currently lives independently  Environmental/chemical exposure: None known    Family History:       Problem Relation Age of Onset    Heart Disease Mother     Heart Disease Father     Asthma Neg Hx     Cancer Neg Hx     Diabetes Neg Hx     Emphysema Neg Hx     Hypertension Neg Hx     Heart Failure Neg Hx      REVIEW OF SYSTEMS:    CONSTITUTIONAL:  negative for fevers, chills, diaphoresis, activity change, appetite change, fatigue, night sweats and unexpected weight change.    EYES:  negative for blurred vision, eye discharge, visual disturbance and icterus  HEENT:  negative for hearing loss, tinnitus, ear drainage, sinus pressure, nasal congestion, epistaxis and snoring  RESPIRATORY:  See HPI  CARDIOVASCULAR:  negative for chest pain, palpitations, exertional chest pressure/discomfort, edema, syncope  GASTROINTESTINAL:  negative for nausea, vomiting, diarrhea, constipation, blood in stool and abdominal pain  GENITOURINARY:  negative for frequency, dysuria, urinary incontinence, decreased urine volume, and hematuria  HEMATOLOGIC/LYMPHATIC:  negative for easy bruising, bleeding and lymphadenopathy  ALLERGIC/IMMUNOLOGIC:  negative for recurrent infections, angioedema, anaphylaxis and drug reactions  ENDOCRINE:  negative for weight changes and diabetic symptoms including polyuria, polydipsia and polyphagia  MUSCULOSKELETAL:  negative for  pain, joint swelling, decreased range of motion and muscle weakness  NEUROLOGICAL:  negative for headaches, slurred speech, unilateral weakness  PSYCHIATRIC/BEHAVIORAL: negative for hallucinations, behavioral problems, confusion and agitation. Objective:   PHYSICAL EXAM:      VITALS:  /72   Pulse 75   Temp 98.3 °F (36.8 °C) (Oral)   Resp 16   Ht 5' 5\" (1.651 m)   Wt 171 lb 8 oz (77.8 kg)   SpO2 91%   BMI 28.54 kg/m²      24HR INTAKE/OUTPUT:      Intake/Output Summary (Last 24 hours) at 11/11/2021 1128  Last data filed at 11/11/2021 0355  Gross per 24 hour   Intake 41.5 ml   Output 2325 ml   Net -2283.5 ml     CONSTITUTIONAL:  awake, alert, cooperative, no apparent distress, and appears stated age  NECK:  Supple, symmetrical, trachea midline, no adenopathy, thyroid symmetric, not enlarged and no tenderness, skin normal  LUNGS:  no increased work of breathing and inspiratory crackles to auscultation. No accessory muscle use  CARDIOVASCULAR: S1 and S2, 2-3+ peripheral pitting edema  ABDOMEN:  normal bowel sounds, non-distended and no masses palpated, and no tenderness to palpation. No hepatospleenomegaly  LYMPHADENOPATHY:  no axillary or supraclavicular adenopathy. No cervical adnenopathy  PSYCHIATRIC: Oriented to person place and time. No obvious depression or anxiety. MUSCULOSKELETAL: No obvious misalignment or effusion of the joints.  No clubbing, cyanosis of the digits. SKIN:  normal skin color, texture, turgor and no redness, warmth, or swelling. No palpable nodules    DATA:    Old records have been reviewed    CBC:  Recent Labs     11/08/21  1245 11/10/21  0623 11/11/21  0623   WBC 16.3* 11.9* 11.9*   RBC 2.86* 3.02* 3.03*   HGB 8.1* 8.6* 8.5*   HCT 26.9* 27.6* 27.6*    202 180   MCV 93.8 91.4 91.1   MCH 28.2 28.4 28.2   MCHC 30.1* 31.1 31.0   RDW 20.0* 20.5* 20.6*   BANDSPCT 4  --   --       BMP:  Recent Labs     11/08/21  1245 11/10/21  0556 11/11/21  0623    142 150*   K 3.2* 3.1* 3.0*    109 106   CO2 23 21 31   BUN 46* 34* 27*   CREATININE 1.8* 1.3* 1.1   CALCIUM 8.5 8.5 8.5   GLUCOSE 119* 105* 117*      ABG:  No results for input(s): PHART, PBA9JTG, PO2ART, LPQ0VBP, M5SZZWRJ, BEART in the last 72 hours. Procalcitonin  Recent Labs     11/09/21  1657   PROCAL 0.61*       No results found for: BNP  Lab Results   Component Value Date    TROPONINI 0.02 (H) 11/05/2021           Radiology Review:  All pertinent images / reports were reviewed as a part of this visit. Assessment:     1. Acute pulmonary edema  2. Acute kidney injury  3. E. coli bacteremia      Plan:     I have reviewed laboratories, medical records and images for this visit  Reviewed CT imaging which reveals significant bilateral airspace disease and bilateral pleural effusions. This could be a combination of pneumonia and pulmonary edema  I did request procalcitonin which is come back at 0.61  Continue Zosyn  BNP is over 50,000. Echocardiogram reveals grade 2 diastolic dysfunction  She is presently on IV fluid at 100 cc/h  Would recommend stopping IV fluid and starting her on some Lasix  She was confused when I saw her this morning. However she was also on room air and probably hypoxemic. I did replace our nasal cannula oxygen which has been effective in providing saturations in the 90s.   Discussed with Dr. Sade Yan

## 2021-11-11 NOTE — PROGRESS NOTES
Patient restless, pulling at lines and telemetry leads. Pt found with midline pulled out. Hospitalist notified. New order received for one time dose of Ativan.

## 2021-11-11 NOTE — PROGRESS NOTES
Discussed plan of care with daughter. Will have meeting with them tomorrow and discuss palliative approach .

## 2021-11-11 NOTE — PROGRESS NOTES
MD Tyson Foster MD Niel Floras, MD                                  Office: [phone]                 Fax: [phone]          "[employer]                  NEPHROLOGY  INPATIENT PROGRESS NOTE:     PATIENT NAME: Larisa Marrufo  : 1935  MRN: [id number]        Assessment and Plan   JULIO on CKD 3b-severe. Baseline crea 1.4. Non-oliguric. UA bland. Hypoxia worsened to 6L  CXR reviewed - fluid overload concerns too   Pulm following     Added RTC lasix 20 Mg BID IV  Added KCL 40 mEq IV   Continue herron     Hypernatremia - severe- worsening  Hold lasix today   F/u recheck labs     Replete K  Replete Mag     Per PICC RN: Peripheral IV placed for extra access. PICC held for now until swelling dissipates. Hypotension.-Improved. Hypovolemia.-Improved  Hypokalemia-replaced. Follow K. Hypocalcemia- asymptomatic. Anemia of acute blood loss. -follow Hgb. Metabolic acidosis. Improved. Hypoalbuminemia  E. coli sepsis   Bacteremia     High complexity. Multiple medical problems. Discussed with patient and treatment team.    Thank you for allowing me to participate in this patient's care. Please do not hesitate to contact me for any questions/concerns. We will follow along with you. Brayan Machado MD  [facility] [address]   Phone: [phone] or [address] [employer]  Fax: [phone]             Subjective:   Patient is weak and lethargic. Hypoxia worsening   Lasix - good UOP   Mild fluid overload     Objective:      Objective:  EXAM  Vitals:    21 0725   BP: 137/72   Pulse: 75   Resp: 18   Temp: 98.3 °F (36.8 °C)   SpO2: 90%       Intake/Output Summary (Last 24 hours) at 2021 0946  Last data filed at 2021 0355  Gross per 24 hour   Intake 161.5 ml   Output 2325 ml   Net -2163.5 ml     General: sleepy  HENT: Atraumatic, normocephalic   Eyes: Normal conjunctiva, Non-incteral sclera. Neck: Supple, JVD not visible.    CVS:  Heart sounds are normal. No loud murmur. RS: abNormal respiratory effort, Breat sound: diminished at bases. Abd: Soft , bowel sounds are normal, no distension and no tenderness . Skin: No rash , some bruises,   Extremities/MSK: ++ Edema, no cyanosis. Active Problems:    Essential hypertension    ROBYN (obstructive sleep apnea)    Acute GI bleeding    Atrial fibrillation with RVR (HCC)    Severe sepsis (HCC)    E coli bacteremia    Supratherapeutic INR    Hyperkalemia    Normocytic normochromic anemia    Hyponatremia    Bilateral pleural effusion    Diverticulosis    Anasarca    Overweight (BMI 25.0-29. 9)    High anion gap metabolic acidosis    Elevated brain natriuretic peptide (BNP) level    JULIO (acute kidney injury) ([facility] [address]    Hypokalemia  Resolved Problems:    * No resolved hospital problems. *        Medications Reviewed by me   furosemide  20 mg IntraVENous BID    dilTIAZem  30 mg Oral 4 times per day    piperacillin-tazobactam  3,375 mg IntraVENous Q12H    lidocaine 1 % injection  5 mL IntraDERmal Once    apixaban  2.5 mg Oral BID    metoprolol succinate  50 mg Oral TID    QUEtiapine  50 mg Oral Nightly    sodium chloride flush  5-40 mL IntraVENous 2 times per day    Vitamin D  2 tablet Oral Daily      sodium chloride 25 mL (11/11/21 0527)       Data Review.     Labs reviewed by me       CBC:   Recent Labs     11/08/21  1245 11/10/21  0623 11/11/21  0623   WBC 16.3* 11.9* 11.9*   HGB 8.1* 8.6* 8.5*   HCT 26.9* 27.6* 27.6*   MCV 93.8 91.4 91.1    202 180     BMP:   Recent Labs     11/08/21  1245 11/10/21  0556 11/11/21  0623    142 150*   K 3.2* 3.1* 3.0*    109 106   CO2 23 21 31   PHOS  --  3.7 2.7   BUN 46* 34* 27*   CREATININE 1.8* 1.3* 1.1     Magnesium:   Lab Results   Component Value Date    MG 1.60 11/11/2021    MG 2.20 11/10/2021    MG 1.90 11/08/2021     Lab Results   Component Value Date    CREATININE 1.1 11/11/2021       Arterial Blood Gasses  No results for input(s): PH, PCO2, PO2 in the last 72 hours. Invalid input(s): Giovanny Waite    UA:  No results for input(s): NITRITE, COLORU, PHUR, LABCAST, WBCUA, RBCUA, MUCUS, TRICHOMONAS, YEAST, BACTERIA, CLARITYU, SPECGRAV, LEUKOCYTESUR, UROBILINOGEN, BILIRUBINUR, BLOODU, GLUCOSEU, AMORPHOUS in the last 72 hours. Invalid input(s): Yury Spikes    LIVER PROFILE:   No results for input(s): AST, ALT, LIPASE, BILIDIR, BILITOT, ALKPHOS in the last 72 hours. Invalid input(s): AMYLASE,  ALB  PT/INR:    Lab Results   Component Value Date    PROTIME 20.1 11/10/2021    PROTIME 17.7 11/08/2021    PROTIME 20.6 11/06/2021    INR 1.74 11/10/2021    INR 1.54 11/08/2021    INR 1.78 11/06/2021     PTT:    Lab Results   Component Value Date    APTT 32.1 11/01/2021     CANDE:  No results found for: ANATITER, CANDE  CHEMISTRY COMMON GROUP :   Lab Results   Component Value Date    GLUCOSE 117 11/11/2021    TSH 2.54 06/03/2010     Recent Labs     11/08/21  1245 11/10/21  0556 11/11/21  0623   GLUCOSE 119* 105* 117*   CALCIUM 8.5 8.5 8.5         RADIOLOGY:        Imaging Results.   Chest X Ray reviewed by me      Electronically Signed: Eleanor Olson MD 11/11/2021 9:46 AM

## 2021-11-11 NOTE — PROGRESS NOTES
[facility]    11/11/2021 9:29 AM        Name: Etsela Aggarwal . Admitted: 11/1/2021  Primary Care Provider: Yumiko Lan (Tel: [phone])                        Subjective:  . No acute events overnight. Resting well. Pain control. Diet ok. Labs reviewed  -Slowly improving A. fib is controlled on p.o. medication now.   She is on 6 L oxygen  Reviewed interval ancillary notes    Current Medications  magnesium sulfate 1000 mg in dextrose 5% 100 mL IVPB, PRN  potassium chloride (KLOR-CON M) extended release tablet 40 mEq, PRN   Or  potassium bicarb-citric acid (EFFER-K) effervescent tablet 40 mEq, PRN   Or  potassium chloride 10 mEq/100 mL IVPB (Peripheral Line), PRN  furosemide (LASIX) injection 20 mg, BID  dilTIAZem (CARDIZEM) tablet 30 mg, 4 times per day  piperacillin-tazobactam (ZOSYN) 3,375 mg in dextrose 5 % 50 mL IVPB extended infusion (mini-bag), Q12H  lidocaine PF 1 % injection 5 mL, Once  apixaban (ELIQUIS) tablet 2.5 mg, BID  ipratropium-albuterol (DUONEB) nebulizer solution 1 ampule, Q4H PRN  metoprolol succinate (TOPROL XL) extended release tablet 50 mg, TID  QUEtiapine (SEROQUEL) tablet 50 mg, Nightly  sodium chloride flush 0.9 % injection 5-40 mL, 2 times per day  sodium chloride flush 0.9 % injection 5-40 mL, PRN  0.9 % sodium chloride infusion, PRN  Vitamin D (CHOLECALCIFEROL) tablet 2,000 Units, Daily        Objective:  /72   Pulse 75   Temp 98.3 °F (36.8 °C) (Oral)   Resp 18   Ht 5' 5\" (1.651 m)   Wt 171 lb 8 oz (77.8 kg)   SpO2 90%   BMI 28.54 kg/m²     Intake/Output Summary (Last 24 hours) at 11/11/2021 0929  Last data filed at 11/11/2021 0355  Gross per 24 hour   Intake 161.5 ml   Output 2325 ml   Net -2163.5 ml      Wt Readings from Last 3 Encounters:   11/11/21 171 lb 8 oz (77.8 kg)   10/22/21 156 lb (70.8 kg)   11/16/18 147 lb (66.7 kg)       General appearance:  Appears comfortable  Eyes: Sclera clear. Pupils equal.  ENT: Moist oral mucosa. Trachea midline, no adenopathy. Cardiovascular: Regular rhythm, normal S1, S2. No murmur. No edema in lower extremities  Respiratory: Not using accessory muscles. Good inspiratory effort. Clear to auscultation bilaterally, no wheeze or crackles. GI: Abdomen soft, no tenderness, not distended, normal bowel sounds  Musculoskeletal: No cyanosis in digits, neck supple  Neurology: CN 2-12 grossly intact. No speech or motor deficits  Psych: Normal affect. Alert and oriented in time, place and person  Skin: Warm, dry, normal turgor    Labs and Tests:  CBC:   Recent Labs     11/08/21  1245 11/10/21  0623 11/11/21  0623   WBC 16.3* 11.9* 11.9*   HGB 8.1* 8.6* 8.5*    202 180     BMP:    Recent Labs     11/08/21  1245 11/10/21  0556 11/11/21  0623    142 150*   K 3.2* 3.1* 3.0*    109 106   CO2 23 21 31   BUN 46* 34* 27*   CREATININE 1.8* 1.3* 1.1   GLUCOSE 119* 105* 117*     Hepatic: No results for input(s): AST, ALT, ALB, BILITOT, ALKPHOS in the last 72 hours. Discussed care with family and patient             Spent 30  minutes with patient and family at bedside and on unit reviewing medical records and labs, spent greater than 50% time counseling patient and family on diagnosis and plan   Problem List  Active Problems:    Essential hypertension    ROBYN (obstructive sleep apnea)    Acute GI bleeding    Atrial fibrillation with RVR (HCC)    Severe sepsis (HCC)    E coli bacteremia    Supratherapeutic INR    Hyperkalemia    Normocytic normochromic anemia    Hyponatremia    Bilateral pleural effusion    Diverticulosis    Anasarca    Overweight (BMI 25.0-29. 9)    High anion gap metabolic acidosis    Elevated brain natriuretic peptide (BNP) level    JULIO (acute kidney injury) ([facility] [address]    Hypokalemia  Resolved Problems:    * No resolved hospital problems.  *       Assessment & Plan: 1. Sepsis  -Secondary to bacteremia E. Coli bacteremia  - was doing well but likely had aspiration episode   - abx  Adjusted to cover for aspiration on zosyn. - monitor     A. fib with RVR  --Improved likely probably volume overload on IV Lasix  -Been weaned off of them drip now on p.o. 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 H/H has been stable    Diet: ADULT DIET; Dysphagia - Pureed; Mildly Thick (Nectar);  No Drinking Straws  Code:Full Code  DVT PPX lovenox       Rosalba Cartagena MD   11/11/2021 9:29 AM

## 2021-11-12 LAB
ALBUMIN SERPL-MCNC: 2.3 G/DL (ref 3.4–5)
ANION GAP SERPL CALCULATED.3IONS-SCNC: 8 MMOL/L (ref 3–16)
BASOPHILS ABSOLUTE: 0 K/UL (ref 0–0.2)
BASOPHILS RELATIVE PERCENT: 0.1 %
BUN BLDV-MCNC: 23 MG/DL (ref 7–20)
CALCIUM SERPL-MCNC: 8.2 MG/DL (ref 8.3–10.6)
CHLORIDE BLD-SCNC: 106 MMOL/L (ref 99–110)
CO2: 33 MMOL/L (ref 21–32)
CREAT SERPL-MCNC: 1.3 MG/DL (ref 0.6–1.2)
EOSINOPHILS ABSOLUTE: 0 K/UL (ref 0–0.6)
EOSINOPHILS RELATIVE PERCENT: 0 %
GFR AFRICAN AMERICAN: 47
GFR NON-AFRICAN AMERICAN: 39
GLUCOSE BLD-MCNC: 136 MG/DL (ref 70–99)
HCT VFR BLD CALC: 25.8 % (ref 36–48)
HEMOGLOBIN: 8 G/DL (ref 12–16)
INR BLD: 1.75 (ref 0.88–1.12)
LYMPHOCYTES ABSOLUTE: 0.2 K/UL (ref 1–5.1)
LYMPHOCYTES RELATIVE PERCENT: 2.6 %
MAGNESIUM: 2 MG/DL (ref 1.8–2.4)
MCH RBC QN AUTO: 28.7 PG (ref 26–34)
MCHC RBC AUTO-ENTMCNC: 30.9 G/DL (ref 31–36)
MCV RBC AUTO: 92.8 FL (ref 80–100)
MONOCYTES ABSOLUTE: 0.2 K/UL (ref 0–1.3)
MONOCYTES RELATIVE PERCENT: 1.9 %
NEUTROPHILS ABSOLUTE: 8.8 K/UL (ref 1.7–7.7)
NEUTROPHILS RELATIVE PERCENT: 95.4 %
PDW BLD-RTO: 21 % (ref 12.4–15.4)
PHOSPHORUS: 3.3 MG/DL (ref 2.5–4.9)
PLATELET # BLD: 153 K/UL (ref 135–450)
PMV BLD AUTO: 7.6 FL (ref 5–10.5)
POTASSIUM SERPL-SCNC: 3.1 MMOL/L (ref 3.5–5.1)
POTASSIUM SERPL-SCNC: 3.3 MMOL/L (ref 3.5–5.1)
PROTHROMBIN TIME: 20.2 SEC (ref 9.9–12.7)
RBC # BLD: 2.78 M/UL (ref 4–5.2)
SODIUM BLD-SCNC: 147 MMOL/L (ref 136–145)
WBC # BLD: 9.2 K/UL (ref 4–11)

## 2021-11-12 PROCEDURE — 83735 ASSAY OF MAGNESIUM: CPT

## 2021-11-12 PROCEDURE — 6370000000 HC RX 637 (ALT 250 FOR IP): Performed by: INTERNAL MEDICINE

## 2021-11-12 PROCEDURE — 84132 ASSAY OF SERUM POTASSIUM: CPT

## 2021-11-12 PROCEDURE — 6360000002 HC RX W HCPCS: Performed by: INTERNAL MEDICINE

## 2021-11-12 PROCEDURE — 99233 SBSQ HOSP IP/OBS HIGH 50: CPT | Performed by: INTERNAL MEDICINE

## 2021-11-12 PROCEDURE — 85610 PROTHROMBIN TIME: CPT

## 2021-11-12 PROCEDURE — 6370000000 HC RX 637 (ALT 250 FOR IP): Performed by: EMERGENCY MEDICINE

## 2021-11-12 PROCEDURE — 80069 RENAL FUNCTION PANEL: CPT

## 2021-11-12 PROCEDURE — 97530 THERAPEUTIC ACTIVITIES: CPT

## 2021-11-12 PROCEDURE — 36592 COLLECT BLOOD FROM PICC: CPT

## 2021-11-12 PROCEDURE — 2580000003 HC RX 258: Performed by: INTERNAL MEDICINE

## 2021-11-12 PROCEDURE — 6370000000 HC RX 637 (ALT 250 FOR IP): Performed by: HOSPITALIST

## 2021-11-12 PROCEDURE — 85025 COMPLETE CBC W/AUTO DIFF WBC: CPT

## 2021-11-12 PROCEDURE — 99232 SBSQ HOSP IP/OBS MODERATE 35: CPT | Performed by: INTERNAL MEDICINE

## 2021-11-12 PROCEDURE — 1200000000 HC SEMI PRIVATE

## 2021-11-12 RX ORDER — LACTOBACILLUS RHAMNOSUS GG 10B CELL
1 CAPSULE ORAL 2 TIMES DAILY WITH MEALS
Status: DISCONTINUED | OUTPATIENT
Start: 2021-11-12 | End: 2021-11-15 | Stop reason: HOSPADM

## 2021-11-12 RX ORDER — AMOXICILLIN AND CLAVULANATE POTASSIUM 875; 125 MG/1; MG/1
1 TABLET, FILM COATED ORAL EVERY 12 HOURS SCHEDULED
Status: DISCONTINUED | OUTPATIENT
Start: 2021-11-12 | End: 2021-11-15 | Stop reason: HOSPADM

## 2021-11-12 RX ORDER — POTASSIUM CHLORIDE 20 MEQ/1
20 TABLET, EXTENDED RELEASE ORAL ONCE
Status: COMPLETED | OUTPATIENT
Start: 2021-11-12 | End: 2021-11-12

## 2021-11-12 RX ORDER — LORAZEPAM 2 MG/ML
0.5 INJECTION INTRAMUSCULAR EVERY 6 HOURS PRN
Status: DISCONTINUED | OUTPATIENT
Start: 2021-11-12 | End: 2021-11-15 | Stop reason: HOSPADM

## 2021-11-12 RX ADMIN — SODIUM CHLORIDE 25 ML: 9 INJECTION, SOLUTION INTRAVENOUS at 00:21

## 2021-11-12 RX ADMIN — SODIUM CHLORIDE 25 ML: 9 INJECTION, SOLUTION INTRAVENOUS at 09:46

## 2021-11-12 RX ADMIN — DILTIAZEM HYDROCHLORIDE 30 MG: 30 TABLET, FILM COATED ORAL at 13:45

## 2021-11-12 RX ADMIN — METOPROLOL SUCCINATE 50 MG: 50 TABLET, EXTENDED RELEASE ORAL at 13:45

## 2021-11-12 RX ADMIN — APIXABAN 2.5 MG: 2.5 TABLET, FILM COATED ORAL at 09:43

## 2021-11-12 RX ADMIN — PIPERACILLIN AND TAZOBACTAM 3375 MG: 3; .375 INJECTION, POWDER, LYOPHILIZED, FOR SOLUTION INTRAVENOUS at 00:22

## 2021-11-12 RX ADMIN — DILTIAZEM HYDROCHLORIDE 30 MG: 30 TABLET, FILM COATED ORAL at 06:10

## 2021-11-12 RX ADMIN — Medication 1 CAPSULE: at 13:45

## 2021-11-12 RX ADMIN — AMOXICILLIN AND CLAVULANATE POTASSIUM 1 TABLET: 875; 125 TABLET, FILM COATED ORAL at 21:03

## 2021-11-12 RX ADMIN — POTASSIUM CHLORIDE 20 MEQ: 1500 TABLET, EXTENDED RELEASE ORAL at 13:45

## 2021-11-12 RX ADMIN — QUETIAPINE FUMARATE 50 MG: 25 TABLET ORAL at 21:03

## 2021-11-12 RX ADMIN — APIXABAN 2.5 MG: 2.5 TABLET, FILM COATED ORAL at 21:03

## 2021-11-12 RX ADMIN — Medication 2000 UNITS: at 09:43

## 2021-11-12 RX ADMIN — PIPERACILLIN AND TAZOBACTAM 3375 MG: 3; .375 INJECTION, POWDER, LYOPHILIZED, FOR SOLUTION INTRAVENOUS at 09:48

## 2021-11-12 RX ADMIN — DILTIAZEM HYDROCHLORIDE 30 MG: 30 TABLET, FILM COATED ORAL at 18:21

## 2021-11-12 RX ADMIN — DILTIAZEM HYDROCHLORIDE 30 MG: 30 TABLET, FILM COATED ORAL at 00:16

## 2021-11-12 RX ADMIN — Medication 1 CAPSULE: at 18:23

## 2021-11-12 RX ADMIN — Medication 10 ML: at 21:04

## 2021-11-12 RX ADMIN — Medication 10 ML: at 11:08

## 2021-11-12 ASSESSMENT — PAIN SCALES - GENERAL
PAINLEVEL_OUTOF10: 0

## 2021-11-12 ASSESSMENT — ENCOUNTER SYMPTOMS
APNEA: 0
COUGH: 1
PHOTOPHOBIA: 0
TROUBLE SWALLOWING: 0
FACIAL SWELLING: 0
NAUSEA: 0
DIARRHEA: 0
ABDOMINAL PAIN: 0
RHINORRHEA: 0
CHEST TIGHTNESS: 0
COLOR CHANGE: 0
SHORTNESS OF BREATH: 1
EYE REDNESS: 0
CHOKING: 0
STRIDOR: 0
EYE DISCHARGE: 0
BLOOD IN STOOL: 0

## 2021-11-12 NOTE — PROGRESS NOTES
Potassium replacement finished and re-check only 3.1. Hospitalist notified. Okay to re-check potassium in AM and go from there.

## 2021-11-12 NOTE — PROGRESS NOTES
Infectious Diseases   Progress Note      Admission Date: 11/1/2021  Hospital Day: Hospital Day: 12   Attending: Tiara Sorto MD  Date of service: 11/12/2021     Chief complaint/ Reason for consult:     · Severe sepsis on admission with leukocytosis, hypotension, tachycardia, acute kidney injury  · Gram-negative bacteremia with E. Coli  · High anion gap metabolic acidosis  · Elevated proBNP  · Mild hyperkalemia  · Hyponatremia  · Normocytic normochromic anemia  · Bilateral pleural effusions    Microbiology:        I have reviewed allavailable micro lab data and cultures    · Blood culture (2/2) - collected on 11/1/2021: E coli    Susceptibility     Escherichia coli     BACTERIAL SUSCEPTIBILITY PANEL BY ERIK     ampicillin >=32 mcg/mL Resistant     ceFAZolin <=4 mcg/mL Sensitive     cefepime <=0.12 mcg/mL Sensitive     cefTRIAXone <=0.25 mcg/mL Sensitive     ciprofloxacin <=0.25 mcg/mL Sensitive     ertapenem <=0.12 mcg/mL Sensitive     gentamicin <=1 mcg/mL Sensitive     levofloxacin 1 mcg/mL Sensitive     piperacillin-tazobactam <=4 mcg/mL Sensitive     trimethoprim-sulfamethoxazole >=320 mcg/mL Resistant        · Urine culture  - collected on 11/1/2021: > 100,000 cfu/ml of E coli    Susceptibility    Escherichia coli (1)    Antibiotic Interpretation ERIK Status    ampicillin Resistant >=32 mcg/mL     ceFAZolin Sensitive <=4 mcg/mL      NOTE: Cefazolin should only be used for uncomplicated UTI         for E.coli or Klebsiella pneumoniae.    cefepime Sensitive <=0.12 mcg/mL     cefTRIAXone Sensitive <=0.25 mcg/mL     ciprofloxacin Sensitive <=0.25 mcg/mL     ertapenem Sensitive <=0.12 mcg/mL     gentamicin Sensitive <=1 mcg/mL     levofloxacin Sensitive 1 mcg/mL     nitrofurantoin Sensitive <=16 mcg/mL     piperacillin-tazobactam Sensitive <=4 mcg/mL     trimethoprim-sulfamethoxazole Resistant >=320 mcg/mL         Antibiotics and immunizations:       Current antibiotics: All antibiotics and their doses were reviewed by me    Recent Abx Admin                   piperacillin-tazobactam (ZOSYN) 3,375 mg in dextrose 5 % 50 mL IVPB extended infusion (mini-bag) (mg) 3,375 mg New Bag 11/12/21 0948     3,375 mg New Bag  0022     3,375 mg New Bag 11/11/21 1639                  Immunization History: All immunization history was reviewed by me today. Immunization History   Administered Date(s) Administered    Influenza, High Dose (Fluzone 65 yrs and older) 10/27/2018    Influenza, Quadv, IM, PF (6 mo and older Fluzone, Flulaval, Fluarix, and 3 yrs and older Afluria) 10/20/2021       Known drug allergies: All allergies were reviewed and updated    Allergies   Allergen Reactions    Percocet [Oxycodone-Acetaminophen] Other (See Comments)     Pt states it makes her crazy. Social history:     Social History:  All social andepidemiologic history was reviewed and updated by me today as needed. · Tobacco use:   reports that she has never smoked. She has never used smokeless tobacco.  · Alcohol use:   reports current alcohol use of about 2.0 standard drinks of alcohol per week. · Currently lives in: [address] [place] [address],[address]  ·  reports no history of drug use. COVID VACCINATION AND LAB RESULT RECORDS:     Internal Administration   First Dose      Second Dose           Last COVID Lab SARS-CoV-2, PCR (no units)   Date Value   11/09/2021 Not Detected     SARS-CoV-2, NAAT (no units)   Date Value   11/03/2021 Not Detected            Assessment:     The patient is a 82 y.o. old female who  has a past medical history of Atrial fibrillation ([facility] [address] CAD (coronary artery disease), Depression, Hyperlipidemia, Hypertension, Hypothyroid, Legal blindness, and Obstructive sleep apnea (adult) (pediatric).  with following problems:    · Severe sepsis on admission with leukocytosis, hypotension, tachycardia, acute kidney injury-resolved  · Acute respiratory failure with hypoxia-aspiration pneumonia-improving on Zosyn  · Gram-negative bacteremia with E. Coli-covered well with Zosyn  · Bilateral atypical pneumonia-COVID-19 has been ruled out  · High anion gap metabolic acidosis-resolved  · Elevated proBNP  · Acute kidney injury-serum creatinine is 1.3 today  · Hypokalemia-being corrected  · Hyponatremia-l serum sodium is 147  · Normocytic normochromic anemia-ongoing  · Bilateral pleural effusions  · Colon Diverticulosis  · Generalized anasarca  · Coronary artery disease-stable  · Essential hypertension-BP controlled  · Hyperlipidemia  · Obstructive sleep apnea  · Overweight due to excess calorie intake : Body mass index is 27.33 kg/m².-Counseling done      Discussion:      The patient is afebrile. She is on IV Zosyn. Fever curve is improved. White cell count is normalized and is 9200 today. Nasal MRSA probe was negative. Serum creatinine is 1.3 today. Chest x-ray from yesterday reviewed. Bilateral multifocal lung infiltrates noted. Plan:     Diagnostic Workup:      · Continue to follow  fever curve, WBC count and blood cultures. · Continue to monitor blood counts, liver and renal function. Antimicrobials:    · Will stop IV Zosyn today  · Will order Augmentin 875 mg every 12 hour  · Order oral probiotic twice daily  · If the patient tolerates above oral antibiotic okay, plan for 1 week course of oral Augmentin  · Cough and deep breathing exercises  · If the patient started having diarrhea, may need to shorten the Augmentin course  · Continue close vitals monitoring. · Maintain good glycemic control. · Fall precautions. · Aspiration precautions. · Continue to watch for new fever or diarrhea. · DVT prophylaxis. · Discussed all above with patient's sister at bedside      Drug Monitoring:    · Continue monitoring for antibiotic toxicity as follows: CBC, CMP   · Continue to watch for following: new or worsening fever, new hypotension, hives, lip swelling and redness or purulence at vascular access sites.      I/v access Management:    · Continue to monitor i.v access sites for erythema, induration, discharge or tenderness. · As always, continue efforts to minimize tubes/lines/drains as clinically appropriate to reduce chances of line associated infections. Level of complexity of visit: High       Thank you for involving me in the care of your patient. I will continue to follow. If you have anyadditional questions, please do not hesitate to contact me. Subjective: Interval history: Interval history was obtained from chart review and RN. She is afebrile. She is tolerating antibiotics okay. She is on folate oxygen via high flow nasal cannula     REVIEW OF SYSTEMS:      Review of Systems   Constitutional: Positive for fatigue. Negative for chills, diaphoresis and unexpected weight change. HENT: Negative for congestion, ear discharge, ear pain, facial swelling, hearing loss, rhinorrhea and trouble swallowing. Eyes: Negative for photophobia, discharge, redness and visual disturbance. Respiratory: Positive for cough and shortness of breath. Negative for apnea, choking, chest tightness and stridor. Cardiovascular: Negative for chest pain and palpitations. Gastrointestinal: Negative for abdominal pain, blood in stool, diarrhea and nausea. Endocrine: Negative for polydipsia, polyphagia and polyuria. Genitourinary: Negative for difficulty urinating, dysuria, frequency, hematuria, menstrual problem and vaginal discharge. Musculoskeletal: Negative for arthralgias, joint swelling, myalgias and neck stiffness. Skin: Negative for color change and rash. Allergic/Immunologic: Negative for immunocompromised state. Neurological: Negative for dizziness, seizures, speech difficulty, light-headedness and headaches. Hematological: Negative for adenopathy. Psychiatric/Behavioral: Negative for agitation, hallucinations and suicidal ideas.          Past Medical History: All past medical history reviewed today. Past Medical History:   Diagnosis Date    Atrial fibrillation ([facility] [address]     CAD (coronary artery disease)     Depression     Hyperlipidemia     Hypertension     Hypothyroid     Legal blindness     Obstructive sleep apnea (adult) (pediatric)        Past Surgical History: All past surgical history was reviewed today. Past Surgical History:   Procedure Laterality Date    ANGIOPLASTY      COLONOSCOPY  11/16/2018    COLONOSCOPY POLYPECTOMY SNARE/COLD BIOPSY performed by Reena Do MD at [address] N/A 10/20/2021    COLONOSCOPY POLYPECTOMY ABLATION performed by Luke Srinivasan MD at [address] N/A 11/4/2021    COLONOSCOPY POLYPECTOMY ABLATION performed by Humberto Mancia MD at [address]     CATARACT EXTRACTION W/ IOL    EYE SURGERY Left     CATARCT EXTRACTION W/ IOL    [place] SIGMOIDOSCOPY FLX DX W/COLLJ SPEC BR/WA IF PFRMD N/A 10/19/2018    SIGMOIDOSCOPY DIAGNOSTIC FLEXIBLE performed by Reena Do MD at [address]  10/19/2018    flexible    TONSILLECTOMY AND ADENOIDECTOMY      TUBAL LIGATION      UPPER GASTROINTESTINAL ENDOSCOPY N/A 10/20/2021    EGD BIOPSY performed by Luke Srinivasan MD at [address] ENDOSCOPY       Family History: All family history was reviewed today. Problem Relation Age of Onset    Heart Disease Mother     Heart Disease Father     Asthma Neg Hx     Cancer Neg Hx     Diabetes Neg Hx     Emphysema Neg Hx     Hypertension Neg Hx     Heart Failure Neg Hx        Objective:       PHYSICAL EXAM:      Vitals:   Vitals:    11/12/21 0309 11/12/21 0421 11/12/21 0600 11/12/21 0714   BP:  111/62 110/70 95/64   Pulse:  65 66 66   Resp:  17  18   Temp:  97.2 °F (36.2 °C)     TempSrc:  Tympanic  Oral   SpO2: 96% 94%  92%   Weight:       Height:           Physical Exam  Vitals and nursing note reviewed.    Constitutional:       General: She is not in acute distress. Appearance: She is well-developed. She is not diaphoretic. HENT:      Head: Normocephalic. Right Ear: External ear normal.      Left Ear: External ear normal.      Nose: Nose normal.   Eyes:      General: No scleral icterus. Right eye: No discharge. Left eye: No discharge. Conjunctiva/sclera: Conjunctivae normal.      Pupils: Pupils are equal, round, and reactive to light. Cardiovascular:      Rate and Rhythm: Normal rate and regular rhythm. Heart sounds: No murmur heard. No friction rub. Pulmonary:      Effort: Pulmonary effort is normal.      Breath sounds: No stridor. Rales (Bibasilar) present. No wheezing. Chest:      Chest wall: No tenderness. Abdominal:      Palpations: Abdomen is soft. There is no mass. Tenderness: There is no abdominal tenderness. There is no guarding or rebound. Musculoskeletal:         General: No tenderness. Cervical back: Normal range of motion and neck supple. Lymphadenopathy:      Cervical: No cervical adenopathy. Skin:     General: Skin is warm and dry. Findings: No erythema or rash. Neurological:      Mental Status: She is alert. Motor: No abnormal muscle tone. Comments: Arousable on verbal command   Psychiatric:         Judgment: Judgment normal.           Intake and output:    I/O last 3 completed shifts: In: 830.7 [I.V.:122.1; IV Piggyback:708.6]  Out: 1250 [Urine:1250]    Lab Data:   All available labs and old records have been reviewed by me.     CBC:  Recent Labs     11/10/21  0623 11/11/21  0623 11/12/21  0620   WBC 11.9* 11.9* 9.2   RBC 3.02* 3.03* 2.78*   HGB 8.6* 8.5* 8.0*   HCT 27.6* 27.6* 25.8*    180 153   MCV 91.4 91.1 92.8   MCH 28.4 28.2 28.7   MCHC 31.1 31.0 30.9*   RDW 20.5* 20.6* 21.0*        BMP:  Recent Labs     11/10/21  0556 11/10/21  0556 11/11/21  0623 11/12/21  0020 11/12/21  0620     --  150*  --  147*   K 3.1*   < > 3.0* 3.1* 3.3*     -- 106  --  106   CO2 21  --  31  --  33*   BUN 34*  --  27*  --  23*   CREATININE 1.3*  --  1.1  --  1.3*   CALCIUM 8.5  --  8.5  --  8.2*   GLUCOSE 105*  --  117*  --  136*    < > = values in this interval not displayed. Hepatic Function Panel:   Lab Results   Component Value Date    ALKPHOS 67 11/05/2021    ALT 14 11/05/2021    AST 20 11/05/2021    PROT 5.4 11/05/2021    PROT 7.6 06/03/2010    BILITOT 0.4 11/05/2021    LABALBU 2.3 11/12/2021       CPK: No results found for: CKTOTAL  ESR: No results found for: SEDRATE  CRP: No results found for: CRP        Imaging: All pertinent images and reports for the current visit were reviewed by me during this visit. XR CHEST PORTABLE   Final Result   Extensive bilateral airspace opacities persist suggesting multifocal pneumonia         CT CHEST WO CONTRAST   Final Result   Extensive bilateral pneumonia. Small pleural effusions have enlarged. XR CHEST PORTABLE   Final Result   Mild interval worsening of the bilateral airspace opacities, when compared to   the previous study performed 11/08/2021. Rule out worsening CHF. Pneumonia   cannot be entirely excluded. Decrease in the right pleural effusion. XR CHEST PORTABLE   Final Result   Worsening patchy bilateral airspace opacities. Consider infection. Stable   bilateral pleural effusions. XR CHEST PORTABLE   Final Result   Nasogastric tube is in normal position. Vascular congestion. Stable bilateral pleural effusions with associated atelectasis or airspace   disease. CT CHEST ABDOMEN PELVIS WO CONTRAST   Final Result   Small bilateral pleural effusions are seen with adjacent consolidation at the   lung bases, likely atelectasis in the absence of clinical signs of pneumonia. Septal thickening is seen, compatible with fluid overload. Mildly enlarged precarinal node, likely reactive due to the suspected fluid   overload.   Recommend 3 month follow-up chest CT for further characterization. Scattered colonic diverticula are seen. No significant pericolonic fat   stranding. Body wall anasarca with trace free fluid in pelvis, compatible with mild   fluid overload         XR CHEST PORTABLE   Final Result   Interval resolution of pulmonary edema. Fluid in the minor fissure, and   suspected bilateral pleural effusions. Left basilar airspace disease likely   atelectasis             Medications: All current and past medications were reviewed.  piperacillin-tazobactam  3,375 mg IntraVENous Q8H    [Held by provider] furosemide  20 mg IntraVENous BID    dilTIAZem  30 mg Oral 4 times per day    lidocaine 1 % injection  5 mL IntraDERmal Once    apixaban  2.5 mg Oral BID    metoprolol succinate  50 mg Oral TID    QUEtiapine  50 mg Oral Nightly    sodium chloride flush  5-40 mL IntraVENous 2 times per day    Vitamin D  2 tablet Oral Daily        sodium chloride 25 mL (11/12/21 4326)       LORazepam, magnesium sulfate, potassium chloride, ondansetron, sodium chloride flush, sodium chloride      Problem list:       Patient Active Problem List   Diagnosis Code    CAD (coronary artery disease) I25.10    Essential hypertension I10    ROBYN (obstructive sleep apnea) G47.33    Hypothyroid E03.9    Hyperlipidemia E78.5    Depression F32. A    GI bleeding K92.2    Acute blood loss anemia D62    Acute GI bleeding K92.2    Atrial fibrillation with RVR (HCC) I48.91    Severe sepsis (HCC) A41.9, R65.20    E coli bacteremia R78.81, B96.20    Supratherapeutic INR R79.1    Hyperkalemia E87.5    Normocytic normochromic anemia D64.9    Hyponatremia E87.1    Bilateral pleural effusion J90    Diverticulosis K57.90    Anasarca R60.1    Overweight (BMI 25.0-29. 9) E66.3    High anion gap metabolic acidosis J36.1    Elevated brain natriuretic peptide (BNP) level R79.89    JULIO (acute kidney injury) ([facility] [address] N17.9    Hypokalemia E87.6    Hypernatremia E87.0       Please note that this chart was generated using Dragon dictation software. Although every effort was made to ensure the accuracy of this automated transcription, some errors in transcription may have occurred inadvertently. If you may need any clarification, please do not hesitate to contact me through EPIC or at the phone number provided below with my electronic signature. Any pictures or media included in this note were obtained after taking informed verbal consent from the patient and with their approval to include those in the patient's medical record.     Yanira Garcia MD, MPH  11/12/2021 , 11:36 AM   [facility] Infectious Disease   [address] [place] [address], [address] [place] [address]  Office: [phone]  Fax: [phone]  Clinic days:  Tuesday & Thursday

## 2021-11-12 NOTE — PROGRESS NOTES
[facility]    11/12/2021 10:43 AM        Name: Celeste Macias . Admitted: 11/1/2021  Primary Care Provider: Yael Yin (Tel: [phone])                        Subjective:  . No acute events overnight. Resting well. Pain control. Diet ok. Labs reviewed  -Slowly improving A. fib is controlled on p.o. medication now. She is on 6 L oxygen  Reviewed interval ancillary notes    Current Medications  LORazepam (ATIVAN) injection 0.5 mg, Q6H PRN  magnesium sulfate 1000 mg in dextrose 5% 100 mL IVPB, PRN  potassium chloride 10 mEq/100 mL IVPB (Peripheral Line), PRN  piperacillin-tazobactam (ZOSYN) 3,375 mg in dextrose 5 % 50 mL IVPB extended infusion (mini-bag), Q8H  ondansetron (ZOFRAN) injection 4 mg, Q6H PRN  [Held by provider] furosemide (LASIX) injection 20 mg, BID  dilTIAZem (CARDIZEM) tablet 30 mg, 4 times per day  lidocaine PF 1 % injection 5 mL, Once  apixaban (ELIQUIS) tablet 2.5 mg, BID  metoprolol succinate (TOPROL XL) extended release tablet 50 mg, TID  QUEtiapine (SEROQUEL) tablet 50 mg, Nightly  sodium chloride flush 0.9 % injection 5-40 mL, 2 times per day  sodium chloride flush 0.9 % injection 5-40 mL, PRN  0.9 % sodium chloride infusion, PRN  Vitamin D (CHOLECALCIFEROL) tablet 2,000 Units, Daily        Objective:  BP 95/64   Pulse 66   Temp 97.2 °F (36.2 °C) (Tympanic)   Resp 18   Ht 5' 5\" (1.651 m)   Wt 171 lb 8 oz (77.8 kg)   SpO2 92%   BMI 28.54 kg/m²     Intake/Output Summary (Last 24 hours) at 11/12/2021 1043  Last data filed at 11/12/2021 7466  Gross per 24 hour   Intake 830.66 ml   Output 1250 ml   Net -419.34 ml      Wt Readings from Last 3 Encounters:   11/11/21 171 lb 8 oz (77.8 kg)   10/22/21 156 lb (70.8 kg)   11/16/18 147 lb (66.7 kg)       General appearance:  Appears comfortable  Eyes: Sclera clear.  Pupils equal.  ENT: Moist oral mucosa. Trachea midline, no adenopathy. Cardiovascular: Regular rhythm, normal S1, S2. No murmur. No edema in lower extremities  Respiratory: Not using accessory muscles. Good inspiratory effort. Clear to auscultation bilaterally, no wheeze or crackles. GI: Abdomen soft, no tenderness, not distended, normal bowel sounds  Musculoskeletal: No cyanosis in digits, neck supple  Neurology: CN 2-12 grossly intact. No speech or motor deficits  Psych: Normal affect. Alert and oriented in time, place and person  Skin: Warm, dry, normal turgor    Labs and Tests:  CBC:   Recent Labs     11/10/21  0623 11/11/21  0623 11/12/21  0620   WBC 11.9* 11.9* 9.2   HGB 8.6* 8.5* 8.0*    180 153     BMP:    Recent Labs     11/10/21  0556 11/10/21  0556 11/11/21  0623 11/12/21  0020 11/12/21  0620     --  150*  --  147*   K 3.1*   < > 3.0* 3.1* 3.3*     --  106  --  106   CO2 21  --  31  --  33*   BUN 34*  --  27*  --  23*   CREATININE 1.3*  --  1.1  --  1.3*   GLUCOSE 105*  --  117*  --  136*    < > = values in this interval not displayed. Hepatic: No results for input(s): AST, ALT, ALB, BILITOT, ALKPHOS in the last 72 hours. Discussed care with family and patient             Spent 30  minutes with patient and family at bedside and on unit reviewing medical records and labs, spent greater than 50% time counseling patient and family on diagnosis and plan   Problem List  Active Problems:    Essential hypertension    ROBYN (obstructive sleep apnea)    Acute GI bleeding    Atrial fibrillation with RVR (HCC)    Severe sepsis (HCC)    E coli bacteremia    Supratherapeutic INR    Hyperkalemia    Normocytic normochromic anemia    Hyponatremia    Bilateral pleural effusion    Diverticulosis    Anasarca    Overweight (BMI 25.0-29. 9)    High anion gap metabolic acidosis    Elevated brain natriuretic peptide (BNP) level    JULIO (acute kidney injury) ([facility] [address]    Hypokalemia    Hypernatremia  Resolved Problems:    * No resolved hospital problems. *       Assessment & Plan: This is a 82-year-old female who was admitted for acute blood loss anemia severe sepsis initially presented with rectal bleed. For which he underwent EGD colonoscopy. Patient initially was found to have E. coli UTI with bacteremia. Was in ICU was transferred out of ICU. Patient was doing well up until 11/8/2021 when she had aspiration event. Since then patient has been had increased work of breathing went into A. fib RVR again       1. Sepsis  -Secondary to bacteremia E. Coli bacteremia  - -From a bacteremia perspective patient is stable    Aspiration pneumonia  -With increased oxygen requirement initially was a 10 L now down to 4 L  -Is also some component of volume overload in setting of A. Fib  -Patient has been diuresed.  -Her mentation still remains an issue. Still getting agitated at times.  -Continue IV Zosyn  -Discussed with daughter and palliative care. Patient will be monitored closely if she  to fail to improve  For 48 hours and family is considering palliative approach      A. fib with RVR  --Improved likely probably volume overload on IV Lasix  - Was on Cardizem drip now on p.o. Cardizem      Acute hypoxic respiratory failure secondary to above-mentioned pneumonia A. 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 H/H has been stable    Diet: ADULT DIET; Dysphagia - Pureed; Mildly Thick (Nectar); No Drinking Straws  ADULT ORAL NUTRITION SUPPLEMENT; Lunch;  Fortified Pudding Oral Supplement  ADULT ORAL NUTRITION SUPPLEMENT; Dinner; Frozen Oral Supplement  Code:[id number]-CCA  DVT PPX lovenox       Alise Stewart MD   11/12/2021 10:43 AM

## 2021-11-12 NOTE — PROGRESS NOTES
Message sent to Dr Amanda Bales: \"This patient ended up having a good day overall, didn't end up giving Ativan, she got up to chair twice with 2 people assisting, I wanted to let you know that she has not had great output though, only 150 over 9 hours. She's swollen and cold though so I know we don't necessarily want to give fluids. \"    Response: \"Will monitor for now .  I will touch base with nephrology\"

## 2021-11-12 NOTE — PROGRESS NOTES
Physical Therapy  Facility/Department: [address] ONCOLOGY  Daily Treatment Note  NAME: Georgina Olvera  : 1935  MRN: [id number]    Date of Service: 2021    Discharge Recommendations:  Georgina Olvera scored a 13/24 on the AM-PAC short mobility form. Current research shows that an AM-PAC score of 17 or less is typically not associated with a discharge to the patient's home setting. Based on the patient's AM-PAC score and their current functional mobility deficits, it is recommended that the patient have 3-5 sessions per week of Physical Therapy at d/c to increase the patient's independence. Please see assessment section for further patient specific details. If patient discharges prior to next session this note will serve as a discharge summary. Please see below for the latest assessment towards goals. 24 hour supervision or assist, 3-5 sessions per week   PT Equipment Recommendations  Equipment Needed: No    Assessment   Body structures, Functions, Activity limitations: Decreased functional mobility ; Decreased safe awareness; Decreased balance; Decreased endurance; Decreased strength  Assessment: Patient requiring assistance with bed mobility and transfers, unable to demonstrate safe ambulation this session, quick to fatigue. Will need 24 hour assist and continued therapy at d/c. Treatment Diagnosis: decreased functional mobility, impaired gait, decreased endurance  Prognosis: Good  Clinical Presentation: evolving  PT Education: Goals; PT Role; Plan of Care; Transfer Training; General Safety; Orientation; Low Vision Education; Gait Training; Functional Mobility Training  Patient Education: Patient did not verbalize understanding and will need reinforcement. Barriers to Learning: visual, cognition  REQUIRES PT FOLLOW UP: Yes  Activity Tolerance  Activity Tolerance: Patient limited by endurance;  Patient Tolerated treatment well; Patient limited by fatigue  Activity Tolerance: Pt limited by legal blindness and unfamiliar environment. Self limiting behaviors     Patient Diagnosis(es): The primary encounter diagnosis was Acute GI bleeding. Diagnoses of Blood loss anemia, Atrial fibrillation with RVR ([facility] [address] Acute cystitis without hematuria, Septicemia ([facility] [address] Supratherapeutic INR, and JULIO (acute kidney injury) ([facility] [address] were also pertinent to this visit. has a past medical history of Atrial fibrillation ([facility] [address] CAD (coronary artery disease), Depression, Hyperlipidemia, Hypertension, Hypothyroid, Legal blindness, and Obstructive sleep apnea (adult) (pediatric). has a past surgical history that includes Coronary artery bypass graft (1997); Tubal ligation; angioplasty; sigmoidoscopy (10/19/2018); pr sigmoidoscopy flx dx w/collj spec br/wa if pfrmd (N/A, 10/19/2018); eye surgery (Right); eye surgery (Left); Tonsillectomy and adenoidectomy; Colonoscopy (11/16/2018); Upper gastrointestinal endoscopy (N/A, 10/20/2021); Colonoscopy (N/A, 10/20/2021); and Colonoscopy (N/A, 11/4/2021). Restrictions  Restrictions/Precautions  Restrictions/Precautions: Fall Risk (high fall risk)  Required Braces or Orthoses?: No  Position Activity Restriction  Other position/activity restrictions: Sandy Luna is a 82 y.o. female who presents to the emergency department with a chief complaint of some shortness of breath and general weakness. Her ex- who is taking care of her later presents and states she began to have some bloody stools again today. She was just discharged about a week ago for this and had EGD and colonoscopy that revealed no source of bleeding but she did have 2 clips placed over nonbleeding cecal AVMs. She is anticoagulated on Eliquis. She denies chest pain, abdominal pain, dysuria, hematuria, fevers, nausea, vomiting or any other symptoms. Patient is unsure of how many bloody stools she has had. Anticoagulant on Eliquis with history of atrial fibrillation.      Subjective   General  Chart Reviewed: Yes  Response To Previous Treatment: Patient with no complaints from previous session. Family / Caregiver Present: Yes (daughter)  Subjective  Subjective: Patient reports she wants to get up to chair. She does not want to try to walk. General Comment  Comments: Patient supine w/ HOB elevated. Pain Screening  Patient Currently in Pain: Denies  Vital Signs  Patient Currently in Pain: Denies       Orientation  Orientation  Overall Orientation Status: Impaired  Orientation Level: Oriented to person; Disoriented to place; Disoriented to time; Disoriented to situation     Objective   Bed mobility  Rolling to Right: Maximum assistance  Supine to Sit: Maximum assistance  Scooting: Stand by assistance  Comment: HOB slightly elevated, patient used bed rail for support. Transfers  Sit to Stand: Minimal Assistance  Stand to sit: Minimal Assistance  Bed to Chair: Minimal assistance  Stand Pivot Transfers: Minimal Assistance  Comment: Gait belt donned. Patient used RW for balance, required manual cues with walker. Ambulation  Ambulation?: No (Patient declined.)  Stairs/Curb  Stairs?: No     Balance  Posture: Fair  Sitting - Static: Good; -  Sitting - Dynamic: Fair  Standing - Static: Fair  Standing - Dynamic: Fair; -      AROM RLE (degrees)  RLE AROM: WFL  AROM LLE (degrees)  LLE AROM : WFL  Strength LLE  Strength LLE: [facility]           AM-PAC Score  AM-PAC Inpatient Mobility Raw Score : 13 (11/12/21 1541)  AM-PAC Inpatient T-Scale Score : 36.74 (11/12/21 1541)  Mobility Inpatient CMS 0-100% Score: 64.91 (11/12/21 1541)  Mobility Inpatient CMS G-Code Modifier : CL (11/12/21 1541)        Goals  Short term goals  Time Frame for Short term goals: To be met prior to discharge  Short term goal 1: Bed mobility with min A.  (Not met)  Short term goal 2: Patient will perform sit-stand MOD I w/ RW. Short term goal 3: Patient will transfer bed-chair w/ RW and CGA.   Short term goal 4: Patient will ambulate 22' w/ RW and MIN assist.  Patient Goals   Patient goals : did not state    Plan    Plan  Times per week: 3-5  Times per day: Daily  Current Treatment Recommendations: Strengthening, Transfer Training, Endurance Training, Balance Training, Gait Training, Functional Mobility Training, Stair training, Safety Education & Training, Home Exercise Program, Patient/Caregiver Education & Training  Safety Devices  Type of devices:  All fall risk precautions in place, Call light within reach, Left in chair, Patient at risk for falls, Chair alarm in place, Gait belt, Nurse notified, Telesitter in use (daughter present in room)  Restraints  Initially in place: No     Therapy Time   Individual Concurrent Group Co-treatment   Time In 1457         Time Out 1536         Minutes 39         Timed Code Treatment Minutes: [address] [place], [place], DPT, ATC-R [id number]

## 2021-11-12 NOTE — PROGRESS NOTES
Shift assessment completed. Routine vitals stable. Scheduled medications given. IV potassium replacement infusing. Patient is awake, resting in bed. Daughter Kavita Oliva at bedside. Respirations are easy and unlabored. Patient does not appear to be in distress. Call light within reach.

## 2021-11-12 NOTE — CARE COORDINATION
CM was informed by  who worked with pt yesterday that [facility] ([facility]) had stated they could accept patient. CM called [facility] [phone] and left  and CM office number for them to call and leave message confirming they will accept patient.     Dayna Rodriguez, RN, BSN  [phone]

## 2021-11-12 NOTE — PROGRESS NOTES
MD Zabrina Olmstead MD Arlo Leigh, MD                                  Office: [phone]                 Fax: [phone]          [place]                  NEPHROLOGY  INPATIENT PROGRESS NOTE:     PATIENT NAME: Geeta Horn  : 1935  MRN: [id number]        Assessment and Plan   JULIO on CKD 3bsevere. Baseline crea 1.4. Non-oliguric. UA bland. Hypoxia worsened   CXR reviewed - fluid overload concerns too   Pulm following     Needs K repletion  Mag stable     Continue herron     Hypernatremia - severe- better   Hold lasix    F/u recheck labs afternoon    D/W nurse too     Per PICC RN: Peripheral IV placed for extra access. PICC held for now until swelling dissipates. Hypotension. Improved. Hypovolemia. Improved  Hypokalemia-replaced. Follow K. Hypocalcemia- asymptomatic. Anemia of acute blood loss. follow Hgb. Metabolic acidosis. Improved. Hypoalbuminemia  E. coli sepsis   Bacteremia     High complexity. Multiple medical problems. Discussed with patient and treatment team.    Thank you for allowing me to participate in this patient's care. Please do not hesitate to contact me for any questions/concerns. We will follow along with you. Blaine Antonio MD  [facility] [address]   Phone: [phone] or [address] [employer]  Fax: [phone]             Subjective:   Remains weak and lethargic. Hypoxia better     Objective:      Objective:  EXAM  Vitals:    21 0714   BP: 95/64   Pulse: 66   Resp: 18   Temp:    SpO2: 92%       Intake/Output Summary (Last 24 hours) at 2021 1303  Last data filed at 2021 0620  Gross per 24 hour   Intake 830.66 ml   Output 700 ml   Net 130.66 ml     General: sleepy  HENT: Atraumatic, normocephalic   Eyes: Normal conjunctiva, Non-incteral sclera. Neck: Supple, JVD not visible. CVS:  Heart sounds are normal. No loud murmur.     RS: abNormal respiratory effort, Breat sound: diminished at bases.   Abd: Soft , bowel sounds are normal, no distension and no tenderness . Skin: No rash , some bruises,   Extremities/MSK: ++ Edema, no cyanosis. Active Problems:    Essential hypertension    ROBYN (obstructive sleep apnea)    Acute GI bleeding    Atrial fibrillation with RVR (HCC)    Severe sepsis (HCC)    E coli bacteremia    Supratherapeutic INR    Hyperkalemia    Normocytic normochromic anemia    Hyponatremia    Bilateral pleural effusion    Diverticulosis    Anasarca    Overweight (BMI 25.0-29. 9)    High anion gap metabolic acidosis    Elevated brain natriuretic peptide (BNP) level    JULIO (acute kidney injury) ([facility] [address]    Hypokalemia    Hypernatremia  Resolved Problems:    * No resolved hospital problems. *        Medications Reviewed by me   amoxicillin-clavulanate  1 tablet Oral 2 times per day    lactobacillus  1 capsule Oral BID WC    potassium chloride  20 mEq Oral Once    [Held by provider] furosemide  20 mg IntraVENous BID    dilTIAZem  30 mg Oral 4 times per day    lidocaine 1 % injection  5 mL IntraDERmal Once    apixaban  2.5 mg Oral BID    metoprolol succinate  50 mg Oral TID    QUEtiapine  50 mg Oral Nightly    sodium chloride flush  5-40 mL IntraVENous 2 times per day    Vitamin D  2 tablet Oral Daily      sodium chloride 25 mL (11/12/21 0946)       Data Review. Labs reviewed by me       CBC:   Recent Labs     11/10/21  0623 11/11/21  0623 11/12/21  0620   WBC 11.9* 11.9* 9.2   HGB 8.6* 8.5* 8.0*   HCT 27.6* 27.6* 25.8*   MCV 91.4 91.1 92.8    180 153     BMP:   Recent Labs     11/10/21  0556 11/10/21  0556 11/11/21 0623 11/12/21  0020 11/12/21  0620     --  150*  --  147*   K 3.1*   < > 3.0* 3.1* 3.3*     --  106  --  106   CO2 21  --  31  --  33*   PHOS 3.7  --  2.7  --  3.3   BUN 34*  --  27*  --  23*   CREATININE 1.3*  --  1.1  --  1.3*    < > = values in this interval not displayed.      Magnesium:   Lab Results   Component Value Date    MG 2.00 11/12/2021    MG 1.60 11/11/2021    MG 2.20 11/10/2021     Lab Results   Component Value Date    CREATININE 1.3 11/12/2021       Arterial Blood Gasses  No results for input(s): PH, PCO2, PO2 in the last 72 hours. Invalid input(s): [facility]    UA:  No results for input(s): NITRITE, COLORU, PHUR, LABCAST, WBCUA, RBCUA, MUCUS, TRICHOMONAS, YEAST, BACTERIA, CLARITYU, SPECGRAV, LEUKOCYTESUR, UROBILINOGEN, BILIRUBINUR, BLOODU, GLUCOSEU, AMORPHOUS in the last 72 hours. Invalid input(s): Chyna Phipps    LIVER PROFILE:   No results for input(s): AST, ALT, LIPASE, BILIDIR, BILITOT, ALKPHOS in the last 72 hours. Invalid input(s): AMYLASE,  ALB  PT/INR:    Lab Results   Component Value Date    PROTIME 20.2 11/12/2021    PROTIME 24.4 11/11/2021    PROTIME 20.1 11/10/2021    INR 1.75 11/12/2021    INR 2.09 11/11/2021    INR 1.74 11/10/2021     PTT:    Lab Results   Component Value Date    APTT 32.1 11/01/2021     CANDE:  No results found for: ANATITER, CANDE  CHEMISTRY COMMON GROUP :   Lab Results   Component Value Date    GLUCOSE 136 11/12/2021    TSH 2.54 06/03/2010     Recent Labs     11/10/21  0556 11/11/21  0623 11/12/21  0620   GLUCOSE 105* 117* 136*   CALCIUM 8.5 8.5 8.2*         RADIOLOGY:        Imaging Results.   Chest X Ray reviewed by me      Electronically Signed: David Cevallos MD 11/12/2021 1:03 PM

## 2021-11-12 NOTE — PROGRESS NOTES
[facility]  Progress note      Reason for Consult: Acute hypoxemic respiratory failure  Requesting Physician: Dr. Torie Crawford:   Reji Koch / HPI:                The patient is a 82 y.o. female with significant past medical history of:      Diagnosis Date    Atrial fibrillation ([address]     CAD (coronary artery disease)     Depression     Hyperlipidemia     Hypertension     Hypothyroid     Legal blindness     Obstructive sleep apnea (adult) (pediatric)      Interval history: She is about the same. She was sleeping when I was there. Her sister was in the room and stated the patient was quite awake when her daughter was present.       Past Surgical History:        Procedure Laterality Date    ANGIOPLASTY      COLONOSCOPY  11/16/2018    COLONOSCOPY POLYPECTOMY SNARE/COLD BIOPSY performed by Jasmin Sosa MD at [address] [place] [address] 10/20/2021    COLONOSCOPY POLYPECTOMY ABLATION performed by Jose Molina MD at [address] [place] [address] 11/4/2021    COLONOSCOPY POLYPECTOMY ABLATION performed by Gillian Bergman MD at [address] Right     CATARACT EXTRACTION W/ IOL    EYE SURGERY Left     CATARCT EXTRACTION W/ IOL    NH SIGMOIDOSCOPY FLX DX W/COLLJ SPEC BR/WA IF PFRMD N/A 10/19/2018    SIGMOIDOSCOPY DIAGNOSTIC FLEXIBLE performed by Jasmin Sosa MD at [address] [facility]  10/19/2018    flexible    TONSILLECTOMY AND ADENOIDECTOMY      TUBAL LIGATION      UPPER GASTROINTESTINAL ENDOSCOPY N/A 10/20/2021    EGD BIOPSY performed by Jose Molina MD at [address] [facility] ENDOSCOPY     Current Medications:    Current Facility-Administered Medications: LORazepam (ATIVAN) injection 0.5 mg, 0.5 mg, IntraVENous, Q6H PRN  amoxicillin-clavulanate (AUGMENTIN) 875-125 MG per tablet 1 tablet, 1 tablet, Oral, 2 times per day  lactobacillus (CULTURELLE) capsule 1 capsule, 1 capsule, Oral, BID WC  magnesium sulfate 1000 mg in dextrose 5% 100 mL IVPB, 1,000 mg, IntraVENous, PRN  potassium chloride 10 mEq/100 mL IVPB (Peripheral Line), 10 mEq, IntraVENous, PRN  ondansetron (ZOFRAN) injection 4 mg, 4 mg, IntraVENous, Q6H PRN  [Held by provider] furosemide (LASIX) injection 20 mg, 20 mg, IntraVENous, BID  dilTIAZem (CARDIZEM) tablet 30 mg, 30 mg, Oral, 4 times per day  lidocaine PF 1 % injection 5 mL, 5 mL, IntraDERmal, Once  apixaban (ELIQUIS) tablet 2.5 mg, 2.5 mg, Oral, BID  metoprolol succinate (TOPROL XL) extended release tablet 50 mg, 50 mg, Oral, TID  QUEtiapine (SEROQUEL) tablet 50 mg, 50 mg, Oral, Nightly  sodium chloride flush 0.9 % injection 5-40 mL, 5-40 mL, IntraVENous, 2 times per day  sodium chloride flush 0.9 % injection 5-40 mL, 5-40 mL, IntraVENous, PRN  0.9 % sodium chloride infusion, 25 mL, IntraVENous, PRN  Vitamin D (CHOLECALCIFEROL) tablet 2,000 Units, 2 tablet, Oral, Daily    Allergies   Allergen Reactions    Percocet [Oxycodone-Acetaminophen] Other (See Comments)     Pt states it makes her crazy. Social History:    TOBACCO:   reports that she has never smoked. She has never used smokeless tobacco.  ETOH:   reports current alcohol use of about 2.0 standard drinks of alcohol per week. Patient currently lives independently  Environmental/chemical exposure: None known    Family History:       Problem Relation Age of Onset    Heart Disease Mother     Heart Disease Father     Asthma Neg Hx     Cancer Neg Hx     Diabetes Neg Hx     Emphysema Neg Hx     Hypertension Neg Hx     Heart Failure Neg Hx      REVIEW OF SYSTEMS:    CONSTITUTIONAL:  negative for fevers, chills, diaphoresis, activity change, appetite change, fatigue, night sweats and unexpected weight change.    EYES:  negative for blurred vision, eye discharge, visual disturbance and icterus  HEENT:  negative for hearing loss, tinnitus, ear drainage, sinus pressure, nasal congestion, epistaxis and snoring  RESPIRATORY: obvious depression or anxiety. MUSCULOSKELETAL: No obvious misalignment or effusion of the joints. No clubbing, cyanosis of the digits. SKIN:  normal skin color, texture, turgor and no redness, warmth, or swelling. No palpable nodules    DATA:    Old records have been reviewed    CBC:  Recent Labs     11/10/21  0623 11/11/21  0623 11/12/21  0620   WBC 11.9* 11.9* 9.2   RBC 3.02* 3.03* 2.78*   HGB 8.6* 8.5* 8.0*   HCT 27.6* 27.6* 25.8*    180 153   MCV 91.4 91.1 92.8   MCH 28.4 28.2 28.7   MCHC 31.1 31.0 30.9*   RDW 20.5* 20.6* 21.0*      BMP:  Recent Labs     11/10/21  0556 11/10/21  0556 11/11/21  0623 11/12/21  0020 11/12/21  0620     --  150*  --  147*   K 3.1*   < > 3.0* 3.1* 3.3*     --  106  --  106   CO2 21  --  31  --  33*   BUN 34*  --  27*  --  23*   CREATININE 1.3*  --  1.1  --  1.3*   CALCIUM 8.5  --  8.5  --  8.2*   GLUCOSE 105*  --  117*  --  136*    < > = values in this interval not displayed. ABG:  No results for input(s): PHART, FXD5RZE, PO2ART, FIU1CLQ, S5SCFUIP, BEART in the last 72 hours. Procalcitonin  Recent Labs     11/09/21  1657   PROCAL 0.61*       No results found for: BNP  Lab Results   Component Value Date    TROPONINI 0.02 (H) 11/05/2021           Radiology Review:  All pertinent images / reports were reviewed as a part of this visit. Assessment:     1. Acute pulmonary edema  2. Multifocal pneumonia  3. Acute kidney injury  4. E. coli bacteremia      Plan:     I have reviewed laboratories, medical records and images for this visit  Repeat chest x-ray continues to show diffuse airspace disease. This has not improved. This could be a combination of pneumonia and pulmonary edema  I did request procalcitonin which is come back at 0.61  Continue Zosyn  BNP is over 50,000. Echocardiogram reveals grade 2 diastolic dysfunction  Oxygen needs are downtrending. She is now on 4 L/min HFNC.   Discussed with her sister at the bedside

## 2021-11-12 NOTE — PROGRESS NOTES
Speech Language Pathology  Attempt    Giuseppe Ends  1935      SLP completed chart review and consulted with RN who reports poor PO intake, but overall tolerating current diet. Attempted to see patient for follow-up dysphagia therapy, however patient unable to be roused and remaining sleeping. Daughter at bedside. SLP will attempt again as schedule permits.       Thanks,  Paul Jo M.A., Gus, [id number]  Speech-Language Pathologist

## 2021-11-12 NOTE — PROGRESS NOTES
Pt assisted to bedside commode with this RN and help from daughter. Pt had BM, got bathed, hair washed, electrodes replaced, patient agitated at the time so PRN ativan ordered but not needing to give at this time after patient is calm and back in bed.

## 2021-11-13 ENCOUNTER — APPOINTMENT (OUTPATIENT)
Dept: GENERAL RADIOLOGY | Age: 86
DRG: 871 | End: 2021-11-13
Payer: COMMERCIAL

## 2021-11-13 LAB
ALBUMIN SERPL-MCNC: 2.3 G/DL (ref 3.4–5)
ANION GAP SERPL CALCULATED.3IONS-SCNC: 10 MMOL/L (ref 3–16)
ANION GAP SERPL CALCULATED.3IONS-SCNC: 9 MMOL/L (ref 3–16)
BASOPHILS ABSOLUTE: 0 K/UL (ref 0–0.2)
BASOPHILS RELATIVE PERCENT: 0.3 %
BLOOD CULTURE, ROUTINE: NORMAL
BUN BLDV-MCNC: 24 MG/DL (ref 7–20)
BUN BLDV-MCNC: 26 MG/DL (ref 7–20)
CALCIUM SERPL-MCNC: 7.9 MG/DL (ref 8.3–10.6)
CALCIUM SERPL-MCNC: 8.4 MG/DL (ref 8.3–10.6)
CHLORIDE BLD-SCNC: 105 MMOL/L (ref 99–110)
CHLORIDE BLD-SCNC: 111 MMOL/L (ref 99–110)
CO2: 32 MMOL/L (ref 21–32)
CO2: 33 MMOL/L (ref 21–32)
CREAT SERPL-MCNC: 1.3 MG/DL (ref 0.6–1.2)
CREAT SERPL-MCNC: 1.5 MG/DL (ref 0.6–1.2)
CULTURE, BLOOD 2: NORMAL
EOSINOPHILS ABSOLUTE: 0 K/UL (ref 0–0.6)
EOSINOPHILS RELATIVE PERCENT: 0 %
GFR AFRICAN AMERICAN: 40
GFR AFRICAN AMERICAN: 47
GFR NON-AFRICAN AMERICAN: 33
GFR NON-AFRICAN AMERICAN: 39
GLUCOSE BLD-MCNC: 127 MG/DL (ref 70–99)
GLUCOSE BLD-MCNC: 150 MG/DL (ref 70–99)
HCT VFR BLD CALC: 26.4 % (ref 36–48)
HEMOGLOBIN: 8 G/DL (ref 12–16)
INR BLD: 1.8 (ref 0.88–1.12)
LYMPHOCYTES ABSOLUTE: 0.2 K/UL (ref 1–5.1)
LYMPHOCYTES RELATIVE PERCENT: 2 %
MCH RBC QN AUTO: 28.5 PG (ref 26–34)
MCHC RBC AUTO-ENTMCNC: 30.1 G/DL (ref 31–36)
MCV RBC AUTO: 94.5 FL (ref 80–100)
MONOCYTES ABSOLUTE: 0.1 K/UL (ref 0–1.3)
MONOCYTES RELATIVE PERCENT: 1.2 %
NEUTROPHILS ABSOLUTE: 10.3 K/UL (ref 1.7–7.7)
NEUTROPHILS RELATIVE PERCENT: 96.5 %
PDW BLD-RTO: 20.7 % (ref 12.4–15.4)
PHOSPHORUS: 3.7 MG/DL (ref 2.5–4.9)
PLATELET # BLD: 137 K/UL (ref 135–450)
PMV BLD AUTO: 7.6 FL (ref 5–10.5)
POTASSIUM SERPL-SCNC: 3.2 MMOL/L (ref 3.5–5.1)
POTASSIUM SERPL-SCNC: 3.6 MMOL/L (ref 3.5–5.1)
PROTHROMBIN TIME: 20.8 SEC (ref 9.9–12.7)
RBC # BLD: 2.8 M/UL (ref 4–5.2)
SODIUM BLD-SCNC: 148 MMOL/L (ref 136–145)
SODIUM BLD-SCNC: 152 MMOL/L (ref 136–145)
WBC # BLD: 10.6 K/UL (ref 4–11)

## 2021-11-13 PROCEDURE — 6370000000 HC RX 637 (ALT 250 FOR IP): Performed by: INTERNAL MEDICINE

## 2021-11-13 PROCEDURE — 94761 N-INVAS EAR/PLS OXIMETRY MLT: CPT

## 2021-11-13 PROCEDURE — 85025 COMPLETE CBC W/AUTO DIFF WBC: CPT

## 2021-11-13 PROCEDURE — 99233 SBSQ HOSP IP/OBS HIGH 50: CPT | Performed by: INTERNAL MEDICINE

## 2021-11-13 PROCEDURE — 2580000003 HC RX 258: Performed by: INTERNAL MEDICINE

## 2021-11-13 PROCEDURE — 1200000000 HC SEMI PRIVATE

## 2021-11-13 PROCEDURE — 85610 PROTHROMBIN TIME: CPT

## 2021-11-13 PROCEDURE — 6360000002 HC RX W HCPCS: Performed by: INTERNAL MEDICINE

## 2021-11-13 PROCEDURE — 71045 X-RAY EXAM CHEST 1 VIEW: CPT

## 2021-11-13 PROCEDURE — 6370000000 HC RX 637 (ALT 250 FOR IP): Performed by: EMERGENCY MEDICINE

## 2021-11-13 PROCEDURE — 6370000000 HC RX 637 (ALT 250 FOR IP): Performed by: HOSPITALIST

## 2021-11-13 PROCEDURE — 2700000000 HC OXYGEN THERAPY PER DAY

## 2021-11-13 PROCEDURE — 80069 RENAL FUNCTION PANEL: CPT

## 2021-11-13 RX ORDER — POTASSIUM BICARBONATE 25 MEQ/1
25 TABLET, EFFERVESCENT ORAL ONCE
Status: DISCONTINUED | OUTPATIENT
Start: 2021-11-13 | End: 2021-11-13

## 2021-11-13 RX ORDER — POTASSIUM CHLORIDE 7.45 MG/ML
10 INJECTION INTRAVENOUS
Status: ACTIVE | OUTPATIENT
Start: 2021-11-13 | End: 2021-11-13

## 2021-11-13 RX ADMIN — DILTIAZEM HYDROCHLORIDE 30 MG: 30 TABLET, FILM COATED ORAL at 01:18

## 2021-11-13 RX ADMIN — DILTIAZEM HYDROCHLORIDE 30 MG: 30 TABLET, FILM COATED ORAL at 06:19

## 2021-11-13 RX ADMIN — Medication 2000 UNITS: at 09:05

## 2021-11-13 RX ADMIN — METOPROLOL SUCCINATE 50 MG: 50 TABLET, EXTENDED RELEASE ORAL at 09:05

## 2021-11-13 RX ADMIN — APIXABAN 2.5 MG: 2.5 TABLET, FILM COATED ORAL at 21:23

## 2021-11-13 RX ADMIN — Medication 10 ML: at 21:20

## 2021-11-13 RX ADMIN — APIXABAN 2.5 MG: 2.5 TABLET, FILM COATED ORAL at 09:05

## 2021-11-13 RX ADMIN — METOPROLOL TARTRATE 25 MG: 25 TABLET, FILM COATED ORAL at 18:34

## 2021-11-13 RX ADMIN — METOPROLOL TARTRATE 25 MG: 25 TABLET, FILM COATED ORAL at 21:19

## 2021-11-13 RX ADMIN — Medication 1 CAPSULE: at 17:16

## 2021-11-13 RX ADMIN — DILTIAZEM HYDROCHLORIDE 30 MG: 30 TABLET, FILM COATED ORAL at 17:16

## 2021-11-13 RX ADMIN — QUETIAPINE FUMARATE 50 MG: 25 TABLET ORAL at 21:20

## 2021-11-13 RX ADMIN — POTASSIUM CHLORIDE: 149 INJECTION, SOLUTION, CONCENTRATE INTRAVENOUS at 10:56

## 2021-11-13 RX ADMIN — DILTIAZEM HYDROCHLORIDE 30 MG: 30 TABLET, FILM COATED ORAL at 13:18

## 2021-11-13 RX ADMIN — AMOXICILLIN AND CLAVULANATE POTASSIUM 1 TABLET: 875; 125 TABLET, FILM COATED ORAL at 09:05

## 2021-11-13 RX ADMIN — Medication 10 ML: at 09:05

## 2021-11-13 RX ADMIN — AMOXICILLIN AND CLAVULANATE POTASSIUM 1 TABLET: 875; 125 TABLET, FILM COATED ORAL at 21:19

## 2021-11-13 RX ADMIN — Medication 1 CAPSULE: at 09:05

## 2021-11-13 ASSESSMENT — PAIN SCALES - WONG BAKER

## 2021-11-13 ASSESSMENT — PAIN SCALES - GENERAL
PAINLEVEL_OUTOF10: 0

## 2021-11-13 NOTE — PROGRESS NOTES
Hospitalist Progress Note      PCP: Leo Orellana    Date of Admission: 11/1/2021    Chief Complaint: SOB    Hospital Course: 82-year-old female who was admitted for acute blood loss anemia due to rectal bleeding found to be in severe sepsis due to E. coli UTI with bacteremia. She underwent EGD & colonoscopy endings of a cecal ulcer. She was in the ICU initially and transferred to the floor. Patient was doing well up until 11/8/2021 when she had aspiration event. Hospital course has since then been complicated by A. fib with RVR, acute hypoxic respiratory failure, metabolic encephalopathy, hyponatremia and hypokalemia with poor oral intake .       Subjective:  Patient seen and examined. Daughter at bedside. Patient lethargic with no significant improvement in mental status. UTO ROS. Poor oral intake per RN. Hyponatremic and hypokalemic.         Medications:  Reviewed    Infusion Medications    IV infusion builder 70 mL/hr at 11/13/21 1056    sodium chloride 25 mL (11/12/21 0972)     Scheduled Medications    amoxicillin-clavulanate  1 tablet Oral 2 times per day    lactobacillus  1 capsule Oral BID WC    [Held by provider] furosemide  20 mg IntraVENous BID    dilTIAZem  30 mg Oral 4 times per day    lidocaine 1 % injection  5 mL IntraDERmal Once    apixaban  2.5 mg Oral BID    metoprolol succinate  50 mg Oral TID    QUEtiapine  50 mg Oral Nightly    sodium chloride flush  5-40 mL IntraVENous 2 times per day    Vitamin D  2 tablet Oral Daily     PRN Meds: LORazepam, magnesium sulfate, potassium chloride, ondansetron, sodium chloride flush, sodium chloride      Intake/Output Summary (Last 24 hours) at 11/13/2021 1626  Last data filed at 11/13/2021 1333  Gross per 24 hour   Intake 300 ml   Output 475 ml   Net -175 ml       Physical Exam Performed:    BP (!) 106/58   Pulse 75   Temp 96.4 °F (35.8 °C) (Temporal)   Resp 16   Ht 5' 5\" (1.651 m)   Wt 171 lb 8 oz (77.8 kg)   SpO2 94%   BMI 28.54 kg/m²     General appearance: Frail, chronically ill looking, appears stated age and cooperative. HEENT: Conjunctivae. Neck: Supple, with full range of motion. Trachea midline. Respiratory:  Normal respiratory effort. Decreased air entry at the bases with rales, No Wheezes/Rhonchi. Cardiovascular: Regular rate and rhythm with normal S1/S2 without murmurs, rubs or gallops. Abdomen: Soft, non-tender, non-distended with normal bowel sounds. Musculoskeletal: No clubbing, cyanosis or edema bilaterally. Skin: Skin color, texture, turgor normal. Stage 2 sacral ulcer. Neurologic: unable to assess. Psychiatric: Sleepy and lethargic. Capillary Refill: Brisk,3 seconds, normal   Peripheral Pulses: +2 palpable, equal bilaterally       Labs:   Recent Labs     11/11/21 0623 11/12/21 0620 11/13/21 0625   WBC 11.9* 9.2 10.6   HGB 8.5* 8.0* 8.0*   HCT 27.6* 25.8* 26.4*    153 137     Recent Labs     11/11/21  0623 11/11/21 0623 11/12/21  0020 11/12/21 0620 11/13/21 0625   *  --   --  147* 152*   K 3.0*   < > 3.1* 3.3* 3.2*     --   --  106 111*   CO2 31  --   --  33* 32   BUN 27*  --   --  23* 24*   CREATININE 1.1  --   --  1.3* 1.3*   CALCIUM 8.5  --   --  8.2* 7.9*   PHOS 2.7  --   --  3.3 3.7    < > = values in this interval not displayed. No results for input(s): AST, ALT, BILIDIR, BILITOT, ALKPHOS in the last 72 hours. Recent Labs     11/11/21  1210 11/12/21 0620 11/13/21 0625   INR 2.09* 1.75* 1.80*     No results for input(s): CKTOTAL, TROPONINI in the last 72 hours.     Urinalysis:      Lab Results   Component Value Date    NITRU Negative 11/06/2021    WBCUA 6 11/06/2021    BACTERIA RARE 11/06/2021    RBCUA 0-2 11/06/2021    BLOODU Negative 11/06/2021    SPECGRAV 1.018 11/06/2021    GLUCOSEU Negative 11/06/2021       Radiology:  XR CHEST PORTABLE   Final Result   Unchanged bilateral airspace opacities         XR CHEST PORTABLE   Final Result   Extensive bilateral airspace opacities persist suggesting multifocal pneumonia         CT CHEST WO CONTRAST   Final Result   Extensive bilateral pneumonia. Small pleural effusions have enlarged. XR CHEST PORTABLE   Final Result   Mild interval worsening of the bilateral airspace opacities, when compared to   the previous study performed 11/08/2021. Rule out worsening CHF. Pneumonia   cannot be entirely excluded. Decrease in the right pleural effusion. XR CHEST PORTABLE   Final Result   Worsening patchy bilateral airspace opacities. Consider infection. Stable   bilateral pleural effusions. XR CHEST PORTABLE   Final Result   Nasogastric tube is in normal position. Vascular congestion. Stable bilateral pleural effusions with associated atelectasis or airspace   disease. CT CHEST ABDOMEN PELVIS WO CONTRAST   Final Result   Small bilateral pleural effusions are seen with adjacent consolidation at the   lung bases, likely atelectasis in the absence of clinical signs of pneumonia. Septal thickening is seen, compatible with fluid overload. Mildly enlarged precarinal node, likely reactive due to the suspected fluid   overload. Recommend 3 month follow-up chest CT for further characterization. Scattered colonic diverticula are seen. No significant pericolonic fat   stranding. Body wall anasarca with trace free fluid in pelvis, compatible with mild   fluid overload         XR CHEST PORTABLE   Final Result   Interval resolution of pulmonary edema. Fluid in the minor fissure, and   suspected bilateral pleural effusions.   Left basilar airspace disease likely   atelectasis                 Assessment/Plan:    Active Hospital Problems    Diagnosis     Hypernatremia [E87.0]     JULIO (acute kidney injury) ([address] [N17.9]     Hypokalemia [E87.6]     Atrial fibrillation with RVR ([address] [I48.91]     Severe sepsis ([address] [A41.9, R65.20]     E coli bacteremia [R78.81, B96.20]     Supratherapeutic INR [R79.1]  Hyperkalemia [E87.5]     Normocytic normochromic anemia [D64.9]     Hyponatremia [E87.1]     Bilateral pleural effusion [J90]     Diverticulosis [K57.90]     Anasarca [R60.1]     Overweight (BMI 25.0-29. 9) [E66.3]     High anion gap metabolic acidosis [W21.5]     Elevated brain natriuretic peptide (BNP) level [R79.89]     Acute GI bleeding [K92.2]     ROBYN (obstructive sleep apnea) [G47.33]     Essential hypertension [B33]      Metabolic encephalopathy:  Multifactorial: Sepsis, hypernatremia, hypoxia. Continue current management with close monitoring. Goals of care discussed with daughter POA at bedside. She want her mother to be full code in full accordance with patient's  wishes. Hypokalemia:  Replacement ongoing. Monitor potassium level. Hypernatremia:  Hold Lasix. Started on D5 water. Monitor Na level. Sepsis Secondary to bacteremia E. Coli bacteremia:  From a bacteremia perspective patient is stable. Repeat cultures 11/9/2021 no growth to date. ID consulted: off Zosyn.       Acute hypoxic respiratory failure due to aspiration pneumonia & volume overload:  CXR: Multifocal airspace disease. ? PNA vs. Pulmonary edema. Oxygen requirement initially 10 L now down to 4 L. Diuresed adequately. Complete antibiotic course with Augmentin. Dysphagia: swallow evaluation done: Continue dysphagia diet. A. fib with RVR:  Now rate controlled. Continue Cardizem, metoprolol and Eliquis.     JULIO on CKD 3:  Creatinine peak 3.0 now 1.3. Monitor renal function. Nephrology following, appreciate input recommendations noted. 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 s/p 3 units PRBC. H/H has been stable. Goal > 7 g/dl. DVT Prophylaxis: Apixaban  Diet: ADULT DIET; Dysphagia - Pureed; Mildly Thick (Nectar);  No Drinking Straws  ADULT ORAL NUTRITION SUPPLEMENT; Dinner; Frozen Oral Supplement  ADULT ORAL NUTRITION SUPPLEMENT; Lunch, Dinner; Fortified Pudding Oral Supplement  Code Status: Full Code    PT/OT Eval Status:SNF    Dispo -once medically stable    Keke White MD

## 2021-11-13 NOTE — PROGRESS NOTES
Shift assessment completed. Routine vitals stable. Pt alert and easy to arouse. Scheduled medications given crushed with applesauce. Patient is resting with no s/s of pain or discomfort noted. Daughter is at bedside. Respirations are easy and unlabored. . Call light within reach.

## 2021-11-13 NOTE — PROGRESS NOTES
MD Colin Allen MD Brown Peels, MD                                  Office: [phone]                 Fax: [phone]          Close                  NEPHROLOGY  INPATIENT PROGRESS NOTE:     PATIENT NAME: Radha Adams  : 1935  MRN: [id number]        Assessment and Plan   JULIO on CKD 3bsevere. Baseline crea 1.4. UA was bland. Urine output - decreased     Hypoxia stable 6->4L NC  Recent CXR reviewed - fluid overload concerns too   Pulm following     Hypernatremia - severe- dehydration - worsening   Holding Lasix    Agree w/ more hypotonic IVFs w/ D5  F/u recheck labs today - has been ordered    Needs K repletion  Mag stable     Continue herron   D/W nurse too     Per PICC RN: Peripheral IV placed for extra access. PICC held for now until swelling dissipates. Hypotension. Improved. Hypovolemia. Improved  Hypokalemia-replaced. Follow K. Hypocalcemia- asymptomatic. Anemia of acute blood loss. follow Hgb. Metabolic acidosis. Improved. Hypoalbuminemia  E. coli sepsis   Bacteremia     High complexity. Multiple medical problems. Discussed with patient and treatment team.    Thank you for allowing me to participate in this patient's care. Please do not hesitate to contact me for any questions/concerns. We will follow along with you. Temo Looney MD  [facility] [address]   Phone: [phone] or Via [employer]  Fax: [phone]             Subjective:   Remains weak and lethargic. Hypoxia better   Still w/ dehydration     Objective:      Objective:  EXAM  Vitals:    21 0856   BP:    Pulse:    Resp:    Temp:    SpO2: 93%       Intake/Output Summary (Last 24 hours) at 2021 1049  Last data filed at 2021 0420  Gross per 24 hour   Intake 360 ml   Output 425 ml   Net -65 ml     General: sleepy  HENT: Atraumatic, normocephalic   Eyes: Normal conjunctiva, Non-incteral sclera. Neck: Supple, JVD not visible.    CVS: Heart sounds are normal. No loud murmur. RS: abNormal respiratory effort, Breat sound: diminished at bases. Abd: Soft , bowel sounds are normal, no distension and no tenderness . Skin: No rash , some bruises,   Extremities/MSK: + Edema, no cyanosis. Active Problems:    Essential hypertension    ROBYN (obstructive sleep apnea)    Acute GI bleeding    Atrial fibrillation with RVR (HCC)    Severe sepsis (HCC)    E coli bacteremia    Supratherapeutic INR    Hyperkalemia    Normocytic normochromic anemia    Hyponatremia    Bilateral pleural effusion    Diverticulosis    Anasarca    Overweight (BMI 25.0-29. 9)    High anion gap metabolic acidosis    Elevated brain natriuretic peptide (BNP) level    JULIO (acute kidney injury) ([facility] [address]    Hypokalemia    Hypernatremia  Resolved Problems:    * No resolved hospital problems. *        Medications Reviewed by me   potassium chloride  10 mEq IntraVENous Q1H    amoxicillin-clavulanate  1 tablet Oral 2 times per day    lactobacillus  1 capsule Oral BID WC    [Held by provider] furosemide  20 mg IntraVENous BID    dilTIAZem  30 mg Oral 4 times per day    lidocaine 1 % injection  5 mL IntraDERmal Once    apixaban  2.5 mg Oral BID    metoprolol succinate  50 mg Oral TID    QUEtiapine  50 mg Oral Nightly    sodium chloride flush  5-40 mL IntraVENous 2 times per day    Vitamin D  2 tablet Oral Daily      IV infusion builder      sodium chloride 25 mL (11/12/21 0946)       Data Review.     Labs reviewed by me       CBC:   Recent Labs     11/11/21 0623 11/12/21 0620 11/13/21  0625   WBC 11.9* 9.2 10.6   HGB 8.5* 8.0* 8.0*   HCT 27.6* 25.8* 26.4*   MCV 91.1 92.8 94.5    153 137     BMP:   Recent Labs     11/11/21 0623 11/11/21 0623 11/12/21  0020 11/12/21 0620 11/13/21  0625   *  --   --  147* 152*   K 3.0*   < > 3.1* 3.3* 3.2*     --   --  106 111*   CO2 31  --   --  33* 32   PHOS 2.7  --   --  3.3 3.7   BUN 27*  --   --  23* 24*   CREATININE 1.1 --   --  1.3* 1.3*    < > = values in this interval not displayed. Magnesium:   Lab Results   Component Value Date    MG 2.00 11/12/2021    MG 1.60 11/11/2021    MG 2.20 11/10/2021     Lab Results   Component Value Date    CREATININE 1.3 11/13/2021       Arterial Blood Gasses  No results for input(s): PH, PCO2, PO2 in the last 72 hours. Invalid input(s): [place] Likes    UA:  No results for input(s): NITRITE, COLORU, PHUR, LABCAST, WBCUA, RBCUA, MUCUS, TRICHOMONAS, YEAST, BACTERIA, CLARITYU, SPECGRAV, LEUKOCYTESUR, UROBILINOGEN, BILIRUBINUR, BLOODU, GLUCOSEU, AMORPHOUS in the last 72 hours. Invalid input(s): Quirino Alverto    LIVER PROFILE:   No results for input(s): AST, ALT, LIPASE, BILIDIR, BILITOT, ALKPHOS in the last 72 hours. Invalid input(s): AMYLASE,  ALB  PT/INR:    Lab Results   Component Value Date    PROTIME 20.8 11/13/2021    PROTIME 20.2 11/12/2021    PROTIME 24.4 11/11/2021    INR 1.80 11/13/2021    INR 1.75 11/12/2021    INR 2.09 11/11/2021     PTT:    Lab Results   Component Value Date    APTT 32.1 11/01/2021     CANDE:  No results found for: ANATITER, CANDE  CHEMISTRY COMMON GROUP :   Lab Results   Component Value Date    GLUCOSE 127 11/13/2021    TSH 2.54 06/03/2010     Recent Labs     11/11/21  0623 11/12/21  0620 11/13/21  0625   GLUCOSE 117* 136* 127*   CALCIUM 8.5 8.2* 7.9*         RADIOLOGY:        Imaging Results.   Chest X Ray reviewed by me      Electronically Signed: Blaine Antonio MD 11/13/2021 10:49 AM

## 2021-11-13 NOTE — PROGRESS NOTES
Shift assessment completed. Routine vitals obtained. Scheduled medications given. Patient initially responsive to voice but now more alert. Respirations are easy and unlabored. Patient does not appear to be in distress, resting comfortably at this time. Daughter at bedside. No needs expressed. Call light within reach. Fall precautions in place.

## 2021-11-13 NOTE — PROGRESS NOTES
PULMONARY AND CRITICAL CARE MEDICINE PROGRESS NOTE      SUBJECTIVE: Patient is seen at bedside. She was very sleepy when I saw her. The nurse later on told me that patient was awake and was sitting up at side of the bed and also take the medications. REVIEW OF SYSTEMS: Could not be obtained as she was sleepy    MEDICATIONS:      amoxicillin-clavulanate  1 tablet Oral 2 times per day    lactobacillus  1 capsule Oral BID WC    [Held by provider] furosemide  20 mg IntraVENous BID    dilTIAZem  30 mg Oral 4 times per day    lidocaine 1 % injection  5 mL IntraDERmal Once    apixaban  2.5 mg Oral BID    metoprolol succinate  50 mg Oral TID    QUEtiapine  50 mg Oral Nightly    sodium chloride flush  5-40 mL IntraVENous 2 times per day    Vitamin D  2 tablet Oral Daily      IV infusion builder 70 mL/hr at 11/13/21 1056    sodium chloride 25 mL (11/12/21 0946)     LORazepam, magnesium sulfate, potassium chloride, ondansetron, sodium chloride flush, sodium chloride     ALLERGIES:   Allergies as of 11/01/2021 - Fully Reviewed 11/01/2021   Allergen Reaction Noted    Percocet [oxycodone-acetaminophen] Other (See Comments) 05/07/2014        OBJECTIVE:   height is 5' 5\" (1.651 m) and weight is 171 lb 8 oz (77.8 kg). Her temporal temperature is 96.4 °F (35.8 °C). Her blood pressure is 106/58 (abnormal) and her pulse is 75. Her respiration is 16 and oxygen saturation is 94%. No intake/output data recorded. PHYSICAL EXAM:  CONSTITUTIONAL: She is a 82y.o.-year-old who appears her stated age. She is in no acute distress. CARDIOVASCULAR: S1 S2 RRR. Without murmer  RESPIRATORY & CHEST: Lungs are clear to auscultation and percussion. No wheezing, no crackles. Good air movement  GASTROINTESTINAL & ABDOMEN: Soft, nontender, positive bowel sounds in all quadrants, non-distended, without hepatosplenomegaly. GENITOURINARY: Deferred. MUSCULOSKELETAL: No tenderness to palpation of the axial skeleton.  There is no clubbing. No cyanosis. No edema of the lower extremities. SKIN OF BODY: No rash or jaundice. PSYCHIATRIC EVALUATION: Could not be assessed. HEMATOLOGIC/LYMPHATIC/ IMMUNOLOGIC: No palpable lymphadenopathy. NEUROLOGIC: Sleepy. Groslly non-focal. Motor strength is 5+/5 in all muscle groups. The patient has a normal sensorium globally. LABS:   Lab Results   Component Value Date    WBC 10.6 11/13/2021    HGB 8.0 (L) 11/13/2021    HCT 26.4 (L) 11/13/2021     11/13/2021    TRIG 200 (H) 12/03/2009    LDLDIRECT 204 (H) 06/03/2010    ALT 14 11/05/2021    AST 20 11/05/2021     (H) 11/13/2021    K 3.2 (L) 11/13/2021     (H) 11/13/2021    CREATININE 1.3 (H) 11/13/2021    BUN 24 (H) 11/13/2021    CO2 32 11/13/2021    TSH 2.54 06/03/2010    INR 1.80 (H) 11/13/2021       Lab Results   Component Value Date    GLUCOSE 127 (H) 11/13/2021    CALCIUM 7.9 (L) 11/13/2021     (H) 11/13/2021    K 3.2 (L) 11/13/2021    CO2 32 11/13/2021     (H) 11/13/2021    BUN 24 (H) 11/13/2021    CREATININE 1.3 (H) 11/13/2021       Lab Results   Component Value Date    GLUCOSE 127 (H) 11/13/2021    CALCIUM 7.9 (L) 11/13/2021     (H) 11/13/2021    K 3.2 (L) 11/13/2021    CO2 32 11/13/2021     (H) 11/13/2021    BUN 24 (H) 11/13/2021    CREATININE 1.3 (H) 11/13/2021       IMAGING:   I reviewed the chest x-ray from 11/11/2021 and my dictation is as follows. Bilateral extensive multifocal infiltrates noted. IMPRESSION:   1. Acute pulmonary edema  2. Multifocal pneumonia  3. Acute kidney injury  4. E. coli bacteremia      RECOMMENDATION:   Chest x-ray shows multifocal airspace disease. Differentials include pulmonary edema or multifocal pneumonia. Patient was noted to have BNP of more than 50,000. She is volume overloaded and also has bilateral leg swelling. Echo with grade 2 diastolic dysfunction  She would benefit from diuresis. Hypernatremia. Likely due to poor oral intake.   Agree with D5 water.  Mild elevation of procalcitonin at 0.61. Patient is on Zosyn. Remains on 4 L nasal cannula. Care discussed with family at bedside. Daniel Pearson MD  Pulmonary Critical Care and Sleep Medicine  11/13/2021, 3:21 PM    This note was completed using dragon medical speech recognition software. Grammatical errors, random word insertions, pronoun errors and incomplete sentences are occasional consequences of this technology due to software limitations. If there are questions or concerns about the content of this note of information contained within the body of this dictation they should be addressed with the provider for clarification.

## 2021-11-14 LAB
ALBUMIN SERPL-MCNC: 2.4 G/DL (ref 3.4–5)
ANION GAP SERPL CALCULATED.3IONS-SCNC: 7 MMOL/L (ref 3–16)
ANISOCYTOSIS: ABNORMAL
BASOPHILS ABSOLUTE: 0 K/UL (ref 0–0.2)
BASOPHILS RELATIVE PERCENT: 0 %
BUN BLDV-MCNC: 27 MG/DL (ref 7–20)
BURR CELLS: ABNORMAL
CALCIUM SERPL-MCNC: 8.4 MG/DL (ref 8.3–10.6)
CHLORIDE BLD-SCNC: 108 MMOL/L (ref 99–110)
CO2: 34 MMOL/L (ref 21–32)
CREAT SERPL-MCNC: 1.7 MG/DL (ref 0.6–1.2)
EOSINOPHILS ABSOLUTE: 0 K/UL (ref 0–0.6)
EOSINOPHILS RELATIVE PERCENT: 0 %
GFR AFRICAN AMERICAN: 35
GFR NON-AFRICAN AMERICAN: 29
GLUCOSE BLD-MCNC: 128 MG/DL (ref 70–99)
HCT VFR BLD CALC: 25.8 % (ref 36–48)
HEMOGLOBIN: 8 G/DL (ref 12–16)
HYPOCHROMIA: ABNORMAL
INR BLD: 1.81 (ref 0.88–1.12)
LYMPHOCYTES ABSOLUTE: 0.1 K/UL (ref 1–5.1)
LYMPHOCYTES RELATIVE PERCENT: 1 %
MACROCYTES: ABNORMAL
MCH RBC QN AUTO: 29 PG (ref 26–34)
MCHC RBC AUTO-ENTMCNC: 30.8 G/DL (ref 31–36)
MCV RBC AUTO: 94.1 FL (ref 80–100)
MICROCYTES: ABNORMAL
MONOCYTES ABSOLUTE: 0.1 K/UL (ref 0–1.3)
MONOCYTES RELATIVE PERCENT: 1 %
MYELOCYTE PERCENT: 1 %
NEUTROPHILS ABSOLUTE: 10.3 K/UL (ref 1.7–7.7)
NEUTROPHILS RELATIVE PERCENT: 97 %
OVALOCYTES: ABNORMAL
PDW BLD-RTO: 20.1 % (ref 12.4–15.4)
PHOSPHORUS: 4.1 MG/DL (ref 2.5–4.9)
PLATELET # BLD: 127 K/UL (ref 135–450)
PLATELET SLIDE REVIEW: ADEQUATE
PMV BLD AUTO: 8 FL (ref 5–10.5)
POLYCHROMASIA: ABNORMAL
POTASSIUM SERPL-SCNC: 3.7 MMOL/L (ref 3.5–5.1)
PROTHROMBIN TIME: 21 SEC (ref 9.9–12.7)
RBC # BLD: 2.74 M/UL (ref 4–5.2)
SLIDE REVIEW: ABNORMAL
SODIUM BLD-SCNC: 149 MMOL/L (ref 136–145)
TEAR DROP CELLS: ABNORMAL
WBC # BLD: 10.5 K/UL (ref 4–11)

## 2021-11-14 PROCEDURE — 94761 N-INVAS EAR/PLS OXIMETRY MLT: CPT

## 2021-11-14 PROCEDURE — 6370000000 HC RX 637 (ALT 250 FOR IP): Performed by: INTERNAL MEDICINE

## 2021-11-14 PROCEDURE — 2700000000 HC OXYGEN THERAPY PER DAY

## 2021-11-14 PROCEDURE — 2580000003 HC RX 258: Performed by: INTERNAL MEDICINE

## 2021-11-14 PROCEDURE — 85610 PROTHROMBIN TIME: CPT

## 2021-11-14 PROCEDURE — 80069 RENAL FUNCTION PANEL: CPT

## 2021-11-14 PROCEDURE — 6360000002 HC RX W HCPCS: Performed by: HOSPITALIST

## 2021-11-14 PROCEDURE — 85025 COMPLETE CBC W/AUTO DIFF WBC: CPT

## 2021-11-14 PROCEDURE — 1200000000 HC SEMI PRIVATE

## 2021-11-14 PROCEDURE — 6370000000 HC RX 637 (ALT 250 FOR IP): Performed by: HOSPITALIST

## 2021-11-14 RX ORDER — DEXTROSE MONOHYDRATE 50 MG/ML
INJECTION, SOLUTION INTRAVENOUS CONTINUOUS
Status: ACTIVE | OUTPATIENT
Start: 2021-11-14 | End: 2021-11-14

## 2021-11-14 RX ADMIN — DEXTROSE MONOHYDRATE: 50 INJECTION, SOLUTION INTRAVENOUS at 11:20

## 2021-11-14 RX ADMIN — DILTIAZEM HYDROCHLORIDE 30 MG: 30 TABLET, FILM COATED ORAL at 00:35

## 2021-11-14 RX ADMIN — Medication 1 CAPSULE: at 16:54

## 2021-11-14 RX ADMIN — Medication 10 ML: at 09:44

## 2021-11-14 RX ADMIN — LORAZEPAM 0.5 MG: 2 INJECTION INTRAMUSCULAR; INTRAVENOUS at 17:43

## 2021-11-14 RX ADMIN — DILTIAZEM HYDROCHLORIDE 30 MG: 30 TABLET, FILM COATED ORAL at 06:37

## 2021-11-14 ASSESSMENT — PAIN SCALES - WONG BAKER

## 2021-11-14 ASSESSMENT — PAIN SCALES - GENERAL
PAINLEVEL_OUTOF10: 0

## 2021-11-14 NOTE — PROGRESS NOTES
MD Sully Mclaughlin MD Lovett Cordial, MD                                  Office: [phone]                 Fax: [phone]          [place]                  NEPHROLOGY  INPATIENT PROGRESS NOTE:     PATIENT NAME: Lito Allen  : 1935  MRN: [id number]        Assessment and Plan   JULIO on CKD 3bsevere. Baseline crea 1.4. UA was bland. Urine output - decreased       Today - I had a long discussion w/ pt's , daughter, and other family- re: poor prognosis, long term feeding issues, consider palliative care  They reasonably agreed, as their goal is to make her comfortable too. Hypoxia worsening 6L NC  Recent CXR reviewed - fluid overload concerns too   Pulm following     Hypernatremia - severe- dehydration - better   Holding Lasix  - likely resume in 24 hrs, for HFpEF   Needs more hypotonic IVFs   D5 short term 5 hrs at 100 cc/hr   F/u recheck labs in am     Needs K repletion  Mag stable     Continue herron         Per PICC RN: Peripheral IV placed for extra access. PICC held for now until swelling dissipates. Hypotension. Improved. Hypovolemia. Improved  Hypokalemia-replaced. Follow K. Hypocalcemia- asymptomatic. Anemia of acute blood loss. follow Hgb. Metabolic acidosis. Improved. Hypoalbuminemia  E. coli sepsis   Bacteremia     High complexity. Multiple medical problems. Discussed with patient and treatment team.    Thank you for allowing me to participate in this patient's care. Please do not hesitate to contact me for any questions/concerns. We will follow along with you. Grazyna Ordonez MD  [facility] [address]   Phone: [phone] or [facility] [employer]  Fax: [phone]             Subjective:   Remains weak and lethargic.   Hypoxia better   Still w/ dehydration   Remains w/ AMs    Objective:      Objective:  EXAM  Vitals:    21 0945   BP:    Pulse: 55   Resp:    Temp:    SpO2:        Intake/Output Summary (Last 24 hours) at 11/14/2021 1045  Last data filed at 11/14/2021 0647  Gross per 24 hour   Intake 60 ml   Output 200 ml   Net -140 ml     General: sleepy, slow to respond, lethargic   HENT: Atraumatic, normocephalic   Eyes: Normal conjunctiva, Non-incteral sclera. Neck: Supple, JVD not visible. CVS:  Heart sounds are normal. No loud murmur. RS: abNormal respiratory effort, Breat sound: diminished at bases. Abd: Soft , bowel sounds are normal, no distension and no tenderness . Skin: No rash , some bruises,   Extremities/MSK: + Edema, no cyanosis. Active Problems:    Essential hypertension    ROBYN (obstructive sleep apnea)    Acute GI bleeding    Atrial fibrillation with RVR ([facility])    Severe sepsis ([facility])    E coli bacteremia    Supratherapeutic INR    Hyperkalemia    Normocytic normochromic anemia    Hyponatremia    Bilateral pleural effusion    Diverticulosis    Anasarca    Overweight (BMI 25.0-29. 9)    High anion gap metabolic acidosis    Elevated brain natriuretic peptide (BNP) level    JULIO (acute kidney injury) ([facility] [address]    Hypokalemia    Hypernatremia  Resolved Problems:    * No resolved hospital problems. *        Medications Reviewed by me   metoprolol tartrate  12.5 mg Oral TID    amoxicillin-clavulanate  1 tablet Oral 2 times per day    lactobacillus  1 capsule Oral BID WC    [Held by provider] furosemide  20 mg IntraVENous BID    [Held by provider] dilTIAZem  30 mg Oral 4 times per day    lidocaine 1 % injection  5 mL IntraDERmal Once    apixaban  2.5 mg Oral BID    QUEtiapine  50 mg Oral Nightly    sodium chloride flush  5-40 mL IntraVENous 2 times per day    Vitamin D  2 tablet Oral Daily      sodium chloride 25 mL (11/12/21 0946)       Data Review.     Labs reviewed by me       CBC:   Recent Labs     11/12/21  0620 11/13/21  0625 11/14/21  0301   WBC 9.2 10.6 10.5   HGB 8.0* 8.0* 8.0*   HCT 25.8* 26.4* 25.8*   MCV 92.8 94.5 94.1    137 127*     BMP:   Recent Labs     11/12/21 0620 11/12/21  0620 11/13/21  0625 11/13/21  1730 11/14/21  0301   *   < > 152* 148* 149*   K 3.3*   < > 3.2* 3.6 3.7      < > 111* 105 108   CO2 33*   < > 32 33* 34*   PHOS 3.3  --  3.7  --  4.1   BUN 23*   < > 24* 26* 27*   CREATININE 1.3*   < > 1.3* 1.5* 1.7*    < > = values in this interval not displayed. Magnesium:   Lab Results   Component Value Date    MG 2.00 11/12/2021    MG 1.60 11/11/2021    MG 2.20 11/10/2021     Lab Results   Component Value Date    CREATININE 1.7 11/14/2021       Arterial Blood Gasses  No results for input(s): PH, PCO2, PO2 in the last 72 hours. Invalid input(s): Radha Martin    UA:  No results for input(s): NITRITE, COLORU, PHUR, LABCAST, WBCUA, RBCUA, MUCUS, TRICHOMONAS, YEAST, BACTERIA, CLARITYU, SPECGRAV, LEUKOCYTESUR, UROBILINOGEN, BILIRUBINUR, BLOODU, GLUCOSEU, AMORPHOUS in the last 72 hours. Invalid input(s): Bart Razo    LIVER PROFILE:   No results for input(s): AST, ALT, LIPASE, BILIDIR, BILITOT, ALKPHOS in the last 72 hours. Invalid input(s): AMYLASE,  ALB  PT/INR:    Lab Results   Component Value Date    PROTIME 21.0 11/14/2021    PROTIME 20.8 11/13/2021    PROTIME 20.2 11/12/2021    INR 1.81 11/14/2021    INR 1.80 11/13/2021    INR 1.75 11/12/2021     PTT:    Lab Results   Component Value Date    APTT 32.1 11/01/2021     CANDE:  No results found for: ANATITER, CANDE  CHEMISTRY COMMON GROUP :   Lab Results   Component Value Date    GLUCOSE 128 11/14/2021    TSH 2.54 06/03/2010     Recent Labs     11/12/21  0620 11/13/21  0625 11/13/21  1730 11/14/21  0301   GLUCOSE 136* 127* 150* 128*   CALCIUM 8.2* 7.9* 8.4 8.4         RADIOLOGY:        Imaging Results.   Chest X Ray reviewed by me      Electronically Signed: Lester Jones MD 11/14/2021 10:45 AM

## 2021-11-14 NOTE — PROGRESS NOTES
Shift assessment completed. Routine vitals stable. Scheduled medications given. Patient is awake, alert to self throughout shift. Respirations are shallow, tachypnic and unlabored. Pt 02 sats are positional. When pt HOB elevated O2 sats are elevated, but pt is resistant to sitting up in bed. NP came to see pt at bedside this shift for low urine out put and hypoxia. Fluids on hold until NP reviews AM labs. Pt has no c/o pain. Call light remains within reach.

## 2021-11-14 NOTE — PROGRESS NOTES
Family had discussion at bedside and would like pt code status to be changed to [facility]. MD messaged.

## 2021-11-14 NOTE — PROGRESS NOTES
Hospitalist Progress Note      PCP: Meena Stark    Date of Admission: 11/1/2021    Chief Complaint: SOB    Hospital Course: 74-year-old female who was admitted for acute blood loss anemia due to rectal bleeding found to be in severe sepsis due to E. coli UTI with bacteremia. She underwent EGD & colonoscopy endings of a cecal ulcer. She was in the ICU initially and transferred to the floor. Patient was doing well up until 11/8/2021 when she had aspiration event. Hospital course has since then been complicated by A. fib with RVR, acute hypoxic respiratory failure, metabolic encephalopathy, hyponatremia and hypokalemia with poor oral intake .       Subjective:  Patient seen and examined. Daughter at bedside. Patient lethargic with no significant improvement in mental status. UTO ROS. Poor oral intake per RN. Hypernatremic and hypokalemic. IV fluids held temporarily overnight due to concern for volume overload and worsening hypoxia.         Medications:  Reviewed    Infusion Medications    dextrose 100 mL/hr at 11/14/21 1120    sodium chloride 25 mL (11/12/21 0946)     Scheduled Medications    metoprolol tartrate  12.5 mg Oral BID    amoxicillin-clavulanate  1 tablet Oral 2 times per day    lactobacillus  1 capsule Oral BID WC    [Held by provider] furosemide  20 mg IntraVENous BID    [Held by provider] dilTIAZem  30 mg Oral 4 times per day    lidocaine 1 % injection  5 mL IntraDERmal Once    apixaban  2.5 mg Oral BID    QUEtiapine  50 mg Oral Nightly    sodium chloride flush  5-40 mL IntraVENous 2 times per day    Vitamin D  2 tablet Oral Daily     PRN Meds: LORazepam, magnesium sulfate, potassium chloride, ondansetron, sodium chloride flush, sodium chloride      Intake/Output Summary (Last 24 hours) at 11/14/2021 1408  Last data filed at 11/14/2021 0647  Gross per 24 hour   Intake    Output 150 ml   Net -150 ml       Physical Exam Performed:    BP (!) 99/55   Pulse 55   Temp 95.2 °F (35.1 °C) (Temporal)   Resp 16   Ht 5' 5\" (1.651 m)   Wt 171 lb 8 oz (77.8 kg)   SpO2 94%   BMI 28.54 kg/m²     General appearance: Frail, chronically ill looking, appears stated age and cooperative. HEENT: Conjunctivae clear. Neck: Supple, with full range of motion. Trachea midline. Respiratory:  Normal respiratory effort. Decreased air entry at the bases with rales, No Wheezes/Rhonchi. Cardiovascular: Regular rate and rhythm with normal S1/S2 without murmurs, rubs or gallops. Abdomen: Soft, non-tender, non-distended with normal bowel sounds. Musculoskeletal: No clubbing, cyanosis or edema bilaterally. Skin: Skin color, texture, turgor normal. Stage 2 sacral ulcer. Neurologic: unable to assess. Psychiatric: Sleepy and lethargic. Capillary Refill: Brisk,3 seconds, normal   Peripheral Pulses: +2 palpable, equal bilaterally       Labs:   Recent Labs     11/12/21 0620 11/13/21 0625 11/14/21  0301   WBC 9.2 10.6 10.5   HGB 8.0* 8.0* 8.0*   HCT 25.8* 26.4* 25.8*    137 127*     Recent Labs     11/12/21 0620 11/12/21 0620 11/13/21 0625 11/13/21  1730 11/14/21  0301   *   < > 152* 148* 149*   K 3.3*   < > 3.2* 3.6 3.7      < > 111* 105 108   CO2 33*   < > 32 33* 34*   BUN 23*   < > 24* 26* 27*   CREATININE 1.3*   < > 1.3* 1.5* 1.7*   CALCIUM 8.2*   < > 7.9* 8.4 8.4   PHOS 3.3  --  3.7  --  4.1    < > = values in this interval not displayed. No results for input(s): AST, ALT, BILIDIR, BILITOT, ALKPHOS in the last 72 hours. Recent Labs     11/12/21 0620 11/13/21 0625 11/14/21  0301   INR 1.75* 1.80* 1.81*     No results for input(s): CKTOTAL, TROPONINI in the last 72 hours.     Urinalysis:      Lab Results   Component Value Date    NITRU Negative 11/06/2021    WBCUA 6 11/06/2021    BACTERIA RARE 11/06/2021    RBCUA 0-2 11/06/2021    BLOODU Negative 11/06/2021    SPECGRAV 1.018 11/06/2021    GLUCOSEU Negative 11/06/2021       Radiology:  XR CHEST PORTABLE   Final Result   Unchanged bilateral airspace opacities         XR CHEST PORTABLE   Final Result   Extensive bilateral airspace opacities persist suggesting multifocal pneumonia         CT CHEST WO CONTRAST   Final Result   Extensive bilateral pneumonia. Small pleural effusions have enlarged. XR CHEST PORTABLE   Final Result   Mild interval worsening of the bilateral airspace opacities, when compared to   the previous study performed 11/08/2021. Rule out worsening CHF. Pneumonia   cannot be entirely excluded. Decrease in the right pleural effusion. XR CHEST PORTABLE   Final Result   Worsening patchy bilateral airspace opacities. Consider infection. Stable   bilateral pleural effusions. XR CHEST PORTABLE   Final Result   Nasogastric tube is in normal position. Vascular congestion. Stable bilateral pleural effusions with associated atelectasis or airspace   disease. CT CHEST ABDOMEN PELVIS WO CONTRAST   Final Result   Small bilateral pleural effusions are seen with adjacent consolidation at the   lung bases, likely atelectasis in the absence of clinical signs of pneumonia. Septal thickening is seen, compatible with fluid overload. Mildly enlarged precarinal node, likely reactive due to the suspected fluid   overload. Recommend 3 month follow-up chest CT for further characterization. Scattered colonic diverticula are seen. No significant pericolonic fat   stranding. Body wall anasarca with trace free fluid in pelvis, compatible with mild   fluid overload         XR CHEST PORTABLE   Final Result   Interval resolution of pulmonary edema. Fluid in the minor fissure, and   suspected bilateral pleural effusions. Left basilar airspace disease likely   atelectasis           Echocardiogram 11/10/21:  Summary   Left ventricular cavity size is normal with mild concentric left ventricular   hypertrophy.    Overall left ventricular systolic function appears normal. Ejection fraction   is visually estimated to be 55-60%. Possible hypokinesis of the basal inferolateral wall. Grade II diastolic dysfunction with elevated LV filling pressures. Moderate-severe mitral regurgitation. Moderate tricuspid regurgitation with a PASP of 48 mmHg. There are bilateral pleural effusions. Assessment/Plan:    Active Hospital Problems    Diagnosis     Hypernatremia [E87.0]     JULIO (acute kidney injury) ([address] [N17.9]     Hypokalemia [E87.6]     Atrial fibrillation with RVR ([address] [I48.91]     Severe sepsis ([address] [A41.9, R65.20]     E coli bacteremia [R78.81, B96.20]     Supratherapeutic INR [R79.1]     Hyperkalemia [E87.5]     Normocytic normochromic anemia [D64.9]     Hyponatremia [E87.1]     Bilateral pleural effusion [J90]     Diverticulosis [K57.90]     Anasarca [R60.1]     Overweight (BMI 25.0-29. 9) [E66.3]     High anion gap metabolic acidosis [T21.4]     Elevated brain natriuretic peptide (BNP) level [R79.89]     Acute GI bleeding [K92.2]     ROBYN (obstructive sleep apnea) [G47.33]     Essential hypertension [R82]      Metabolic encephalopathy:  Multifactorial: Sepsis, hypernatremia, hypoxia. Continue current management with close monitoring. Hypokalemia:  Replacement ongoing. Monitor potassium level. Hypernatremia:  Hold Lasix. Started on D5 water. Monitor Na level. Sepsis Secondary to bacteremia E. Coli bacteremia:  From a bacteremia perspective patient is stable. Repeat cultures 11/9/2021 no growth to date. ID consulted: off Zosyn.       Acute hypoxic respiratory failure due to aspiration pneumonia & acute on chronic Diastolic CHF exacerbation:  CXR: Multifocal airspace disease. ? PNA vs. Pulmonary edema. ECHO 11/10: LV EF 55-60%. Grade 2 DD with high filling pressures. Oxygen requirement initially 10 L now down to 4 L. Hold Lasix due to dehydration with Hypernatremia. Complete antibiotic course with Augmentin Stop date 11/19/21.       Dysphagia with poor Oral Intake:   Swallow evaluation done: Continue dysphagia diet. A. fib with RVR:  Now rate controlled. Hold Cardizem. Metoprolol dose decreased due to Bradycardia. Continue Eliquis.     JULIO on CKD 3:  Creatinine peak 3.0 now 1.7. Monitor renal function. Nephrology following, appreciate input recommendations noted. 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 s/p 3 units PRBC. H/H has been stable. Goal > 7 g/dl. Poor Prognosis:  Palliative care consulted but daughter who is POA in accordance with the mom's wishes needed to remain full code. Several discussions held with poor prognosis explained. Daughter now wishes mom to be DNR CCA. Comfort care measures to be pursued later if patient does not show any significant improvement. DVT Prophylaxis: Apixaban  Diet: ADULT DIET; Dysphagia - Pureed; Mildly Thick (Nectar);  No Drinking Straws  ADULT ORAL NUTRITION SUPPLEMENT; Dinner; Frozen Oral Supplement  ADULT ORAL NUTRITION SUPPLEMENT; Lunch, Dinner; Fortified Pudding Oral Supplement  Code Status: DNR-CCA    PT/OT Eval Status:SNF    Dispo -once medically stable    Katherine Muñoz MD

## 2021-11-14 NOTE — PROGRESS NOTES
Shift assessment completed. Unable to safely administer medications at this time d/t pt responsiveness. Pt only responsive to voice, easily falls back asleep. Pt repositioned in bed for comfort and does not appear to be in distress at this time. POC discussed with pt's daughter at bedside. No needs expressed. 1127: Pt remains responsive to voice only. No signs of distress observed. Afternoon vitals obtained. Family at bedside.

## 2021-11-15 VITALS
TEMPERATURE: 97.7 F | OXYGEN SATURATION: 93 % | RESPIRATION RATE: 18 BRPM | WEIGHT: 171.5 LBS | SYSTOLIC BLOOD PRESSURE: 116 MMHG | HEART RATE: 106 BPM | HEIGHT: 65 IN | BODY MASS INDEX: 28.57 KG/M2 | DIASTOLIC BLOOD PRESSURE: 68 MMHG

## 2021-11-15 LAB
ALBUMIN SERPL-MCNC: 2.5 G/DL (ref 3.4–5)
ANION GAP SERPL CALCULATED.3IONS-SCNC: 9 MMOL/L (ref 3–16)
ANISOCYTOSIS: ABNORMAL
BANDED NEUTROPHILS RELATIVE PERCENT: 4 % (ref 0–7)
BASOPHILS ABSOLUTE: 0 K/UL (ref 0–0.2)
BASOPHILS RELATIVE PERCENT: 0 %
BUN BLDV-MCNC: 31 MG/DL (ref 7–20)
CALCIUM SERPL-MCNC: 8.5 MG/DL (ref 8.3–10.6)
CHLORIDE BLD-SCNC: 103 MMOL/L (ref 99–110)
CO2: 34 MMOL/L (ref 21–32)
CREAT SERPL-MCNC: 2.1 MG/DL (ref 0.6–1.2)
EOSINOPHILS ABSOLUTE: 0 K/UL (ref 0–0.6)
EOSINOPHILS RELATIVE PERCENT: 0 %
GFR AFRICAN AMERICAN: 27
GFR NON-AFRICAN AMERICAN: 22
GLUCOSE BLD-MCNC: 100 MG/DL (ref 70–99)
HCT VFR BLD CALC: 27.1 % (ref 36–48)
HEMOGLOBIN: 8.2 G/DL (ref 12–16)
INR BLD: 1.37 (ref 0.88–1.12)
LYMPHOCYTES ABSOLUTE: 0.3 K/UL (ref 1–5.1)
LYMPHOCYTES RELATIVE PERCENT: 3 %
MAGNESIUM: 2.1 MG/DL (ref 1.8–2.4)
MCH RBC QN AUTO: 28.7 PG (ref 26–34)
MCHC RBC AUTO-ENTMCNC: 30.2 G/DL (ref 31–36)
MCV RBC AUTO: 95.1 FL (ref 80–100)
MICROCYTES: ABNORMAL
MONOCYTES ABSOLUTE: 0.1 K/UL (ref 0–1.3)
MONOCYTES RELATIVE PERCENT: 1 %
NEUTROPHILS ABSOLUTE: 9.6 K/UL (ref 1.7–7.7)
NEUTROPHILS RELATIVE PERCENT: 92 %
OVALOCYTES: ABNORMAL
PDW BLD-RTO: 20.6 % (ref 12.4–15.4)
PHOSPHORUS: 4.6 MG/DL (ref 2.5–4.9)
PLATELET # BLD: 118 K/UL (ref 135–450)
PLATELET SLIDE REVIEW: ADEQUATE
PMV BLD AUTO: 8.3 FL (ref 5–10.5)
POLYCHROMASIA: ABNORMAL
POTASSIUM SERPL-SCNC: 3.9 MMOL/L (ref 3.5–5.1)
PROTHROMBIN TIME: 15.7 SEC (ref 9.9–12.7)
RBC # BLD: 2.85 M/UL (ref 4–5.2)
SLIDE REVIEW: ABNORMAL
SODIUM BLD-SCNC: 146 MMOL/L (ref 136–145)
TEAR DROP CELLS: ABNORMAL
WBC # BLD: 10 K/UL (ref 4–11)

## 2021-11-15 PROCEDURE — 94761 N-INVAS EAR/PLS OXIMETRY MLT: CPT

## 2021-11-15 PROCEDURE — 99233 SBSQ HOSP IP/OBS HIGH 50: CPT | Performed by: INTERNAL MEDICINE

## 2021-11-15 PROCEDURE — 83735 ASSAY OF MAGNESIUM: CPT

## 2021-11-15 PROCEDURE — 92526 ORAL FUNCTION THERAPY: CPT

## 2021-11-15 PROCEDURE — 2580000003 HC RX 258: Performed by: INTERNAL MEDICINE

## 2021-11-15 PROCEDURE — 85610 PROTHROMBIN TIME: CPT

## 2021-11-15 PROCEDURE — 6370000000 HC RX 637 (ALT 250 FOR IP): Performed by: HOSPITALIST

## 2021-11-15 PROCEDURE — 92507 TX SP LANG VOICE COMM INDIV: CPT

## 2021-11-15 PROCEDURE — 6360000002 HC RX W HCPCS: Performed by: INTERNAL MEDICINE

## 2021-11-15 PROCEDURE — 6370000000 HC RX 637 (ALT 250 FOR IP): Performed by: INTERNAL MEDICINE

## 2021-11-15 PROCEDURE — 80069 RENAL FUNCTION PANEL: CPT

## 2021-11-15 PROCEDURE — 6360000002 HC RX W HCPCS: Performed by: HOSPITALIST

## 2021-11-15 PROCEDURE — 36592 COLLECT BLOOD FROM PICC: CPT

## 2021-11-15 PROCEDURE — 99232 SBSQ HOSP IP/OBS MODERATE 35: CPT | Performed by: INTERNAL MEDICINE

## 2021-11-15 PROCEDURE — 85025 COMPLETE CBC W/AUTO DIFF WBC: CPT

## 2021-11-15 PROCEDURE — 2700000000 HC OXYGEN THERAPY PER DAY

## 2021-11-15 RX ORDER — LORAZEPAM 2 MG/ML
1 CONCENTRATE ORAL EVERY 8 HOURS PRN
Qty: 30 ML | Refills: 0 | Status: SHIPPED | OUTPATIENT
Start: 2021-11-15 | End: 2021-11-15 | Stop reason: HOSPADM

## 2021-11-15 RX ORDER — MORPHINE SULFATE 100 MG/5ML
10 SOLUTION ORAL
Qty: 15 ML | Refills: 0 | Status: SHIPPED | OUTPATIENT
Start: 2021-11-15 | End: 2021-11-15 | Stop reason: HOSPADM

## 2021-11-15 RX ORDER — FUROSEMIDE 10 MG/ML
40 INJECTION INTRAMUSCULAR; INTRAVENOUS ONCE
Status: COMPLETED | OUTPATIENT
Start: 2021-11-15 | End: 2021-11-15

## 2021-11-15 RX ADMIN — Medication 10 ML: at 08:27

## 2021-11-15 RX ADMIN — Medication 2000 UNITS: at 08:22

## 2021-11-15 RX ADMIN — Medication 1 CAPSULE: at 08:22

## 2021-11-15 RX ADMIN — LORAZEPAM 0.5 MG: 2 INJECTION INTRAMUSCULAR; INTRAVENOUS at 12:51

## 2021-11-15 RX ADMIN — FUROSEMIDE 40 MG: 10 INJECTION, SOLUTION INTRAMUSCULAR; INTRAVENOUS at 14:37

## 2021-11-15 RX ADMIN — METOPROLOL TARTRATE 12.5 MG: 25 TABLET, FILM COATED ORAL at 08:22

## 2021-11-15 RX ADMIN — AMOXICILLIN AND CLAVULANATE POTASSIUM 1 TABLET: 875; 125 TABLET, FILM COATED ORAL at 08:31

## 2021-11-15 RX ADMIN — APIXABAN 2.5 MG: 2.5 TABLET, FILM COATED ORAL at 08:23

## 2021-11-15 ASSESSMENT — PAIN SCALES - WONG BAKER
WONGBAKER_NUMERICALRESPONSE: 0

## 2021-11-15 ASSESSMENT — PAIN SCALES - GENERAL
PAINLEVEL_OUTOF10: 0

## 2021-11-15 NOTE — PROGRESS NOTES
PULMONARY AND CRITICAL CARE MEDICINE PROGRESS NOTE      SUBJECTIVE: Patient looks lethargic today. She is up to 8 to 10 L/min O2. In atrial fibrillation with RVR. Creatinine is increasing. Chest x-ray from yesterday is unchanged. REVIEW OF SYSTEMS: Could not be obtained as patient is lethargic    MEDICATIONS:      metoprolol tartrate  12.5 mg Oral BID    amoxicillin-clavulanate  1 tablet Oral 2 times per day    lactobacillus  1 capsule Oral BID WC    [Held by provider] furosemide  20 mg IntraVENous BID    [Held by provider] dilTIAZem  30 mg Oral 4 times per day    lidocaine 1 % injection  5 mL IntraDERmal Once    apixaban  2.5 mg Oral BID    QUEtiapine  50 mg Oral Nightly    sodium chloride flush  5-40 mL IntraVENous 2 times per day    Vitamin D  2 tablet Oral Daily      sodium chloride 25 mL (11/12/21 0946)     LORazepam, magnesium sulfate, potassium chloride, ondansetron, sodium chloride flush, sodium chloride     ALLERGIES:   Allergies as of 11/01/2021 - Fully Reviewed 11/01/2021   Allergen Reaction Noted    Percocet [oxycodone-acetaminophen] Other (See Comments) 05/07/2014        OBJECTIVE:   height is 5' 5\" (1.651 m) and weight is 171 lb 8 oz (77.8 kg). Her temporal temperature is 97 °F (36.1 °C). Her blood pressure is 120/88 and her pulse is 76. Her respiration is 18 and oxygen saturation is 95%. No intake/output data recorded. PHYSICAL EXAM:  CONSTITUTIONAL: She is a 82y.o.-year-old who appears her stated age. She is in no acute distress. CARDIOVASCULAR: S1 S2.  A. fib with RVR. Without murmer  RESPIRATORY & CHEST: Lungs are clear to auscultation and percussion. No wheezing, no crackles. Good air movement  GASTROINTESTINAL & ABDOMEN: Soft, nontender, positive bowel sounds in all quadrants, non-distended, without hepatosplenomegaly. GENITOURINARY: Deferred. MUSCULOSKELETAL: No tenderness to palpation of the axial skeleton. There is no clubbing. No cyanosis.   Bilateral extensive edema of the lower extremities. SKIN OF BODY: No rash or jaundice. PSYCHIATRIC EVALUATION: Could not be assessed  HEMATOLOGIC/LYMPHATIC/ IMMUNOLOGIC: No palpable lymphadenopathy. NEUROLOGIC: Lethargic. Groslly non-focal. Motor strength is 5+/5 in all muscle groups. The patient has a normal sensorium globally. LABS:   Lab Results   Component Value Date    WBC 10.0 11/15/2021    HGB 8.2 (L) 11/15/2021    HCT 27.1 (L) 11/15/2021     (L) 11/15/2021    TRIG 200 (H) 12/03/2009    LDLDIRECT 204 (H) 06/03/2010    ALT 14 11/05/2021    AST 20 11/05/2021     (H) 11/15/2021    K 3.9 11/15/2021     11/15/2021    CREATININE 2.1 (H) 11/15/2021    BUN 31 (H) 11/15/2021    CO2 34 (H) 11/15/2021    TSH 2.54 06/03/2010    INR 1.37 (H) 11/15/2021       Lab Results   Component Value Date    GLUCOSE 100 (H) 11/15/2021    CALCIUM 8.5 11/15/2021     (H) 11/15/2021    K 3.9 11/15/2021    CO2 34 (H) 11/15/2021     11/15/2021    BUN 31 (H) 11/15/2021    CREATININE 2.1 (H) 11/15/2021       Lab Results   Component Value Date    GLUCOSE 100 (H) 11/15/2021    CALCIUM 8.5 11/15/2021     (H) 11/15/2021    K 3.9 11/15/2021    CO2 34 (H) 11/15/2021     11/15/2021    BUN 31 (H) 11/15/2021    CREATININE 2.1 (H) 11/15/2021          IMAGING:   I reviewed the chest x-ray from 11/13/2021 and my interpretation is as follows. Bilateral extensive multifocal infiltrates noted.        IMPRESSION:   1. Acute pulmonary edema  2. Multifocal pneumonia  3. Acute kidney injury  4. E. coli bacteremia        RECOMMENDATION:   Chest x-ray shows multifocal airspace disease. Differentials include pulmonary edema or multifocal pneumonia. Patient was noted to have BNP of more than 50,000. She is volume overloaded and also has bilateral leg swelling. Echo with grade 2 diastolic dysfunction  She would benefit from diuresis. Hypernatremia, improved. Likely due to poor oral intake.     Mild elevation of procalcitonin at 0.61. Patient is on Zosyn. Diet modified per speech  O2 requirements have increased. Currently she is on 10 L/min O2. Also atrial fibrillation with RVR. She is lethargic. Patient is slowly declining. Agree with palliative care and hospice. Ilene Franco MD  Pulmonary Critical Care and Sleep Medicine  11/15/2021, 11:19 AM    This note was completed using dragon medical speech recognition software. Grammatical errors, random word insertions, pronoun errors and incomplete sentences are occasional consequences of this technology due to software limitations. If there are questions or concerns about the content of this note of information contained within the body of this dictation they should be addressed with the provider for clarification.

## 2021-11-15 NOTE — PROGRESS NOTES
MD Luis Torrez MD Rexene Sails, MD                                  Office: [phone]                 Fax: [phone]          [facility]                  NEPHROLOGY  INPATIENT PROGRESS NOTE:     PATIENT NAME: Ivon Guy  : 1935  MRN: [id number]        Assessment and Plan   JULIO on CKD 3bsevere. Baseline crea 1.4. UA was bland. Crea worse today with high O2 requirement. Urine output - decreased     Per Dr. Breanna Gomez notes  Today - I had a long discussion w/ pt's , daughter, and other family- re: poor prognosis, long term feeding issues, consider palliative care  They reasonably agreed, as their goal is to make her comfortable too. Now planning for hospice care. Hypoxia worsening 10L NC  Recent CXR reviewed - fluid overload concerns too   Pulm following     Hypernatremia - severe- dehydration - better     Continue herron     Patient is confused  Okay to give Lasix    Hypotension. Improved. Hypovolemia. Improved  Hypokalemia-replaced. Hypocalcemia- asymptomatic. Better. Anemia of acute blood loss  Metabolic acidosis. Improved. Hypoalbuminemia  E. coli sepsis   Bacteremia     High complexity. Multiple medical problems. Discussed with Medicine    Margo Knapp MD  [facility] [address] [place] [address] Road   Phone: [phone] or [address]  Fax: [phone]             Subjective:   Remains weak and lethargic. Remains w/ AMS    Objective:      Objective:  EXAM  Vitals:    11/15/21 1115   BP:    Pulse:    Resp:    Temp:    SpO2: 93%       Intake/Output Summary (Last 24 hours) at 11/15/2021 1324  Last data filed at 11/15/2021 0501  Gross per 24 hour   Intake    Output 125 ml   Net -125 ml     General: sleepy, slow to respond, lethargic   HENT: Atraumatic, normocephalic   Eyes: Normal conjunctiva, Non-incteral sclera. Neck: Supple, JVD not visible. CVS:  Heart sounds are normal. No loud murmur.     RS: abNormal respiratory effort, Breat sound: diminished at bases. Abd: Soft , bowel sounds are normal, no distension and no tenderness . Skin: No rash , some bruises,   Extremities/MSK: +1 Edema, no cyanosis. Active Problems:    Essential hypertension    ROBYN (obstructive sleep apnea)    Acute GI bleeding    Atrial fibrillation with RVR (HCC)    Severe sepsis ([facility])    E coli bacteremia    Supratherapeutic INR    Hyperkalemia    Normocytic normochromic anemia    Hyponatremia    Bilateral pleural effusion    Diverticulosis    Anasarca    Overweight (BMI 25.0-29. 9)    High anion gap metabolic acidosis    Elevated brain natriuretic peptide (BNP) level    JULIO (acute kidney injury) ([facility] [address]    Hypokalemia    Hypernatremia  Resolved Problems:    * No resolved hospital problems. *        Medications Reviewed by me   metoprolol tartrate  12.5 mg Oral BID    amoxicillin-clavulanate  1 tablet Oral 2 times per day    lactobacillus  1 capsule Oral BID WC    [Held by provider] furosemide  20 mg IntraVENous BID    [Held by provider] dilTIAZem  30 mg Oral 4 times per day    lidocaine 1 % injection  5 mL IntraDERmal Once    apixaban  2.5 mg Oral BID    QUEtiapine  50 mg Oral Nightly    sodium chloride flush  5-40 mL IntraVENous 2 times per day    Vitamin D  2 tablet Oral Daily      sodium chloride 25 mL (11/12/21 0946)       Data Review. Labs reviewed by me       CBC:   Recent Labs     11/13/21  0625 11/14/21  0301 11/15/21  0502   WBC 10.6 10.5 10.0   HGB 8.0* 8.0* 8.2*   HCT 26.4* 25.8* 27.1*   MCV 94.5 94.1 95.1    127* 118*     BMP:   Recent Labs     11/13/21  0625 11/13/21  0625 11/13/21  1730 11/14/21  0301 11/15/21  0502   *   < > 148* 149* 146*   K 3.2*   < > 3.6 3.7 3.9   *   < > 105 108 103   CO2 32   < > 33* 34* 34*   PHOS 3.7  --   --  4.1 4.6   BUN 24*   < > 26* 27* 31*   CREATININE 1.3*   < > 1.5* 1.7* 2.1*    < > = values in this interval not displayed.      Magnesium:   Lab Results   Component Value Date    MG 2.10 11/15/2021    MG 2.00 11/12/2021    MG 1.60 11/11/2021     Lab Results   Component Value Date    CREATININE 2.1 11/15/2021       Arterial Blood Gasses  No results for input(s): PH, PCO2, PO2 in the last 72 hours. Invalid input(s): Lindsey Care    UA:  No results for input(s): NITRITE, COLORU, PHUR, LABCAST, WBCUA, RBCUA, MUCUS, TRICHOMONAS, YEAST, BACTERIA, CLARITYU, SPECGRAV, LEUKOCYTESUR, UROBILINOGEN, BILIRUBINUR, BLOODU, GLUCOSEU, AMORPHOUS in the last 72 hours. Invalid input(s): Manuela Teresa    LIVER PROFILE:   No results for input(s): AST, ALT, LIPASE, BILIDIR, BILITOT, ALKPHOS in the last 72 hours. Invalid input(s): AMYLASE,  ALB  PT/INR:    Lab Results   Component Value Date    PROTIME 15.7 11/15/2021    PROTIME 21.0 11/14/2021    PROTIME 20.8 11/13/2021    INR 1.37 11/15/2021    INR 1.81 11/14/2021    INR 1.80 11/13/2021     PTT:    Lab Results   Component Value Date    APTT 32.1 11/01/2021     CANDE:  No results found for: ANATITER, CANDE  CHEMISTRY COMMON GROUP :   Lab Results   Component Value Date    GLUCOSE 100 11/15/2021    TSH 2.54 06/03/2010     Recent Labs     11/13/21  0625 11/13/21  1730 11/14/21  0301 11/15/21  0502   GLUCOSE 127* 150* 128* 100*   CALCIUM 7.9* 8.4 8.4 8.5         RADIOLOGY:        Imaging Results.   Chest X Ray reviewed by me      Electronically Signed: Ag Pritchard MD 11/15/2021 1:24 PM

## 2021-11-15 NOTE — PROGRESS NOTES
BRIEF NUTRITION NOTE    Per chart review, goal of care is now comfort and family is planning for hospice care. Given this, RD will follow pt at low risk. Should plan of care change and aggressive nutrition intervention is desired, please submit a dietitian consult.     Electronically signed by Silver Mejia RD, LD on 11/15/2021 at 2:50 PM

## 2021-11-15 NOTE — CARE COORDINATION
Patient discharged 11/15/2021 to [facility]  All discharge needs met per case management    Ruthy Nathan RN, BSN  [phone]

## 2021-11-15 NOTE — PROGRESS NOTES
Infectious Diseases   Progress Note      Admission Date: 11/1/2021  Hospital Day: Hospital Day: 15   Attending: Katia Silva MD  Date of service: 11/15/2021     Chief complaint/ Reason for consult:     · Severe sepsis on admission with leukocytosis, hypotension, tachycardia, acute kidney injury  · Gram-negative bacteremia with E. Coli  · High anion gap metabolic acidosis  · Elevated proBNP  · Mild hyperkalemia  · Hyponatremia  · Normocytic normochromic anemia  · Bilateral pleural effusions    Microbiology:        I have reviewed allavailable micro lab data and cultures    · Blood culture (2/2) - collected on 11/1/2021: E coli    Susceptibility     Escherichia coli     BACTERIAL SUSCEPTIBILITY PANEL BY ERIK     ampicillin >=32 mcg/mL Resistant     ceFAZolin <=4 mcg/mL Sensitive     cefepime <=0.12 mcg/mL Sensitive     cefTRIAXone <=0.25 mcg/mL Sensitive     ciprofloxacin <=0.25 mcg/mL Sensitive     ertapenem <=0.12 mcg/mL Sensitive     gentamicin <=1 mcg/mL Sensitive     levofloxacin 1 mcg/mL Sensitive     piperacillin-tazobactam <=4 mcg/mL Sensitive     trimethoprim-sulfamethoxazole >=320 mcg/mL Resistant        · Urine culture  - collected on 11/1/2021: > 100,000 cfu/ml of E coli    Susceptibility    Escherichia coli (1)    Antibiotic Interpretation ERIK Status    ampicillin Resistant >=32 mcg/mL     ceFAZolin Sensitive <=4 mcg/mL      NOTE: Cefazolin should only be used for uncomplicated UTI         for E.coli or Klebsiella pneumoniae.    cefepime Sensitive <=0.12 mcg/mL     cefTRIAXone Sensitive <=0.25 mcg/mL     ciprofloxacin Sensitive <=0.25 mcg/mL     ertapenem Sensitive <=0.12 mcg/mL     gentamicin Sensitive <=1 mcg/mL     levofloxacin Sensitive 1 mcg/mL     nitrofurantoin Sensitive <=16 mcg/mL     piperacillin-tazobactam Sensitive <=4 mcg/mL     trimethoprim-sulfamethoxazole Resistant >=320 mcg/mL         Antibiotics and immunizations:       Current antibiotics: All antibiotics and their doses were reviewed by me    Recent Abx Admin                   amoxicillin-clavulanate (AUGMENTIN) 875-125 MG per tablet 1 tablet (tablet) 1 tablet Given 11/15/21 0831                  Immunization History: All immunization history was reviewed by me today. Immunization History   Administered Date(s) Administered    Influenza, High Dose (Fluzone 65 yrs and older) 10/27/2018    Influenza, Quadv, IM, PF (6 mo and older Fluzone, Flulaval, Fluarix, and 3 yrs and older Afluria) 10/20/2021       Known drug allergies: All allergies were reviewed and updated    Allergies   Allergen Reactions    Percocet [Oxycodone-Acetaminophen] Other (See Comments)     Pt states it makes her crazy. Social history:     Social History:  All social andepidemiologic history was reviewed and updated by me today as needed. · Tobacco use:   reports that she has never smoked. She has never used smokeless tobacco.  · Alcohol use:   reports current alcohol use of about 2.0 standard drinks of alcohol per week. · Currently lives in: [facility]  ·  reports no history of drug use. COVID VACCINATION AND LAB RESULT RECORDS:     Internal Administration   First Dose      Second Dose           Last COVID Lab SARS-CoV-2, PCR (no units)   Date Value   11/09/2021 Not Detected     SARS-CoV-2, NAAT (no units)   Date Value   11/03/2021 Not Detected            Assessment:     The patient is a 82 y.o. old female who  has a past medical history of Atrial fibrillation ([facility] [address] CAD (coronary artery disease), Depression, Hyperlipidemia, Hypertension, Hypothyroid, Legal blindness, and Obstructive sleep apnea (adult) (pediatric).  with following problems:    · Severe sepsis on admission with leukocytosis, hypotension, tachycardia, acute kidney injury-disease resolved  · Acute respiratory failure with hypoxia-aspiration pneumonia-now covered with oral Augmentin-ongoing aspiration cannot be ruled out  · Gram-negative bacteremia with E. Coli-this has been well covered  · Bilateral atypical pneumonia-COVID-19 has been ruled out  · High anion gap metabolic acidosis-resolved  · Elevated proBNP  · Acute kidney injury-worsened, serum creatinine is 2.1 today  · Hypokalemia-being corrected  · Hyponatremia-serum sodium is 146  · Normocytic normochromic anemia-ongoing  · Bilateral pleural effusions  · Colon Diverticulosis  · Generalized anasarca  · Coronary artery disease-stable  · Essential hypertension-blood pressure is okay  · Hyperlipidemia  · Obstructive sleep apnea  · Overweight due to excess calorie intake : Body mass index is 27.33 kg/m².-Counseling done      Discussion:      The patient is afebrile. I had switched her from IV Zosyn to oral Augmentin on Friday. She seems to be tolerating Augmentin okay. White cell count is 10,000 today. Serum creatinine is 2.1. Blood cultures from 11/9/2021 are negative so far. Portable chest x-ray from 11/13/2021 reviewed. Showed bilateral patchy airspace disease. Plan:     Diagnostic Workup:      · Continue to follow  fever curve, WBC count and blood cultures. · Continue to monitor blood counts, liver and renal function. Antimicrobials:    · W continue oral Augmentin. If renal function declines further, reduce oral Augmentin dose to 500 mg every 12 hours  · Complete a 1 week course of oral Augmentin  · Continue oral probiotics while on Augmentin  · Cough and deep breathing exercises  · Continue close vitals monitoring. · Maintain good glycemic control. · Fall precautions. · I am worried about the possibility of recurrent aspiration. · Continue to watch for new fever or diarrhea. · DVT prophylaxis. · Discussed all above with RN.   · Family leaning toward hospice care  · Little further to add from ID standpoint  · Discussed with patient's sister and adult grandson at bedside      Drug Monitoring:    · Continue monitoring for antibiotic toxicity as follows: CBC, CMP   · Continue to watch for following: new or worsening fever, new hypotension, hives, lip swelling and redness or purulence at vascular access sites. I/v access Management:    · Continue to monitor i.v access sites for erythema, induration, discharge or tenderness. · As always, continue efforts to minimize tubes/lines/drains as clinically appropriate to reduce chances of line associated infections. TIME SPENT TODAY:     - Spent over  26 minutes on visit (including interval history, physical exam, review of data including labs, cultures, imaging, development and implementation of treatment plan and coordination of complex care). More than 50 percent of this includes face-to-face time spent with the patient for counseling and coordination of care. Thanks for allowing me to participate in your patient's care. I will sign off today, but will be available to answer any further questions or concerns that may arise during patient's stay in the hospital.          Subjective: Interval history: Interval history was obtained from chart review and RN. The patient is afebrile. She has been tolerating Augmentin okay. She is now on 10 L oxygen via nasal cannula. Ongoing aspiration issues cannot be ruled out     REVIEW OF SYSTEMS:      Review of Systems   Unable to perform ROS: Mental status change       *    Past Medical History: All past medical history reviewed today. Past Medical History:   Diagnosis Date    Atrial fibrillation ([facility] [address]     CAD (coronary artery disease)     Depression     Hyperlipidemia     Hypertension     Hypothyroid     Legal blindness     Obstructive sleep apnea (adult) (pediatric)        Past Surgical History: All past surgical history was reviewed today.     Past Surgical History:   Procedure Laterality Date    ANGIOPLASTY      COLONOSCOPY  11/16/2018    COLONOSCOPY POLYPECTOMY SNARE/COLD BIOPSY performed by Dorys Braga MD at [facility] COLONOSCOPY N/A 10/20/2021    COLONOSCOPY POLYPECTOMY ABLATION performed by Roe Arrington MD at [address] COLONOSCOPY N/A 11/4/2021    COLONOSCOPY POLYPECTOMY ABLATION performed by Katie De Los Santos MD at [address] Right     CATARACT EXTRACTION W/ IOL    EYE SURGERY Left     CATARCT EXTRACTION W/ IOL    TN SIGMOIDOSCOPY FLX DX W/COLLJ SPEC BR/WA IF PFRMD N/A 10/19/2018    SIGMOIDOSCOPY DIAGNOSTIC FLEXIBLE performed by Kristen Isabel MD at [address]  10/19/2018    flexible    TONSILLECTOMY AND ADENOIDECTOMY      TUBAL LIGATION      UPPER GASTROINTESTINAL ENDOSCOPY N/A 10/20/2021    EGD BIOPSY performed by Roe Arrington MD at [address] ENDOSCOPY       Family History: All family history was reviewed today. Problem Relation Age of Onset    Heart Disease Mother     Heart Disease Father     Asthma Neg Hx     Cancer Neg Hx     Diabetes Neg Hx     Emphysema Neg Hx     Hypertension Neg Hx     Heart Failure Neg Hx        Objective:       PHYSICAL EXAM:      Vitals:   Vitals:    11/15/21 0451 11/15/21 0730 11/15/21 1045 11/15/21 1115   BP: 114/62 (!) 103/51 120/88    Pulse: 86 89 76    Resp: 18 18 18    Temp: 96.4 °F (35.8 °C) 95.5 °F (35.3 °C) 97 °F (36.1 °C)    TempSrc: Temporal Temporal Temporal    SpO2: 96% 94% 95% 93%   Weight:       Height:           Physical Exam      General: Encephalopathic but protecting the airway so far,   HEENT: normocephalic, atraumatic, sclera clear, pupils equal, light reflex preserved bilaterally  Cardiovascular: RRR, no murmurs/rubs/gallops detected  Pulmonary: Bibasilar crackles noted  Abdomen/GI: soft, no organomegaly, bowel sounds positive  Neuro: Encephalopathic, pupils are equal and reactive to light, moves all extremities  Skin: no rash,   Musculoskeletal:  No obvious joint swelling, redness. No limitation of range of passive motion  Genitourinary:  Christiansen's catheter in place   Psych: could not assess   Lymphatic/Immunologic: No obvious bruising, no cervical lymphadenopathy    Lines: All vascular access sites are healthy with no local erythema, discharge or tenderness    Intake and output:    I/O last 3 completed shifts:  In: -   Out: 125 [Urine:125]    Lab Data:   All available labs and old records have been reviewed by me. CBC:  Recent Labs     11/13/21  0625 11/14/21  0301 11/15/21  0502   WBC 10.6 10.5 10.0   RBC 2.80* 2.74* 2.85*   HGB 8.0* 8.0* 8.2*   HCT 26.4* 25.8* 27.1*    127* 118*   MCV 94.5 94.1 95.1   MCH 28.5 29.0 28.7   MCHC 30.1* 30.8* 30.2*   RDW 20.7* 20.1* 20.6*   BANDSPCT  --   --  4        BMP:  Recent Labs     11/13/21  1730 11/14/21  0301 11/15/21  0502   * 149* 146*   K 3.6 3.7 3.9    108 103   CO2 33* 34* 34*   BUN 26* 27* 31*   CREATININE 1.5* 1.7* 2.1*   CALCIUM 8.4 8.4 8.5   GLUCOSE 150* 128* 100*        Hepatic Function Panel:   Lab Results   Component Value Date    ALKPHOS 67 11/05/2021    ALT 14 11/05/2021    AST 20 11/05/2021    PROT 5.4 11/05/2021    PROT 7.6 06/03/2010    BILITOT 0.4 11/05/2021    LABALBU 2.5 11/15/2021       CPK: No results found for: CKTOTAL  ESR: No results found for: SEDRATE  CRP: No results found for: CRP        Imaging: All pertinent images and reports for the current visit were reviewed by me during this visit. XR CHEST PORTABLE   Final Result   Unchanged bilateral airspace opacities         XR CHEST PORTABLE   Final Result   Extensive bilateral airspace opacities persist suggesting multifocal pneumonia         CT CHEST WO CONTRAST   Final Result   Extensive bilateral pneumonia. Small pleural effusions have enlarged. XR CHEST PORTABLE   Final Result   Mild interval worsening of the bilateral airspace opacities, when compared to   the previous study performed 11/08/2021. Rule out worsening CHF. Pneumonia   cannot be entirely excluded. Decrease in the right pleural effusion.          XR CHEST PORTABLE   Final Result   Worsening patchy bilateral airspace opacities. Consider infection. Stable   bilateral pleural effusions. XR CHEST PORTABLE   Final Result   Nasogastric tube is in normal position. Vascular congestion. Stable bilateral pleural effusions with associated atelectasis or airspace   disease. CT CHEST ABDOMEN PELVIS WO CONTRAST   Final Result   Small bilateral pleural effusions are seen with adjacent consolidation at the   lung bases, likely atelectasis in the absence of clinical signs of pneumonia. Septal thickening is seen, compatible with fluid overload. Mildly enlarged precarinal node, likely reactive due to the suspected fluid   overload. Recommend 3 month follow-up chest CT for further characterization. Scattered colonic diverticula are seen. No significant pericolonic fat   stranding. Body wall anasarca with trace free fluid in pelvis, compatible with mild   fluid overload         XR CHEST PORTABLE   Final Result   Interval resolution of pulmonary edema. Fluid in the minor fissure, and   suspected bilateral pleural effusions. Left basilar airspace disease likely   atelectasis             Medications: All current and past medications were reviewed.      furosemide  40 mg IntraVENous Once    metoprolol tartrate  12.5 mg Oral BID    amoxicillin-clavulanate  1 tablet Oral 2 times per day    lactobacillus  1 capsule Oral BID WC    [Held by provider] dilTIAZem  30 mg Oral 4 times per day    lidocaine 1 % injection  5 mL IntraDERmal Once    apixaban  2.5 mg Oral BID    QUEtiapine  50 mg Oral Nightly    sodium chloride flush  5-40 mL IntraVENous 2 times per day    Vitamin D  2 tablet Oral Daily        sodium chloride 25 mL (11/12/21 5077)       LORazepam, magnesium sulfate, potassium chloride, ondansetron, sodium chloride flush, sodium chloride      Problem list:       Patient Active Problem List   Diagnosis Code    CAD (coronary artery disease) I25.10    Essential hypertension I10    ROBYN (obstructive sleep apnea) G47.33    Hypothyroid E03.9    Hyperlipidemia E78.5    Depression F32. A    GI bleeding K92.2    Acute blood loss anemia D62    Acute GI bleeding K92.2    Atrial fibrillation with RVR (HCC) I48.91    Severe sepsis (HCC) A41.9, R65.20    E coli bacteremia R78.81, B96.20    Supratherapeutic INR R79.1    Hyperkalemia E87.5    Normocytic normochromic anemia D64.9    Hyponatremia E87.1    Bilateral pleural effusion J90    Diverticulosis K57.90    Anasarca R60.1    Overweight (BMI 25.0-29. 9) E66.3    High anion gap metabolic acidosis F84.7    Elevated brain natriuretic peptide (BNP) level R79.89    JULIO (acute kidney injury) ([facility] [address] N17.9    Hypokalemia E87.6    Hypernatremia E87.0       Please note that this chart was generated using Dragon dictation software. Although every effort was made to ensure the accuracy of this automated transcription, some errors in transcription may have occurred inadvertently. If you may need any clarification, please do not hesitate to contact me through EPIC or at the phone number provided below with my electronic signature. Any pictures or media included in this note were obtained after taking informed verbal consent from the patient and with their approval to include those in the patient's medical record.     Brody Reyes MD, MPH  11/15/2021 , 1:34 PM   [facility] Infectious Disease   [address] [place] [address], [address] [place] [address]  Office: [phone]  Fax: [phone]  Clinic days:  Tuesday & Thursday

## 2021-11-15 NOTE — PROGRESS NOTES
PALLIATIVE MEDICINE PROGRESS NOTE     Patient name:Venessa Yu    KVN:[id number] :1935  Room/Bed:N-5581/5581-01    LOS: 14 days        ASSESSMENT/RECOMMENDATIONS     82 y.o. female with Hypoxia and AMS        Symptom Management:  Hypoxia- pt on 10L declining rapidly    AMS- Pt More lethargic today and increased confusion  Goals of Care- code updated to [facility]. Plan for ECF at [place]. Talked to daughter Ariana Ramesh about pts decline and poor prognosis discussed recommendation for Hospice care. She will call family and call me back.      Patient/Family Goals of Care :    Educated patient and family on CODE STATUS: Although in some cases it does save lives, CPR (cardiopulmonary resuscitation) frequently is not successful or does not benefit those who receive it, especially elderly people or those with serious medical conditions. Even if revived, the person can be left with painful injuries, or in a debilitated state, or with brain damage resulting from oxygen deprivation. Resuscitation can involve such things as drugs, forcefully pressing on the chest, giving electric shocks to restart the heart or placing a tube down the nose or throat to provide artificial breathing. People with terminal illnesses or other serious health conditions may prefer not to be resuscitated.   [address] has established two standardized DNR orders. DNR Comfort Care-Arrest Order you will receive all the appropriate medical treatment, including resuscitation, until the patient has a cardiac arrest (heart has stopped beating) or pulmonary arrest (breathing has stopped), at which point comfort care will be provided. DNR Comfort Care Order [facility]), a patient chooses other measures such as drugs to correct abnormal heart rhythms. With this order, comfort care or other requested treatment is provided at a point before the heart or breathing stops.  Comfort care involves keeping the patient comfortable with pain medication and providing palliative (supportive medical) care. A DNR-CC does not mean do not treat.       Talked to spouse Braden Coleman who asked me to talk to daughter Clifford Acuña who is a Hospice RN, plan for short stay in rehab then if pt needs long term care will move pt to [place] to be near her children. We discussed code status and daughter feel like [facility] aligns with pts wishes but wants to talk it over with spouse and siblings. 11/11  Talked to spouse Braden Coleman he is agreeable to [facility]. Family working toward finding and SNF. Pt is stable currently    11/15  Talked to daughter Clifford Acuña she understands pts poor prognosis. She is calling family and will call me back about Hospice care. They have decided on [facility] and are hoping for IPU     Disposition/Discharge Plan:   pending     Advance Directives:  Surrogate Decision Maker: Giuseppe-spouse  Code status:  Full Code     Case discussed with: patient, floor RN, Clifford Acuña- daughter  Thank you for allowing us to participate in the care of this patient. SUBJECTIVE     Chief Complaint: AMS    Last 24 hours:   Pt continues to be confused today and disoriented. ROS:  Review of Systems -   History obtained from chart review and unobtainable from patient due to mental status      Patient unable to complete full ROS due to current cognitive status. Information that is obtained from nursing and chart. OBJECTIVE   BP (!) 103/51   Pulse 89   Temp 95.5 °F (35.3 °C) (Temporal)   Resp 18   Ht 5' 5\" (1.651 m)   Wt 171 lb 8 oz (77.8 kg)   SpO2 94%   BMI 28.54 kg/m²   I/O last 3 completed shifts:  In: -   Out: 125 [Urine:125]  No intake/output data recorded.       Physical Examination:   General appearance - chronically ill appearing  Mental status - normal mood, behavior, speech, dress, motor activity, and thought processes, disoriented to place and time  Neck - supple, no significant adenopathy  Chest - no tachypnea, retractions or cyanosis  Abdomen - soft, nontender, nondistended, no masses or organomegaly  Musculoskeletal - no joint tenderness, deformity or swelling  Skin - normal coloration and turgor, no rashes, no suspicious skin lesions noted         Total time: 30 minutes  >50% of time spent counseling patient at bedside or POA/family member if applicable , reviewing information and discussing care, coordinating with care team       Signed By: Electronically signed by NAKUL Garza CNP on 11/15/2021 at 9:32 AM   Palliative Medicine   [phone]    November 15, 2021

## 2021-11-15 NOTE — CARE COORDINATION
CM noted palliative care note and HOC. CM faxed to [facility] [phone]: hospice consult order, H&P, palliative care note and demographics. CM called Eric Calixto with [facility] and informed information being faxed and that [facility] RN is here on the unit.     Jason Kong RN, BSN  [phone]

## 2021-11-15 NOTE — PROGRESS NOTES
[facility]    11/15/2021 11:06 AM        Name: Lito Allen . Admitted: 11/1/2021  Primary Care Provider: Rohith Phillip (Tel: [phone])                        Subjective: [place] Copping More lethargic more confused increased oxygen requirement. Declining    Reviewed interval ancillary notes    Current Medications  metoprolol tartrate (LOPRESSOR) tablet 12.5 mg, BID  LORazepam (ATIVAN) injection 0.5 mg, Q6H PRN  amoxicillin-clavulanate (AUGMENTIN) 875-125 MG per tablet 1 tablet, 2 times per day  lactobacillus (CULTURELLE) capsule 1 capsule, BID WC  magnesium sulfate 1000 mg in dextrose 5% 100 mL IVPB, PRN  potassium chloride 10 mEq/100 mL IVPB (Peripheral Line), PRN  ondansetron (ZOFRAN) injection 4 mg, Q6H PRN  [Held by provider] furosemide (LASIX) injection 20 mg, BID  [Held by provider] dilTIAZem (CARDIZEM) tablet 30 mg, 4 times per day  lidocaine PF 1 % injection 5 mL, Once  apixaban (ELIQUIS) tablet 2.5 mg, BID  QUEtiapine (SEROQUEL) tablet 50 mg, Nightly  sodium chloride flush 0.9 % injection 5-40 mL, 2 times per day  sodium chloride flush 0.9 % injection 5-40 mL, PRN  0.9 % sodium chloride infusion, PRN  Vitamin D (CHOLECALCIFEROL) tablet 2,000 Units, Daily        Objective:  BP (!) 103/51   Pulse 89   Temp 95.5 °F (35.3 °C) (Temporal)   Resp 18   Ht 5' 5\" (1.651 m)   Wt 171 lb 8 oz (77.8 kg)   SpO2 94%   BMI 28.54 kg/m²     Intake/Output Summary (Last 24 hours) at 11/15/2021 1106  Last data filed at 11/15/2021 0501  Gross per 24 hour   Intake    Output 125 ml   Net -125 ml      Wt Readings from Last 3 Encounters:   11/11/21 171 lb 8 oz (77.8 kg)   10/22/21 156 lb (70.8 kg)   11/16/18 147 lb (66.7 kg)       General appearance:  Appears comfortable  Eyes: Sclera clear. Pupils equal.  ENT: Moist oral mucosa.  Trachea midline, no adenopathy. Cardiovascular: Regular rhythm, normal S1, S2. No murmur. No edema in lower extremities  Respiratory: Not using accessory muscles. Good inspiratory effort. Clear to auscultation bilaterally, no wheeze or crackles. GI: Abdomen soft, no tenderness, not distended, normal bowel sounds  Musculoskeletal: No cyanosis in digits, neck supple  Neurology: CN 2-12 grossly intact. No speech or motor deficits  Psych: Confused only alert to person place skin: Warm, dry, normal turgor    Labs and Tests:  CBC:   Recent Labs     11/13/21  0625 11/14/21  0301 11/15/21  0502   WBC 10.6 10.5 10.0   HGB 8.0* 8.0* 8.2*    127* 118*     BMP:    Recent Labs     11/13/21  1730 11/14/21  0301 11/15/21  0502   * 149* 146*   K 3.6 3.7 3.9    108 103   CO2 33* 34* 34*   BUN 26* 27* 31*   CREATININE 1.5* 1.7* 2.1*   GLUCOSE 150* 128* 100*     Hepatic: No results for input(s): AST, ALT, ALB, BILITOT, ALKPHOS in the last 72 hours. Discussed care with family and patient             Spent 30  minutes with patient and family at bedside and on unit reviewing medical records and labs, spent greater than 50% time counseling patient and family on diagnosis and plan   Problem List  Active Problems:    Essential hypertension    ROBYN (obstructive sleep apnea)    Acute GI bleeding    Atrial fibrillation with RVR (HCC)    Severe sepsis (HCC)    E coli bacteremia    Supratherapeutic INR    Hyperkalemia    Normocytic normochromic anemia    Hyponatremia    Bilateral pleural effusion    Diverticulosis    Anasarca    Overweight (BMI 25.0-29. 9)    High anion gap metabolic acidosis    Elevated brain natriuretic peptide (BNP) level    JULIO (acute kidney injury) ([facility] [address]    Hypokalemia    Hypernatremia  Resolved Problems:    * No resolved hospital problems.  *       Assessment & Plan:   Acute hypoxic respiratory failure  -Likely aspiration pneumonia  -Patient worsening respiratory status this morning.  -Patient is now up to 10 L A. fib not controlled any mass  -Discussed with family and palliative care family agreeable to [address] consulted hopefully we can get her to inpatient unit          Diet: ADULT DIET; Dysphagia - Pureed; Mildly Thick (Nectar);  No Drinking Straws  ADULT ORAL NUTRITION SUPPLEMENT; Dinner; Frozen Oral Supplement  ADULT ORAL NUTRITION SUPPLEMENT; Lunch, Dinner; Fortified Pudding Oral Supplement  Code:DNR-CCA  DVT PPX lovenox       Jackquelyn Holstein, MD   11/15/2021 11:06 AM

## 2021-11-15 NOTE — PROGRESS NOTES
Speech  Language And Dysphagia Treatment Note    Name: Mark Andrews  : 1935  Medical Diagnosis: Septicemia ([facility] [address] [A41.9]  GI bleed [K92.2]  Acute GI bleeding [K92.2]  JULIO (acute kidney injury) ([facility] [address] [N17.9]  Blood loss anemia [D50.0]  Atrial fibrillation with RVR ([facility] [address] [I48.91]  Supratherapeutic INR [R79.1]  Acute cystitis without hematuria [N30.00]  Treatment Diagnosis: Oropharyngeal Dysphagia, Cognitive-Linguistic Deficits  Pain: Patient denies pain at this time. Patient's response to therapy:  Pt alert in bed on O2 via nasal cannula upon SLP arrival. SLP assisted patient in [facility] gown, as observed to only be wearing brief. Repositioned patient into upright positioning and elevated HOB for session. SLP notified RN regarding vital machine not consistently displaying vitals. Dysphagia Treatment:  Current Diet Level: Puree, Mildly thick, No straws, Meds in puree  Tolerance of Current Diet Level: RN reports patient tolerated meds in puree well this AM.     Assessment of Texture Tolerance:  Impressions: Patient observed with 1x congested coughing episode prior to any PO intake. Patient accepted limited trials of single ice chips and puree solids to assess oropharyngeal function. Oral phase characterized by reduced labial seal, with disordered manipulation of ice chips. Noted improved oral manipulation and AP propulsion with puree solids, with effective clearance. Mildly reduced hyolaryngeal elevation with both consistencies via manual palpation. Patient refused further PO trials. No overt s/s of aspiration with either texture this date. Patient is at a continued risk of aspiration due to respiratory status and symptoms of oropharyngeal dysphagia. Recommend continuation of puree solids and mildly thick liquids. Dysphagia Goals, addressed this date: 1. Pt will functionally tolerate recommended diet with no overt clinical s/s of aspiration (onoing 11/15/2021)  2. Pt will demonstrate understanding of aspiration risk and precautions via education/demonstration with occasional prompting (ongoing 11/15/2021)  3. Pt will advance to least restrictive diet as indicated (ongoing 11/15/2021)  4. If clinical s/s of aspiration/penetration continue to be noted, Pt will participate in Modified Barium Swallow Study (ongoing 11/15/2021)    Diet and Treatment Recommendations 11/15/2021:  Puree solids, Mildly (nectar) thick liquids, no straws, meds in puree as tolerated    Compensatory Strategies: Alternate solids/liquids , Effortful Swallows , Small bites/sips , Eat/feed slowly, Total Feed     Speech Language Treatment:  Impressions: Calm and cooperative throughout session. Patient disoriented, including to self, place, and temporal concepts. When asked patient to state name, patient said \"I don't know\". SLP provided choice of two options, however unsuccessful in cueing patient. Patient followed single-step commands with 75% accuracy. Provided appropriate verbal responses in approximately 60% of opportunities, however characterized by reduced utterance length. Will benefit from continued speech therapy focusing on cognitive-linguistic skills. Speech Language STG, addressed this date:  1. Pt will improve auditory processing/comprehension of commands and questions to 80%, via graded tasks. (ongoing 11/15/2021)   2. Pt will improve orientation and short term recall via graded tasks to 80%. (ongoing 11/15/2021)  3. Pt will improve verbal expression for functional expression via graded tasks to 80%. (ongoing 11/15/2021)  4. Pt will participate in ongoing cognitive assessment with goals to be established as indicated.  (ongoing 11/15/2021      Patient/Family Education:Education given to the Pt (no evidence of comprehension) and nurse, who verbalized understanding    Assessment: Patient progressing toward goals    Plan: Continue as per plan of care    Discharge Recommendations: Pt will benefit from continued skilled Speech Therapy for Speech and Dysphagia services, prior to returning home. Treatment time  Timed Code Treatment Minutes: 10 minutes  Total Treatment time: 25 minutes    If patient discharges prior to next session this note will serve as a discharge summary.       Signature:   Mignon Sinclair M.A., KRISHNA [id number]  Speech-Language Pathologist    -

## 2021-11-15 NOTE — PROGRESS NOTES
Shift assessment completed. Routine vitals stable. Patient is resting with her eyes closed, responds to voice. Not alert enough to administer PO meds. Respirations are easy and unlabored. Patient does not appear to be in distress. Call light within reach. Bed alarm engaged.

## 2021-11-15 NOTE — CARE COORDINATION
CM perfect served MD to place d/c order because pt is being transported to [facility] hospice at 5 to 5:30. Received message back ok.     Josue Elaine, RN, BSN  [phone]

## 2021-11-15 NOTE — PROGRESS NOTES
This RN was instructed by [facility] to leave midline, herron catheter, and PIV access for transport to inpatient hospice.

## 2021-11-18 NOTE — DISCHARGE SUMMARY
[facility] DISCHARGE SUMMARY    Patient Demographics    Patient. Giuseppe Cade  Date of Birth. 1935  MRN. [id number]     Primary care provider. Leo Orellana  (Tel: [phone])    Admit date: 11/1/2021    Discharge date (blank if same as Note Date): 11/15/2021  Note Date: 11/18/2021     Reason for Hospitalization. Chief Complaint   Patient presents with    Shortness of Breath     arrived via EMS d/t SOB. original call for EMS was for rectal bleeding. None noted via EMS. recent cauterization last week (pointing to abd); hx of quad bypass; BP 90/60; dark purple bruising noted to BUE; stated was on [facility] Course. This is a 71-year-old female who was admitted for acute blood loss anemia and renal failure uncontrolled A. fib. Patient was evaluated by GI patient needed blood transfusion patient was admitted to ICU for close monitoring patient underwent colonoscopy. Uncontrolled bleeding anticoagulation resumed. Patient during the course developed acute renal failure uncontrolled A. fib and worsening respiratory status secondary to aspiration pneumonia patient was also found to have bacteremia secondary to UTI. She was appropriately treated with antibiotics ID was consulted pulmonary was following cardiology was following. She continued to deteriorate despite all medical efforts. Worsening mentation started oxygen requirement. After detailed discussion with family patient was made DNR comfort care patient was discharged to inpatient hospice due to failed to respond and multiorgan failure    acute respiratory failure  aspiration pneumonia  acute blood loss anemia  acute renal failure  A. fib RVR  volume overload  UTI  recurrent GI bleed  CHF    Consults.   IP CONSULT TO HOSPITALIST  IP CONSULT TO GI  IP CONSULT TO SOCIAL WORK  IP CONSULT TO NEPHROLOGY  IP CONSULT TO CARDIOLOGY  IP CONSULT TO INFECTIOUS DISEASES  IP CONSULT TO CARDIOLOGY  IP CONSULT TO PULMONOLOGY  IP CONSULT TO PALLIATIVE CARE  IP CONSULT TO HOSPICE    Physical examination on discharge day. /68   Pulse 106   Temp 97.7 °F (36.5 °C) (Temporal)   Resp 18   Ht 5' 5\" (1.651 m)   Wt 171 lb 8 oz (77.8 kg)   SpO2 93%   BMI 28.54 kg/m²   General appearance. Confusion discharge  HEENT. Sclera clear. Moist mucus membranes. Cardiovascular. Regular rate and rhythm, normal S1, S2. No murmur. Respiratory. Not using accessory muscles. Clear to auscultation bilaterally, no wheeze. Gastrointestinal. Abdomen soft, non-tender, not distended, normal bowel sounds  Neurology. F unable to examine due to patient mentation  Extremities. No edema in lower extremities. Skin. Warm, dry, normal turgor    Condition at time of discharge stable     Medication instructions provided to patient at discharge.      Medication List      CHANGE how you take these medications    metoprolol succinate 50 MG extended release tablet  Commonly known as: TOPROL XL  Take 1 tablet by mouth 3 times daily  What changed: when to take this        CONTINUE taking these medications    atorvastatin 40 MG tablet  Commonly known as: LIPITOR     docusate sodium 100 MG capsule  Commonly known as: COLACE  Take 1 capsule by mouth daily     Eliquis 2.5 MG Tabs tablet  Generic drug: apixaban     ferrous sulfate 325 (65 Fe) MG tablet  Commonly known as: IRON 325  Take 1 tablet by mouth 2 times daily (with meals)     levothyroxine 112 MCG tablet  Commonly known as: SYNTHROID  Take 1 tablet by mouth every morning (before breakfast)     prednisoLONE sodium phosphate 1 % ophthalmic solution  Commonly known as: INFLAMASE FORTE     QUEtiapine 50 MG tablet  Commonly known as: SEROQUEL     therapeutic multivitamin-minerals tablet     vitamin D 50 MCG (2000 UT) Caps capsule        STOP taking these medications    amiodarone 400 MG tablet  Commonly known as: PACERONE     aspirin EC 81 MG EC tablet     furosemide 40 MG tablet  Commonly known as: LASIX            Discharge recommendations given to patient. Follow Up. pcp in 1 week   Disposition. Inpatient hospice  Activity. activity as tolerated  Diet: No diet orders on file      Spent 45  minutes in discharge process.     Signed:  Raul Kaiser MD     11/18/2021 6:15 PM

## 2022-05-06 NOTE — TELEPHONE ENCOUNTER
Patient saw Manual Dines while in the hospital and was told to make a follow up appt. She has a cardiologist , Dr Janell Allen that she is making an appt with .  Does she really need appt with [facility] if she is seeing her regular cardiologist? Handoff
